# Patient Record
Sex: MALE | Race: WHITE | NOT HISPANIC OR LATINO
[De-identification: names, ages, dates, MRNs, and addresses within clinical notes are randomized per-mention and may not be internally consistent; named-entity substitution may affect disease eponyms.]

---

## 2018-08-30 ENCOUNTER — APPOINTMENT (OUTPATIENT)
Dept: SURGICAL ONCOLOGY | Facility: CLINIC | Age: 83
End: 2018-08-30
Payer: MEDICARE

## 2018-08-30 VITALS
HEART RATE: 70 BPM | SYSTOLIC BLOOD PRESSURE: 107 MMHG | BODY MASS INDEX: 25.18 KG/M2 | WEIGHT: 170 LBS | OXYGEN SATURATION: 98 % | DIASTOLIC BLOOD PRESSURE: 67 MMHG | HEIGHT: 69 IN

## 2018-08-30 DIAGNOSIS — Z86.79 PERSONAL HISTORY OF OTHER DISEASES OF THE CIRCULATORY SYSTEM: ICD-10-CM

## 2018-08-30 DIAGNOSIS — Z87.898 PERSONAL HISTORY OF OTHER SPECIFIED CONDITIONS: ICD-10-CM

## 2018-08-30 DIAGNOSIS — Z80.52 FAMILY HISTORY OF MALIGNANT NEOPLASM OF BLADDER: ICD-10-CM

## 2018-08-30 DIAGNOSIS — Z80.3 FAMILY HISTORY OF MALIGNANT NEOPLASM OF BREAST: ICD-10-CM

## 2018-08-30 DIAGNOSIS — Z86.69 PERSONAL HISTORY OF OTHER DISEASES OF THE NERVOUS SYSTEM AND SENSE ORGANS: ICD-10-CM

## 2018-08-30 DIAGNOSIS — Z86.72 PERSONAL HISTORY OF THROMBOPHLEBITIS: ICD-10-CM

## 2018-08-30 DIAGNOSIS — Z87.19 PERSONAL HISTORY OF OTHER DISEASES OF THE DIGESTIVE SYSTEM: ICD-10-CM

## 2018-08-30 DIAGNOSIS — Z85.828 PERSONAL HISTORY OF OTHER MALIGNANT NEOPLASM OF SKIN: ICD-10-CM

## 2018-08-30 DIAGNOSIS — Z78.9 OTHER SPECIFIED HEALTH STATUS: ICD-10-CM

## 2018-08-30 PROCEDURE — 99205 OFFICE O/P NEW HI 60 MIN: CPT

## 2018-08-31 ENCOUNTER — RESULT REVIEW (OUTPATIENT)
Age: 83
End: 2018-08-31

## 2018-09-05 ENCOUNTER — OUTPATIENT (OUTPATIENT)
Dept: OUTPATIENT SERVICES | Facility: HOSPITAL | Age: 83
LOS: 1 days | End: 2018-09-05
Payer: MEDICARE

## 2018-09-05 VITALS
TEMPERATURE: 98 F | WEIGHT: 164.02 LBS | DIASTOLIC BLOOD PRESSURE: 68 MMHG | HEIGHT: 65 IN | HEART RATE: 56 BPM | SYSTOLIC BLOOD PRESSURE: 108 MMHG | RESPIRATION RATE: 17 BRPM

## 2018-09-05 DIAGNOSIS — Z98.890 OTHER SPECIFIED POSTPROCEDURAL STATES: Chronic | ICD-10-CM

## 2018-09-05 DIAGNOSIS — Z98.61 CORONARY ANGIOPLASTY STATUS: Chronic | ICD-10-CM

## 2018-09-05 DIAGNOSIS — C49.0 MALIGNANT NEOPLASM OF CONNECTIVE AND SOFT TISSUE OF HEAD, FACE AND NECK: ICD-10-CM

## 2018-09-05 DIAGNOSIS — Z98.41 CATARACT EXTRACTION STATUS, RIGHT EYE: Chronic | ICD-10-CM

## 2018-09-05 DIAGNOSIS — Z95.818 PRESENCE OF OTHER CARDIAC IMPLANTS AND GRAFTS: Chronic | ICD-10-CM

## 2018-09-05 DIAGNOSIS — C49.9 MALIGNANT NEOPLASM OF CONNECTIVE AND SOFT TISSUE, UNSPECIFIED: ICD-10-CM

## 2018-09-05 DIAGNOSIS — Z90.49 ACQUIRED ABSENCE OF OTHER SPECIFIED PARTS OF DIGESTIVE TRACT: Chronic | ICD-10-CM

## 2018-09-05 DIAGNOSIS — I25.10 ATHEROSCLEROTIC HEART DISEASE OF NATIVE CORONARY ARTERY WITHOUT ANGINA PECTORIS: ICD-10-CM

## 2018-09-05 DIAGNOSIS — C44.90 UNSPECIFIED MALIGNANT NEOPLASM OF SKIN, UNSPECIFIED: Chronic | ICD-10-CM

## 2018-09-05 DIAGNOSIS — K22.70 BARRETT'S ESOPHAGUS WITHOUT DYSPLASIA: ICD-10-CM

## 2018-09-05 DIAGNOSIS — E78.00 PURE HYPERCHOLESTEROLEMIA, UNSPECIFIED: ICD-10-CM

## 2018-09-05 DIAGNOSIS — Z87.09 PERSONAL HISTORY OF OTHER DISEASES OF THE RESPIRATORY SYSTEM: Chronic | ICD-10-CM

## 2018-09-05 LAB
BUN SERPL-MCNC: 22 MG/DL — SIGNIFICANT CHANGE UP (ref 7–23)
CALCIUM SERPL-MCNC: 9.3 MG/DL — SIGNIFICANT CHANGE UP (ref 8.4–10.5)
CHLORIDE SERPL-SCNC: 104 MMOL/L — SIGNIFICANT CHANGE UP (ref 98–107)
CO2 SERPL-SCNC: 25 MMOL/L — SIGNIFICANT CHANGE UP (ref 22–31)
CREAT SERPL-MCNC: 1.36 MG/DL — HIGH (ref 0.5–1.3)
GLUCOSE SERPL-MCNC: 101 MG/DL — HIGH (ref 70–99)
HCT VFR BLD CALC: 38 % — LOW (ref 39–50)
HGB BLD-MCNC: 12.7 G/DL — LOW (ref 13–17)
MCHC RBC-ENTMCNC: 33.4 % — SIGNIFICANT CHANGE UP (ref 32–36)
MCHC RBC-ENTMCNC: 34 PG — SIGNIFICANT CHANGE UP (ref 27–34)
MCV RBC AUTO: 101.9 FL — HIGH (ref 80–100)
NRBC # FLD: 0 — SIGNIFICANT CHANGE UP
PLATELET # BLD AUTO: 180 K/UL — SIGNIFICANT CHANGE UP (ref 150–400)
PMV BLD: 10.1 FL — SIGNIFICANT CHANGE UP (ref 7–13)
POTASSIUM SERPL-MCNC: 4.3 MMOL/L — SIGNIFICANT CHANGE UP (ref 3.5–5.3)
POTASSIUM SERPL-SCNC: 4.3 MMOL/L — SIGNIFICANT CHANGE UP (ref 3.5–5.3)
RBC # BLD: 3.73 M/UL — LOW (ref 4.2–5.8)
RBC # FLD: 11.8 % — SIGNIFICANT CHANGE UP (ref 10.3–14.5)
SODIUM SERPL-SCNC: 141 MMOL/L — SIGNIFICANT CHANGE UP (ref 135–145)
WBC # BLD: 5.04 K/UL — SIGNIFICANT CHANGE UP (ref 3.8–10.5)
WBC # FLD AUTO: 5.04 K/UL — SIGNIFICANT CHANGE UP (ref 3.8–10.5)

## 2018-09-05 PROCEDURE — 93010 ELECTROCARDIOGRAM REPORT: CPT

## 2018-09-05 RX ORDER — SODIUM CHLORIDE 9 MG/ML
1000 INJECTION, SOLUTION INTRAVENOUS
Qty: 0 | Refills: 0 | Status: DISCONTINUED | OUTPATIENT
Start: 2018-09-12 | End: 2018-09-27

## 2018-09-05 NOTE — H&P PST ADULT - PSH
Closed Fracture of Shoulder Blade (ICD9 811.00)  L 8 yrs ago  Fracture of Nose (ICD9 802.0)  40 yrs ago  H/O cataract extraction, right    Osteomyelitis (ICD9 730.20)  R lower ribs after fracture 1990's requiring ABX and surgery  S/P PTCA (percutaneous transluminal coronary angioplasty)  2009- with stent to LAD  Skin cancer    Status post placement of implantable loop recorder  implanted in 2014  removed in 2017 Closed Fracture of Shoulder Blade (ICD9 811.00)  L 8 yrs ago  Fracture of Nose (ICD9 802.0)  40 yrs ago  H/O cataract extraction, right    H/O pneumothorax  right lung s/p nail punctured right lung (30 years ago)  History of laparoscopic cholecystectomy  2010  Osteomyelitis (ICD9 730.20)  R lower ribs after fracture 1990's requiring ABX and surgery  S/P arthroscopy of shoulder  left  S/P PTCA (percutaneous transluminal coronary angioplasty)  2009- with stent to LAD  Skin cancer    Status post placement of implantable loop recorder  implanted in 2014  removed in 2017

## 2018-09-05 NOTE — H&P PST ADULT - PROBLEM SELECTOR PLAN 1
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  Preoperative instructions reviewed, pt verbalized understanding.  Lab results pending, EKG done.  Pt. instructed to obtain cardiac evaluation prior to surgery, please obtain last stress and Echo results for PST chart

## 2018-09-05 NOTE — H&P PST ADULT - PMH
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)    CAD (Coronary Artery Disease) (ICD9 414.00)  s/p stent to LAD 9/11/09  Cataract    Hypercholesteremia (ICD9 272.0)    Lung nodule  followed by annual CT Scan  Skin cancer  Basal, Squamous - treated surgically  Spinal stenosis    Syncope  (2016) as a result of Clopedigrel and seizure like jerking- stopped after Plavix was stopped 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

## 2018-09-05 NOTE — H&P PST ADULT - PROBLEM/PLAN-3
[facility]  [address]  [facility] [address]  Phone: [phone]           Patient Discharge Instructions   Our goal is to set you up for success. This packet includes information on your condition, medications, and your home care.  It will help you care for yourself to prevent having to return to the hospital.     Please ask your nurse if you have any questions.      There are many details to remember when preparing to leave the hospital. Here is what you will need to do:    1. Take your medicine. If you are prescribed medications, review your Medication List on the following pages. You may have new medications to  at the pharmacy and others that you'll need to stop taking. Review the instructions for how and when to take your medications. Talk with your doctor or nurses if you are unsure of what to do.     2. Go to your follow-up appointments. Specific follow-up information is listed in the following pages. Your may be contacted by a nurse or clinical provider about future appointments. Be sure we have all of the phone numbers to reach you. Please contact your provider's office if you are unable to make an appointment.     3. Watch for warning signs. Your doctor or nurse will give you detailed warning signs to watch for and when to call for assistance. These instructions may also include educational information about your condition. If you experience any of warning signs to your health, call your doctor.           [facility]  Unless otherwise directed by your provider, please   contact [facility], our nurse care line   that is available for 24/7 assistance.     [phone] (toll-free)     Registered nurses in the [facility]   provide: appointment scheduling, clinical advisement, health education, and other advisory services.                  ** Verify the list of medication(s) below is accurate and up to date. Carry this with you in case of  emergency. If your medications have changed, please notify your healthcare provider.             Medication List      START taking these medications        Additional Info                      amoxicillin-clavulanate 500-125mg 500-125 mg Tab   Commonly known as:  AUGMENTIN   Quantity:  14 tablet   Refills:  0   Dose:  1 tablet    Instructions:  Take 1 tablet (500 mg total) by mouth 2 (two) times daily.     Begin Date    AM    Noon    PM    Bedtime       ondansetron 4 MG tablet   Commonly known as:  ZOFRAN   Quantity:  28 tablet   Refills:  0   Dose:  4 mg    Instructions:  Take 1 tablet (4 mg total) by mouth every 6 (six) hours as needed for Nausea.     Begin Date    AM    Noon    PM    Bedtime         CHANGE how you take these medications        Additional Info                      * oxycodone 30 MG Tab   Commonly known as:  ROXICODONE   Refills:  0   Dose:  30 mg   What changed:  Another medication with the same name was added. Make sure you understand how and when to take each.    Last time this was given:  5 mg on 4/18/2017  5:42 AM   Instructions:  Take 30 mg by mouth every 8 (eight) hours.     Begin Date    AM    Noon    PM    Bedtime       * oxycodone 5 MG immediate release tablet   Commonly known as:  ROXICODONE   Quantity:  20 tablet   Refills:  0   Dose:  5 mg   What changed:  You were already taking a medication with the same name, and this prescription was added. Make sure you understand how and when to take each.    Last time this was given:  5 mg on 4/18/2017  5:42 AM   Instructions:  Take 1 tablet (5 mg total) by mouth every 6 (six) hours as needed for Pain.     Begin Date    AM    Noon    PM    Bedtime       * Notice:  This list has 2 medication(s) that are the same as other medications prescribed for you. Read the directions carefully, and ask your doctor or other care provider to review them with you.      CONTINUE taking these medications        Additional Info                      calcium-vitamin  D 250-100 mg-unit per tablet   Refills:  0   Dose:  2 tablet    Instructions:  Take 2 tablets by mouth 2 (two) times daily.     Begin Date    AM    Noon    PM    Bedtime       CHANTIX STARTING MONTH BOX 0.5 mg (11)- 1 mg (42) tablet   Refills:  0   Generic drug:  varenicline    Instructions:  as directed Orally 30 days     Begin Date    AM    Noon    PM    Bedtime       ciprofloxacin HCl 500 MG tablet   Commonly known as:  CIPRO   Quantity:  30 tablet   Refills:  6   Dose:  500 mg    Instructions:  Take 1 tablet (500 mg total) by mouth once daily.     Begin Date    AM    Noon    PM    Bedtime       diazePAM 10 MG Tab   Commonly known as:  VALIUM   Refills:  0   Dose:  10 mg    Last time this was given:  5 mg on 4/17/2017  9:32 PM   Instructions:  Take 10 mg by mouth 2 (two) times daily.     Begin Date    AM    Noon    PM    Bedtime       furosemide 40 MG tablet   Commonly known as:  LASIX   Quantity:  30 tablet   Refills:  11   Dose:  40 mg    Last time this was given:  40 mg on 4/17/2017  2:49 PM   Instructions:  Take 1 tablet (40 mg total) by mouth once daily.     Begin Date    AM    Noon    PM    Bedtime       * lactulose 20 gram/30 mL Soln   Commonly known as:  CHRONULAC   Quantity:  3000 mL   Refills:  11   Dose:  20 g    Instructions:  Take 30 mLs (20 g total) by mouth every 6 (six) hours as needed (titrate to have 2-3 bowle movements per day).     Begin Date    AM    Noon    PM    Bedtime       * lactulose 10 gram/15 mL solution   Commonly known as:  CHRONULAC   Refills:  0      Begin Date    AM    Noon    PM    Bedtime       morphine 30 MG 12 hr tablet   Commonly known as:  MS CONTIN   Refills:  0   Dose:  30 mg    Instructions:  Take 30 mg by mouth every 12 (twelve) hours.     Begin Date    AM    Noon    PM    Bedtime       neomycin-polymyxin-dexamethasone 3.5 mg/g-10,000 unit/g-0.1 % Oint   Commonly known as:  DEXACINE   Refills:  0    Instructions:  every 6 (six) hours.     Begin Date    AM    Noon    PM     Bedtime       nicotine 10 mg Crtg   Commonly known as:  NICOTROL   Quantity:  168 puff   Refills:  0   Dose:  1 puff    Instructions:  Inhale 1 puff into the lungs as needed. (6-16 cartridges daily as needed) inhaled     Begin Date    AM    Noon    PM    Bedtime       pantoprazole 40 MG tablet   Commonly known as:  PROTONIX   Quantity:  30 tablet   Refills:  11   Dose:  40 mg    Last time this was given:  40 mg on 4/17/2017  2:50 PM   Instructions:  Take 1 tablet (40 mg total) by mouth once daily.     Begin Date    AM    Noon    PM    Bedtime       rifAXIMin 550 mg Tab   Commonly known as:  XIFAXAN   Quantity:  60 tablet   Refills:  11   Dose:  550 mg    Last time this was given:  550 mg on 4/17/2017  9:32 PM   Instructions:  Take 1 tablet (550 mg total) by mouth 2 (two) times daily.     Begin Date    AM    Noon    PM    Bedtime       sodium,potassium,mag sulfates 17.5-3.13-1.6 gram Solr   Commonly known as:  SUPREP BOWEL PREP KIT   Quantity:  354 mL   Refills:  0   Dose:  1 kit    Instructions:  Take 1 kit by mouth as directed.     Begin Date    AM    Noon    PM    Bedtime       spironolactone 50 MG tablet   Commonly known as:  ALDACTONE   Quantity:  30 tablet   Refills:  11   Dose:  50 mg    Last time this was given:  50 mg on 4/17/2017  2:50 PM   Instructions:  Take 1 tablet (50 mg total) by mouth once daily.     Begin Date    AM    Noon    PM    Bedtime       VOLTAREN 1 % Gel   Refills:  3   Generic drug:  diclofenac sodium    Instructions:  apply (2G) by topical route 3 times every day to the affected area(s)     Begin Date    AM    Noon    PM    Bedtime       * Notice:  This list has 2 medication(s) that are the same as other medications prescribed for you. Read the directions carefully, and ask your doctor or other care provider to review them with you.         Where to Get Your Medications      You can get these medications from any pharmacy     Bring a paper prescription for each of these medications      amoxicillin-clavulanate 500-125mg 500-125 mg Tab    ondansetron 4 MG tablet    oxycodone 5 MG immediate release tablet                  Please bring to all follow up appointments:    1. A copy of your discharge instructions.  2. All medicines you are currently taking in their original bottles.  3. Identification and insurance card.    Please arrive 15 minutes ahead of scheduled appointment time.    Please call 24 hours in advance if you must reschedule your appointment and/or time.        Your Scheduled Appointments     Apr 25, 2017  1:00 PM CDT   Non-Fasting Lab with LAB, APPOINTMENT [place] [facility] ([facility])    [facility] [facility] [address]   [phone]            Apr 25, 2017  1:05 PM CDT   Urine with SPECIMEN, MAIN CAMPUS [facility] ([facility])    [address] [facility] [address]   [phone]            Apr 27, 2017 11:00 AM CDT   Established Patient Visit with Binta Castaneda MD   [facility] - Pulmonary Services ([facility] )    [address]  [place] [address]   [phone]            Apr 27, 2017  2:20 PM CDT   Established Patient with Sharyn Eaton NP   [facility] - Liver Transplant ([facility] )    [address]  [place] [address]   [phone]            May 05, 2017 11:30 AM CDT   Established Patient Visit with Keely Luna MD   [facility] - Nephrology ([facility] )    [facility]  [place] [id number] [phone]                  Admission Information     Date & Time Provider Department CSN    4/17/2017  7:25 AM Indira Oconnor MD [facility] [id number]      Care Providers     Provider Role Specialty Primary office phone    Indira Oconnor MD Attending Provider Gastroenterology [phone]    [facility] Diagnostic Provider Surgeon  -- Number not on file      Your Vitals Were     BP  "Pulse Temp Resp Height Weight    111/70 (BP Location: Left arm, Patient Position: Lying, BP Method: Automatic) 71 97.9 °F (36.6 °C) (Oral) 18 5' 9" (1.753 m) 70.3 kg (155 lb)    SpO2 BMI             96% 22.89 kg/m2         Recent Lab Values     No lab values to display.      Allergies as of 4/18/2017        Reactions    Adhesive Blisters      Advance Directives     An advance directive is a document which, in the event you are no longer able to make decisions for yourself, tells your healthcare team what kind of treatment you do or do not want to receive, or who you would like to make those decisions for you.  If you do not currently have an advance directive, [facility] encourages you to create one.  For more information call:  [phone]), [phone] ([phone]),  or log on to [email or web address].        Smoking Cessation     If you would like to quit smoking:   You may be eligible for free services if you are a [place] resident and started smoking cigarettes before September 1, 1988.  Call the Smoking Cessation Trust ([facility]) toll free at [phone] or [phone].   Call 9-691-QUIT-NOW if you do not meet the above criteria.   Contact us via email: [email or web address]   View our website for more information: [email or web address]        Language Assistance Services     ATTENTION: Language assistance services are available, free of charge. Please call [phone].      ATENCIÓN: Si habla español, tiene a sears disposición servicios gratuitos de asistencia lingüística. Llame al [phone].     CHÚ Ý: N?u b?n nói Ti?ng Vi?t, có các d?ch v? h? tr? ngôn ng? mi?n phí dành cho b?n. G?i s? [phone].         [facility] complies with applicable Federal civil rights laws and does not discriminate on the basis of race, color, national origin, age, disability, or sex.        " DISPLAY PLAN FREE TEXT

## 2018-09-05 NOTE — H&P PST ADULT - FAMILY HISTORY
Mother  Still living? No  Family history of bladder cancer, Age at diagnosis: Age Unknown     Father  Still living? No  Family history of premature CAD, Age at diagnosis: Age Unknown  Family history of pulmonary embolism, Age at diagnosis: Age Unknown

## 2018-09-05 NOTE — H&P PST ADULT - HISTORY OF PRESENT ILLNESS
83 y/o male with history of right scalp sarcoma presents to PAST today for presurgical evaluation.  He is scheduled for radical resection right scalp sarcoma on 9/12/18. 83 y/o male with history of right scalp sarcoma presents to PAST today for presurgical evaluation.  He has history of multiple skin cancers and had multiple excisions and Mohs Surgeries.  He is scheduled for radical resection right scalp sarcoma on 9/12/18.

## 2018-09-10 PROBLEM — Z80.52 FAMILY HISTORY OF MALIGNANT NEOPLASM OF URINARY BLADDER: Status: ACTIVE | Noted: 2018-08-30

## 2018-09-10 PROBLEM — Z86.79 HISTORY OF OTHER DISEASES OF THE CIRCULATORY SYSTEM, NOT ELSEWHERE CLASSIFIED: Status: RESOLVED | Noted: 2018-08-30 | Resolved: 2018-09-10

## 2018-09-10 PROBLEM — Z86.69 HISTORY OF CATARACT: Status: RESOLVED | Noted: 2018-08-30 | Resolved: 2018-09-10

## 2018-09-10 PROBLEM — Z87.898 HISTORY OF SEIZURES: Status: RESOLVED | Noted: 2018-08-30 | Resolved: 2018-09-10

## 2018-09-10 PROBLEM — Z86.72 HISTORY OF PHLEBITIS: Status: RESOLVED | Noted: 2018-08-30 | Resolved: 2018-09-10

## 2018-09-10 PROBLEM — Z86.69 HISTORY OF EYE PROBLEM: Status: RESOLVED | Noted: 2018-08-30 | Resolved: 2018-09-10

## 2018-09-10 PROBLEM — C49.9 MALIGNANT NEOPLASM OF CONNECTIVE AND SOFT TISSUE, UNSPECIFIED: Chronic | Status: ACTIVE | Noted: 2018-09-05

## 2018-09-10 PROBLEM — Z85.828 HISTORY OF SKIN CANCER: Status: RESOLVED | Noted: 2018-08-30 | Resolved: 2018-09-10

## 2018-09-10 PROBLEM — R91.1 SOLITARY PULMONARY NODULE: Chronic | Status: ACTIVE | Noted: 2018-09-05

## 2018-09-10 PROBLEM — M48.00 SPINAL STENOSIS, SITE UNSPECIFIED: Chronic | Status: ACTIVE | Noted: 2018-09-05

## 2018-09-10 PROBLEM — Z86.79 HISTORY OF VASCULAR DISORDER: Status: RESOLVED | Noted: 2018-08-30 | Resolved: 2018-09-10

## 2018-09-10 PROBLEM — Z78.9 CONSUMES ALCOHOL OCCASIONALLY: Status: ACTIVE | Noted: 2018-08-30

## 2018-09-10 PROBLEM — Z87.19 HISTORY OF HIATAL HERNIA: Status: RESOLVED | Noted: 2018-08-30 | Resolved: 2018-09-10

## 2018-09-10 PROBLEM — Z80.3 FAMILY HISTORY OF MALIGNANT NEOPLASM OF BREAST: Status: ACTIVE | Noted: 2018-08-30

## 2018-09-11 ENCOUNTER — TRANSCRIPTION ENCOUNTER (OUTPATIENT)
Age: 83
End: 2018-09-11

## 2018-09-11 NOTE — ASU PATIENT PROFILE, ADULT - PAIN SCALE PREFERRED, PROFILE
Identified patient 2 identifiers verified.  Patient made aware that there was no Antibiotice prescribed and that she may have to schedule an appointment to be seen
Pt is advising that she was to  Rx today, she will be picking it up tomorrow.  Also, needs to know if there was an antibiotic called in for her     Best contact # [phone]       Message received & copied from [facility]
Pt is expecting a call regarding a Rx for cough syrup to be picked up       Best contact # [phone]       Message received & copied from [facility] Castro
numerical 0-10

## 2018-09-11 NOTE — ASU PATIENT PROFILE, ADULT - PSH
Closed Fracture of Shoulder Blade (ICD9 811.00)  L 8 yrs ago  Fracture of Nose (ICD9 802.0)  40 yrs ago  H/O cataract extraction, right    H/O pneumothorax  right lung s/p nail punctured right lung (30 years ago)  History of laparoscopic cholecystectomy  2010  Osteomyelitis (ICD9 730.20)  R lower ribs after fracture 1990's requiring ABX and surgery  S/P arthroscopy of shoulder  left  S/P PTCA (percutaneous transluminal coronary angioplasty)  2009- with stent to LAD  Skin cancer    Status post placement of implantable loop recorder  implanted in 2014  removed in 2017

## 2018-09-11 NOTE — ASU PATIENT PROFILE, ADULT - PMH
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

## 2018-09-12 ENCOUNTER — RESULT REVIEW (OUTPATIENT)
Age: 83
End: 2018-09-12

## 2018-09-12 ENCOUNTER — OUTPATIENT (OUTPATIENT)
Dept: OUTPATIENT SERVICES | Facility: HOSPITAL | Age: 83
LOS: 1 days | Discharge: HOME | End: 2018-09-12
Payer: MEDICARE

## 2018-09-12 ENCOUNTER — APPOINTMENT (OUTPATIENT)
Dept: SURGICAL ONCOLOGY | Facility: HOSPITAL | Age: 83
End: 2018-09-12

## 2018-09-12 VITALS
SYSTOLIC BLOOD PRESSURE: 138 MMHG | RESPIRATION RATE: 17 BRPM | TEMPERATURE: 98 F | HEIGHT: 65 IN | WEIGHT: 164.02 LBS | OXYGEN SATURATION: 98 % | DIASTOLIC BLOOD PRESSURE: 71 MMHG | HEART RATE: 57 BPM

## 2018-09-12 VITALS
SYSTOLIC BLOOD PRESSURE: 146 MMHG | TEMPERATURE: 98 F | DIASTOLIC BLOOD PRESSURE: 73 MMHG | OXYGEN SATURATION: 96 % | RESPIRATION RATE: 12 BRPM | HEART RATE: 61 BPM

## 2018-09-12 DIAGNOSIS — Z98.890 OTHER SPECIFIED POSTPROCEDURAL STATES: Chronic | ICD-10-CM

## 2018-09-12 DIAGNOSIS — Z95.818 PRESENCE OF OTHER CARDIAC IMPLANTS AND GRAFTS: Chronic | ICD-10-CM

## 2018-09-12 DIAGNOSIS — Z98.61 CORONARY ANGIOPLASTY STATUS: Chronic | ICD-10-CM

## 2018-09-12 DIAGNOSIS — Z90.49 ACQUIRED ABSENCE OF OTHER SPECIFIED PARTS OF DIGESTIVE TRACT: Chronic | ICD-10-CM

## 2018-09-12 DIAGNOSIS — Z98.41 CATARACT EXTRACTION STATUS, RIGHT EYE: Chronic | ICD-10-CM

## 2018-09-12 DIAGNOSIS — C44.90 UNSPECIFIED MALIGNANT NEOPLASM OF SKIN, UNSPECIFIED: Chronic | ICD-10-CM

## 2018-09-12 DIAGNOSIS — Z87.09 PERSONAL HISTORY OF OTHER DISEASES OF THE RESPIRATORY SYSTEM: Chronic | ICD-10-CM

## 2018-09-12 DIAGNOSIS — C49.0 MALIGNANT NEOPLASM OF CONNECTIVE AND SOFT TISSUE OF HEAD, FACE AND NECK: ICD-10-CM

## 2018-09-12 PROCEDURE — 88331 PATH CONSLTJ SURG 1 BLK 1SPC: CPT | Mod: 26

## 2018-09-12 PROCEDURE — 88305 TISSUE EXAM BY PATHOLOGIST: CPT | Mod: 26

## 2018-09-12 PROCEDURE — 21016 RESECT FACE/SCALP TUM 2 CM/>: CPT

## 2018-09-12 PROCEDURE — 21015 RESECT FACE/SCALP TUM < 2 CM: CPT | Mod: 59

## 2018-09-12 PROCEDURE — 88341 IMHCHEM/IMCYTCHM EA ADD ANTB: CPT | Mod: 26

## 2018-09-12 PROCEDURE — 88342 IMHCHEM/IMCYTCHM 1ST ANTB: CPT | Mod: 26

## 2018-09-12 RX ORDER — ONDANSETRON 8 MG/1
4 TABLET, FILM COATED ORAL ONCE
Qty: 0 | Refills: 0 | Status: DISCONTINUED | OUTPATIENT
Start: 2018-09-12 | End: 2018-09-12

## 2018-09-12 RX ORDER — FENTANYL CITRATE 50 UG/ML
25 INJECTION INTRAVENOUS
Qty: 0 | Refills: 0 | Status: DISCONTINUED | OUTPATIENT
Start: 2018-09-12 | End: 2018-09-12

## 2018-09-12 RX ORDER — OXYCODONE HYDROCHLORIDE 5 MG/1
5 TABLET ORAL EVERY 4 HOURS
Qty: 0 | Refills: 0 | Status: DISCONTINUED | OUTPATIENT
Start: 2018-09-12 | End: 2018-09-12

## 2018-09-12 RX ORDER — OXYCODONE HYDROCHLORIDE 5 MG/1
5 TABLET ORAL ONCE
Qty: 0 | Refills: 0 | Status: DISCONTINUED | OUTPATIENT
Start: 2018-09-12 | End: 2018-09-12

## 2018-09-12 RX ORDER — OXYCODONE HYDROCHLORIDE 5 MG/1
1 TABLET ORAL
Qty: 12 | Refills: 0 | OUTPATIENT
Start: 2018-09-12

## 2018-09-12 RX ORDER — CEPHALEXIN 500 MG
1 CAPSULE ORAL
Qty: 21 | Refills: 0 | OUTPATIENT
Start: 2018-09-12 | End: 2018-09-18

## 2018-09-12 NOTE — ASU DISCHARGE PLAN (ADULT/PEDIATRIC). - MEDICATION SUMMARY - MEDICATIONS TO TAKE
I will START or STAY ON the medications listed below when I get home from the hospital:    oxyCODONE 5 mg oral tablet  -- 1 tab(s) by mouth every 4 to 6 hours, As Needed -Moderate to Severe pain MDD:6 tabs  -- Indication: For Postoperative pain control as needed    aspirin 81 mg oral tablet  -- 1 tab(s) by mouth once a day  -- Indication: For Home medication    simvastatin 20 mg oral tablet  -- 1 tab(s) by mouth once a day (at bedtime)  -- Indication: For Home medication    Keflex 500 mg oral capsule  -- 1 cap(s) by mouth every 8 hours   -- Finish all this medication unless otherwise directed by prescriber.    -- Indication: For Postoperative antibiotics for 7 days    Probiotic Formula oral capsule  -- 1 cap(s) by mouth once a day  -- Indication: For Home medication    pantoprazole 40 mg oral delayed release tablet  -- 1 tab(s) by mouth once a day  -- Indication: For Home medication    Folgard Rx 2.2 oral tablet  -- 1 tab(s) by mouth once a day  -- Indication: For Home medication    Vitamin D3 1000 intl units oral tablet  -- 1 tab(s) by mouth once a day  -- Indication: For Home medication

## 2018-09-12 NOTE — ASU DISCHARGE PLAN (ADULT/PEDIATRIC). - NOTIFY
Bleeding that does not stop Swelling that continues/Unable to Urinate/Inability to Tolerate Liquids or Foods/Fever greater than 101/Pain not relieved by Medications/Bleeding that does not stop/Persistent Nausea and Vomiting

## 2018-09-12 NOTE — ASU DISCHARGE PLAN (ADULT/PEDIATRIC). - ACTIVITY LEVEL
weight bearing as tolerated no heavy lifting/weight bearing as tolerated no heavy lifting/no sports/gym/weight bearing as tolerated/no exercise

## 2018-09-12 NOTE — ASU DISCHARGE PLAN (ADULT/PEDIATRIC). - PAIN
take Tylenol as needed for mild pain. Take prescribed oxycodone as needed for moderate to severe pain/prescription called to pharmacy

## 2018-09-12 NOTE — ASU DISCHARGE PLAN (ADULT/PEDIATRIC). - NURSING INSTRUCTIONS
DO NOT take any Tylenol (Acetaminophen) or narcotics containing Tylenol until after  12:30am. You received Tylenol during your operation and it can cause damage to your liver if too much is taken within a 24 hour time period.

## 2018-09-12 NOTE — BRIEF OPERATIVE NOTE - OPERATION/FINDINGS
Right scalp mass removed at the level of the periosteum.  See Plastics note for reconstruction portion Right scalp mass removed at the level of the periosteum (General Surgery)    Closure of defect with pericranial flaps & Split-thickness skin graft from Right Lateral Thigh to Scalp.

## 2018-09-27 ENCOUNTER — APPOINTMENT (OUTPATIENT)
Dept: SURGICAL ONCOLOGY | Facility: CLINIC | Age: 83
End: 2018-09-27
Payer: MEDICARE

## 2018-09-27 VITALS
HEART RATE: 61 BPM | BODY MASS INDEX: 24.88 KG/M2 | RESPIRATION RATE: 15 BRPM | OXYGEN SATURATION: 97 % | WEIGHT: 168 LBS | SYSTOLIC BLOOD PRESSURE: 106 MMHG | DIASTOLIC BLOOD PRESSURE: 67 MMHG | HEIGHT: 69 IN

## 2018-09-27 PROCEDURE — 99024 POSTOP FOLLOW-UP VISIT: CPT

## 2018-10-11 ENCOUNTER — APPOINTMENT (OUTPATIENT)
Dept: SURGICAL ONCOLOGY | Facility: CLINIC | Age: 83
End: 2018-10-11
Payer: MEDICARE

## 2018-10-11 VITALS
DIASTOLIC BLOOD PRESSURE: 78 MMHG | HEART RATE: 71 BPM | HEIGHT: 69 IN | BODY MASS INDEX: 25.18 KG/M2 | OXYGEN SATURATION: 98 % | SYSTOLIC BLOOD PRESSURE: 133 MMHG | WEIGHT: 170 LBS

## 2018-10-11 PROCEDURE — 99024 POSTOP FOLLOW-UP VISIT: CPT

## 2018-11-08 ENCOUNTER — APPOINTMENT (OUTPATIENT)
Dept: SURGICAL ONCOLOGY | Facility: CLINIC | Age: 83
End: 2018-11-08
Payer: MEDICARE

## 2018-11-08 VITALS
DIASTOLIC BLOOD PRESSURE: 61 MMHG | HEIGHT: 69 IN | RESPIRATION RATE: 14 BRPM | HEART RATE: 66 BPM | SYSTOLIC BLOOD PRESSURE: 99 MMHG

## 2018-11-08 PROCEDURE — 99024 POSTOP FOLLOW-UP VISIT: CPT

## 2018-11-27 ENCOUNTER — OUTPATIENT (OUTPATIENT)
Dept: OUTPATIENT SERVICES | Facility: HOSPITAL | Age: 83
LOS: 1 days | End: 2018-11-27

## 2018-11-27 VITALS
WEIGHT: 166.01 LBS | SYSTOLIC BLOOD PRESSURE: 110 MMHG | HEIGHT: 65.5 IN | DIASTOLIC BLOOD PRESSURE: 64 MMHG | TEMPERATURE: 98 F | RESPIRATION RATE: 14 BRPM | HEART RATE: 68 BPM

## 2018-11-27 DIAGNOSIS — Z98.890 OTHER SPECIFIED POSTPROCEDURAL STATES: Chronic | ICD-10-CM

## 2018-11-27 DIAGNOSIS — Z87.09 PERSONAL HISTORY OF OTHER DISEASES OF THE RESPIRATORY SYSTEM: Chronic | ICD-10-CM

## 2018-11-27 DIAGNOSIS — C49.0 MALIGNANT NEOPLASM OF CONNECTIVE AND SOFT TISSUE OF HEAD, FACE AND NECK: ICD-10-CM

## 2018-11-27 DIAGNOSIS — C44.90 UNSPECIFIED MALIGNANT NEOPLASM OF SKIN, UNSPECIFIED: Chronic | ICD-10-CM

## 2018-11-27 DIAGNOSIS — Z90.49 ACQUIRED ABSENCE OF OTHER SPECIFIED PARTS OF DIGESTIVE TRACT: Chronic | ICD-10-CM

## 2018-11-27 DIAGNOSIS — Z95.818 PRESENCE OF OTHER CARDIAC IMPLANTS AND GRAFTS: Chronic | ICD-10-CM

## 2018-11-27 DIAGNOSIS — Z98.61 CORONARY ANGIOPLASTY STATUS: Chronic | ICD-10-CM

## 2018-11-27 DIAGNOSIS — G47.33 OBSTRUCTIVE SLEEP APNEA (ADULT) (PEDIATRIC): ICD-10-CM

## 2018-11-27 DIAGNOSIS — C44.40 UNSPECIFIED MALIGNANT NEOPLASM OF SKIN OF SCALP AND NECK: ICD-10-CM

## 2018-11-27 DIAGNOSIS — Z98.41 CATARACT EXTRACTION STATUS, RIGHT EYE: Chronic | ICD-10-CM

## 2018-11-27 LAB
ALBUMIN SERPL ELPH-MCNC: 4.2 G/DL — SIGNIFICANT CHANGE UP (ref 3.3–5)
ALP SERPL-CCNC: 70 U/L — SIGNIFICANT CHANGE UP (ref 40–120)
ALT FLD-CCNC: 23 U/L — SIGNIFICANT CHANGE UP (ref 4–41)
AST SERPL-CCNC: 25 U/L — SIGNIFICANT CHANGE UP (ref 4–40)
BILIRUB SERPL-MCNC: 0.5 MG/DL — SIGNIFICANT CHANGE UP (ref 0.2–1.2)
BUN SERPL-MCNC: 24 MG/DL — HIGH (ref 7–23)
CALCIUM SERPL-MCNC: 9.2 MG/DL — SIGNIFICANT CHANGE UP (ref 8.4–10.5)
CHLORIDE SERPL-SCNC: 102 MMOL/L — SIGNIFICANT CHANGE UP (ref 98–107)
CO2 SERPL-SCNC: 27 MMOL/L — SIGNIFICANT CHANGE UP (ref 22–31)
CREAT SERPL-MCNC: 1.32 MG/DL — HIGH (ref 0.5–1.3)
GLUCOSE SERPL-MCNC: 88 MG/DL — SIGNIFICANT CHANGE UP (ref 70–99)
HCT VFR BLD CALC: 37.3 % — LOW (ref 39–50)
HGB BLD-MCNC: 12.2 G/DL — LOW (ref 13–17)
MCHC RBC-ENTMCNC: 32.7 % — SIGNIFICANT CHANGE UP (ref 32–36)
MCHC RBC-ENTMCNC: 33.6 PG — SIGNIFICANT CHANGE UP (ref 27–34)
MCV RBC AUTO: 102.8 FL — HIGH (ref 80–100)
NRBC # FLD: 0 — SIGNIFICANT CHANGE UP
PLATELET # BLD AUTO: 175 K/UL — SIGNIFICANT CHANGE UP (ref 150–400)
PMV BLD: 9.8 FL — SIGNIFICANT CHANGE UP (ref 7–13)
POTASSIUM SERPL-MCNC: 4.1 MMOL/L — SIGNIFICANT CHANGE UP (ref 3.5–5.3)
POTASSIUM SERPL-SCNC: 4.1 MMOL/L — SIGNIFICANT CHANGE UP (ref 3.5–5.3)
PROT SERPL-MCNC: 6.5 G/DL — SIGNIFICANT CHANGE UP (ref 6–8.3)
RBC # BLD: 3.63 M/UL — LOW (ref 4.2–5.8)
RBC # FLD: 11.9 % — SIGNIFICANT CHANGE UP (ref 10.3–14.5)
SODIUM SERPL-SCNC: 140 MMOL/L — SIGNIFICANT CHANGE UP (ref 135–145)
WBC # BLD: 4.28 K/UL — SIGNIFICANT CHANGE UP (ref 3.8–10.5)
WBC # FLD AUTO: 4.28 K/UL — SIGNIFICANT CHANGE UP (ref 3.8–10.5)

## 2018-11-27 RX ORDER — ASPIRIN/CALCIUM CARB/MAGNESIUM 324 MG
1 TABLET ORAL
Qty: 0 | Refills: 0 | COMMUNITY

## 2018-11-27 RX ORDER — L.ACIDOPH/B.ANIMALIS/B.LONGUM 15B CELL
1 CAPSULE ORAL
Qty: 0 | Refills: 0 | COMMUNITY

## 2018-11-27 RX ORDER — SODIUM CHLORIDE 9 MG/ML
1000 INJECTION, SOLUTION INTRAVENOUS
Qty: 0 | Refills: 0 | Status: DISCONTINUED | OUTPATIENT
Start: 2018-12-17 | End: 2019-01-01

## 2018-11-27 RX ORDER — PANTOPRAZOLE SODIUM 20 MG/1
1 TABLET, DELAYED RELEASE ORAL
Qty: 0 | Refills: 0 | COMMUNITY

## 2018-11-27 NOTE — H&P PST ADULT - HISTORY OF PRESENT ILLNESS
Pt. is an 82 yo male that has skin cancer in the scalp.  Pt. had surgery 9/2018.  There appears to be cancer remaining in the scalp.

## 2018-11-27 NOTE — H&P PST ADULT - PSH
Closed Fracture of Shoulder Blade (ICD9 811.00)  L 8 yrs ago  Fracture of Nose (ICD9 802.0)  40 yrs ago  H/O cataract extraction, right    H/O pneumothorax  right lung s/p nail punctured right lung (30 years ago)  History of laparoscopic cholecystectomy  2010  History of loop recorder  was removed in 2016/2017  Osteomyelitis (ICD9 730.20)  R lower ribs after fracture 1990's requiring ABX and surgery  S/P arthroscopy of shoulder  left  S/P PTCA (percutaneous transluminal coronary angioplasty)  2009- with stent to LAD  Skin cancer    Status post placement of implantable loop recorder  implanted in 2014  removed in 2017

## 2018-11-27 NOTE — H&P PST ADULT - PROBLEM SELECTOR PLAN 1
Pt. is scheduled for reexcision of scalp skin cancer 12/17/18.  Spoke with Anette benz NP in the Surgeon's office who confirmed a T&S is not warranted for the procedure after discussion with the Surgeon.

## 2018-12-16 ENCOUNTER — TRANSCRIPTION ENCOUNTER (OUTPATIENT)
Age: 83
End: 2018-12-16

## 2018-12-17 ENCOUNTER — OUTPATIENT (OUTPATIENT)
Dept: OUTPATIENT SERVICES | Facility: HOSPITAL | Age: 83
LOS: 1 days | Discharge: HOME | End: 2018-12-17
Payer: MEDICARE

## 2018-12-17 ENCOUNTER — APPOINTMENT (OUTPATIENT)
Dept: SURGICAL ONCOLOGY | Facility: HOSPITAL | Age: 83
End: 2018-12-17

## 2018-12-17 ENCOUNTER — RESULT REVIEW (OUTPATIENT)
Age: 83
End: 2018-12-17

## 2018-12-17 VITALS
RESPIRATION RATE: 16 BRPM | HEART RATE: 63 BPM | DIASTOLIC BLOOD PRESSURE: 65 MMHG | OXYGEN SATURATION: 100 % | SYSTOLIC BLOOD PRESSURE: 128 MMHG

## 2018-12-17 VITALS
SYSTOLIC BLOOD PRESSURE: 130 MMHG | HEIGHT: 65.5 IN | DIASTOLIC BLOOD PRESSURE: 70 MMHG | RESPIRATION RATE: 16 BRPM | WEIGHT: 166.01 LBS | TEMPERATURE: 98 F | HEART RATE: 58 BPM | OXYGEN SATURATION: 99 %

## 2018-12-17 DIAGNOSIS — Z98.890 OTHER SPECIFIED POSTPROCEDURAL STATES: Chronic | ICD-10-CM

## 2018-12-17 DIAGNOSIS — Z98.61 CORONARY ANGIOPLASTY STATUS: Chronic | ICD-10-CM

## 2018-12-17 DIAGNOSIS — Z95.818 PRESENCE OF OTHER CARDIAC IMPLANTS AND GRAFTS: Chronic | ICD-10-CM

## 2018-12-17 DIAGNOSIS — C49.0 MALIGNANT NEOPLASM OF CONNECTIVE AND SOFT TISSUE OF HEAD, FACE AND NECK: ICD-10-CM

## 2018-12-17 DIAGNOSIS — Z90.49 ACQUIRED ABSENCE OF OTHER SPECIFIED PARTS OF DIGESTIVE TRACT: Chronic | ICD-10-CM

## 2018-12-17 DIAGNOSIS — Z87.09 PERSONAL HISTORY OF OTHER DISEASES OF THE RESPIRATORY SYSTEM: Chronic | ICD-10-CM

## 2018-12-17 DIAGNOSIS — Z98.41 CATARACT EXTRACTION STATUS, RIGHT EYE: Chronic | ICD-10-CM

## 2018-12-17 DIAGNOSIS — C44.90 UNSPECIFIED MALIGNANT NEOPLASM OF SKIN, UNSPECIFIED: Chronic | ICD-10-CM

## 2018-12-17 PROCEDURE — 21016 RESECT FACE/SCALP TUM 2 CM/>: CPT

## 2018-12-17 PROCEDURE — 88342 IMHCHEM/IMCYTCHM 1ST ANTB: CPT | Mod: 26

## 2018-12-17 PROCEDURE — 88305 TISSUE EXAM BY PATHOLOGIST: CPT | Mod: 26

## 2018-12-17 PROCEDURE — 88341 IMHCHEM/IMCYTCHM EA ADD ANTB: CPT | Mod: 26

## 2018-12-17 RX ORDER — ONDANSETRON 8 MG/1
4 TABLET, FILM COATED ORAL ONCE
Qty: 0 | Refills: 0 | Status: DISCONTINUED | OUTPATIENT
Start: 2018-12-17 | End: 2018-12-17

## 2018-12-17 RX ORDER — OXYCODONE AND ACETAMINOPHEN 5; 325 MG/1; MG/1
1 TABLET ORAL
Qty: 20 | Refills: 0
Start: 2018-12-17 | End: 2018-12-21

## 2018-12-17 RX ORDER — SODIUM CHLORIDE 9 MG/ML
1000 INJECTION, SOLUTION INTRAVENOUS
Qty: 0 | Refills: 0 | Status: DISCONTINUED | OUTPATIENT
Start: 2018-12-17 | End: 2019-01-01

## 2018-12-17 RX ORDER — FENTANYL CITRATE 50 UG/ML
50 INJECTION INTRAVENOUS
Qty: 0 | Refills: 0 | Status: DISCONTINUED | OUTPATIENT
Start: 2018-12-17 | End: 2018-12-17

## 2018-12-17 RX ADMIN — SODIUM CHLORIDE 30 MILLILITER(S): 9 INJECTION, SOLUTION INTRAVENOUS at 06:42

## 2018-12-17 NOTE — ASU DISCHARGE PLAN (ADULT/PEDIATRIC). - MEDICATION SUMMARY - MEDICATIONS TO TAKE
I will START or STAY ON the medications listed below when I get home from the hospital:    Percocet 5/325 oral tablet  -- 1 tab(s) by mouth every 6 hours as needed for pain MDD:6  -- Caution federal law prohibits the transfer of this drug to any person other  than the person for whom it was prescribed.  May cause drowsiness.  Alcohol may intensify this effect.  Use care when operating dangerous machinery.  This prescription cannot be refilled.  This product contains acetaminophen.  Do not use  with any other product containing acetaminophen to prevent possible liver damage.  Using more of this medication than prescribed may cause serious breathing problems.    -- Indication: For Pain    aspirin 81 mg oral tablet  -- 1 tab(s) by mouth once a day in am  -- Indication: For Home medication    simvastatin 20 mg oral tablet  -- 1 tab(s) by mouth once a day (at bedtime)  -- Indication: For Home medication    pantoprazole 40 mg oral delayed release tablet  -- 1 tab(s) by mouth once a day in am  -- Indication: For Home medication    Folgard Rx 2.2 oral tablet  -- 1 tab(s) by mouth once a day  -- Indication: For Home medication    Vitamin D3 1000 intl units oral tablet  -- 1 tab(s) by mouth once a day  -- Indication: For Home medication I will START or STAY ON the medications listed below when I get home from the hospital:    Percocet 5/325 oral tablet  -- 1 tab(s) by mouth every 6 hours as needed for pain MDD:6  -- Caution federal law prohibits the transfer of this drug to any person other  than the person for whom it was prescribed.  May cause drowsiness.  Alcohol may intensify this effect.  Use care when operating dangerous machinery.  This prescription cannot be refilled.  This product contains acetaminophen.  Do not use  with any other product containing acetaminophen to prevent possible liver damage.  Using more of this medication than prescribed may cause serious breathing problems.    -- Indication: For pain    aspirin 81 mg oral tablet  -- 1 tab(s) by mouth once a day in am  -- Indication: For Home medication    simvastatin 20 mg oral tablet  -- 1 tab(s) by mouth once a day (at bedtime)  -- Indication: For Home medication    pantoprazole 40 mg oral delayed release tablet  -- 1 tab(s) by mouth once a day in am  -- Indication: For Home medication    Folgard Rx 2.2 oral tablet  -- 1 tab(s) by mouth once a day  -- Indication: For Home medication    Vitamin D3 1000 intl units oral tablet  -- 1 tab(s) by mouth once a day  -- Indication: For Home medication

## 2018-12-17 NOTE — ASU DISCHARGE PLAN (ADULT/PEDIATRIC). - NURSING INSTRUCTIONS
call md for follow up appointment. Keep dressing dry and intact. Keep dressing on thigh.  Дмитрий mfor any increase in pain fever or unable to tolerate food or fluids

## 2018-12-17 NOTE — BRIEF OPERATIVE NOTE - PROCEDURE
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

## 2018-12-17 NOTE — BRIEF OPERATIVE NOTE - OPERATION/FINDINGS
Wide local excision from scalp, STSG from R thigh applied with bolster dressing.
3x9cm section of skin excised containing recurrent neoplasm and some of the prior placed skin graft  Plastics to do closure, in separate op note

## 2018-12-17 NOTE — ASU DISCHARGE PLAN (ADULT/PEDIATRIC). - NOTIFY
Fever greater than 101/Bleeding that does not stop/Persistent Nausea and Vomiting Increased Irritability or Sluggishness/Bleeding that does not stop/Fever greater than 101/Inability to Tolerate Liquids or Foods/Pain not relieved by Medications/Persistent Nausea and Vomiting

## 2018-12-17 NOTE — ASU DISCHARGE PLAN (ADULT/PEDIATRIC). - ITEMS TO FOLLOWUP WITH YOUR PHYSICIAN'S
Please keep the bolster head dressing in place and dry. Do not let soaked in the shower. This will be removed in office at your follow up appointment with plastic surgery. The thigh dressing is water protected, this dressing may ooze which is normal. You may reinforce with Tegaderm as needed.    Please follow up with your plastic surgeon, Dr. Myles within 1 week. You may call [phone] to schedule an appointment. Please follow up with Dr. Stevens within 2 weeks of your discharge from the hospital. You may call [phone] to schedule an appointment.

## 2018-12-17 NOTE — BRIEF OPERATIVE NOTE - PROCEDURE
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

## 2018-12-17 NOTE — ASU DISCHARGE PLAN (ADULT/PEDIATRIC). - SPECIAL INSTRUCTIONS
Please take Tylenol/Motrin for pain. You were given a script for narcotic pain control before your surgery, please take for breakthrough pain control.

## 2018-12-24 LAB — SURGICAL PATHOLOGY STUDY: SIGNIFICANT CHANGE UP

## 2019-01-03 ENCOUNTER — APPOINTMENT (OUTPATIENT)
Dept: SURGICAL ONCOLOGY | Facility: CLINIC | Age: 84
End: 2019-01-03
Payer: MEDICARE

## 2019-01-03 VITALS
OXYGEN SATURATION: 99 % | WEIGHT: 170 LBS | HEART RATE: 68 BPM | HEIGHT: 69 IN | DIASTOLIC BLOOD PRESSURE: 72 MMHG | BODY MASS INDEX: 25.18 KG/M2 | SYSTOLIC BLOOD PRESSURE: 118 MMHG

## 2019-01-03 PROCEDURE — 99024 POSTOP FOLLOW-UP VISIT: CPT

## 2019-01-08 NOTE — HISTORY OF PRESENT ILLNESS
[de-identified] : Patient is an 83 y/o male who presented with a raised lesion in the mid-frontal scalp.  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  He is referred for surgical management.\par Patient reports he has had this lesion for several years and does not have complaints of associated pain.  He has no previous history of sarcoma.\par \par 09/27/2018:  Patient is s/p radical resection of frontal scalp spindle cell neoplasm and biopsy of vertex scalp lesion.  Skin graft coverage by Dr. Myles.  Recovering well.  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  Additional deep margin shows spindle cell lesion felt to represent fibrosing granulation tissue.  Vertex scalp lesion demonstrates atypical spindle cell lesion, favoring atypical fibroxanthoma.\par \par 10/11/2018:  Improved.  Skin graft healing well.\par \par 11/08/2018:  Feels well.  Denies pain. Saw Dr. 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 Myles 12/17/2018.  Pathology shows minimal residual disease, margins negative.

## 2019-02-26 ENCOUNTER — RX CHANGE (OUTPATIENT)
Age: 84
End: 2019-02-26

## 2019-03-06 ENCOUNTER — APPOINTMENT (OUTPATIENT)
Dept: CARDIOLOGY | Facility: CLINIC | Age: 84
End: 2019-03-06
Payer: MEDICARE

## 2019-03-06 ENCOUNTER — APPOINTMENT (OUTPATIENT)
Dept: PULMONOLOGY | Facility: CLINIC | Age: 84
End: 2019-03-06
Payer: MEDICARE

## 2019-03-06 ENCOUNTER — NON-APPOINTMENT (OUTPATIENT)
Age: 84
End: 2019-03-06

## 2019-03-06 VITALS
BODY MASS INDEX: 23.99 KG/M2 | HEART RATE: 66 BPM | OXYGEN SATURATION: 97 % | DIASTOLIC BLOOD PRESSURE: 65 MMHG | SYSTOLIC BLOOD PRESSURE: 90 MMHG | HEIGHT: 69 IN | WEIGHT: 162 LBS | TEMPERATURE: 98.2 F

## 2019-03-06 VITALS — HEART RATE: 68 BPM | SYSTOLIC BLOOD PRESSURE: 109 MMHG | OXYGEN SATURATION: 98 % | DIASTOLIC BLOOD PRESSURE: 62 MMHG

## 2019-03-06 DIAGNOSIS — Z82.49 FAMILY HISTORY OF ISCHEMIC HEART DISEASE AND OTHER DISEASES OF THE CIRCULATORY SYSTEM: ICD-10-CM

## 2019-03-06 LAB
ALBUMIN SERPL ELPH-MCNC: 4.5 G/DL
ALP BLD-CCNC: 71 U/L
ALT SERPL-CCNC: 18 U/L
ANION GAP SERPL CALC-SCNC: 12 MMOL/L
AST SERPL-CCNC: 21 U/L
BASOPHILS # BLD AUTO: 0.03 K/UL
BASOPHILS NFR BLD AUTO: 0.6 %
BILIRUB SERPL-MCNC: 0.4 MG/DL
BUN SERPL-MCNC: 33 MG/DL
CALCIUM SERPL-MCNC: 10.1 MG/DL
CHLORIDE SERPL-SCNC: 105 MMOL/L
CHOLEST SERPL-MCNC: 122 MG/DL
CHOLEST/HDLC SERPL: 2.7 RATIO
CO2 SERPL-SCNC: 24 MMOL/L
CREAT SERPL-MCNC: 1.7 MG/DL
EOSINOPHIL # BLD AUTO: 0.04 K/UL
EOSINOPHIL NFR BLD AUTO: 0.8 %
GLUCOSE SERPL-MCNC: 108 MG/DL
HBA1C MFR BLD HPLC: 5.6 %
HCT VFR BLD CALC: 39.8 %
HDLC SERPL-MCNC: 46 MG/DL
HGB BLD-MCNC: 12.5 G/DL
IMM GRANULOCYTES NFR BLD AUTO: 0.2 %
LDLC SERPL CALC-MCNC: 60 MG/DL
LYMPHOCYTES # BLD AUTO: 1.06 K/UL
LYMPHOCYTES NFR BLD AUTO: 20.7 %
MAN DIFF?: NORMAL
MCHC RBC-ENTMCNC: 31.4 GM/DL
MCHC RBC-ENTMCNC: 32.4 PG
MCV RBC AUTO: 103.1 FL
MONOCYTES # BLD AUTO: 0.4 K/UL
MONOCYTES NFR BLD AUTO: 7.8 %
NEUTROPHILS # BLD AUTO: 3.58 K/UL
NEUTROPHILS NFR BLD AUTO: 69.9 %
PLATELET # BLD AUTO: 192 K/UL
POTASSIUM SERPL-SCNC: 5.3 MMOL/L
PROT SERPL-MCNC: 6.8 G/DL
RBC # BLD: 3.86 M/UL
RBC # FLD: 11.8 %
SODIUM SERPL-SCNC: 141 MMOL/L
TRIGL SERPL-MCNC: 80 MG/DL
TSH SERPL-ACNC: 2.01 UIU/ML
WBC # FLD AUTO: 5.12 K/UL

## 2019-03-06 PROCEDURE — 94727 GAS DIL/WSHOT DETER LNG VOL: CPT

## 2019-03-06 PROCEDURE — 93000 ELECTROCARDIOGRAM COMPLETE: CPT

## 2019-03-06 PROCEDURE — 94729 DIFFUSING CAPACITY: CPT

## 2019-03-06 PROCEDURE — 99203 OFFICE O/P NEW LOW 30 MIN: CPT

## 2019-03-06 PROCEDURE — 94060 EVALUATION OF WHEEZING: CPT

## 2019-03-06 PROCEDURE — 99213 OFFICE O/P EST LOW 20 MIN: CPT

## 2019-03-06 PROCEDURE — 99214 OFFICE O/P EST MOD 30 MIN: CPT | Mod: 25

## 2019-03-06 RX ORDER — ASPIRIN ENTERIC COATED TABLETS 81 MG 81 MG/1
81 TABLET, DELAYED RELEASE ORAL DAILY
Refills: 0 | Status: ACTIVE | COMMUNITY

## 2019-03-06 NOTE — PHYSICAL EXAM

## 2019-03-06 NOTE — PHYSICAL EXAM
[General Appearance - Well Developed] : well developed [General Appearance - Well Nourished] : well nourished [Normal Oropharynx] : normal oropharynx [Jugular Venous Distention Increased] : there was no jugular-venous distention [Auscultation Breath Sounds / Voice Sounds] : lungs were clear to auscultation bilaterally [Lungs Percussion] : the lungs were normal to percussion [Abdomen Soft] : soft [Abdomen Tenderness] : non-tender [Nail Clubbing] : no clubbing of the fingernails [Cyanosis, Localized] : no localized cyanosis [Petechial Hemorrhages (___cm)] : no petechial hemorrhages [] : no ischemic changes

## 2019-03-06 NOTE — PROCEDURE
[FreeTextEntry1] : CT noted no change.\par Pulmonary function testing.\par FEV1, FVC, and FEV1/FVC are within normal limits. There was not a significant response to inhaled bronchodilator. TLC and subdivisions are normal. RV/TLC ratio is normal. There is a mild diffusion impairment. Corrects to normal with lung volume correction \par PFT attached relatively stable function.\par

## 2019-03-06 NOTE — ASSESSMENT
[FreeTextEntry1] : Patient clinically and radiographically stable. Recommend repeat CAT scan in one year at which time he will followup

## 2019-03-06 NOTE — HISTORY OF PRESENT ILLNESS
[FreeTextEntry1] : Lesion on frontal scalp s/p radical resection in Jan 2019 [de-identified] : 83 year old male patient with hx of lung nodules, skin cancer, lumbosacral stenosis presents today for follow up lesion on scalp. Pt is concerned that the wound is not healing well. There are purulent discharge. Pt denied pain or swelling. pt with hx of cad

## 2019-03-06 NOTE — HISTORY OF PRESENT ILLNESS
[FreeTextEntry1] : Patient presently feeling well. Describes mild chronic cough which has not significantly changed and he relates predominantly to a postnasal drip\par \par No wheezing and baseline JOHN.

## 2019-03-09 LAB
ANION GAP SERPL CALC-SCNC: 11 MMOL/L
BUN SERPL-MCNC: 37 MG/DL
CALCIUM SERPL-MCNC: 9.6 MG/DL
CHLORIDE SERPL-SCNC: 105 MMOL/L
CO2 SERPL-SCNC: 23 MMOL/L
CREAT SERPL-MCNC: 1.64 MG/DL
GLUCOSE SERPL-MCNC: 85 MG/DL
POTASSIUM SERPL-SCNC: 4.6 MMOL/L
SODIUM SERPL-SCNC: 139 MMOL/L

## 2019-03-12 LAB — POCT - HEMOGLOBIN (HGB), QUANTITATIVE, TRANSCUTANEOUS: 12.8

## 2019-03-25 ENCOUNTER — LABORATORY RESULT (OUTPATIENT)
Age: 84
End: 2019-03-25

## 2019-03-26 ENCOUNTER — APPOINTMENT (OUTPATIENT)
Dept: DERMATOLOGY | Facility: CLINIC | Age: 84
End: 2019-03-26
Payer: MEDICARE

## 2019-03-26 VITALS — HEIGHT: 69 IN | BODY MASS INDEX: 23.99 KG/M2 | WEIGHT: 162 LBS

## 2019-03-26 PROCEDURE — 17000 DESTRUCT PREMALG LESION: CPT | Mod: 59

## 2019-03-26 PROCEDURE — 99204 OFFICE O/P NEW MOD 45 MIN: CPT | Mod: 25

## 2019-03-26 PROCEDURE — 17003 DESTRUCT PREMALG LES 2-14: CPT

## 2019-03-26 PROCEDURE — 11102 TANGNTL BX SKIN SINGLE LES: CPT

## 2019-04-02 ENCOUNTER — APPOINTMENT (OUTPATIENT)
Dept: SURGICAL ONCOLOGY | Facility: CLINIC | Age: 84
End: 2019-04-02
Payer: MEDICARE

## 2019-04-02 VITALS
SYSTOLIC BLOOD PRESSURE: 114 MMHG | OXYGEN SATURATION: 98 % | HEIGHT: 69 IN | DIASTOLIC BLOOD PRESSURE: 68 MMHG | HEART RATE: 72 BPM | WEIGHT: 170 LBS | BODY MASS INDEX: 25.18 KG/M2

## 2019-04-02 PROCEDURE — 99213 OFFICE O/P EST LOW 20 MIN: CPT

## 2019-04-02 NOTE — HISTORY OF PRESENT ILLNESS
[de-identified] : Patient is an 81 y/o male who presented with a raised lesion in the mid-frontal scalp.  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  He is referred for surgical management.\par Patient reports he has had this lesion for several years and does not have complaints of associated pain.  He has no previous history of sarcoma.\par \par 09/27/2018:  Patient is s/p radical resection of frontal scalp spindle cell neoplasm and biopsy of vertex scalp lesion.  Skin graft coverage by Dr. Myles.  Recovering well.  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  Additional deep margin shows spindle cell lesion felt to represent fibrosing granulation tissue.  Vertex scalp lesion demonstrates atypical spindle cell lesion, favoring atypical fibroxanthoma.\par \par 10/11/2018:  Improved.  Skin graft healing well.\par \par 11/08/2018:  Feels well.  Denies pain. Saw Dr. 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 Myles 12/17/2018.  Pathology shows minimal residual disease, margins negative.\par \par 04/02/2019:  Patient presents for 3 months follow up.  Feels well.  He states he is transferring dermatology care to Dr. Fitzpatrick of [facility].  Had benign right mid back biopsy performed last week by dermatology.

## 2019-04-02 NOTE — ASSESSMENT
[FreeTextEntry1] : Doing well.\par \par Plan:  Continue skin surveillance with dermatology.  Follow up exam in 6 months.

## 2019-04-02 NOTE — PHYSICAL EXAM
[FreeTextEntry1] : Scalp skin graft well healed.  No obvious tumor recurrence under graft.  Small area of residual scar at left edge of skin graft at 3 o'clock position (6 o'clock anterior). [Normal] : supple, no neck mass and thyroid not enlarged [Normal Neck Lymph Nodes] : normal neck lymph nodes  [Normal Supraclavicular Lymph Nodes] : normal supraclavicular lymph nodes [Normal Groin Lymph Nodes] : normal groin lymph nodes [Normal Axillary Lymph Nodes] : normal axillary lymph nodes [Normal] : oriented to person, place and time, with appropriate affect

## 2019-04-02 NOTE — REASON FOR VISIT
[Follow-Up Visit] : a follow-up visit for [FreeTextEntry2] : spindle cell and squamous cell neoplasm of scalp

## 2019-06-03 ENCOUNTER — LABORATORY RESULT (OUTPATIENT)
Age: 84
End: 2019-06-03

## 2019-06-04 ENCOUNTER — APPOINTMENT (OUTPATIENT)
Dept: DERMATOLOGY | Facility: CLINIC | Age: 84
End: 2019-06-04
Payer: MEDICARE

## 2019-06-04 PROCEDURE — 17003 DESTRUCT PREMALG LES 2-14: CPT

## 2019-06-04 PROCEDURE — 11102 TANGNTL BX SKIN SINGLE LES: CPT

## 2019-06-04 PROCEDURE — 99214 OFFICE O/P EST MOD 30 MIN: CPT | Mod: 25

## 2019-06-04 PROCEDURE — 17000 DESTRUCT PREMALG LESION: CPT | Mod: 59

## 2019-07-11 ENCOUNTER — RX RENEWAL (OUTPATIENT)
Age: 84
End: 2019-07-11

## 2019-07-30 ENCOUNTER — LABORATORY RESULT (OUTPATIENT)
Age: 84
End: 2019-07-30

## 2019-07-30 ENCOUNTER — NON-APPOINTMENT (OUTPATIENT)
Age: 84
End: 2019-07-30

## 2019-07-30 ENCOUNTER — APPOINTMENT (OUTPATIENT)
Dept: CARDIOLOGY | Facility: CLINIC | Age: 84
End: 2019-07-30
Payer: MEDICARE

## 2019-07-30 VITALS
HEART RATE: 69 BPM | WEIGHT: 163 LBS | HEIGHT: 69 IN | DIASTOLIC BLOOD PRESSURE: 60 MMHG | OXYGEN SATURATION: 98 % | TEMPERATURE: 98.1 F | SYSTOLIC BLOOD PRESSURE: 90 MMHG | BODY MASS INDEX: 24.14 KG/M2

## 2019-07-30 VITALS — SYSTOLIC BLOOD PRESSURE: 110 MMHG | DIASTOLIC BLOOD PRESSURE: 64 MMHG

## 2019-07-30 DIAGNOSIS — Z00.00 ENCOUNTER FOR GENERAL ADULT MEDICAL EXAMINATION W/OUT ABNORMAL FINDINGS: ICD-10-CM

## 2019-07-30 PROCEDURE — 93000 ELECTROCARDIOGRAM COMPLETE: CPT

## 2019-07-30 PROCEDURE — 99214 OFFICE O/P EST MOD 30 MIN: CPT

## 2019-07-30 NOTE — HISTORY OF PRESENT ILLNESS
[de-identified] : This is an 83 year old gentlemen with a PMH of CAD, HLD, Lung Nodule, Spinal Cell carcinoma and a Pancreatic Lesion. Patient states that he recently had a CT of the Abdomen and Pelvis which showed that the pancreatic lesion was unchanged from 2018. The CT also showed a new compression fracture at L3, which is most likely contributing to the back pain.Patient states that the back pain has been effecting his quality of life and would like a new referral to an orthopedist.  Patient denies dyspnea, palpitations, chest pain, nausea, vomiting, dizziness and lightheadedness.\par

## 2019-07-30 NOTE — PHYSICAL EXAM
[No Acute Distress] : no acute distress [Well Nourished] : well nourished [Well Developed] : well developed [Well-Appearing] : well-appearing [Normal Sclera/Conjunctiva] : normal sclera/conjunctiva [PERRL] : pupils equal round and reactive to light [Normal Outer Ear/Nose] : the outer ears and nose were normal in appearance [EOMI] : extraocular movements intact [No JVD] : no jugular venous distention [Normal Oropharynx] : the oropharynx was normal [No Lymphadenopathy] : no lymphadenopathy [Thyroid Normal, No Nodules] : the thyroid was normal and there were no nodules present [Supple] : supple [No Respiratory Distress] : no respiratory distress  [No Accessory Muscle Use] : no accessory muscle use [Clear to Auscultation] : lungs were clear to auscultation bilaterally [Normal Rate] : normal rate  [Regular Rhythm] : with a regular rhythm [No Murmur] : no murmur heard [Normal S1, S2] : normal S1 and S2 [No Carotid Bruits] : no carotid bruits [No Varicosities] : no varicosities [No Abdominal Bruit] : a ~M bruit was not heard ~T in the abdomen [Pedal Pulses Present] : the pedal pulses are present [No Edema] : there was no peripheral edema [No Palpable Aorta] : no palpable aorta [No Extremity Clubbing/Cyanosis] : no extremity clubbing/cyanosis [Soft] : abdomen soft [Non-distended] : non-distended [Non Tender] : non-tender [No Masses] : no abdominal mass palpated [No HSM] : no HSM [Normal Bowel Sounds] : normal bowel sounds [Normal Posterior Cervical Nodes] : no posterior cervical lymphadenopathy [Normal Anterior Cervical Nodes] : no anterior cervical lymphadenopathy [No CVA Tenderness] : no CVA  tenderness [No Spinal Tenderness] : no spinal tenderness [Grossly Normal Strength/Tone] : grossly normal strength/tone [No Joint Swelling] : no joint swelling [No Rash] : no rash [Coordination Grossly Intact] : coordination grossly intact [No Focal Deficits] : no focal deficits [Normal Gait] : normal gait [Deep Tendon Reflexes (DTR)] : deep tendon reflexes were 2+ and symmetric [Normal Affect] : the affect was normal [Normal Insight/Judgement] : insight and judgment were intact [de-identified] : excoriation scalp

## 2019-08-02 ENCOUNTER — APPOINTMENT (OUTPATIENT)
Dept: ORTHOPEDIC SURGERY | Facility: CLINIC | Age: 84
End: 2019-08-02
Payer: MEDICARE

## 2019-08-02 VITALS
WEIGHT: 160 LBS | SYSTOLIC BLOOD PRESSURE: 91 MMHG | BODY MASS INDEX: 24.25 KG/M2 | HEART RATE: 72 BPM | DIASTOLIC BLOOD PRESSURE: 52 MMHG | HEIGHT: 68 IN

## 2019-08-02 PROCEDURE — 72100 X-RAY EXAM L-S SPINE 2/3 VWS: CPT

## 2019-08-02 PROCEDURE — 99204 OFFICE O/P NEW MOD 45 MIN: CPT

## 2019-08-02 NOTE — ADDENDUM
[FreeTextEntry1] : This note was authored by Monique Byrnes working as a medical scribe for Dr. Ulises Enriquez. The note was reviewed, edited, and revised by Dr. Ulises Enriquez whom is in agreement with the exam findings, imaging findings, and treatment plan. 08/02/2019.

## 2019-08-02 NOTE — PHYSICAL EXAM
[UE/LE] : Sensory: Intact in bilateral upper & lower extremities [ALL] : dorsalis pedis, posterior tibial, femoral, popliteal, and radial 2+ and symmetric bilaterally [Normal] : Gait: normal [Poor Appearance] : well-appearing [Acute Distress] : not in acute distress [de-identified] : 5 out of 5 motor strength, sensation is intact and symmetrical full range of motion flexion extension and rotation, no palpatory tenderness full range of motion of hips knees shoulders and elbows (all four extremities), no atrophy, negative straight leg raise, no pathological reflexes, no swelling, normal ambulation, no apparent distress skin is intact, no palpable lymph nodes, no upper or lower extremity instability, alert and oriented x3 and normal mood. Normal finger-to nose test. \par Percussive tenderness over L3.  [de-identified] : AP/lat lumbar 08/02/2019:L3 and L1 fracture-reviewed with patient.

## 2019-08-02 NOTE — DISCUSSION/SUMMARY
[de-identified] : L1 and L3 fracture L3 ? new.\par We discussed all options. \par Brace and if no better Dr. Hinojosa for discussion kyphoplasty.\par All options discussed including rest, medicine, home exercise, acupuncture, Chiropractic care, Physical Therapy, Pain management, and last resort surgery. \par All questions were answered, all alternatives discussed and the patient is in complete agreement with that plan. Follow-up appointment as instructed. Any issues and the patient will call or come in sooner.

## 2019-08-02 NOTE — HISTORY OF PRESENT ILLNESS
[Stable] : stable [de-identified] : C/o low back pain - pt points to the area above the buttocks/small of the back x 4 years. \par Pain with walking and standing extended periods of time. \par Can walk up to one block. \par Recently worsened. \par Went on vacation in April and tripped while going up the steps - landed onto the outstretched hands, but did not fall directly onto the back. \par Patient referred himself for a CT scan on 07/22/2019. \par CT showed new compression fracture at the inferior endplate of the L3 vertebral body. \par Hx of prior compression fractures of L1 and L5. \par Treated with PT, chiropractic care, acupuncture and medication. \par No NSAIDs.\par No fever chills sweats nausea vomiting no bowel or bladder dysfunction, no recent weight loss or gain no night pain. This history is in addition to the intake form that I personally reviewed.

## 2019-08-04 LAB
25(OH)D3 SERPL-MCNC: 46.8 NG/ML
ALBUMIN SERPL ELPH-MCNC: 4.3 G/DL
ALP BLD-CCNC: 70 U/L
ALT SERPL-CCNC: 14 U/L
ANION GAP SERPL CALC-SCNC: 12 MMOL/L
AST SERPL-CCNC: 18 U/L
BASOPHILS # BLD AUTO: 0.03 K/UL
BASOPHILS NFR BLD AUTO: 0.7 %
BILIRUB DIRECT SERPL-MCNC: 0.1 MG/DL
BILIRUB INDIRECT SERPL-MCNC: 0.2 MG/DL
BILIRUB SERPL-MCNC: 0.3 MG/DL
BUN SERPL-MCNC: 20 MG/DL
CALCIUM SERPL-MCNC: 9.4 MG/DL
CHLORIDE SERPL-SCNC: 107 MMOL/L
CHOLEST SERPL-MCNC: 118 MG/DL
CHOLEST/HDLC SERPL: 2.6 RATIO
CK SERPL-CCNC: 69 U/L
CO2 SERPL-SCNC: 24 MMOL/L
CREAT SERPL-MCNC: 1.41 MG/DL
EOSINOPHIL # BLD AUTO: 0.1 K/UL
EOSINOPHIL NFR BLD AUTO: 2.3 %
ESTIMATED AVERAGE GLUCOSE: 120 MG/DL
GLUCOSE SERPL-MCNC: 88 MG/DL
HBA1C MFR BLD HPLC: 5.8 %
HCT VFR BLD CALC: 37.3 %
HDLC SERPL-MCNC: 45 MG/DL
HGB BLD-MCNC: 12 G/DL
IMM GRANULOCYTES NFR BLD AUTO: 0.2 %
LDLC SERPL CALC-MCNC: 56 MG/DL
LYMPHOCYTES # BLD AUTO: 1.19 K/UL
LYMPHOCYTES NFR BLD AUTO: 27.4 %
MAGNESIUM SERPL-MCNC: 1.6 MG/DL
MAN DIFF?: NORMAL
MCHC RBC-ENTMCNC: 32.2 GM/DL
MCHC RBC-ENTMCNC: 33.1 PG
MCV RBC AUTO: 102.8 FL
MONOCYTES # BLD AUTO: 0.39 K/UL
MONOCYTES NFR BLD AUTO: 9 %
NEUTROPHILS # BLD AUTO: 2.62 K/UL
NEUTROPHILS NFR BLD AUTO: 60.4 %
PHOSPHATE SERPL-MCNC: 2.1 MG/DL
PLATELET # BLD AUTO: 182 K/UL
POTASSIUM SERPL-SCNC: 4.8 MMOL/L
PROT SERPL-MCNC: 6.1 G/DL
PSA FREE SERPL-MCNC: 0.54 NG/ML
RBC # BLD: 3.63 M/UL
RBC # FLD: 12.5 %
SODIUM SERPL-SCNC: 143 MMOL/L
T3RU NFR SERPL: 1 TBI
T4 FREE SERPL-MCNC: 1.1 NG/DL
T4 SERPL-MCNC: 4.8 UG/DL
TRIGL SERPL-MCNC: 84 MG/DL
TSH SERPL-ACNC: 2.6 UIU/ML
URATE SERPL-MCNC: 6.5 MG/DL
WBC # FLD AUTO: 4.34 K/UL

## 2019-08-05 RX ORDER — ADHESIVE TAPE 3"X 2.3 YD
50 MCG TAPE, NON-MEDICATED TOPICAL
Qty: 90 | Refills: 2 | Status: ACTIVE | COMMUNITY
Start: 2019-08-05 | End: 1900-01-01

## 2019-08-05 RX ORDER — IBUPROFEN 200 MG
600 CAPSULE ORAL DAILY
Qty: 60 | Refills: 0 | Status: ACTIVE | COMMUNITY
Start: 2019-08-05 | End: 1900-01-01

## 2019-08-17 ENCOUNTER — MEDICATION RENEWAL (OUTPATIENT)
Age: 84
End: 2019-08-17

## 2019-08-27 ENCOUNTER — APPOINTMENT (OUTPATIENT)
Dept: DERMATOLOGY | Facility: CLINIC | Age: 84
End: 2019-08-27
Payer: MEDICARE

## 2019-08-27 ENCOUNTER — LABORATORY RESULT (OUTPATIENT)
Age: 84
End: 2019-08-27

## 2019-08-27 DIAGNOSIS — Z86.03 PERSONAL HISTORY OF NEOPLASM OF UNCERTAIN BEHAVIOR: ICD-10-CM

## 2019-08-27 DIAGNOSIS — Z12.83 ENCOUNTER FOR SCREENING FOR MALIGNANT NEOPLASM OF SKIN: ICD-10-CM

## 2019-08-27 DIAGNOSIS — D48.5 NEOPLASM OF UNCERTAIN BEHAVIOR OF SKIN: ICD-10-CM

## 2019-08-27 PROCEDURE — 11103 TANGNTL BX SKIN EA SEP/ADDL: CPT

## 2019-08-27 PROCEDURE — 11102 TANGNTL BX SKIN SINGLE LES: CPT

## 2019-08-27 PROCEDURE — 17003 DESTRUCT PREMALG LES 2-14: CPT

## 2019-08-27 PROCEDURE — 17000 DESTRUCT PREMALG LESION: CPT | Mod: 59

## 2019-08-27 PROCEDURE — 99214 OFFICE O/P EST MOD 30 MIN: CPT | Mod: 25

## 2019-09-27 ENCOUNTER — LABORATORY RESULT (OUTPATIENT)
Age: 84
End: 2019-09-27

## 2019-09-27 ENCOUNTER — APPOINTMENT (OUTPATIENT)
Dept: DERMATOLOGY | Facility: CLINIC | Age: 84
End: 2019-09-27
Payer: MEDICARE

## 2019-09-27 PROCEDURE — 11602 EXC TR-EXT MAL+MARG 1.1-2 CM: CPT

## 2019-09-27 PROCEDURE — 11102 TANGNTL BX SKIN SINGLE LES: CPT | Mod: 59

## 2019-09-27 PROCEDURE — 12032 INTMD RPR S/A/T/EXT 2.6-7.5: CPT | Mod: 59

## 2019-10-01 ENCOUNTER — APPOINTMENT (OUTPATIENT)
Dept: SURGICAL ONCOLOGY | Facility: CLINIC | Age: 84
End: 2019-10-01
Payer: MEDICARE

## 2019-10-01 VITALS
HEART RATE: 65 BPM | SYSTOLIC BLOOD PRESSURE: 95 MMHG | HEIGHT: 68 IN | WEIGHT: 160 LBS | DIASTOLIC BLOOD PRESSURE: 60 MMHG | OXYGEN SATURATION: 98 % | BODY MASS INDEX: 24.25 KG/M2 | RESPIRATION RATE: 14 BRPM

## 2019-10-01 PROCEDURE — 99213 OFFICE O/P EST LOW 20 MIN: CPT

## 2019-10-01 NOTE — ASSESSMENT
[FreeTextEntry1] : Await pathology to determine need for additional surgery.\par If negative no surgical excision needed, may follow up in 3 months.

## 2019-10-01 NOTE — PHYSICAL EXAM
[FreeTextEntry1] : Scalp skin graft well healed. Biopsy sites along mid posterior aspect of skin graft. [Normal] : supple, no neck mass and thyroid not enlarged [Normal Neck Lymph Nodes] : normal neck lymph nodes  [Normal Supraclavicular Lymph Nodes] : normal supraclavicular lymph nodes [Normal Groin Lymph Nodes] : normal groin lymph nodes [Normal Axillary Lymph Nodes] : normal axillary lymph nodes [Normal] : oriented to person, place and time, with appropriate affect

## 2019-10-01 NOTE — HISTORY OF PRESENT ILLNESS
[de-identified] : Patient is an 81 y/o male who presented with a raised lesion in the mid-frontal scalp.  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  He is referred for surgical management.\par Patient reports he has had this lesion for several years and does not have complaints of associated pain.  He has no previous history of sarcoma.\par \par 09/27/2018:  Patient is s/p radical resection of frontal scalp spindle cell neoplasm and biopsy of vertex scalp lesion.  Skin graft coverage by Dr. Myles.  Recovering well.  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  Additional deep margin shows spindle cell lesion felt to represent fibrosing granulation tissue.  Vertex scalp lesion demonstrates atypical spindle cell lesion, favoring atypical fibroxanthoma.\par \par 10/11/2018:  Improved.  Skin graft healing well.\par \par 11/08/2018:  Feels well.  Denies pain. Saw Dr. 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 Myles 12/17/2018.  Pathology shows minimal residual disease, margins negative.\par \par 04/02/2019:  Patient presents for 3 months follow up.  Feels well.  He states he is transferring dermatology care to Dr. Fitzpatrick of [facility].  Had benign right mid back biopsy performed last week by dermatology.\par \par 10/01/2019: Patient recently developed firm nodule at the posterior edge of his scalp skin graft.  Biopsy performed 08/27/2019 demonstrated atypical epithelioid cells for which an underlying neoplasm could not be ruled out.  He is s/p deeper biopsy by dermatology last week for which results are pending.  He offers no new complaints.

## 2019-10-04 ENCOUNTER — APPOINTMENT (OUTPATIENT)
Dept: DERMATOLOGY | Facility: CLINIC | Age: 84
End: 2019-10-04
Payer: MEDICARE

## 2019-10-04 DIAGNOSIS — Z48.02 ENCOUNTER FOR REMOVAL OF SUTURES: ICD-10-CM

## 2019-10-04 PROCEDURE — 99024 POSTOP FOLLOW-UP VISIT: CPT

## 2019-10-08 ENCOUNTER — MEDICATION RENEWAL (OUTPATIENT)
Age: 84
End: 2019-10-08

## 2019-10-08 RX ORDER — CYANOCOBALAMIN/FOLIC AC/VIT B6 1-2.2-25MG
2.2-25-1 TABLET ORAL
Refills: 0 | Status: DISCONTINUED | COMMUNITY
End: 2019-10-08

## 2019-11-09 ENCOUNTER — RX RENEWAL (OUTPATIENT)
Age: 84
End: 2019-11-09

## 2019-11-25 ENCOUNTER — APPOINTMENT (OUTPATIENT)
Dept: CARDIOLOGY | Facility: CLINIC | Age: 84
End: 2019-11-25
Payer: MEDICARE

## 2019-11-25 ENCOUNTER — NON-APPOINTMENT (OUTPATIENT)
Age: 84
End: 2019-11-25

## 2019-11-25 VITALS
OXYGEN SATURATION: 99 % | HEART RATE: 64 BPM | HEIGHT: 68 IN | TEMPERATURE: 98.5 F | BODY MASS INDEX: 24.25 KG/M2 | WEIGHT: 160 LBS | SYSTOLIC BLOOD PRESSURE: 100 MMHG | DIASTOLIC BLOOD PRESSURE: 60 MMHG

## 2019-11-25 DIAGNOSIS — G47.62 SLEEP RELATED LEG CRAMPS: ICD-10-CM

## 2019-11-25 PROCEDURE — G0008: CPT

## 2019-11-25 PROCEDURE — 99214 OFFICE O/P EST MOD 30 MIN: CPT

## 2019-11-25 PROCEDURE — 90662 IIV NO PRSV INCREASED AG IM: CPT

## 2019-11-25 PROCEDURE — 93000 ELECTROCARDIOGRAM COMPLETE: CPT

## 2019-11-25 NOTE — PHYSICAL EXAM
[General Appearance - Well Developed] : well developed [Normal Appearance] : normal appearance [Well Groomed] : well groomed [General Appearance - In No Acute Distress] : no acute distress [No Deformities] : no deformities [General Appearance - Well Nourished] : well nourished [Normal Conjunctiva] : the conjunctiva exhibited no abnormalities [Eyelids - No Xanthelasma] : the eyelids demonstrated no xanthelasmas [Normal Oral Mucosa] : normal oral mucosa [No Oral Pallor] : no oral pallor [No Oral Cyanosis] : no oral cyanosis [Normal Jugular Venous A Waves Present] : normal jugular venous A waves present [Normal Jugular Venous V Waves Present] : normal jugular venous V waves present [No Jugular Venous Mcgee A Waves] : no jugular venous mcgee A waves [Respiration, Rhythm And Depth] : normal respiratory rhythm and effort [Exaggerated Use Of Accessory Muscles For Inspiration] : no accessory muscle use [Auscultation Breath Sounds / Voice Sounds] : lungs were clear to auscultation bilaterally [Heart Rate And Rhythm] : heart rate and rhythm were normal [Heart Sounds] : normal S1 and S2 [Abdomen Soft] : soft [Murmurs] : no murmurs present [Abdomen Tenderness] : non-tender [Abdomen Mass (___ Cm)] : no abdominal mass palpated [Gait - Sufficient For Exercise Testing] : the gait was sufficient for exercise testing [Abnormal Walk] : normal gait [Nail Clubbing] : no clubbing of the fingernails [Petechial Hemorrhages (___cm)] : no petechial hemorrhages [Cyanosis, Localized] : no localized cyanosis [] : no rash [Skin Color & Pigmentation] : normal skin color and pigmentation [No Venous Stasis] : no venous stasis [Skin Lesions] : no skin lesions [No Skin Ulcers] : no skin ulcer [No Xanthoma] : no  xanthoma was observed [Mood] : the mood was normal [No Anxiety] : not feeling anxious [Affect] : the affect was normal [Oriented To Time, Place, And Person] : oriented to person, place, and time [FreeTextEntry1] : mild decreased pulses bilaterally

## 2019-11-25 NOTE — HISTORY OF PRESENT ILLNESS
[FreeTextEntry1] : Terence is an 85yo male who presents for routine follow-up. He has hx of [facility] of CAD, HLD, Lung Nodule, Spinal Cell carcinoma and a Pancreatic Lesion. Patient reports that he is following with Dr. Hull of hematology and states he was diagnosed with Osteopenia He is taking Calcium daily. He also reports BL leg cramps that wakes him up at night. He states this has been ongoing for the past 6 months and occurs about 1-2 x a week. He does not have them when he falls asleep but develops them in the middle of the night. Patient also requesting the flu shot. Patient states he saw Ortho for his back and was given a brace which is helping with his back pain.

## 2019-11-27 ENCOUNTER — APPOINTMENT (OUTPATIENT)
Dept: CARDIOLOGY | Facility: CLINIC | Age: 84
End: 2019-11-27
Payer: MEDICARE

## 2019-11-27 PROCEDURE — 93306 TTE W/DOPPLER COMPLETE: CPT

## 2019-11-27 PROCEDURE — 93978 VASCULAR STUDY: CPT

## 2019-11-27 PROCEDURE — 93925 LOWER EXTREMITY STUDY: CPT

## 2019-12-08 LAB
25(OH)D3 SERPL-MCNC: 60.1 NG/ML
ALBUMIN SERPL ELPH-MCNC: 4.5 G/DL
ALP BLD-CCNC: 71 U/L
ALT SERPL-CCNC: 18 U/L
ANION GAP SERPL CALC-SCNC: 13 MMOL/L
AST SERPL-CCNC: 23 U/L
BASOPHILS # BLD AUTO: 0.05 K/UL
BASOPHILS NFR BLD AUTO: 1 %
BILIRUB DIRECT SERPL-MCNC: 0.2 MG/DL
BILIRUB INDIRECT SERPL-MCNC: 0.3 MG/DL
BILIRUB SERPL-MCNC: 0.4 MG/DL
BUN SERPL-MCNC: 26 MG/DL
CALCIUM SERPL-MCNC: 9.4 MG/DL
CHLORIDE SERPL-SCNC: 103 MMOL/L
CHOLEST SERPL-MCNC: 135 MG/DL
CHOLEST/HDLC SERPL: 2.2 RATIO
CK SERPL-CCNC: 88 U/L
CO2 SERPL-SCNC: 23 MMOL/L
CREAT SERPL-MCNC: 1.46 MG/DL
EOSINOPHIL # BLD AUTO: 0.17 K/UL
EOSINOPHIL NFR BLD AUTO: 3.4 %
ESTIMATED AVERAGE GLUCOSE: 114 MG/DL
GLUCOSE SERPL-MCNC: 95 MG/DL
HBA1C MFR BLD HPLC: 5.6 %
HCT VFR BLD CALC: 37.6 %
HDLC SERPL-MCNC: 62 MG/DL
HGB BLD-MCNC: 12.4 G/DL
IMM GRANULOCYTES NFR BLD AUTO: 0.4 %
LDLC SERPL CALC-MCNC: 63 MG/DL
LDLC SERPL DIRECT ASSAY-MCNC: 72 MG/DL
LYMPHOCYTES # BLD AUTO: 1.64 K/UL
LYMPHOCYTES NFR BLD AUTO: 32.6 %
MAGNESIUM SERPL-MCNC: 1.8 MG/DL
MAN DIFF?: NORMAL
MCHC RBC-ENTMCNC: 33 GM/DL
MCHC RBC-ENTMCNC: 33.2 PG
MCV RBC AUTO: 100.8 FL
MONOCYTES # BLD AUTO: 0.41 K/UL
MONOCYTES NFR BLD AUTO: 8.2 %
NEUTROPHILS # BLD AUTO: 2.74 K/UL
NEUTROPHILS NFR BLD AUTO: 54.4 %
PLATELET # BLD AUTO: 183 K/UL
POTASSIUM SERPL-SCNC: 4.5 MMOL/L
PROT SERPL-MCNC: 6.5 G/DL
RBC # BLD: 3.73 M/UL
RBC # FLD: 11.8 %
SODIUM SERPL-SCNC: 139 MMOL/L
T4 FREE SERPL-MCNC: 1.1 NG/DL
TRIGL SERPL-MCNC: 52 MG/DL
TSH SERPL-ACNC: 1.87 UIU/ML
VIT B1 SERPL-MCNC: 106 NMOL/L
VIT B12 SERPL-MCNC: 1289 PG/ML
VIT B6 SERPL-MCNC: 71.9 UG/L
WBC # FLD AUTO: 5.03 K/UL

## 2019-12-18 ENCOUNTER — APPOINTMENT (OUTPATIENT)
Dept: DERMATOLOGY | Facility: CLINIC | Age: 84
End: 2019-12-18
Payer: MEDICARE

## 2019-12-18 ENCOUNTER — LABORATORY RESULT (OUTPATIENT)
Age: 84
End: 2019-12-18

## 2019-12-18 PROCEDURE — 17003 DESTRUCT PREMALG LES 2-14: CPT | Mod: 59

## 2019-12-18 PROCEDURE — 11102 TANGNTL BX SKIN SINGLE LES: CPT | Mod: 59

## 2019-12-18 PROCEDURE — 99214 OFFICE O/P EST MOD 30 MIN: CPT | Mod: 25

## 2019-12-18 PROCEDURE — 17110 DESTRUCTION B9 LES UP TO 14: CPT | Mod: 59

## 2019-12-18 PROCEDURE — 17000 DESTRUCT PREMALG LESION: CPT | Mod: 59

## 2020-01-02 ENCOUNTER — APPOINTMENT (OUTPATIENT)
Dept: CARDIOLOGY | Facility: CLINIC | Age: 85
End: 2020-01-02
Payer: MEDICARE

## 2020-01-02 VITALS
DIASTOLIC BLOOD PRESSURE: 62 MMHG | OXYGEN SATURATION: 98 % | HEIGHT: 68 IN | WEIGHT: 161 LBS | BODY MASS INDEX: 24.4 KG/M2 | SYSTOLIC BLOOD PRESSURE: 100 MMHG | TEMPERATURE: 98.2 F | HEART RATE: 67 BPM

## 2020-01-02 DIAGNOSIS — N28.9 DISORDER OF KIDNEY AND URETER, UNSPECIFIED: ICD-10-CM

## 2020-01-02 DIAGNOSIS — R07.81 PLEURODYNIA: ICD-10-CM

## 2020-01-02 LAB
ANION GAP SERPL CALC-SCNC: 11 MMOL/L
ANION GAP SERPL CALC-SCNC: 15 MMOL/L
BASOPHILS # BLD AUTO: 0.03 K/UL
BASOPHILS NFR BLD AUTO: 0.5 %
BUN SERPL-MCNC: 34 MG/DL
BUN SERPL-MCNC: 35 MG/DL
CALCIUM SERPL-MCNC: 9.7 MG/DL
CALCIUM SERPL-MCNC: 9.9 MG/DL
CHLORIDE SERPL-SCNC: 103 MMOL/L
CHLORIDE SERPL-SCNC: 103 MMOL/L
CO2 SERPL-SCNC: 23 MMOL/L
CO2 SERPL-SCNC: 26 MMOL/L
CREAT SERPL-MCNC: 1.76 MG/DL
CREAT SERPL-MCNC: 1.78 MG/DL
EOSINOPHIL # BLD AUTO: 0.08 K/UL
EOSINOPHIL NFR BLD AUTO: 1.4 %
GLUCOSE SERPL-MCNC: 97 MG/DL
GLUCOSE SERPL-MCNC: 97 MG/DL
HCT VFR BLD CALC: 38.9 %
HGB BLD-MCNC: 12.4 G/DL
IMM GRANULOCYTES NFR BLD AUTO: 0.2 %
LYMPHOCYTES # BLD AUTO: 1.34 K/UL
LYMPHOCYTES NFR BLD AUTO: 24 %
MAN DIFF?: NORMAL
MCHC RBC-ENTMCNC: 31.9 GM/DL
MCHC RBC-ENTMCNC: 32.9 PG
MCV RBC AUTO: 103.2 FL
MONOCYTES # BLD AUTO: 0.32 K/UL
MONOCYTES NFR BLD AUTO: 5.7 %
NEUTROPHILS # BLD AUTO: 3.8 K/UL
NEUTROPHILS NFR BLD AUTO: 68.2 %
PLATELET # BLD AUTO: 226 K/UL
POTASSIUM SERPL-SCNC: 4.7 MMOL/L
POTASSIUM SERPL-SCNC: 4.9 MMOL/L
RBC # BLD: 3.77 M/UL
RBC # FLD: 11.8 %
SODIUM SERPL-SCNC: 140 MMOL/L
SODIUM SERPL-SCNC: 141 MMOL/L
WBC # FLD AUTO: 5.58 K/UL

## 2020-01-02 PROCEDURE — 99214 OFFICE O/P EST MOD 30 MIN: CPT

## 2020-01-02 NOTE — PHYSICAL EXAM
[General Appearance - Well Developed] : well developed [Normal Appearance] : normal appearance [General Appearance - Well Nourished] : well nourished [No Deformities] : no deformities [Well Groomed] : well groomed [Normal Conjunctiva] : the conjunctiva exhibited no abnormalities [General Appearance - In No Acute Distress] : no acute distress [Eyelids - No Xanthelasma] : the eyelids demonstrated no xanthelasmas [No Oral Pallor] : no oral pallor [Normal Oral Mucosa] : normal oral mucosa [No Oral Cyanosis] : no oral cyanosis [Normal Jugular Venous A Waves Present] : normal jugular venous A waves present [No Jugular Venous Mcgee A Waves] : no jugular venous mcgee A waves [Normal Jugular Venous V Waves Present] : normal jugular venous V waves present [Exaggerated Use Of Accessory Muscles For Inspiration] : no accessory muscle use [Respiration, Rhythm And Depth] : normal respiratory rhythm and effort [Auscultation Breath Sounds / Voice Sounds] : lungs were clear to auscultation bilaterally [Heart Rate And Rhythm] : heart rate and rhythm were normal [Heart Sounds] : normal S1 and S2 [Murmurs] : no murmurs present [Abdomen Soft] : soft [Abdomen Tenderness] : non-tender [Abdomen Mass (___ Cm)] : no abdominal mass palpated [Abnormal Walk] : normal gait [Gait - Sufficient For Exercise Testing] : the gait was sufficient for exercise testing [Nail Clubbing] : no clubbing of the fingernails [Cyanosis, Localized] : no localized cyanosis [Petechial Hemorrhages (___cm)] : no petechial hemorrhages [Skin Color & Pigmentation] : normal skin color and pigmentation [] : no rash [No Venous Stasis] : no venous stasis [Skin Lesions] : no skin lesions [No Skin Ulcers] : no skin ulcer [No Xanthoma] : no  xanthoma was observed [Oriented To Time, Place, And Person] : oriented to person, place, and time [Affect] : the affect was normal [Mood] : the mood was normal [No Anxiety] : not feeling anxious [FreeTextEntry1] : excoriation scalp

## 2020-01-02 NOTE — HISTORY OF PRESENT ILLNESS
[FreeTextEntry1] : Terence is an 83yo male who presents for routine follow-up. Patient reports he fell two weeks ago after tripping over an object. Patient reports he developed a large bruise on his right side and believes he may have broken a rib but did not go to the emergency room. Patient reports he is having right rib pain and has not taken any OTC pain meds. Patient also reporting he is following Dr. Root and last PSA was 6.6. Patient also reports he is following with nephrology Dr. Castillo for CKD. Patient also reporting he had recent skin biopsy done and is awaiting result, patient denies chest pain, palpitations or shortness of breath.\par patient also reporting [id number]

## 2020-01-05 ENCOUNTER — RX RENEWAL (OUTPATIENT)
Age: 85
End: 2020-01-05

## 2020-01-07 ENCOUNTER — APPOINTMENT (OUTPATIENT)
Dept: SURGICAL ONCOLOGY | Facility: CLINIC | Age: 85
End: 2020-01-07
Payer: MEDICARE

## 2020-01-07 ENCOUNTER — CHART COPY (OUTPATIENT)
Age: 85
End: 2020-01-07

## 2020-01-07 VITALS
WEIGHT: 160 LBS | RESPIRATION RATE: 14 BRPM | HEIGHT: 68 IN | BODY MASS INDEX: 24.25 KG/M2 | HEART RATE: 69 BPM | SYSTOLIC BLOOD PRESSURE: 95 MMHG | DIASTOLIC BLOOD PRESSURE: 60 MMHG

## 2020-01-07 PROCEDURE — 99213 OFFICE O/P EST LOW 20 MIN: CPT

## 2020-01-07 NOTE — PHYSICAL EXAM
[FreeTextEntry1] : Scalp skin graft well healed. Vertex scalp with 1 cm open wound. [Normal] : supple, no neck mass and thyroid not enlarged [Normal Neck Lymph Nodes] : normal neck lymph nodes  [Normal Supraclavicular Lymph Nodes] : normal supraclavicular lymph nodes [Normal Axillary Lymph Nodes] : normal axillary lymph nodes [Normal Groin Lymph Nodes] : normal groin lymph nodes [Normal] : grossly intact

## 2020-01-07 NOTE — ASSESSMENT
[FreeTextEntry1] : Continue local wound care and dermatology follow up.\par Follow up exam in 3 months.

## 2020-01-07 NOTE — HISTORY OF PRESENT ILLNESS
[de-identified] : Patient is an 81 y/o male who presented with a raised lesion in the mid-frontal scalp.  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  He is referred for surgical management.\par Patient reports he has had this lesion for several years and does not have complaints of associated pain.  He has no previous history of sarcoma.\par \par 09/27/2018:  Patient is s/p radical resection of frontal scalp spindle cell neoplasm and biopsy of vertex scalp lesion.  Skin graft coverage by Dr. Myles.  Recovering well.  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  Additional deep margin shows spindle cell lesion felt to represent fibrosing granulation tissue.  Vertex scalp lesion demonstrates atypical spindle cell lesion, favoring atypical fibroxanthoma.\par \par 10/11/2018:  Improved.  Skin graft healing well.\par \par 11/08/2018:  Feels well.  Denies pain. Saw Dr. 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 Myles 12/17/2018.  Pathology shows minimal residual disease, margins negative.\par \par 04/02/2019:  Patient presents for 3 months follow up.  Feels well.  He states he is transferring dermatology care to Dr. Fitzpatrick of [facility].  Had benign right mid back biopsy performed last week by dermatology.\par \par 10/01/2019: Patient recently developed firm nodule at the posterior edge of his scalp skin graft.  Biopsy performed 08/27/2019 demonstrated atypical epithelioid cells for which an underlying neoplasm could not be ruled out.  He is s/p deeper biopsy by dermatology last week for which results are pending.  He offers no new complaints.\par \par 01/07/2020: Overall doing well, but developed an area of ulceration in vertex scalp.  Recent biopsy 12/18/2019 did not demonstrate evidence of recurrent malignancy.  He denies pain or drainage.

## 2020-01-08 ENCOUNTER — OTHER (OUTPATIENT)
Age: 85
End: 2020-01-08

## 2020-03-12 ENCOUNTER — APPOINTMENT (OUTPATIENT)
Dept: DERMATOLOGY | Facility: CLINIC | Age: 85
End: 2020-03-12
Payer: MEDICARE

## 2020-03-12 PROCEDURE — 99213 OFFICE O/P EST LOW 20 MIN: CPT

## 2020-03-17 ENCOUNTER — APPOINTMENT (OUTPATIENT)
Dept: SURGICAL ONCOLOGY | Facility: CLINIC | Age: 85
End: 2020-03-17

## 2020-04-10 ENCOUNTER — APPOINTMENT (OUTPATIENT)
Dept: PULMONOLOGY | Facility: CLINIC | Age: 85
End: 2020-04-10

## 2020-06-02 ENCOUNTER — APPOINTMENT (OUTPATIENT)
Dept: PULMONOLOGY | Facility: CLINIC | Age: 85
End: 2020-06-02
Payer: MEDICARE

## 2020-06-02 VITALS
SYSTOLIC BLOOD PRESSURE: 94 MMHG | HEIGHT: 68 IN | TEMPERATURE: 97.6 F | OXYGEN SATURATION: 96 % | DIASTOLIC BLOOD PRESSURE: 58 MMHG | WEIGHT: 16 LBS | HEART RATE: 94 BPM | BODY MASS INDEX: 2.43 KG/M2

## 2020-06-02 DIAGNOSIS — Z87.891 PERSONAL HISTORY OF NICOTINE DEPENDENCE: ICD-10-CM

## 2020-06-02 PROCEDURE — 99214 OFFICE O/P EST MOD 30 MIN: CPT

## 2020-06-02 NOTE — ASSESSMENT
[FreeTextEntry1] : Trial of twice daily pantoprazole.\par Up in 4 to 6 weeks for reevaluation and lung function testing.  Decision at that time regarding value of bronchodilator therapy for COPD and cough.\par No indication for follow-up CAT scan of the chest unless there is a change in his clinical status.

## 2020-06-02 NOTE — HISTORY OF PRESENT ILLNESS
[TextBox_4] : Some intermittent hoarseness believes related to GERD\par On pantoprazole in AM\par \par Had CT\par \par Pers. cough dry. \par No wheezing or SOB.\par \par Trying pantoprazole BID.\par \par Had fall in Dec. with rib fractures. Healed\par \par No other change medical history

## 2020-06-02 NOTE — DISCUSSION/SUMMARY
[FreeTextEntry1] : Mild COPD\par Stable CT\par Cough to be related to GERD.  Possible component related to mild chronic obstructive pulmonary disease.  This likely related to postnasal drip or other etiologies.\par Status post rib fractures.  Clinically healed.

## 2020-06-02 NOTE — PHYSICAL EXAM
[No Acute Distress] : no acute distress [Normal Oropharynx] : normal oropharynx [Normal Appearance] : normal appearance [No Neck Mass] : no neck mass [No JVD] : no jvd [Normal S1, S2] : normal s1, s2 [No Murmurs] : no murmurs [Clear to Auscultation Bilaterally] : clear to auscultation bilaterally [Normal to Percussion] : normal to percussion [Benign] : benign [Not Tender] : not tender [No HSM] : no hsm [No Clubbing] : no clubbing [No Cyanosis] : no cyanosis [No Edema] : no edema

## 2020-06-18 ENCOUNTER — APPOINTMENT (OUTPATIENT)
Dept: DERMATOLOGY | Facility: CLINIC | Age: 85
End: 2020-06-18

## 2020-06-22 ENCOUNTER — NON-APPOINTMENT (OUTPATIENT)
Age: 85
End: 2020-06-22

## 2020-06-22 ENCOUNTER — APPOINTMENT (OUTPATIENT)
Dept: CARDIOLOGY | Facility: CLINIC | Age: 85
End: 2020-06-22
Payer: MEDICARE

## 2020-06-22 VITALS
WEIGHT: 160 LBS | HEIGHT: 68 IN | SYSTOLIC BLOOD PRESSURE: 80 MMHG | HEART RATE: 60 BPM | OXYGEN SATURATION: 96 % | BODY MASS INDEX: 24.25 KG/M2 | DIASTOLIC BLOOD PRESSURE: 60 MMHG | TEMPERATURE: 98.1 F

## 2020-06-22 DIAGNOSIS — R53.83 OTHER FATIGUE: ICD-10-CM

## 2020-06-22 DIAGNOSIS — Z71.89 OTHER SPECIFIED COUNSELING: ICD-10-CM

## 2020-06-22 DIAGNOSIS — Z12.11 ENCOUNTER FOR SCREENING FOR MALIGNANT NEOPLASM OF COLON: ICD-10-CM

## 2020-06-22 DIAGNOSIS — K86.9 DISEASE OF PANCREAS, UNSPECIFIED: ICD-10-CM

## 2020-06-22 DIAGNOSIS — Z86.79 PERSONAL HISTORY OF OTHER DISEASES OF THE CIRCULATORY SYSTEM: ICD-10-CM

## 2020-06-22 DIAGNOSIS — M85.80 OTHER SPECIFIED DISORDERS OF BONE DENSITY AND STRUCTURE, UNSPECIFIED SITE: ICD-10-CM

## 2020-06-22 PROCEDURE — 93000 ELECTROCARDIOGRAM COMPLETE: CPT

## 2020-06-22 PROCEDURE — 99214 OFFICE O/P EST MOD 30 MIN: CPT

## 2020-06-22 NOTE — PHYSICAL EXAM
[General Appearance - Well Developed] : well developed [Normal Appearance] : normal appearance [Well Groomed] : well groomed [General Appearance - Well Nourished] : well nourished [No Deformities] : no deformities [General Appearance - In No Acute Distress] : no acute distress [Normal Conjunctiva] : the conjunctiva exhibited no abnormalities [Eyelids - No Xanthelasma] : the eyelids demonstrated no xanthelasmas [Normal Oral Mucosa] : normal oral mucosa [No Oral Pallor] : no oral pallor [No Oral Cyanosis] : no oral cyanosis [Normal Jugular Venous V Waves Present] : normal jugular venous V waves present [Normal Jugular Venous A Waves Present] : normal jugular venous A waves present [No Jugular Venous Mcgee A Waves] : no jugular venous mcgee A waves [Exaggerated Use Of Accessory Muscles For Inspiration] : no accessory muscle use [Respiration, Rhythm And Depth] : normal respiratory rhythm and effort [Auscultation Breath Sounds / Voice Sounds] : lungs were clear to auscultation bilaterally [Heart Rate And Rhythm] : heart rate and rhythm were normal [Heart Sounds] : normal S1 and S2 [Murmurs] : no murmurs present [Abdomen Soft] : soft [Abdomen Tenderness] : non-tender [Abdomen Mass (___ Cm)] : no abdominal mass palpated [Abnormal Walk] : normal gait [Gait - Sufficient For Exercise Testing] : the gait was sufficient for exercise testing [Nail Clubbing] : no clubbing of the fingernails [Cyanosis, Localized] : no localized cyanosis [Petechial Hemorrhages (___cm)] : no petechial hemorrhages [Skin Color & Pigmentation] : normal skin color and pigmentation [] : no rash [No Venous Stasis] : no venous stasis [Skin Lesions] : no skin lesions [No Skin Ulcers] : no skin ulcer [No Xanthoma] : no  xanthoma was observed [FreeTextEntry1] : excoriation scalp  [Oriented To Time, Place, And Person] : oriented to person, place, and time [Affect] : the affect was normal [Mood] : the mood was normal [No Anxiety] : not feeling anxious

## 2020-06-28 ENCOUNTER — RESULT CHARGE (OUTPATIENT)
Age: 85
End: 2020-06-28

## 2020-06-29 DIAGNOSIS — Z20.828 CONTACT WITH AND (SUSPECTED) EXPOSURE TO OTHER VIRAL COMMUNICABLE DISEASES: ICD-10-CM

## 2020-07-05 ENCOUNTER — APPOINTMENT (OUTPATIENT)
Dept: DISASTER EMERGENCY | Facility: CLINIC | Age: 85
End: 2020-07-05

## 2020-07-06 LAB — SARS-COV-2 N GENE NPH QL NAA+PROBE: NOT DETECTED

## 2020-07-07 ENCOUNTER — APPOINTMENT (OUTPATIENT)
Dept: PULMONOLOGY | Facility: CLINIC | Age: 85
End: 2020-07-07
Payer: MEDICARE

## 2020-07-07 ENCOUNTER — RX RENEWAL (OUTPATIENT)
Age: 85
End: 2020-07-07

## 2020-07-07 VITALS
BODY MASS INDEX: 24.25 KG/M2 | SYSTOLIC BLOOD PRESSURE: 84 MMHG | RESPIRATION RATE: 14 BRPM | OXYGEN SATURATION: 99 % | WEIGHT: 160 LBS | HEIGHT: 68 IN | DIASTOLIC BLOOD PRESSURE: 55 MMHG | HEART RATE: 66 BPM

## 2020-07-07 VITALS
HEIGHT: 68 IN | WEIGHT: 160 LBS | OXYGEN SATURATION: 99 % | DIASTOLIC BLOOD PRESSURE: 55 MMHG | SYSTOLIC BLOOD PRESSURE: 84 MMHG | BODY MASS INDEX: 24.25 KG/M2 | HEART RATE: 66 BPM | RESPIRATION RATE: 14 BRPM

## 2020-07-07 PROCEDURE — 94060 EVALUATION OF WHEEZING: CPT

## 2020-07-07 PROCEDURE — 94727 GAS DIL/WSHOT DETER LNG VOL: CPT

## 2020-07-07 PROCEDURE — ZZZZZ: CPT

## 2020-07-07 PROCEDURE — 94729 DIFFUSING CAPACITY: CPT

## 2020-07-07 PROCEDURE — 99213 OFFICE O/P EST LOW 20 MIN: CPT | Mod: 25

## 2020-07-08 NOTE — HISTORY OF PRESENT ILLNESS
[TextBox_4] : Still some cough and interm. hoarseness\par Feels comes from throat.\par Occ clear mucous.\par \par Has not seen ENT\par \par Had endoscopy told GERD\par \par On Pantoprazole OD

## 2020-07-08 NOTE — DISCUSSION/SUMMARY
[FreeTextEntry1] : Mild COPD\par Stable CT\par Cough most likely related to GERD.    Possible component related to postnasal drip.\par Possible component related to mild chronic obstructive pulmonary disease.

## 2020-07-08 NOTE — PROCEDURE
[FreeTextEntry1] : CT 3/9/20 stable\par \par 07/07/2020\par Pulmonary function testing\par Normal Flow Rates There is no significant bronchodilator response. Normal Lung Volumes. There is a mild diffusion impairment.

## 2020-07-21 ENCOUNTER — LABORATORY RESULT (OUTPATIENT)
Age: 85
End: 2020-07-21

## 2020-07-22 ENCOUNTER — APPOINTMENT (OUTPATIENT)
Dept: DERMATOLOGY | Facility: CLINIC | Age: 85
End: 2020-07-22
Payer: MEDICARE

## 2020-07-22 VITALS — TEMPERATURE: 97.3 F

## 2020-07-22 PROCEDURE — 11102 TANGNTL BX SKIN SINGLE LES: CPT | Mod: 59,GC

## 2020-07-22 PROCEDURE — 17003 DESTRUCT PREMALG LES 2-14: CPT | Mod: 59,GC

## 2020-07-22 PROCEDURE — 17000 DESTRUCT PREMALG LESION: CPT | Mod: 59,GC

## 2020-07-22 PROCEDURE — 99214 OFFICE O/P EST MOD 30 MIN: CPT | Mod: 25,GC

## 2020-07-23 ENCOUNTER — APPOINTMENT (OUTPATIENT)
Dept: SURGICAL ONCOLOGY | Facility: CLINIC | Age: 85
End: 2020-07-23
Payer: MEDICARE

## 2020-07-23 VITALS
WEIGHT: 165 LBS | DIASTOLIC BLOOD PRESSURE: 66 MMHG | BODY MASS INDEX: 25.01 KG/M2 | HEART RATE: 65 BPM | RESPIRATION RATE: 15 BRPM | HEIGHT: 68 IN | OXYGEN SATURATION: 98 % | SYSTOLIC BLOOD PRESSURE: 100 MMHG

## 2020-07-23 PROCEDURE — 99213 OFFICE O/P EST LOW 20 MIN: CPT

## 2020-07-23 NOTE — HISTORY OF PRESENT ILLNESS
[de-identified] : Patient is an 83 y/o male who presented with a raised lesion in the mid-frontal scalp.  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  He is referred for surgical management.\par Patient reports he has had this lesion for several years and does not have complaints of associated pain.  He has no previous history of sarcoma.\par \par 09/27/2018:  Patient is s/p radical resection of frontal scalp spindle cell neoplasm and biopsy of vertex scalp lesion.  Skin graft coverage by Dr. Myles.  Recovering well.  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  Additional deep margin shows spindle cell lesion felt to represent fibrosing granulation tissue.  Vertex scalp lesion demonstrates atypical spindle cell lesion, favoring atypical fibroxanthoma.\par \par 10/11/2018:  Improved.  Skin graft healing well.\par \par 11/08/2018:  Feels well.  Denies pain. Saw Dr. 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 Myles 12/17/2018.  Pathology shows minimal residual disease, margins negative.\par \par 04/02/2019:  Patient presents for 3 months follow up.  Feels well.  He states he is transferring dermatology care to Dr. Fitzpatrick of [facility].  Had benign right mid back biopsy performed last week by dermatology.\par \par 10/01/2019: Patient recently developed firm nodule at the posterior edge of his scalp skin graft.  Biopsy performed 08/27/2019 demonstrated atypical epithelioid cells for which an underlying neoplasm could not be ruled out.  He is s/p deeper biopsy by dermatology last week for which results are pending.  He offers no new complaints.\par \par 01/07/2020: Overall doing well, but developed an area of ulceration in vertex scalp.  Recent biopsy 12/18/2019 did not demonstrate evidence of recurrent malignancy.  He denies pain or drainage.\par \par 07/23/2020: Patient was seen by dermatology yesterday and underwent biopsy of a brown pigmented scalp lesion posterior to skin graft.  He continues to have a nonhealing ulcer at the vertex scalp at site of prior biopsy.  He denies pain or drainage.

## 2020-07-23 NOTE — PHYSICAL EXAM
[FreeTextEntry1] : Scalp skin graft well healed. Vertex scalp with 1 cm open wound. [Normal] : supple, no neck mass and thyroid not enlarged [Normal Supraclavicular Lymph Nodes] : normal supraclavicular lymph nodes [Normal Neck Lymph Nodes] : normal neck lymph nodes  [Normal Groin Lymph Nodes] : normal groin lymph nodes [Normal Axillary Lymph Nodes] : normal axillary lymph nodes [Normal] : oriented to person, place and time, with appropriate affect

## 2020-07-23 NOTE — ASSESSMENT
[FreeTextEntry1] : Will follow up biopsy pathology.\par Patient will follow up with plastic surgery to address nonhealing vertex scalp ulcer pending biopsy results.\par Tentative follow up in 6 months, sooner if needed.

## 2020-07-26 LAB
ALBUMIN SERPL ELPH-MCNC: 4.7 G/DL
ALBUMIN SERPL ELPH-MCNC: 4.7 G/DL
ALP BLD-CCNC: 68 U/L
ALP BLD-CCNC: 69 U/L
ALT SERPL-CCNC: 15 U/L
ALT SERPL-CCNC: 16 U/L
ANION GAP SERPL CALC-SCNC: 12 MMOL/L
APPEARANCE: CLEAR
AST SERPL-CCNC: 22 U/L
AST SERPL-CCNC: 22 U/L
BACTERIA: NEGATIVE
BASOPHILS # BLD AUTO: 0.04 K/UL
BASOPHILS NFR BLD AUTO: 0.8 %
BILIRUB DIRECT SERPL-MCNC: 0.2 MG/DL
BILIRUB INDIRECT SERPL-MCNC: 0.4 MG/DL
BILIRUB SERPL-MCNC: 0.6 MG/DL
BILIRUB SERPL-MCNC: 0.6 MG/DL
BILIRUBIN URINE: NEGATIVE
BLOOD URINE: NEGATIVE
BUN SERPL-MCNC: 29 MG/DL
CALCIUM SERPL-MCNC: 9.9 MG/DL
CHLORIDE SERPL-SCNC: 104 MMOL/L
CHOLEST SERPL-MCNC: 136 MG/DL
CHOLEST/HDLC SERPL: 2.4 RATIO
CK SERPL-CCNC: 78 U/L
CO2 SERPL-SCNC: 26 MMOL/L
COLOR: YELLOW
CREAT SERPL-MCNC: 1.9 MG/DL
CREAT SPEC-SCNC: 277 MG/DL
EOSINOPHIL # BLD AUTO: 0.15 K/UL
EOSINOPHIL NFR BLD AUTO: 3 %
GLUCOSE QUALITATIVE U: NEGATIVE
GLUCOSE SERPL-MCNC: 102 MG/DL
HCT VFR BLD CALC: 37.4 %
HDLC SERPL-MCNC: 57 MG/DL
HGB BLD-MCNC: 12 G/DL
HYALINE CASTS: 1 /LPF
IMM GRANULOCYTES NFR BLD AUTO: 0.4 %
KETONES URINE: NEGATIVE
LDLC SERPL CALC-MCNC: 64 MG/DL
LEUKOCYTE ESTERASE URINE: NEGATIVE
LYMPHOCYTES # BLD AUTO: 1.46 K/UL
LYMPHOCYTES NFR BLD AUTO: 29.4 %
MAN DIFF?: NORMAL
MCHC RBC-ENTMCNC: 32.1 GM/DL
MCHC RBC-ENTMCNC: 33.4 PG
MCV RBC AUTO: 104.2 FL
MICROALBUMIN 24H UR DL<=1MG/L-MCNC: <1.2 MG/DL
MICROALBUMIN/CREAT 24H UR-RTO: NORMAL MG/G
MICROSCOPIC-UA: NORMAL
MONOCYTES # BLD AUTO: 0.42 K/UL
MONOCYTES NFR BLD AUTO: 8.5 %
NEUTROPHILS # BLD AUTO: 2.87 K/UL
NEUTROPHILS NFR BLD AUTO: 57.9 %
NITRITE URINE: NEGATIVE
PH URINE: 6
PLATELET # BLD AUTO: 196 K/UL
POTASSIUM SERPL-SCNC: 5.2 MMOL/L
PROT SERPL-MCNC: 6.6 G/DL
PROT SERPL-MCNC: 6.8 G/DL
PROTEIN URINE: NORMAL
RBC # BLD: 3.59 M/UL
RBC # FLD: 12 %
RED BLOOD CELLS URINE: 1 /HPF
SARS-COV-2 IGG SERPL IA-ACNC: <3.8 AU/ML
SARS-COV-2 IGG SERPL QL IA: NEGATIVE
SODIUM SERPL-SCNC: 141 MMOL/L
SPECIFIC GRAVITY URINE: 1.03
SQUAMOUS EPITHELIAL CELLS: 0 /HPF
T3FREE SERPL-MCNC: 2.52 PG/ML
T4 FREE SERPL-MCNC: 1.1 NG/DL
TRIGL SERPL-MCNC: 74 MG/DL
TSH SERPL-ACNC: 2.46 UIU/ML
UROBILINOGEN URINE: NORMAL
WBC # FLD AUTO: 4.96 K/UL
WHITE BLOOD CELLS URINE: 1 /HPF

## 2020-07-31 ENCOUNTER — TRANSCRIPTION ENCOUNTER (OUTPATIENT)
Age: 85
End: 2020-07-31

## 2020-08-06 ENCOUNTER — APPOINTMENT (OUTPATIENT)
Dept: SURGICAL ONCOLOGY | Facility: CLINIC | Age: 85
End: 2020-08-06
Payer: MEDICARE

## 2020-08-06 VITALS
BODY MASS INDEX: 23.49 KG/M2 | WEIGHT: 155 LBS | DIASTOLIC BLOOD PRESSURE: 50 MMHG | HEIGHT: 68 IN | RESPIRATION RATE: 15 BRPM | SYSTOLIC BLOOD PRESSURE: 74 MMHG | OXYGEN SATURATION: 98 % | HEART RATE: 66 BPM

## 2020-08-06 PROCEDURE — 99213 OFFICE O/P EST LOW 20 MIN: CPT

## 2020-08-07 NOTE — PHYSICAL EXAM
[FreeTextEntry1] : Scalp skin graft well healed. Vertex scalp with 1 cm open wound.  Adjacent  biopsy site. [Normal] : supple, no neck mass and thyroid not enlarged [Normal Neck Lymph Nodes] : normal neck lymph nodes  [Normal Groin Lymph Nodes] : normal groin lymph nodes [Normal Supraclavicular Lymph Nodes] : normal supraclavicular lymph nodes [Normal Axillary Lymph Nodes] : normal axillary lymph nodes [Normal] : grossly intact

## 2020-08-07 NOTE — ASSESSMENT
[FreeTextEntry1] : Will plan excision of scalp melanoma with area of chronic nonhealing scalp wound.\par Plastic reconstruction.\par Discussed with patient in detail.  R/B/A explained.

## 2020-08-07 NOTE — HISTORY OF PRESENT ILLNESS
[de-identified] : Patient is an 83 y/o male who presented with a raised lesion in the mid-frontal scalp.  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  He is referred for surgical management.\par Patient reports he has had this lesion for several years and does not have complaints of associated pain.  He has no previous history of sarcoma.\par \par 09/27/2018:  Patient is s/p radical resection of frontal scalp spindle cell neoplasm and biopsy of vertex scalp lesion.  Skin graft coverage by Dr. Myles.  Recovering well.  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  Additional deep margin shows spindle cell lesion felt to represent fibrosing granulation tissue.  Vertex scalp lesion demonstrates atypical spindle cell lesion, favoring atypical fibroxanthoma.\par \par 10/11/2018:  Improved.  Skin graft healing well.\par \par 11/08/2018:  Feels well.  Denies pain. Saw Dr. 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 Myles 12/17/2018.  Pathology shows minimal residual disease, margins negative.\par \par 04/02/2019:  Patient presents for 3 months follow up.  Feels well.  He states he is transferring dermatology care to Dr. Fitzpatrick of [facility].  Had benign right mid back biopsy performed last week by dermatology.\par \par 10/01/2019: Patient recently developed firm nodule at the posterior edge of his scalp skin graft.  Biopsy performed 08/27/2019 demonstrated atypical epithelioid cells for which an underlying neoplasm could not be ruled out.  He is s/p deeper biopsy by dermatology last week for which results are pending.  He offers no new complaints.\par \par 01/07/2020: Overall doing well, but developed an area of ulceration in vertex scalp.  Recent biopsy 12/18/2019 did not demonstrate evidence of recurrent malignancy.  He denies pain or drainage.\par \par 07/23/2020: Patient was seen by dermatology yesterday and underwent biopsy of a brown pigmented scalp lesion posterior to skin graft.  He continues to have a nonhealing ulcer at the vertex scalp at site of prior biopsy.  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  he offers no new complaints.

## 2020-08-19 NOTE — ASU PATIENT PROFILE, ADULT - VISION (WITH CORRECTIVE LENSES IF THE PATIENT USUALLY WEARS THEM):
no
Partially impaired: cannot see medication labels or newsprint, but can see obstacles in path, and the surrounding layout; can count fingers at arm's length/eyeglasses

## 2020-09-01 ENCOUNTER — NON-APPOINTMENT (OUTPATIENT)
Age: 85
End: 2020-09-01

## 2020-09-01 ENCOUNTER — APPOINTMENT (OUTPATIENT)
Dept: CARDIOLOGY | Facility: CLINIC | Age: 85
End: 2020-09-01
Payer: MEDICARE

## 2020-09-01 VITALS
SYSTOLIC BLOOD PRESSURE: 100 MMHG | HEIGHT: 68 IN | OXYGEN SATURATION: 98 % | HEART RATE: 60 BPM | WEIGHT: 156 LBS | TEMPERATURE: 97.8 F | BODY MASS INDEX: 23.64 KG/M2 | DIASTOLIC BLOOD PRESSURE: 60 MMHG

## 2020-09-01 PROCEDURE — 93000 ELECTROCARDIOGRAM COMPLETE: CPT

## 2020-09-01 PROCEDURE — 99213 OFFICE O/P EST LOW 20 MIN: CPT

## 2020-09-01 NOTE — HISTORY OF PRESENT ILLNESS
[Preoperative Visit] : for a medical evaluation prior to surgery [Scheduled Procedure ___] : a [unfilled] [Date of Surgery ___] : on [unfilled] [Surgeon Name ___] : surgeon: [unfilled] [FreeTextEntry1] : Patient is a 84 year old male with a past medical history of CAD s/p stent LAD 2009, PAD, CKD, HLD, B/L Lung nodule, COPD, Pancreatic nodule, Enlarged prostate, Basal cell carcinoma, SCC of scalp and neck, Spindle cell carcinoma, Caballero esophagus, Osteopenia, Spinal stenosis, Vitamin D Deficiency, history of Falls and recent diagnosis of melanoma on scalp who presents for pre-op clearance. Patient is scheduled for melanoma excision on 9/11/20 at [facility]. Patient reports no other issues or concerns for today. Patient denies chest pain, shortness of breath, palpitations, dizziness, vision changes, n/v, abdominal pain, changes in bowel/bladder habits,  or appetite.

## 2020-09-01 NOTE — PHYSICAL EXAM
[General Appearance - Well Developed] : well developed [Normal Appearance] : normal appearance [Well Groomed] : well groomed [General Appearance - Well Nourished] : well nourished [No Deformities] : no deformities [General Appearance - In No Acute Distress] : no acute distress [Normal Conjunctiva] : the conjunctiva exhibited no abnormalities [Eyelids - No Xanthelasma] : the eyelids demonstrated no xanthelasmas [Normal Oral Mucosa] : normal oral mucosa [No Oral Pallor] : no oral pallor [No Oral Cyanosis] : no oral cyanosis [FreeTextEntry1] : lesion noted scalp and scar noted scalp  [Normal Jugular Venous A Waves Present] : normal jugular venous A waves present [Normal Jugular Venous V Waves Present] : normal jugular venous V waves present [No Jugular Venous Mcgee A Waves] : no jugular venous mcgee A waves [Respiration, Rhythm And Depth] : normal respiratory rhythm and effort [Exaggerated Use Of Accessory Muscles For Inspiration] : no accessory muscle use [Auscultation Breath Sounds / Voice Sounds] : lungs were clear to auscultation bilaterally [Heart Rate And Rhythm] : heart rate and rhythm were normal [Heart Sounds] : normal S1 and S2 [Murmurs] : no murmurs present [Abdomen Soft] : soft [Abdomen Tenderness] : non-tender [Abdomen Mass (___ Cm)] : no abdominal mass palpated [Abnormal Walk] : normal gait [Gait - Sufficient For Exercise Testing] : the gait was sufficient for exercise testing [Nail Clubbing] : no clubbing of the fingernails [Cyanosis, Localized] : no localized cyanosis [Petechial Hemorrhages (___cm)] : no petechial hemorrhages [Skin Color & Pigmentation] : normal skin color and pigmentation [] : no rash [No Venous Stasis] : no venous stasis [Skin Lesions] : no skin lesions [No Skin Ulcers] : no skin ulcer [No Xanthoma] : no  xanthoma was observed [Oriented To Time, Place, And Person] : oriented to person, place, and time [Affect] : the affect was normal [Mood] : the mood was normal [No Anxiety] : not feeling anxious

## 2020-09-04 ENCOUNTER — OUTPATIENT (OUTPATIENT)
Dept: OUTPATIENT SERVICES | Facility: HOSPITAL | Age: 85
LOS: 1 days | End: 2020-09-04

## 2020-09-04 VITALS
HEIGHT: 63 IN | TEMPERATURE: 97 F | HEART RATE: 70 BPM | SYSTOLIC BLOOD PRESSURE: 102 MMHG | DIASTOLIC BLOOD PRESSURE: 64 MMHG | WEIGHT: 153 LBS | RESPIRATION RATE: 16 BRPM | OXYGEN SATURATION: 98 %

## 2020-09-04 DIAGNOSIS — Z90.49 ACQUIRED ABSENCE OF OTHER SPECIFIED PARTS OF DIGESTIVE TRACT: Chronic | ICD-10-CM

## 2020-09-04 DIAGNOSIS — Z98.890 OTHER SPECIFIED POSTPROCEDURAL STATES: Chronic | ICD-10-CM

## 2020-09-04 DIAGNOSIS — D03.4 MELANOMA IN SITU OF SCALP AND NECK: ICD-10-CM

## 2020-09-04 DIAGNOSIS — Z87.09 PERSONAL HISTORY OF OTHER DISEASES OF THE RESPIRATORY SYSTEM: ICD-10-CM

## 2020-09-04 DIAGNOSIS — Z95.818 PRESENCE OF OTHER CARDIAC IMPLANTS AND GRAFTS: Chronic | ICD-10-CM

## 2020-09-04 DIAGNOSIS — Z87.09 PERSONAL HISTORY OF OTHER DISEASES OF THE RESPIRATORY SYSTEM: Chronic | ICD-10-CM

## 2020-09-04 DIAGNOSIS — Z98.61 CORONARY ANGIOPLASTY STATUS: Chronic | ICD-10-CM

## 2020-09-04 DIAGNOSIS — Z98.41 CATARACT EXTRACTION STATUS, RIGHT EYE: Chronic | ICD-10-CM

## 2020-09-04 DIAGNOSIS — I25.10 ATHEROSCLEROTIC HEART DISEASE OF NATIVE CORONARY ARTERY WITHOUT ANGINA PECTORIS: ICD-10-CM

## 2020-09-04 DIAGNOSIS — C44.90 UNSPECIFIED MALIGNANT NEOPLASM OF SKIN, UNSPECIFIED: Chronic | ICD-10-CM

## 2020-09-04 LAB
BLD GP AB SCN SERPL QL: NEGATIVE — SIGNIFICANT CHANGE UP
RH IG SCN BLD-IMP: POSITIVE — SIGNIFICANT CHANGE UP

## 2020-09-04 RX ORDER — PANTOPRAZOLE SODIUM 20 MG/1
1 TABLET, DELAYED RELEASE ORAL
Qty: 0 | Refills: 0 | DISCHARGE

## 2020-09-04 RX ORDER — CHOLECALCIFEROL (VITAMIN D3) 125 MCG
1 CAPSULE ORAL
Qty: 0 | Refills: 0 | DISCHARGE

## 2020-09-04 RX ORDER — SODIUM CHLORIDE 9 MG/ML
1000 INJECTION INTRAMUSCULAR; INTRAVENOUS; SUBCUTANEOUS
Refills: 0 | Status: DISCONTINUED | OUTPATIENT
Start: 2020-09-11 | End: 2020-09-25

## 2020-09-04 RX ORDER — ASPIRIN/CALCIUM CARB/MAGNESIUM 324 MG
1 TABLET ORAL
Qty: 0 | Refills: 0 | DISCHARGE

## 2020-09-04 RX ORDER — SIMVASTATIN 20 MG/1
1 TABLET, FILM COATED ORAL
Qty: 0 | Refills: 0 | DISCHARGE

## 2020-09-04 NOTE — H&P PST ADULT - NSICDXPASTSURGICALHX_GEN_ALL_CORE_FT
PAST SURGICAL HISTORY:  Closed Fracture of Shoulder Blade (ICD9 811.00) L 8 yrs ago    Fracture of Nose (ICD9 802.0) 40 yrs ago    H/O cataract extraction, right     H/O pneumothorax right lung s/p nail punctured right lung (30 years ago)    History of laparoscopic cholecystectomy 2010    History of loop recorder was removed in 2016/2017    Osteomyelitis (ICD9 730.20) R lower ribs after fracture 1990's requiring ABX and surgery    S/P arthroscopy of shoulder left    S/P PTCA (percutaneous transluminal coronary angioplasty) 2009- with stent to LAD    S/P skin biopsy of scalp    S/P skin cancer resection spindle cell carcinoma 9/2018 1/2019 re-excision for residula disease    Skin cancer     Status post placement of implantable loop recorder implanted in 2014  removed in 2017

## 2020-09-04 NOTE — H&P PST ADULT - NEGATIVE NEUROLOGICAL SYMPTOMS
no weakness/no paresthesias/no generalized seizures/no vertigo/no headache/no focal seizures/no syncope

## 2020-09-04 NOTE — H&P PST ADULT - HISTORY OF PRESENT ILLNESS
83 y/o male with CAD s/p stent in LAD, bilateral Lung Nodules, HLD, CKD, spindle cell neoplasm s/p resection in 2018 and COPD presents to PST preop for excision of scalp melanoma with reconstruction on 9/11/20. preop dx of melanoma in situ of scalp and neck. pt presented to his dermatologist reporting scalp lesion. biopsy revealed melanoma. now for surgery 85 y/o male with CAD s/p stent in LAD, bilateral Lung Nodules, HLD, CKD, spindle cell neoplasm s/p resection in 2018 and COPD presents to PST preop for excision of scalp melanoma with reconstruction on 9/11/20. preop dx of melanoma in situ of scalp and neck. pt presented to his dermatologist reporting scalp lesion. biopsy revealed melanoma. now for surgery.

## 2020-09-04 NOTE — H&P PST ADULT - NS MD HP INPLANTS MED DEV
1 coronary stent, right lung shrapnel (nail)/Vascular stents/Clips 1 coronary stent, right lung shrapnel (nail)/Lens implant/Vascular stents/Clips

## 2020-09-04 NOTE — H&P PST ADULT - NSICDXPASTMEDICALHX_GEN_ALL_CORE_FT
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)     CAD (Coronary Artery Disease) (ICD9 414.00) s/p stent to LAD 9/11/09    Cataract     Chronic kidney disease (CKD)     History of COPD     Hypercholesteremia (ICD9 272.0)     Lung nodule followed by annual CT Scan    Lung nodule bilateral    Melanoma in situ of scalp     PAD (peripheral artery disease)     Sarcoma of scalp    Skin cancer Basal, Squamous - treated surgically    Spinal stenosis     Spinal stenosis     Spindle cell carcinoma 2018    Syncope (2016) as a result of Clopedigrel and seizure like jerking- stopped after Plavix was stopped

## 2020-09-04 NOTE — H&P PST ADULT - NSICDXPROBLEM_GEN_ALL_CORE_FT
PROBLEM DIAGNOSES  Problem: Melanoma in situ of scalp  Assessment and Plan: preop for excision of scalp melanoma with reconstruction on 9/11/20  preop instructions given, pt verbalized understanding   pt can take prescribed pantoprazole AM of surgery for GI prophylaxis   pt scheduled for COVID testing on 9/8/20  DAVID precautions. OR booking notified     Problem: CAD (coronary artery disease)  Assessment and Plan: cardiac clearance on chart with ECHO and EKG  pt to remain on low dose aspirin in preop period     Problem: History of COPD  Assessment and Plan: last pulmonary visit note on chart with recent PFT's

## 2020-09-04 NOTE — H&P PST ADULT - MUSCULOSKELETAL
details… No joint pain, swelling or deformity; no limitation of movement detailed exam no joint swelling/normal strength

## 2020-09-04 NOTE — H&P PST ADULT - RS GEN PE MLT RESP DETAILS PC
clear to auscultation bilaterally respirations non-labored/clear to auscultation bilaterally/breath sounds equal/no wheezes

## 2020-09-06 DIAGNOSIS — Z01.818 ENCOUNTER FOR OTHER PREPROCEDURAL EXAMINATION: ICD-10-CM

## 2020-09-07 ENCOUNTER — RX RENEWAL (OUTPATIENT)
Age: 85
End: 2020-09-07

## 2020-09-08 ENCOUNTER — APPOINTMENT (OUTPATIENT)
Dept: DISASTER EMERGENCY | Facility: CLINIC | Age: 85
End: 2020-09-08

## 2020-09-09 LAB — SARS-COV-2 N GENE NPH QL NAA+PROBE: NOT DETECTED

## 2020-09-10 ENCOUNTER — TRANSCRIPTION ENCOUNTER (OUTPATIENT)
Age: 85
End: 2020-09-10

## 2020-09-10 NOTE — ASU PATIENT PROFILE, ADULT - PSH
Closed Fracture of Shoulder Blade (ICD9 811.00)  L 8 yrs ago  Fracture of Nose (ICD9 802.0)  40 yrs ago  H/O cataract extraction, right    H/O pneumothorax  right lung s/p nail punctured right lung (30 years ago)  History of laparoscopic cholecystectomy  2010  History of loop recorder  was removed in 2016/2017  Osteomyelitis (ICD9 730.20)  R lower ribs after fracture 1990's requiring ABX and surgery  S/P arthroscopy of shoulder  left  S/P PTCA (percutaneous transluminal coronary angioplasty)  2009- with stent to LAD  S/P skin biopsy  of scalp  S/P skin cancer resection  spindle cell carcinoma 9/2018 1/2019 re-excision for residula disease  Skin cancer    Status post placement of implantable loop recorder  implanted in 2014  removed in 2017

## 2020-09-10 NOTE — ASU PATIENT PROFILE, ADULT - PMH
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

## 2020-09-11 ENCOUNTER — APPOINTMENT (OUTPATIENT)
Dept: SURGICAL ONCOLOGY | Facility: HOSPITAL | Age: 85
End: 2020-09-11

## 2020-09-11 ENCOUNTER — RESULT REVIEW (OUTPATIENT)
Age: 85
End: 2020-09-11

## 2020-09-11 ENCOUNTER — OUTPATIENT (OUTPATIENT)
Dept: OUTPATIENT SERVICES | Facility: HOSPITAL | Age: 85
LOS: 1 days | Discharge: HOME | End: 2020-09-11
Payer: MEDICARE

## 2020-09-11 VITALS
HEART RATE: 71 BPM | RESPIRATION RATE: 14 BRPM | OXYGEN SATURATION: 100 % | TEMPERATURE: 98 F | DIASTOLIC BLOOD PRESSURE: 50 MMHG | SYSTOLIC BLOOD PRESSURE: 129 MMHG

## 2020-09-11 VITALS
DIASTOLIC BLOOD PRESSURE: 62 MMHG | SYSTOLIC BLOOD PRESSURE: 110 MMHG | WEIGHT: 153 LBS | OXYGEN SATURATION: 97 % | TEMPERATURE: 98 F | HEART RATE: 56 BPM | HEIGHT: 63 IN | RESPIRATION RATE: 16 BRPM

## 2020-09-11 DIAGNOSIS — Z98.890 OTHER SPECIFIED POSTPROCEDURAL STATES: Chronic | ICD-10-CM

## 2020-09-11 DIAGNOSIS — D03.4 MELANOMA IN SITU OF SCALP AND NECK: ICD-10-CM

## 2020-09-11 DIAGNOSIS — Z98.61 CORONARY ANGIOPLASTY STATUS: Chronic | ICD-10-CM

## 2020-09-11 DIAGNOSIS — C44.90 UNSPECIFIED MALIGNANT NEOPLASM OF SKIN, UNSPECIFIED: Chronic | ICD-10-CM

## 2020-09-11 DIAGNOSIS — Z98.41 CATARACT EXTRACTION STATUS, RIGHT EYE: Chronic | ICD-10-CM

## 2020-09-11 DIAGNOSIS — Z87.09 PERSONAL HISTORY OF OTHER DISEASES OF THE RESPIRATORY SYSTEM: Chronic | ICD-10-CM

## 2020-09-11 DIAGNOSIS — Z90.49 ACQUIRED ABSENCE OF OTHER SPECIFIED PARTS OF DIGESTIVE TRACT: Chronic | ICD-10-CM

## 2020-09-11 DIAGNOSIS — Z95.818 PRESENCE OF OTHER CARDIAC IMPLANTS AND GRAFTS: Chronic | ICD-10-CM

## 2020-09-11 PROCEDURE — 11626 EXC S/N/H/F/G MAL+MRG >4 CM: CPT

## 2020-09-11 RX ORDER — SODIUM CHLORIDE 9 MG/ML
1000 INJECTION, SOLUTION INTRAVENOUS
Refills: 0 | Status: DISCONTINUED | OUTPATIENT
Start: 2020-09-11 | End: 2020-09-25

## 2020-09-11 RX ORDER — ASPIRIN/CALCIUM CARB/MAGNESIUM 324 MG
1 TABLET ORAL
Qty: 0 | Refills: 0 | DISCHARGE

## 2020-09-11 RX ORDER — HYDROMORPHONE HYDROCHLORIDE 2 MG/ML
0.5 INJECTION INTRAMUSCULAR; INTRAVENOUS; SUBCUTANEOUS
Refills: 0 | Status: DISCONTINUED | OUTPATIENT
Start: 2020-09-11 | End: 2020-09-11

## 2020-09-11 RX ORDER — HYDROMORPHONE HYDROCHLORIDE 2 MG/ML
0.25 INJECTION INTRAMUSCULAR; INTRAVENOUS; SUBCUTANEOUS
Refills: 0 | Status: DISCONTINUED | OUTPATIENT
Start: 2020-09-11 | End: 2020-09-11

## 2020-09-11 RX ORDER — ONDANSETRON 8 MG/1
4 TABLET, FILM COATED ORAL ONCE
Refills: 0 | Status: DISCONTINUED | OUTPATIENT
Start: 2020-09-11 | End: 2020-09-25

## 2020-09-11 NOTE — BRIEF OPERATIVE NOTE - NSICDXBRIEFPROCEDURE_GEN_ALL_CORE_FT
PROCEDURES:  Application, skin substitute, initial 25 sq cm 11-Sep-2020 09:10:07  Navi Guan  Surgical preparation of recipient site by excision of open wound of scalp 11-Sep-2020 09:07:17  Navi Guan
PROCEDURES:  Excision of malignant lesion of scalp, 3.6 to 4.0cm 11-Sep-2020 08:56:18  Cornell Gómez

## 2020-09-11 NOTE — ASU DISCHARGE PLAN (ADULT/PEDIATRIC) - FOLLOW UP APPOINTMENTS
911 or go to the nearest Emergency Room [facility] (Adult):/may also call Recovery Room (PACU) 24/7 @ [phone]

## 2020-09-11 NOTE — ASU DISCHARGE PLAN (ADULT/PEDIATRIC) - CARE PROVIDER_API CALL
Octavia Brown  [address] [address]  Phone: [phone]  Fax: [phone]  Follow Up Time: 1 week

## 2020-09-11 NOTE — BRIEF OPERATIVE NOTE - OPERATION/FINDINGS
Scalp lesion removed by Dr. Stevens. Wound bed prepared with pineapple bur on scalp. 2x2 meshed integra bilayer matrix sutured in place. Cotton bolster over top, tied down with nylon sutures. Separate biopsy site closed primarily.
Left parietal scalp excisional biopsy,   Wide-Excision of Scalp Melanoma in situ  followed with closure by Plastic Surgery

## 2020-09-11 NOTE — ASU DISCHARGE PLAN (ADULT/PEDIATRIC) - ASU DC SPECIAL INSTRUCTIONSFT
Do not get the dressing wet for 10 days, ok to shower from the neck down after 2 days  Ok to restart ASA after 2 days  No strenuous activity or heavy lifting for 1 week  Sleep with HOB Elevated at 45 degrees please

## 2020-09-11 NOTE — ASU DISCHARGE PLAN (ADULT/PEDIATRIC) - CALL YOUR DOCTOR IF YOU HAVE ANY OF THE FOLLOWING:
Bleeding that does not stop/Pain not relieved by Medications/Swelling that gets worse/Fever greater than (need to indicate Fahrenheit or Celsius)/Wound/Surgical Site with redness, or foul smelling discharge or pus Nausea and vomiting that does not stop/Unable to urinate/Bleeding that does not stop/Pain not relieved by Medications/Swelling that gets worse/Fever greater than (need to indicate Fahrenheit or Celsius)/Wound/Surgical Site with redness, or foul smelling discharge or pus

## 2020-09-18 LAB — SURGICAL PATHOLOGY STUDY: SIGNIFICANT CHANGE UP

## 2020-09-22 LAB
ALBUMIN SERPL ELPH-MCNC: 4.4 G/DL
ALP BLD-CCNC: 58 U/L
ALT SERPL-CCNC: 16 U/L
ANION GAP SERPL CALC-SCNC: 11 MMOL/L
APPEARANCE: CLEAR
APTT BLD: 30.4 SEC
AST SERPL-CCNC: 20 U/L
BACTERIA: NEGATIVE
BASOPHILS # BLD AUTO: 0.04 K/UL
BASOPHILS NFR BLD AUTO: 0.8 %
BILIRUB SERPL-MCNC: 0.4 MG/DL
BILIRUBIN URINE: NEGATIVE
BLOOD URINE: NEGATIVE
BUN SERPL-MCNC: 28 MG/DL
CALCIUM SERPL-MCNC: 9.4 MG/DL
CHLORIDE SERPL-SCNC: 104 MMOL/L
CO2 SERPL-SCNC: 24 MMOL/L
COLOR: YELLOW
CREAT SERPL-MCNC: 1.54 MG/DL
EOSINOPHIL # BLD AUTO: 0.08 K/UL
EOSINOPHIL NFR BLD AUTO: 1.6 %
GLUCOSE QUALITATIVE U: NEGATIVE
GLUCOSE SERPL-MCNC: 96 MG/DL
HCT VFR BLD CALC: 35.6 %
HGB BLD-MCNC: 11.6 G/DL
HYALINE CASTS: 1 /LPF
IMM GRANULOCYTES NFR BLD AUTO: 0.2 %
INR PPP: 1.01 RATIO
KETONES URINE: NEGATIVE
LEUKOCYTE ESTERASE URINE: NEGATIVE
LYMPHOCYTES # BLD AUTO: 1.16 K/UL
LYMPHOCYTES NFR BLD AUTO: 23.7 %
MAN DIFF?: NORMAL
MCHC RBC-ENTMCNC: 32.6 GM/DL
MCHC RBC-ENTMCNC: 33.6 PG
MCV RBC AUTO: 103.2 FL
MICROSCOPIC-UA: NORMAL
MONOCYTES # BLD AUTO: 0.44 K/UL
MONOCYTES NFR BLD AUTO: 9 %
NEUTROPHILS # BLD AUTO: 3.17 K/UL
NEUTROPHILS NFR BLD AUTO: 64.7 %
NITRITE URINE: NEGATIVE
PH URINE: 6
PLATELET # BLD AUTO: 183 K/UL
POTASSIUM SERPL-SCNC: 4.4 MMOL/L
PROT SERPL-MCNC: 6.7 G/DL
PROTEIN URINE: NORMAL
PT BLD: 11.9 SEC
RBC # BLD: 3.45 M/UL
RBC # FLD: 11.8 %
RED BLOOD CELLS URINE: 2 /HPF
SODIUM SERPL-SCNC: 138 MMOL/L
SPECIFIC GRAVITY URINE: 1.02
SQUAMOUS EPITHELIAL CELLS: 0 /HPF
UROBILINOGEN URINE: NORMAL
WBC # FLD AUTO: 4.9 K/UL
WHITE BLOOD CELLS URINE: 0 /HPF

## 2020-09-30 PROBLEM — D03.4 MELANOMA IN SITU OF SCALP AND NECK: Chronic | Status: ACTIVE | Noted: 2020-09-04

## 2020-09-30 PROBLEM — N18.9 CHRONIC KIDNEY DISEASE, UNSPECIFIED: Chronic | Status: ACTIVE | Noted: 2020-09-04

## 2020-09-30 PROBLEM — M48.00 SPINAL STENOSIS, SITE UNSPECIFIED: Chronic | Status: ACTIVE | Noted: 2020-09-04

## 2020-09-30 PROBLEM — R91.1 SOLITARY PULMONARY NODULE: Chronic | Status: ACTIVE | Noted: 2020-09-04

## 2020-09-30 PROBLEM — Z87.09 PERSONAL HISTORY OF OTHER DISEASES OF THE RESPIRATORY SYSTEM: Chronic | Status: ACTIVE | Noted: 2020-09-04

## 2020-09-30 PROBLEM — D64.9 ANEMIA, UNSPECIFIED: Chronic | Status: ACTIVE | Noted: 2020-09-04

## 2020-09-30 PROBLEM — I73.9 PERIPHERAL VASCULAR DISEASE, UNSPECIFIED: Chronic | Status: ACTIVE | Noted: 2020-09-04

## 2020-09-30 PROBLEM — C80.1 MALIGNANT (PRIMARY) NEOPLASM, UNSPECIFIED: Chronic | Status: ACTIVE | Noted: 2020-09-04

## 2020-10-08 ENCOUNTER — APPOINTMENT (OUTPATIENT)
Dept: SURGICAL ONCOLOGY | Facility: CLINIC | Age: 85
End: 2020-10-08
Payer: MEDICARE

## 2020-10-08 VITALS
RESPIRATION RATE: 15 BRPM | HEART RATE: 73 BPM | HEIGHT: 68 IN | SYSTOLIC BLOOD PRESSURE: 97 MMHG | WEIGHT: 159 LBS | OXYGEN SATURATION: 100 % | DIASTOLIC BLOOD PRESSURE: 59 MMHG | BODY MASS INDEX: 24.1 KG/M2

## 2020-10-08 PROCEDURE — 99213 OFFICE O/P EST LOW 20 MIN: CPT

## 2020-10-09 NOTE — PHYSICAL EXAM
[FreeTextEntry1] : Scalp: open wound with good granulation.  I compared his preoperative photo taken by dermatology and current post-operative scalp.  The area where the melanoma in-situ was discovered is no longer present.  Left parietal scalp excision site healing well.

## 2020-10-09 NOTE — ASSESSMENT
[FreeTextEntry1] : Recovering well.\par Patient to proceed with skin graft with plastic surgery.\par Will coordinate re-excision of left parietal scalp lesion at that time.

## 2020-10-09 NOTE — HISTORY OF PRESENT ILLNESS
[de-identified] : Patient is an 83 y/o male who presented with a raised lesion in the mid-frontal scalp.  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  He is referred for surgical management.\par Patient reports he has had this lesion for several years and does not have complaints of associated pain.  He has no previous history of sarcoma.\par \par 09/27/2018:  Patient is s/p radical resection of frontal scalp spindle cell neoplasm and biopsy of vertex scalp lesion.  Skin graft coverage by Dr. Myles.  Recovering well.  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  Additional deep margin shows spindle cell lesion felt to represent fibrosing granulation tissue.  Vertex scalp lesion demonstrates atypical spindle cell lesion, favoring atypical fibroxanthoma.\par \par 10/11/2018:  Improved.  Skin graft healing well.\par \par 11/08/2018:  Feels well.  Denies pain. Saw Dr. 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 Myles 12/17/2018.  Pathology shows minimal residual disease, margins negative.\par \par 04/02/2019:  Patient presents for 3 months follow up.  Feels well.  He states he is transferring dermatology care to Dr. Fitzaptrick of [facility].  Had benign right mid back biopsy performed last week by dermatology.\par \par 10/01/2019: Patient recently developed firm nodule at the posterior edge of his scalp skin graft.  Biopsy performed 08/27/2019 demonstrated atypical epithelioid cells for which an underlying neoplasm could not be ruled out.  He is s/p deeper biopsy by dermatology last week for which results are pending.  He offers no new complaints.\par \par 01/07/2020: Overall doing well, but developed an area of ulceration in vertex scalp.  Recent biopsy 12/18/2019 did not demonstrate evidence of recurrent malignancy.  He denies pain or drainage.\par \par 07/23/2020: Patient was seen by dermatology yesterday and underwent biopsy of a brown pigmented scalp lesion posterior to skin graft.  He continues to have a nonhealing ulcer at the vertex scalp at site of prior biopsy.  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  he offers no new complaints.\par \par 10/08/2020: Patient underwent scalp excision of melanoma in-situ and nonhealing area 09/11/2020.  A significant portion of his pre-existing skin graft and adjacent scalp.  An additional left parietal scalp lesion was excised as well.\par Pathology: scalp biopsy - small foci of squamous cell carcinoma in situ, epidermal cyst, no melanoma identified.  Parietal scalp lesion - squamous cell carcinoma in situ - extending to one margin.\par Scalp lesion was covered with Integra and is granulating well.  He is pending formal skin graft coverage.

## 2020-10-14 ENCOUNTER — RX RENEWAL (OUTPATIENT)
Age: 85
End: 2020-10-14

## 2020-10-21 ENCOUNTER — APPOINTMENT (OUTPATIENT)
Dept: CARDIOLOGY | Facility: CLINIC | Age: 85
End: 2020-10-21
Payer: MEDICARE

## 2020-10-21 VITALS
BODY MASS INDEX: 24.1 KG/M2 | SYSTOLIC BLOOD PRESSURE: 122 MMHG | WEIGHT: 159 LBS | HEART RATE: 59 BPM | OXYGEN SATURATION: 97 % | HEIGHT: 68 IN | DIASTOLIC BLOOD PRESSURE: 60 MMHG | TEMPERATURE: 98.2 F

## 2020-10-21 DIAGNOSIS — Z71.89 OTHER SPECIFIED COUNSELING: ICD-10-CM

## 2020-10-21 PROCEDURE — 90662 IIV NO PRSV INCREASED AG IM: CPT

## 2020-10-21 PROCEDURE — G0008: CPT

## 2020-10-21 PROCEDURE — 99213 OFFICE O/P EST LOW 20 MIN: CPT

## 2020-10-21 NOTE — PHYSICAL EXAM
[General Appearance - Well Developed] : well developed [Normal Appearance] : normal appearance [Well Groomed] : well groomed [General Appearance - Well Nourished] : well nourished [No Deformities] : no deformities [General Appearance - In No Acute Distress] : no acute distress [Normal Conjunctiva] : the conjunctiva exhibited no abnormalities [Eyelids - No Xanthelasma] : the eyelids demonstrated no xanthelasmas [Normal Oral Mucosa] : normal oral mucosa [No Oral Pallor] : no oral pallor [No Oral Cyanosis] : no oral cyanosis [Normal Jugular Venous A Waves Present] : normal jugular venous A waves present [Normal Jugular Venous V Waves Present] : normal jugular venous V waves present [No Jugular Venous Mcgee A Waves] : no jugular venous mcgee A waves [Respiration, Rhythm And Depth] : normal respiratory rhythm and effort [Exaggerated Use Of Accessory Muscles For Inspiration] : no accessory muscle use [Auscultation Breath Sounds / Voice Sounds] : lungs were clear to auscultation bilaterally [Heart Rate And Rhythm] : heart rate and rhythm were normal [Heart Sounds] : normal S1 and S2 [Murmurs] : no murmurs present [Abdomen Soft] : soft [Abdomen Tenderness] : non-tender [Abdomen Mass (___ Cm)] : no abdominal mass palpated [Abnormal Walk] : normal gait [Gait - Sufficient For Exercise Testing] : the gait was sufficient for exercise testing [Nail Clubbing] : no clubbing of the fingernails [Cyanosis, Localized] : no localized cyanosis [Petechial Hemorrhages (___cm)] : no petechial hemorrhages [] : no ischemic changes [FreeTextEntry1] : scar noted scalp area  [Oriented To Time, Place, And Person] : oriented to person, place, and time [Affect] : the affect was normal [Mood] : the mood was normal [No Anxiety] : not feeling anxious

## 2020-10-21 NOTE — HISTORY OF PRESENT ILLNESS
[FreeTextEntry1] : pt presents for f/u pt s/p scalp surgery pending skin graft .pt otherwise well .pt with back pain  stble and has adressed chronic pt denies any chest  pain dizziness ,lightheadedness ,nausea vomiting diaphoresis\par

## 2020-11-04 ENCOUNTER — APPOINTMENT (OUTPATIENT)
Dept: CARDIOLOGY | Facility: CLINIC | Age: 85
End: 2020-11-04
Payer: MEDICARE

## 2020-11-04 ENCOUNTER — MED ADMIN CHARGE (OUTPATIENT)
Age: 85
End: 2020-11-04

## 2020-11-04 PROCEDURE — 90732 PPSV23 VACC 2 YRS+ SUBQ/IM: CPT

## 2020-11-04 PROCEDURE — G0009: CPT

## 2020-11-22 ENCOUNTER — RX RENEWAL (OUTPATIENT)
Age: 85
End: 2020-11-22

## 2021-01-09 ENCOUNTER — RX RENEWAL (OUTPATIENT)
Age: 86
End: 2021-01-09

## 2021-01-19 ENCOUNTER — APPOINTMENT (OUTPATIENT)
Dept: SURGICAL ONCOLOGY | Facility: CLINIC | Age: 86
End: 2021-01-19
Payer: MEDICARE

## 2021-01-19 VITALS
HEART RATE: 63 BPM | RESPIRATION RATE: 14 BRPM | HEIGHT: 67 IN | SYSTOLIC BLOOD PRESSURE: 89 MMHG | DIASTOLIC BLOOD PRESSURE: 55 MMHG | BODY MASS INDEX: 24.33 KG/M2 | WEIGHT: 155 LBS | OXYGEN SATURATION: 97 % | TEMPERATURE: 97.9 F

## 2021-01-19 PROCEDURE — 99213 OFFICE O/P EST LOW 20 MIN: CPT

## 2021-01-19 NOTE — HISTORY OF PRESENT ILLNESS
[de-identified] : Patient is an 81 y/o male who presented with a raised lesion in the mid-frontal scalp.  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  He is referred for surgical management.\par Patient reports he has had this lesion for several years and does not have complaints of associated pain.  He has no previous history of sarcoma.\par \par 09/27/2018:  Patient is s/p radical resection of frontal scalp spindle cell neoplasm and biopsy of vertex scalp lesion.  Skin graft coverage by Dr. Myles.  Recovering well.  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  Additional deep margin shows spindle cell lesion felt to represent fibrosing granulation tissue.  Vertex scalp lesion demonstrates atypical spindle cell lesion, favoring atypical fibroxanthoma.\par \par 10/11/2018:  Improved.  Skin graft healing well.\par \par 11/08/2018:  Feels well.  Denies pain. Saw Dr. 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 Myles 12/17/2018.  Pathology shows minimal residual disease, margins negative.\par \par 04/02/2019:  Patient presents for 3 months follow up.  Feels well.  He states he is transferring dermatology care to Dr. Fitzpatrick of [facility].  Had benign right mid back biopsy performed last week by dermatology.\par \par 10/01/2019: Patient recently developed firm nodule at the posterior edge of his scalp skin graft.  Biopsy performed 08/27/2019 demonstrated atypical epithelioid cells for which an underlying neoplasm could not be ruled out.  He is s/p deeper biopsy by dermatology last week for which results are pending.  He offers no new complaints.\par \par 01/07/2020: Overall doing well, but developed an area of ulceration in vertex scalp.  Recent biopsy 12/18/2019 did not demonstrate evidence of recurrent malignancy.  He denies pain or drainage.\par \par 07/23/2020: Patient was seen by dermatology yesterday and underwent biopsy of a brown pigmented scalp lesion posterior to skin graft.  He continues to have a nonhealing ulcer at the vertex scalp at site of prior biopsy.  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  he offers no new complaints.\par \par 10/08/2020: Patient underwent scalp excision of melanoma in-situ and nonhealing area 09/11/2020.  A significant portion of his pre-existing skin graft and adjacent scalp.  An additional left parietal scalp lesion was excised as well.\par Pathology: scalp biopsy - small foci of squamous cell carcinoma in situ, epidermal cyst, no melanoma identified.  Parietal scalp lesion - squamous cell carcinoma in situ - extending to one margin.\par Scalp lesion was covered with Integra and is granulating well.  He is pending formal skin graft coverage.\par \par 01/19/2021: Patient is doing well.  He has recovered well from scalp skin graft.  He denies pain.  He has not have dermatology follow up since surgery 09/2020.

## 2021-01-19 NOTE — PHYSICAL EXAM
[FreeTextEntry1] : Scalp: well healed skin graft.  No obvious evidence of recurrent/new disease.  Photograph taken with patient's consent. [Normal] : supple, no neck mass and thyroid not enlarged [Normal Neck Lymph Nodes] : normal neck lymph nodes  [Normal Supraclavicular Lymph Nodes] : normal supraclavicular lymph nodes [Normal Groin Lymph Nodes] : normal groin lymph nodes [Normal Axillary Lymph Nodes] : normal axillary lymph nodes [Normal] : oriented to person, place and time, with appropriate affect

## 2021-01-26 ENCOUNTER — APPOINTMENT (OUTPATIENT)
Dept: SURGICAL ONCOLOGY | Facility: CLINIC | Age: 86
End: 2021-01-26

## 2021-02-16 ENCOUNTER — NON-APPOINTMENT (OUTPATIENT)
Age: 86
End: 2021-02-16

## 2021-02-16 ENCOUNTER — APPOINTMENT (OUTPATIENT)
Dept: CARDIOLOGY | Facility: CLINIC | Age: 86
End: 2021-02-16
Payer: MEDICARE

## 2021-02-16 VITALS
BODY MASS INDEX: 24.33 KG/M2 | HEIGHT: 67 IN | TEMPERATURE: 98.6 F | DIASTOLIC BLOOD PRESSURE: 60 MMHG | OXYGEN SATURATION: 98 % | SYSTOLIC BLOOD PRESSURE: 85 MMHG | HEART RATE: 65 BPM | WEIGHT: 155 LBS

## 2021-02-16 DIAGNOSIS — M25.551 PAIN IN RIGHT HIP: ICD-10-CM

## 2021-02-16 PROCEDURE — 93000 ELECTROCARDIOGRAM COMPLETE: CPT

## 2021-02-16 PROCEDURE — 99214 OFFICE O/P EST MOD 30 MIN: CPT

## 2021-02-16 NOTE — REVIEW OF SYSTEMS
Billing Type: Third-Party Bill [Earache] : earache [Hip Pain] : hip pain [Lower Back Pain] : lower back pain [Negative] : Heme/Lymph [Discharge From The Ears] : no discharge from the ears [Loss Of Hearing] : no hearing loss [Mouth Sores] : no mouth sores [Sore Throat] : no sore throat [Sinus Pressure] : no sinus pressure [see HPI] : see HPI

## 2021-02-16 NOTE — HISTORY OF PRESENT ILLNESS
[FreeTextEntry1] : This is an 85 year old gentlemen with a PMH of Melanoma, SQ cell carcinoma, HLD, vitamin D Deficiency, Ochoa's Esophagus, Anemia, and CKD presents today for follow up. Patient states that for the past year he has been experiencing hoarseness of his voice, over the last 6 months, patient with an intermittent sharp right ear pain that extends to his jaw. Patient states that his left  Hip has been sore recently, and last week, he was unable to walk. Patient states that he is still experiencing lower back pain, and has not been to Dr. Gates. Patient denies dyspnea, palpitations, chest pain, nausea, vomiting, dizziness and lightheadedness.\par

## 2021-02-16 NOTE — REASON FOR VISIT
[Follow-Up - Clinic] : a clinic follow-up of [FreeTextEntry1] : F/U Medical Issues  Statement Selected

## 2021-02-17 ENCOUNTER — APPOINTMENT (OUTPATIENT)
Dept: CARDIOLOGY | Facility: CLINIC | Age: 86
End: 2021-02-17
Payer: MEDICARE

## 2021-02-17 PROCEDURE — 93306 TTE W/DOPPLER COMPLETE: CPT

## 2021-02-18 ENCOUNTER — APPOINTMENT (OUTPATIENT)
Dept: ORTHOPEDIC SURGERY | Facility: CLINIC | Age: 86
End: 2021-02-18

## 2021-02-21 LAB
25(OH)D3 SERPL-MCNC: 48.9 NG/ML
25(OH)D3 SERPL-MCNC: 51.2 NG/ML
ALBUMIN SERPL ELPH-MCNC: 4.3 G/DL
ALBUMIN SERPL ELPH-MCNC: 4.5 G/DL
ALP BLD-CCNC: 66 U/L
ALP BLD-CCNC: 72 U/L
ALT SERPL-CCNC: 16 U/L
ALT SERPL-CCNC: 19 U/L
ANION GAP SERPL CALC-SCNC: 13 MMOL/L
AST SERPL-CCNC: 23 U/L
AST SERPL-CCNC: 25 U/L
BASOPHILS # BLD AUTO: 0.04 K/UL
BASOPHILS NFR BLD AUTO: 0.8 %
BILIRUB DIRECT SERPL-MCNC: 0.2 MG/DL
BILIRUB INDIRECT SERPL-MCNC: 0.3 MG/DL
BILIRUB SERPL-MCNC: 0.4 MG/DL
BILIRUB SERPL-MCNC: 0.5 MG/DL
BUN SERPL-MCNC: 42 MG/DL
CALCIUM SERPL-MCNC: 9.8 MG/DL
CHLORIDE SERPL-SCNC: 103 MMOL/L
CHOLEST SERPL-MCNC: 121 MG/DL
CHOLEST SERPL-MCNC: 124 MG/DL
CK SERPL-CCNC: 117 U/L
CK SERPL-CCNC: 96 U/L
CO2 SERPL-SCNC: 22 MMOL/L
CREAT SERPL-MCNC: 1.9 MG/DL
EOSINOPHIL # BLD AUTO: 0.07 K/UL
EOSINOPHIL NFR BLD AUTO: 1.3 %
ESTIMATED AVERAGE GLUCOSE: 117 MG/DL
ESTIMATED AVERAGE GLUCOSE: 117 MG/DL
FERRITIN SERPL-MCNC: 304 NG/ML
FOLATE SERPL-MCNC: >20 NG/ML
GLUCOSE SERPL-MCNC: 92 MG/DL
HBA1C MFR BLD HPLC: 5.7 %
HBA1C MFR BLD HPLC: 5.7 %
HCT VFR BLD CALC: 38.1 %
HDLC SERPL-MCNC: 50 MG/DL
HDLC SERPL-MCNC: 51 MG/DL
HGB BLD-MCNC: 12.1 G/DL
IMM GRANULOCYTES NFR BLD AUTO: 0.2 %
IRON SERPL-MCNC: 90 UG/DL
LDLC SERPL CALC-MCNC: 58 MG/DL
LDLC SERPL CALC-MCNC: 60 MG/DL
LDLC SERPL DIRECT ASSAY-MCNC: 62 MG/DL
LYMPHOCYTES # BLD AUTO: 1.44 K/UL
LYMPHOCYTES NFR BLD AUTO: 27.5 %
MAN DIFF?: NORMAL
MCHC RBC-ENTMCNC: 31.8 GM/DL
MCHC RBC-ENTMCNC: 32.3 PG
MCV RBC AUTO: 101.6 FL
MONOCYTES # BLD AUTO: 0.39 K/UL
MONOCYTES NFR BLD AUTO: 7.4 %
NEUTROPHILS # BLD AUTO: 3.29 K/UL
NEUTROPHILS NFR BLD AUTO: 62.8 %
NONHDLC SERPL-MCNC: 71 MG/DL
NONHDLC SERPL-MCNC: 73 MG/DL
PLATELET # BLD AUTO: 204 K/UL
POTASSIUM SERPL-SCNC: 5.4 MMOL/L
PROT SERPL-MCNC: 6.8 G/DL
PROT SERPL-MCNC: 7.2 G/DL
RBC # BLD: 3.75 M/UL
RBC # FLD: 11.8 %
SODIUM SERPL-SCNC: 138 MMOL/L
TRIGL SERPL-MCNC: 64 MG/DL
TRIGL SERPL-MCNC: 65 MG/DL
TSH SERPL-ACNC: 1.22 UIU/ML
VIT B12 SERPL-MCNC: 1488 PG/ML
WBC # FLD AUTO: 5.24 K/UL

## 2021-02-23 ENCOUNTER — NON-APPOINTMENT (OUTPATIENT)
Age: 86
End: 2021-02-23

## 2021-02-23 DIAGNOSIS — E87.5 HYPERKALEMIA: ICD-10-CM

## 2021-02-25 ENCOUNTER — LABORATORY RESULT (OUTPATIENT)
Age: 86
End: 2021-02-25

## 2021-02-25 ENCOUNTER — APPOINTMENT (OUTPATIENT)
Dept: DERMATOLOGY | Facility: CLINIC | Age: 86
End: 2021-02-25
Payer: MEDICARE

## 2021-02-25 DIAGNOSIS — Z86.03 PERSONAL HISTORY OF NEOPLASM OF UNCERTAIN BEHAVIOR: ICD-10-CM

## 2021-02-25 PROCEDURE — 11102 TANGNTL BX SKIN SINGLE LES: CPT | Mod: 59

## 2021-02-25 PROCEDURE — 17003 DESTRUCT PREMALG LES 2-14: CPT | Mod: 59

## 2021-02-25 PROCEDURE — 11103 TANGNTL BX SKIN EA SEP/ADDL: CPT | Mod: 59

## 2021-02-25 PROCEDURE — 99213 OFFICE O/P EST LOW 20 MIN: CPT | Mod: 25

## 2021-02-25 PROCEDURE — 17000 DESTRUCT PREMALG LESION: CPT | Mod: 59

## 2021-03-03 ENCOUNTER — NON-APPOINTMENT (OUTPATIENT)
Age: 86
End: 2021-03-03

## 2021-04-02 ENCOUNTER — APPOINTMENT (OUTPATIENT)
Dept: ORTHOPEDIC SURGERY | Facility: CLINIC | Age: 86
End: 2021-04-02
Payer: MEDICARE

## 2021-04-02 VITALS — HEIGHT: 68 IN | BODY MASS INDEX: 23.49 KG/M2 | WEIGHT: 155 LBS

## 2021-04-02 VITALS — HEIGHT: 68 IN | WEIGHT: 155 LBS | BODY MASS INDEX: 23.49 KG/M2

## 2021-04-02 DIAGNOSIS — M70.62 TROCHANTERIC BURSITIS, LEFT HIP: ICD-10-CM

## 2021-04-02 PROCEDURE — 99203 OFFICE O/P NEW LOW 30 MIN: CPT

## 2021-04-02 PROCEDURE — 72100 X-RAY EXAM L-S SPINE 2/3 VWS: CPT

## 2021-04-02 NOTE — DISCUSSION/SUMMARY
[de-identified] : Discussed findings of today's exam and possible causes of patient's pain.  Educated patient on their most probable diagnosis of chronic multiple years of low back pain due to severe multilevel osteoarthritis, and lumbosacral stenosis.  Reviewed possible courses of treatment, and we collaboratively decided best course of treatment at this time will include conservative management.  Patient will be started on a course of physical therapy to restore normal range of motion and strength as tolerated.  I also recommend physiatry consultation to discuss risk/benefits of epidural/facet steroid injections.  Patient has no acute injury, no decrease sensation or motor weakness on clinical exam, no immediate need for surgical consultation at this time.  Follow up as needed.  Patient appreciates and agrees with current plan.\par \par This note was generated using dragon medical dictation software.  A reasonable effort has been made for proofreading its contents, but typos may still remain.  If there are any questions or points of clarification needed please notify my office.\par

## 2021-04-02 NOTE — PHYSICAL EXAM
[de-identified] : Constitutional: Well-nourished, well-developed, No acute distress\par Respiratory:  Good respiratory effort, no SOB\par Lymphatic: No regional lymphadenopathy, no lymphedema\par Psychiatric: Pleasant and normal affect, alert and oriented x3\par Skin: Clean dry and intact B/L UE/LE\par Musculoskeletal: normal except where as noted in regional exam\par \par Lumbar Spine Exam\par \par APPEARANCE: no marked deformities or malalignment, + loss of lordotic curvature of the lumbosacral spine. + poor posture\par POSITIVE TENDERNESS: + IL/SI/ST ligs, + TTP of b/l SI joints, + b/l lower lumbar tenderness and spasm noted in erector spinae and quadratus lumborum\par NONTENDER: no bony midline tenderness.\par ROM: Mildly limited in all directions, mildly painful at end range of flexion and extension\par RESISTIVE TESTING: painless 5/5 resisted flex/ext, sidebending b/l, and rotation\par SPECIAL TESTS: neg SLR b/l, neg LESLI b/l, neg Trendelenburg b/l \par PULSES: 2+ DP/PT pulses\par NEURO:  L1 - S2 intact to sensation and motor, DTRs 2+/4 patella and achilles\par  [de-identified] : The following radiographs were ordered and read by me during this patient's visit. I reviewed each radiograph in detail with the patient and discussed the findings as highlighted below. \par \par 2 views of the lumbar spine were obtained today that show degenerative changes and evidence of severe multilevel osteoarthritis.  No acute fracture, or dislocation seen at this time, however patient has evidence of known prior L1, L3, and L5 compression fractures, these appear stable at this time. There is no malalignment. No other obvious osseous abnormality. Otherwise unremarkable.

## 2021-04-02 NOTE — HISTORY OF PRESENT ILLNESS
[de-identified] : Mr. SHEA  is a 85 year male  presenting to the office complaining of low back pain. He  presents to the office ambulating with a cane for support. Patient reports pain for intermittently for years with worsening pain over the past few months. Denies injury or trauma to the area. Patient has a known PMHx of DJD, spinal stenosis compression fractures of L1, L3 and L5, and osteopenia. His last bone mineral density was in 2019.  The patient describes the pain as a dull aching, and occasionally sharp pain localized to the low back that is intermittent in nature.  His symptoms are exacerbated with walking and standing. Pain is alleviated with rest. Patient denies radicular symptoms. Patients left lateral aspect hip pain as well.  Patient denies new weakness, numbness or paresthesia.  Patient denies bowel/bladder dysfunction, fevers, chills, weight loss, night pain, or night sweats. Patient is taking Tylenol for pain relief with mild to moderate relief in symptoms. Patient has completed multiple courses of Physcial therapy, and acupuncture noting mild relief in symptoms. Patient has been performing a HEP for the past year due to Covid-19 concerns. He notes the pain has persisted despite his conservative management. \par Patient denies any other complaints at this time. \par \par The patient's past medical history, past surgical history, medications and allergies were reviewed by me today and documented accordingly. In addition, the patient's family and social history, which were noncontributory to this visit, were reviewed also. Intake form was reviewed. The patient has no family history of arthritis.

## 2021-04-02 NOTE — CONSULT LETTER
[Dear  ___] : Dear  [unfilled], [Consult Letter:] : I had the pleasure of evaluating your patient, [unfilled]. [Please see my note below.] : Please see my note below. [Sincerely,] : Sincerely, [FreeTextEntry2] : PCP [FreeTextEntry3] : Dean Parker DO, ATC\par Primary Care Sports Medicine\par [facility] [place]

## 2021-04-08 ENCOUNTER — APPOINTMENT (OUTPATIENT)
Dept: PHYSICAL MEDICINE AND REHAB | Facility: CLINIC | Age: 86
End: 2021-04-08
Payer: MEDICARE

## 2021-04-08 VITALS
TEMPERATURE: 97.9 F | SYSTOLIC BLOOD PRESSURE: 103 MMHG | OXYGEN SATURATION: 100 % | DIASTOLIC BLOOD PRESSURE: 65 MMHG | HEART RATE: 64 BPM

## 2021-04-08 PROCEDURE — 99214 OFFICE O/P EST MOD 30 MIN: CPT

## 2021-04-09 ENCOUNTER — TRANSCRIPTION ENCOUNTER (OUTPATIENT)
Age: 86
End: 2021-04-09

## 2021-04-09 NOTE — REVIEW OF SYSTEMS
[Joint Pain] : joint pain [Joint Stiffness] : joint stiffness [Muscle Pain] : muscle pain [Difficulty Walking] : difficulty walking [Fever] : no fever [Chills] : no chills [Fatigue] : no fatigue [Recent Change In Weight] : ~T no recent weight change [Chest Pain] : no chest pain [Lower Ext Edema] : no lower extremity edema [Shortness Of Breath] : no shortness of breath [Wheezing] : no wheezing [Cough] : no cough [Incontinence] : no incontinence [Muscle Weakness] : no muscle weakness [Negative] : Genitourinary [FreeTextEntry7] : no bowel incontinence [FreeTextEntry9] : as per HPI

## 2021-04-09 NOTE — PHYSICAL EXAM
[FreeTextEntry1] : Gen: NAD\par HEENT: neck supple\par CV: no cyanosis\par Pulm: breathing well on room air\par Abd: soft\par Low back: range of motion limited by pain, tenderness to palpation lower lumbar paraspinals, pos straight leg raise, neg FABERE, neg FAIR, neg Facet loading b/l\par Hip: TTP left hip, not right hip. Left ITB nontender.\par Msk: \par 5/5 hip flexion B/L, 5/5 knee extension B/L, 5/5 knee flexion B/L, 5/5 dorsiflexion B/L, 5/5 EHL B/L, 5/5 plantar flexion B/L\par 5/5 shoulder abduction B/L, 5/5 elbow flexion B/L, 5/5 elbow extension B/L, 5/5 wrist extension B/L, 5/5 hand  B/L\par Neuro: sensation intact to light touch in bilateral upper and lower extremities, reflexes 2+ brachioradialis, biceps, triceps bilaterally, reflexes 2+ patella, medial hamstring, achilles bilaterally, negative babinski, negative irizarry\par

## 2021-04-09 NOTE — ASSESSMENT
[FreeTextEntry1] :  85 year male with PMH CAD s/p stent, CKD, melanoma, basal cell CA, DJD, spinal stenosis compression fractures of L1, L3 and L5, and osteopenia presents today w/ c/o low back pain likely secondary to lumbar spinal stenosis and lumbar radiculopathy\par \par Patient endorses low back pain despite greater than six weeks of physician directed conservative care including PT/HEP, relative rest, PO NSAIDs, PO muscle relaxants, and PO corticosteroids.\par \par - Orthopedic and Cardiology notes reviewed\par - MR L Spine w/o contrast ordered\par - Patient with a hx of CKD, will avoid PO and inj NSAIDs\par - RTC following MRI to review results.  Patient likely would be a good candidate for lumbar ERWIN.  Will design lumbar ERWIN based on a combination of clinical hx, PE findings, and MRI results.\par \par Justin Jaime MD\par Spine and Sports Medicine\par \par [employer]\par At [facility]\par \par

## 2021-04-09 NOTE — HISTORY OF PRESENT ILLNESS
[FreeTextEntry1] : 84 yo M with PMH CAD s/p stent, CKD, melanoma, basal cell CA, DJD, spinal stenosis compression fractures of L1, L3 and L5, and osteopenia who presents with low back pain.\par \par Onset:  several years but has worsened over the last 2-3 months.  No inciting events, trauma, or falls.\par Location: upper, middle, and lower lumbar spine\par Characteristics: dull with periods of sharp pain\par Aggravating factors: prolonged standing, walking\par Alleviating factors: rest\par Radiation: left lower extremity to the left thigh\par Treatments: tylenol, rest, physical therapy, HEP, acupuncture with minimal improvement in his pain.  Given CKD, patient has abstained from PO NSAIDs.\par Severity: 8/10\par \par Diagnostic studies:\par No recent advanced imaging of the lumbar spine\par No previous EMG/NCS\par \par Patient denies new weakness, numbness or paresthesia.  Patient denies bowel/bladder dysfunction, fevers, chills, weight loss, night pain, or night sweats.\par

## 2021-04-13 ENCOUNTER — APPOINTMENT (OUTPATIENT)
Dept: SURGICAL ONCOLOGY | Facility: CLINIC | Age: 86
End: 2021-04-13
Payer: MEDICARE

## 2021-04-13 VITALS
BODY MASS INDEX: 23.49 KG/M2 | SYSTOLIC BLOOD PRESSURE: 84 MMHG | OXYGEN SATURATION: 98 % | DIASTOLIC BLOOD PRESSURE: 47 MMHG | RESPIRATION RATE: 14 BRPM | TEMPERATURE: 97.9 F | HEIGHT: 68 IN | HEART RATE: 66 BPM | WEIGHT: 155 LBS

## 2021-04-13 PROCEDURE — 99213 OFFICE O/P EST LOW 20 MIN: CPT

## 2021-04-14 NOTE — PHYSICAL EXAM
[FreeTextEntry1] : Scalp: well healed skin graft.  No obvious evidence of recurrent/new disease.  New ulcerated region at vertex scalp and second ulcer more anterior aspect, not present on 01/2021 visit. Photograph taken with patient's consent. [Normal] : supple, no neck mass and thyroid not enlarged [Normal Neck Lymph Nodes] : normal neck lymph nodes  [Normal Supraclavicular Lymph Nodes] : normal supraclavicular lymph nodes [Normal Groin Lymph Nodes] : normal groin lymph nodes [Normal Axillary Lymph Nodes] : normal axillary lymph nodes [Normal] : oriented to person, place and time, with appropriate affect

## 2021-04-14 NOTE — HISTORY OF PRESENT ILLNESS
[de-identified] : Patient is an 81 y/o male who presented with a raised lesion in the mid-frontal scalp.  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  He is referred for surgical management.\par Patient reports he has had this lesion for several years and does not have complaints of associated pain.  He has no previous history of sarcoma.\par \par 09/27/2018:  Patient is s/p radical resection of frontal scalp spindle cell neoplasm and biopsy of vertex scalp lesion.  Skin graft coverage by Dr. Myles.  Recovering well.  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  Additional deep margin shows spindle cell lesion felt to represent fibrosing granulation tissue.  Vertex scalp lesion demonstrates atypical spindle cell lesion, favoring atypical fibroxanthoma.\par \par 10/11/2018:  Improved.  Skin graft healing well.\par \par 11/08/2018:  Feels well.  Denies pain. Saw Dr. 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 Myles 12/17/2018.  Pathology shows minimal residual disease, margins negative.\par \par 04/02/2019:  Patient presents for 3 months follow up.  Feels well.  He states he is transferring dermatology care to Dr. Fitzpatrick of [facility].  Had benign right mid back biopsy performed last week by dermatology.\par \par 10/01/2019: Patient recently developed firm nodule at the posterior edge of his scalp skin graft.  Biopsy performed 08/27/2019 demonstrated atypical epithelioid cells for which an underlying neoplasm could not be ruled out.  He is s/p deeper biopsy by dermatology last week for which results are pending.  He offers no new complaints.\par \par 01/07/2020: Overall doing well, but developed an area of ulceration in vertex scalp.  Recent biopsy 12/18/2019 did not demonstrate evidence of recurrent malignancy.  He denies pain or drainage.\par \par 07/23/2020: Patient was seen by dermatology yesterday and underwent biopsy of a brown pigmented scalp lesion posterior to skin graft.  He continues to have a nonhealing ulcer at the vertex scalp at site of prior biopsy.  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  he offers no new complaints.\par \par 10/08/2020: Patient underwent scalp excision of melanoma in-situ and nonhealing area 09/11/2020.  A significant portion of his pre-existing skin graft and adjacent scalp.  An additional left parietal scalp lesion was excised as well.\par Pathology: scalp biopsy - small foci of squamous cell carcinoma in situ, epidermal cyst, no melanoma identified.  Parietal scalp lesion - squamous cell carcinoma in situ - extending to one margin.\par Scalp lesion was covered with Integra and is granulating well.  He is pending formal skin graft coverage.\par \par 01/19/2021: Patient is doing well.  He has recovered well from scalp skin graft.  He denies pain.  He has not have dermatology follow up since surgery 09/2020.\par \par 04/13/2021: Patient developed non-healing wound at vertex scalp since his last visit.  Biopsy of the area by dermatology 02/2021 showed actinic keratosis without evidence of malignancy.  He denies pain in the area.

## 2021-04-18 ENCOUNTER — RX RENEWAL (OUTPATIENT)
Age: 86
End: 2021-04-18

## 2021-04-20 ENCOUNTER — APPOINTMENT (OUTPATIENT)
Dept: MRI IMAGING | Facility: CLINIC | Age: 86
End: 2021-04-20
Payer: MEDICARE

## 2021-04-20 ENCOUNTER — OUTPATIENT (OUTPATIENT)
Dept: OUTPATIENT SERVICES | Facility: HOSPITAL | Age: 86
LOS: 1 days | End: 2021-04-20
Payer: MEDICARE

## 2021-04-20 DIAGNOSIS — Z87.09 PERSONAL HISTORY OF OTHER DISEASES OF THE RESPIRATORY SYSTEM: Chronic | ICD-10-CM

## 2021-04-20 DIAGNOSIS — Z98.61 CORONARY ANGIOPLASTY STATUS: Chronic | ICD-10-CM

## 2021-04-20 DIAGNOSIS — Z90.49 ACQUIRED ABSENCE OF OTHER SPECIFIED PARTS OF DIGESTIVE TRACT: Chronic | ICD-10-CM

## 2021-04-20 DIAGNOSIS — Z98.41 CATARACT EXTRACTION STATUS, RIGHT EYE: Chronic | ICD-10-CM

## 2021-04-20 DIAGNOSIS — Z98.890 OTHER SPECIFIED POSTPROCEDURAL STATES: Chronic | ICD-10-CM

## 2021-04-20 DIAGNOSIS — M48.07 SPINAL STENOSIS, LUMBOSACRAL REGION: ICD-10-CM

## 2021-04-20 DIAGNOSIS — Z95.818 PRESENCE OF OTHER CARDIAC IMPLANTS AND GRAFTS: Chronic | ICD-10-CM

## 2021-04-20 DIAGNOSIS — C44.90 UNSPECIFIED MALIGNANT NEOPLASM OF SKIN, UNSPECIFIED: Chronic | ICD-10-CM

## 2021-04-20 PROCEDURE — G1004: CPT

## 2021-04-20 PROCEDURE — 72148 MRI LUMBAR SPINE W/O DYE: CPT | Mod: 26,ME

## 2021-04-20 PROCEDURE — 72148 MRI LUMBAR SPINE W/O DYE: CPT

## 2021-04-26 ENCOUNTER — APPOINTMENT (OUTPATIENT)
Dept: PHYSICAL MEDICINE AND REHAB | Facility: CLINIC | Age: 86
End: 2021-04-26
Payer: MEDICARE

## 2021-04-26 PROCEDURE — 99214 OFFICE O/P EST MOD 30 MIN: CPT

## 2021-04-26 NOTE — PHYSICAL EXAM
[FreeTextEntry1] : Gen: NAD\par HEENT: neck supple\par CV: no cyanosis\par Pulm: breathing well on room air\par Abd: soft\par Low back: range of motion limited by pain, tenderness to palpation lower lumbar paraspinals, pos straight leg raise LLE, neg FABERE, neg FAIR, neg Facet loading b/l\par Hip: TTP left hip, not right hip. Left ITB nontender.\par Msk: \par 5/5 hip flexion B/L, 5/5 knee extension B/L, 5/5 knee flexion B/L, 5/5 dorsiflexion B/L, 5/5 EHL B/L, 5/5 plantar flexion B/L\par 5/5 shoulder abduction B/L, 5/5 elbow flexion B/L, 5/5 elbow extension B/L, 5/5 wrist extension B/L, 5/5 hand  B/L\par Neuro: sensation intact to light touch in bilateral upper and lower extremities, reflexes 2+ brachioradialis, biceps, triceps bilaterally, reflexes 2+ patella, medial hamstring, achilles bilaterally, negative babinski, negative irizarry\par

## 2021-04-26 NOTE — HISTORY OF PRESENT ILLNESS
[FreeTextEntry1] : 4/26/21\par 86 yo M who presents for follow up with low back pain.  Patient reports that low back pain is about the same as previously.  Pain is worse with prolonged standing.  MRI lumbar spine w/o contrast showing multilevel disc degeneration worse at L3-L4 with noted severe central spinal stenosis.  Patient denies new weakness, numbness or paresthesia.  Denies bowel/bladder dysfunction, fevers, chills, weight loss, night pain, or night sweats.\par \par 4/9/21\par 86 yo M with PMH CAD s/p stent, CKD, melanoma, basal cell CA, DJD, spinal stenosis compression fractures of L1, L3 and L5, and osteopenia who presents with low back pain.\par \par Onset:  several years but has worsened over the last 2-3 months.  No inciting events, trauma, or falls.\par Location: upper, middle, and lower lumbar spine\par Characteristics: dull with periods of sharp pain\par Aggravating factors: prolonged standing, walking\par Alleviating factors: rest\par Radiation: left lower extremity to the left thigh\par Treatments: tylenol, rest, physical therapy, HEP, acupuncture with minimal improvement in his pain.  Given CKD, patient has abstained from PO NSAIDs.\par Severity: 8/10\par \par Diagnostic studies:\par No recent advanced imaging of the lumbar spine\par No previous EMG/NCS\par \par Patient denies new weakness, numbness or paresthesia.  Patient denies bowel/bladder dysfunction, fevers, chills, weight loss, night pain, or night sweats.\par

## 2021-04-26 NOTE — ASSESSMENT
[FreeTextEntry1] :  85 year male with PMH CAD s/p stent, CKD, melanoma, basal cell CA, DJD, spinal stenosis compression fractures of L1, L3 and L5, and osteopenia presents today w/ c/o low back pain likely secondary to lumbar spinal stenosis and lumbar radiculopathy\par \par Several treatment options discussed including lumbar ERWIN.  Risks and benefits of lumbar ERWIN reviewed with patient.  Patient elects for the following treatment plan.\par \par - Orthopedic and Cardiology notes reviewed\par - MR L Spine w/o contrast reviewed\par - Start gabapentin 100 mg PO qHS x 2 weeks.  Risks and benefits of this medication discussed.  If side effect profile is tolerable, then increase gabapentin 200 mg PO qHS until he follows up with me.  Creatinine clearance calculated on this visit as 28.  Will uptitrate as tolerated.\par - Patient with a hx of CKD, will avoid PO and inj NSAIDs\par - RTC 4-6 weeks, if pain persists or worsen despite compliance with above, then will consider scheduling lumbar ERWIN if patient is amenable.\par \par Justin Jaime MD\par Spine and Sports Medicine\par \par [employer] [facility] [employer] [employer]\par At [facility]\par \par

## 2021-04-26 NOTE — REVIEW OF SYSTEMS
[Joint Pain] : joint pain [Joint Stiffness] : joint stiffness [Muscle Pain] : muscle pain [Difficulty Walking] : difficulty walking [Negative] : Heme/Lymph [Fever] : no fever [Chills] : no chills [Fatigue] : no fatigue [Recent Change In Weight] : ~T no recent weight change [Chest Pain] : no chest pain [Lower Ext Edema] : no lower extremity edema [Shortness Of Breath] : no shortness of breath [Wheezing] : no wheezing [Cough] : no cough [Incontinence] : no incontinence [Muscle Weakness] : no muscle weakness [FreeTextEntry7] : no bowel incontinence [FreeTextEntry9] : as per HPI

## 2021-04-27 ENCOUNTER — TRANSCRIPTION ENCOUNTER (OUTPATIENT)
Age: 86
End: 2021-04-27

## 2021-05-13 ENCOUNTER — NON-APPOINTMENT (OUTPATIENT)
Age: 86
End: 2021-05-13

## 2021-05-19 ENCOUNTER — NON-APPOINTMENT (OUTPATIENT)
Age: 86
End: 2021-05-19

## 2021-05-19 ENCOUNTER — APPOINTMENT (OUTPATIENT)
Dept: CARDIOLOGY | Facility: CLINIC | Age: 86
End: 2021-05-19
Payer: MEDICARE

## 2021-05-19 VITALS
HEART RATE: 63 BPM | SYSTOLIC BLOOD PRESSURE: 98 MMHG | BODY MASS INDEX: 23.79 KG/M2 | HEIGHT: 68 IN | DIASTOLIC BLOOD PRESSURE: 60 MMHG | WEIGHT: 157 LBS | OXYGEN SATURATION: 98 %

## 2021-05-19 VITALS — SYSTOLIC BLOOD PRESSURE: 98 MMHG | DIASTOLIC BLOOD PRESSURE: 62 MMHG

## 2021-05-19 DIAGNOSIS — H92.01 OTALGIA, RIGHT EAR: ICD-10-CM

## 2021-05-19 DIAGNOSIS — N40.0 BENIGN PROSTATIC HYPERPLASIA WITHOUT LOWER URINARY TRACT SYMPMS: ICD-10-CM

## 2021-05-19 DIAGNOSIS — M54.9 DORSALGIA, UNSPECIFIED: ICD-10-CM

## 2021-05-19 DIAGNOSIS — Z12.83 ENCOUNTER FOR SCREENING FOR MALIGNANT NEOPLASM OF SKIN: ICD-10-CM

## 2021-05-19 PROCEDURE — 99214 OFFICE O/P EST MOD 30 MIN: CPT

## 2021-05-19 PROCEDURE — 93000 ELECTROCARDIOGRAM COMPLETE: CPT

## 2021-05-19 NOTE — HISTORY OF PRESENT ILLNESS
[FreeTextEntry1] : This is an 85 year old gentlemen with a  PMH of Melanoma, SQ cell carcinoma, HLD, vitamin D Deficiency, Ochoa's Esophagus, Anemia, and CKD presents today for follow up. Patient states that he has been following closely with Dr. Marti re: Ochoa's Esophagus and was told that everything is stable. Patient still with increased hoarseness, was seen by Dr. Nova and had endoscopy. Patient still with back pain, attending physical therapy and states that it is not providing much relief. Patient addressing with ortho (Dr. Tate) and states that he does not think he is going to have spinal injections. Patient with no further episodes of ear pain, s/p normal imaging and visit to ENT. Patient seen by Dr. Pryor for CKD. Patient denies dyspnea, palpitations, chest pain, nausea, vomiting, dizziness and lightheadedness.\par

## 2021-05-19 NOTE — PHYSICAL EXAM
[Well Developed] : well developed [Well Nourished] : well nourished [No Acute Distress] : no acute distress [Normal Conjunctiva] : normal conjunctiva [Normal Venous Pressure] : normal venous pressure [No Carotid Bruit] : no carotid bruit [Normal S1, S2] : normal S1, S2 [No Murmur] : no murmur [No Rub] : no rub [No Gallop] : no gallop [Clear Lung Fields] : clear lung fields [Good Air Entry] : good air entry [No Respiratory Distress] : no respiratory distress  [Soft] : abdomen soft [Non Tender] : non-tender [No Masses/organomegaly] : no masses/organomegaly [Normal Bowel Sounds] : normal bowel sounds [No Edema] : no edema [No Cyanosis] : no cyanosis [No Clubbing] : no clubbing [No Varicosities] : no varicosities [No Rash] : no rash [No Skin Lesions] : no skin lesions [Moves all extremities] : moves all extremities [No Focal Deficits] : no focal deficits [Normal Speech] : normal speech [Alert and Oriented] : alert and oriented [Normal memory] : normal memory [de-identified] : Uses cane

## 2021-05-23 ENCOUNTER — RX RENEWAL (OUTPATIENT)
Age: 86
End: 2021-05-23

## 2021-05-26 ENCOUNTER — APPOINTMENT (OUTPATIENT)
Dept: DERMATOLOGY | Facility: CLINIC | Age: 86
End: 2021-05-26
Payer: MEDICARE

## 2021-05-26 LAB
ALBUMIN SERPL ELPH-MCNC: 4.3 G/DL
ALP BLD-CCNC: 59 U/L
ALT SERPL-CCNC: 17 U/L
ANION GAP SERPL CALC-SCNC: 9 MMOL/L
AST SERPL-CCNC: 21 U/L
BASOPHILS # BLD AUTO: 0.03 K/UL
BASOPHILS NFR BLD AUTO: 0.6 %
BILIRUB SERPL-MCNC: 0.3 MG/DL
BUN SERPL-MCNC: 34 MG/DL
CALCIUM SERPL-MCNC: 9.4 MG/DL
CHLORIDE SERPL-SCNC: 105 MMOL/L
CO2 SERPL-SCNC: 22 MMOL/L
CREAT SERPL-MCNC: 1.74 MG/DL
EOSINOPHIL # BLD AUTO: 0.13 K/UL
EOSINOPHIL NFR BLD AUTO: 2.4 %
GLUCOSE SERPL-MCNC: 89 MG/DL
HCT VFR BLD CALC: 34.6 %
HGB BLD-MCNC: 11 G/DL
IMM GRANULOCYTES NFR BLD AUTO: 0.2 %
LYMPHOCYTES # BLD AUTO: 1.28 K/UL
LYMPHOCYTES NFR BLD AUTO: 24 %
MAN DIFF?: NORMAL
MCHC RBC-ENTMCNC: 31.8 GM/DL
MCHC RBC-ENTMCNC: 32.4 PG
MCV RBC AUTO: 101.8 FL
MONOCYTES # BLD AUTO: 0.41 K/UL
MONOCYTES NFR BLD AUTO: 7.7 %
NEUTROPHILS # BLD AUTO: 3.48 K/UL
NEUTROPHILS NFR BLD AUTO: 65.1 %
PLATELET # BLD AUTO: 194 K/UL
POTASSIUM SERPL-SCNC: 5 MMOL/L
PROT SERPL-MCNC: 6.8 G/DL
RBC # BLD: 3.4 M/UL
RBC # FLD: 12.3 %
SODIUM SERPL-SCNC: 137 MMOL/L
WBC # FLD AUTO: 5.34 K/UL

## 2021-05-26 PROCEDURE — 17000 DESTRUCT PREMALG LESION: CPT

## 2021-05-26 PROCEDURE — 99214 OFFICE O/P EST MOD 30 MIN: CPT | Mod: 25

## 2021-05-26 PROCEDURE — 17003 DESTRUCT PREMALG LES 2-14: CPT

## 2021-05-26 NOTE — PHYSICAL EXAM
[FreeTextEntry3] : AAOx3, pleasant, NAD, no visual lymphadenopathy\par hair, scalp, face, nose, eyelids, ears, lips, oropharynx, neck, chest, abdomen, back, right arm, left arm, nails, and hands examined with all normal findings,\par pertinent findings include:\par \par 2 crusted plaques at posterior end of graft; 1 extending down to bone; similar to pictures from Feb 2021\par \par

## 2021-05-26 NOTE — ASSESSMENT
[FreeTextEntry1] : 1.  skin check-\par -benign findings as above\par \par 2. ulcers; bx proven HAK\par similar to Feb 2021; discussed lesion down to bone may not heal fully\par Actinic keratoses as above\par -curretted down; Treated with cryotherapy x 2, side effects discussed including erythema and scarring, blister formation expected by patient, total freeze time 4 seconds. Wound care reviewed withpatient. Risk of hypo/hyperpigmentation reviewed with patient, and possible need for re-treatment and / or future biopsy also reviewed with patient\par - total # treated- 2\par \par 3. Hx AFX\par Vertex scalp s/p surgical resection with skin graft\par Concern for recurrence in 8/2019, with several bx showing no residual spindled tumor\par Biopsied again in 12/2019 given persistent ulceration - path showing epidermal ulceration w/ residual AK, no spindled tumor\par - cont close f/u with Derm and Surg Onc \par \par 4. h/o MIS\par s/p excision 10/8/20 by surg/onc\par NER\par continue FBSEs\par

## 2021-06-09 ENCOUNTER — APPOINTMENT (OUTPATIENT)
Dept: PHYSICAL MEDICINE AND REHAB | Facility: CLINIC | Age: 86
End: 2021-06-09
Payer: MEDICARE

## 2021-06-09 VITALS
HEIGHT: 68 IN | HEART RATE: 57 BPM | BODY MASS INDEX: 23.49 KG/M2 | OXYGEN SATURATION: 99 % | TEMPERATURE: 97.6 F | SYSTOLIC BLOOD PRESSURE: 92 MMHG | DIASTOLIC BLOOD PRESSURE: 52 MMHG | WEIGHT: 155 LBS

## 2021-06-09 PROCEDURE — 20553 NJX 1/MLT TRIGGER POINTS 3/>: CPT

## 2021-06-09 PROCEDURE — 99214 OFFICE O/P EST MOD 30 MIN: CPT | Mod: 25

## 2021-06-12 NOTE — PROCEDURE
[de-identified] : Procedure: lower lumbar paraspinal muscle trigger point injection.\par Side: Bilateral\par Patient positioning: prone\par Target identification: the body region was palpated as needed in order to localize the area of maximal tenderness.  The overlying skin was marked as necessary.\par Skin sterilization: The overlying skin was widely prepped with a chlorhexidine scrub, which was then allowed to dry for 30 seconds.\par Procedure: A 25 gauge 1.5 inch needle was then inserted through the sterilized skin and directed to the paraspinal muscles at L1-L5 area in which patient has pain and muscle twitching as small amounts of lidocaine mixed with dexamethasone were deposited.  Aspiration was negative for blood prior to injection.\par Injectate: 5 cc 0.5% lidocaine + 0.5 mL dexamethasone (10 mg/ml)\par Post Procedure: Band-Aids were then applied and the patient was advised to leave this on until the next day.  The patient tolerated the procedure well and reported significant pain relief following the procedure.\par

## 2021-06-12 NOTE — PHYSICAL EXAM
[FreeTextEntry1] : Gen: NAD\par HEENT: neck supple\par CV: no cyanosis\par Pulm: breathing well on room air\par Abd: soft\par Low back: range of motion limited by pain, +muscle spasms bilateral lumbar paraspinal muscles, tenderness to palpation lower lumbar paraspinals, pos straight leg raise LLE, neg FABERE, neg FAIR, neg Facet loading b/l\par Hip: TTP left hip, not right hip. Left ITB nontender.\par Msk: \par 5/5 hip flexion B/L, 5/5 knee extension B/L, 5/5 knee flexion B/L, 5/5 dorsiflexion B/L, 5/5 EHL B/L, 5/5 plantar flexion B/L\par 5/5 shoulder abduction B/L, 5/5 elbow flexion B/L, 5/5 elbow extension B/L, 5/5 wrist extension B/L, 5/5 hand  B/L\par Neuro: sensation intact to light touch in bilateral upper and lower extremities, reflexes 2+ brachioradialis, biceps, triceps bilaterally, reflexes 2+ patella, medial hamstring, achilles bilaterally, negative babinski, negative irizarry\par

## 2021-06-12 NOTE — HISTORY OF PRESENT ILLNESS
[FreeTextEntry1] : 6/9/21\par 84 yo M who presents for follow up with low back pain.  Patient reports that he has had some improvement in his low back pain with adherence to PT/HEP.  Patient reports that he is signed up for a few more weeks of therapy (with his last session at the end of June).  Patient continues to have worse pain on the left side of his low back with radiation into the left lower extremity.  However, patient also endorses some right sided low back pain.  Patient denies new weakness, numbness or paresthesia.  Denies bowel/bladder dysfunction, fevers, chills, weight loss, night pain, or night sweats.\par \par 4/26/21\par 84 yo M who presents for follow up with low back pain.  Patient reports that low back pain is about the same as previously.  Pain is worse with prolonged standing.  MRI lumbar spine w/o contrast showing multilevel disc degeneration worse at L3-L4 with noted severe central spinal stenosis.  Patient denies new weakness, numbness or paresthesia.  Denies bowel/bladder dysfunction, fevers, chills, weight loss, night pain, or night sweats.\par \par 4/9/21\par 84 yo M with PMH CAD s/p stent, CKD, melanoma, basal cell CA, DJD, spinal stenosis compression fractures of L1, L3 and L5, and osteopenia who presents with low back pain.\par \par Onset:  several years but has worsened over the last 2-3 months.  No inciting events, trauma, or falls.\par Location: upper, middle, and lower lumbar spine\par Characteristics: dull with periods of sharp pain\par Aggravating factors: prolonged standing, walking\par Alleviating factors: rest\par Radiation: left lower extremity to the left thigh\par Treatments: tylenol, rest, physical therapy, HEP, acupuncture with minimal improvement in his pain.  Given CKD, patient has abstained from PO NSAIDs.\par Severity: 8/10\par \par Diagnostic studies:\par No recent advanced imaging of the lumbar spine\par No previous EMG/NCS\par \par Patient denies new weakness, numbness or paresthesia.  Patient denies bowel/bladder dysfunction, fevers, chills, weight loss, night pain, or night sweats.\par

## 2021-07-07 ENCOUNTER — APPOINTMENT (OUTPATIENT)
Dept: PHYSICAL MEDICINE AND REHAB | Facility: CLINIC | Age: 86
End: 2021-07-07
Payer: MEDICARE

## 2021-07-07 VITALS — TEMPERATURE: 97.3 F

## 2021-07-07 DIAGNOSIS — M54.16 RADICULOPATHY, LUMBAR REGION: ICD-10-CM

## 2021-07-07 DIAGNOSIS — M54.5 LOW BACK PAIN: ICD-10-CM

## 2021-07-07 DIAGNOSIS — M48.07 SPINAL STENOSIS, LUMBOSACRAL REGION: ICD-10-CM

## 2021-07-07 PROCEDURE — 99213 OFFICE O/P EST LOW 20 MIN: CPT

## 2021-07-09 PROBLEM — M54.16 LUMBAR RADICULOPATHY: Status: ACTIVE | Noted: 2021-04-09

## 2021-07-09 NOTE — PHYSICAL EXAM
[FreeTextEntry1] : Gen: NAD\par HEENT: neck supple\par CV: no cyanosis\par Pulm: breathing well on room air\par Abd: soft\par Low back: range of motion limited by pain, mild muscle spasms bilateral lumbar paraspinal muscles, mild tenderness to palpation lower lumbar paraspinals, pos straight leg raise LLE, neg FABERE, neg FAIR, neg Facet loading b/l\par Hip: TTP left hip, not right hip. Left ITB nontender.\par Msk: \par 5/5 hip flexion B/L, 5/5 knee extension B/L, 5/5 knee flexion B/L, 5/5 dorsiflexion B/L, 5/5 EHL B/L, 5/5 plantar flexion B/L\par 5/5 shoulder abduction B/L, 5/5 elbow flexion B/L, 5/5 elbow extension B/L, 5/5 wrist extension B/L, 5/5 hand  B/L\par Neuro: sensation intact to light touch in bilateral upper and lower extremities, reflexes 2+ brachioradialis, biceps, triceps bilaterally, reflexes 2+ patella, medial hamstring, achilles bilaterally, negative babinski, negative irizarry\par

## 2021-07-09 NOTE — HISTORY OF PRESENT ILLNESS
[FreeTextEntry1] : 7/7/21\par 86 yo M who presents for follow up with low back pain.  Patient reports that low back pain has significantly improved following trigger point injections and PT/HEP.  Patient reports that he still has intermittent low back pain in the morning upon waking but that this pain improves after about an hour.  Patient reports currently he has no low back pain.  Patient denies new weakness, numbness or paresthesia.  Denies bowel/bladder dysfunction, fevers, chills, weight loss, night pain, or night sweats.\par \par 6/9/21\par 86 yo M who presents for follow up with low back pain.  Patient reports that he has had some improvement in his low back pain with adherence to PT/HEP.  Patient reports that he is signed up for a few more weeks of therapy (with his last session at the end of June).  Patient continues to have worse pain on the left side of his low back with radiation into the left lower extremity.  However, patient also endorses some right sided low back pain.  Patient denies new weakness, numbness or paresthesia.  Denies bowel/bladder dysfunction, fevers, chills, weight loss, night pain, or night sweats.\par \par 4/26/21\par 86 yo M who presents for follow up with low back pain.  Patient reports that low back pain is about the same as previously.  Pain is worse with prolonged standing.  MRI lumbar spine w/o contrast showing multilevel disc degeneration worse at L3-L4 with noted severe central spinal stenosis.  Patient denies new weakness, numbness or paresthesia.  Denies bowel/bladder dysfunction, fevers, chills, weight loss, night pain, or night sweats.\par \par 4/9/21\par 86 yo M with PMH CAD s/p stent, CKD, melanoma, basal cell CA, DJD, spinal stenosis compression fractures of L1, L3 and L5, and osteopenia who presents with low back pain.\par \par Onset:  several years but has worsened over the last 2-3 months.  No inciting events, trauma, or falls.\par Location: upper, middle, and lower lumbar spine\par Characteristics: dull with periods of sharp pain\par Aggravating factors: prolonged standing, walking\par Alleviating factors: rest\par Radiation: left lower extremity to the left thigh\par Treatments: tylenol, rest, physical therapy, HEP, acupuncture with minimal improvement in his pain.  Given CKD, patient has abstained from PO NSAIDs.\par Severity: 8/10\par \par Diagnostic studies:\par No recent advanced imaging of the lumbar spine\par No previous EMG/NCS\par \par Patient denies new weakness, numbness or paresthesia.  Patient denies bowel/bladder dysfunction, fevers, chills, weight loss, night pain, or night sweats.\par

## 2021-07-09 NOTE — ASSESSMENT
[FreeTextEntry1] :  85 year male with PMH CAD s/p stent, CKD, melanoma, basal cell CA, DJD, spinal stenosis compression fractures of L1, L3 and L5, and osteopenia presents today w/ c/o low back pain likely secondary to lumbar spinal stenosis and lumbar radiculopathy\par \par Patient reports significant improvement in his pain with relative rest and trigger point injections.  Several treatment options discussed with Mr. Pacheco including lumbar ERWIN, PT/HEP, and PO medication.  While patient continues to have intermittent and mild pain, he is not interested in pursuing further rounds of treatment at this time.\par Red flag signs and symptoms were reviewed and patient is instructed to seek immediate medical attention should these arise.\par \par - Orthopedic and Cardiology notes reviewed\par - MR L Spine w/o contrast reviewed\par - Patient with a hx of CKD, will avoid PO and inj NSAIDs\par - RTC prn pain\par \par Justin Jaime MD\par Spine and Sports Medicine\par \par [employer]\par At [facility]\par \par

## 2021-07-13 ENCOUNTER — APPOINTMENT (OUTPATIENT)
Dept: SURGICAL ONCOLOGY | Facility: CLINIC | Age: 86
End: 2021-07-13
Payer: MEDICARE

## 2021-07-13 VITALS
HEART RATE: 77 BPM | SYSTOLIC BLOOD PRESSURE: 73 MMHG | OXYGEN SATURATION: 98 % | DIASTOLIC BLOOD PRESSURE: 41 MMHG | RESPIRATION RATE: 14 BRPM | WEIGHT: 155 LBS | BODY MASS INDEX: 23.49 KG/M2 | HEIGHT: 68 IN

## 2021-07-13 DIAGNOSIS — C44.42 SQUAMOUS CELL CARCINOMA OF SKIN OF SCALP AND NECK: ICD-10-CM

## 2021-07-13 PROCEDURE — 99213 OFFICE O/P EST LOW 20 MIN: CPT

## 2021-07-13 NOTE — HISTORY OF PRESENT ILLNESS
[de-identified] : Patient is an 81 y/o male who presented with a raised lesion in the mid-frontal scalp.  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  He is referred for surgical management.\par Patient reports he has had this lesion for several years and does not have complaints of associated pain.  He has no previous history of sarcoma.\par \par 09/27/2018:  Patient is s/p radical resection of frontal scalp spindle cell neoplasm and biopsy of vertex scalp lesion.  Skin graft coverage by Dr. Myles.  Recovering well.  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  Additional deep margin shows spindle cell lesion felt to represent fibrosing granulation tissue.  Vertex scalp lesion demonstrates atypical spindle cell lesion, favoring atypical fibroxanthoma.\par \par 10/11/2018:  Improved.  Skin graft healing well.\par \par 11/08/2018:  Feels well.  Denies pain. Saw Dr. 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 Myles 12/17/2018.  Pathology shows minimal residual disease, margins negative.\par \par 04/02/2019:  Patient presents for 3 months follow up.  Feels well.  He states he is transferring dermatology care to Dr. Fitzpatrick of [facility].  Had benign right mid back biopsy performed last week by dermatology.\par \par 10/01/2019: Patient recently developed firm nodule at the posterior edge of his scalp skin graft.  Biopsy performed 08/27/2019 demonstrated atypical epithelioid cells for which an underlying neoplasm could not be ruled out.  He is s/p deeper biopsy by dermatology last week for which results are pending.  He offers no new complaints.\par \par 01/07/2020: Overall doing well, but developed an area of ulceration in vertex scalp.  Recent biopsy 12/18/2019 did not demonstrate evidence of recurrent malignancy.  He denies pain or drainage.\par \par 07/23/2020: Patient was seen by dermatology yesterday and underwent biopsy of a brown pigmented scalp lesion posterior to skin graft.  He continues to have a nonhealing ulcer at the vertex scalp at site of prior biopsy.  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  he offers no new complaints.\par \par 10/08/2020: Patient underwent scalp excision of melanoma in-situ and nonhealing area 09/11/2020.  A significant portion of his pre-existing skin graft and adjacent scalp.  An additional left parietal scalp lesion was excised as well.\par Pathology: scalp biopsy - small foci of squamous cell carcinoma in situ, epidermal cyst, no melanoma identified.  Parietal scalp lesion - squamous cell carcinoma in situ - extending to one margin.\par Scalp lesion was covered with Integra and is granulating well.  He is pending formal skin graft coverage.\par \par 01/19/2021: Patient is doing well.  He has recovered well from scalp skin graft.  He denies pain.  He has not have dermatology follow up since surgery 09/2020.\par \par 04/13/2021: Patient developed non-healing wound at vertex scalp since his last visit.  Biopsy of the area by dermatology 02/2021 showed actinic keratosis without evidence of malignancy.  He denies pain in the area.\par \par 07/13/2021: Patient remains stable without new malignant skin biopsies.  He denies pain in scalp excision sites.

## 2021-07-13 NOTE — PHYSICAL EXAM
[FreeTextEntry1] : Scalp: well healed skin graft.  No obvious evidence of recurrent/new disease. Ulcerated region at vertex scalp appears smaller and second ulcer more anterior aspect is stable/improved.  Photograph taken with patient's consent.

## 2021-07-19 ENCOUNTER — NON-APPOINTMENT (OUTPATIENT)
Age: 86
End: 2021-07-19

## 2021-07-27 ENCOUNTER — NON-APPOINTMENT (OUTPATIENT)
Age: 86
End: 2021-07-27

## 2021-07-30 ENCOUNTER — INPATIENT (INPATIENT)
Facility: HOSPITAL | Age: 86
LOS: 9 days | Discharge: HOME HEALTH | DRG: 551 | End: 2021-08-09
Attending: STUDENT IN AN ORGANIZED HEALTH CARE EDUCATION/TRAINING PROGRAM | Admitting: HOSPITALIST
Payer: MEDICARE

## 2021-07-30 ENCOUNTER — NON-APPOINTMENT (OUTPATIENT)
Age: 86
End: 2021-07-30

## 2021-07-30 VITALS
TEMPERATURE: 98 F | SYSTOLIC BLOOD PRESSURE: 113 MMHG | WEIGHT: 145.06 LBS | OXYGEN SATURATION: 96 % | DIASTOLIC BLOOD PRESSURE: 74 MMHG | RESPIRATION RATE: 18 BRPM | HEIGHT: 63 IN | HEART RATE: 74 BPM

## 2021-07-30 DIAGNOSIS — Z98.41 CATARACT EXTRACTION STATUS, RIGHT EYE: Chronic | ICD-10-CM

## 2021-07-30 DIAGNOSIS — Z98.61 CORONARY ANGIOPLASTY STATUS: Chronic | ICD-10-CM

## 2021-07-30 DIAGNOSIS — Z98.890 OTHER SPECIFIED POSTPROCEDURAL STATES: Chronic | ICD-10-CM

## 2021-07-30 DIAGNOSIS — C44.90 UNSPECIFIED MALIGNANT NEOPLASM OF SKIN, UNSPECIFIED: Chronic | ICD-10-CM

## 2021-07-30 DIAGNOSIS — Z95.818 PRESENCE OF OTHER CARDIAC IMPLANTS AND GRAFTS: Chronic | ICD-10-CM

## 2021-07-30 DIAGNOSIS — Z90.49 ACQUIRED ABSENCE OF OTHER SPECIFIED PARTS OF DIGESTIVE TRACT: Chronic | ICD-10-CM

## 2021-07-30 DIAGNOSIS — Z87.09 PERSONAL HISTORY OF OTHER DISEASES OF THE RESPIRATORY SYSTEM: Chronic | ICD-10-CM

## 2021-07-30 DIAGNOSIS — S32.029A UNSPECIFIED FRACTURE OF SECOND LUMBAR VERTEBRA, INITIAL ENCOUNTER FOR CLOSED FRACTURE: ICD-10-CM

## 2021-07-30 LAB
ALBUMIN SERPL ELPH-MCNC: 4.2 G/DL — SIGNIFICANT CHANGE UP (ref 3.3–5)
ALP SERPL-CCNC: 56 U/L — SIGNIFICANT CHANGE UP (ref 40–120)
ALT FLD-CCNC: 39 U/L — SIGNIFICANT CHANGE UP (ref 10–45)
ANION GAP SERPL CALC-SCNC: 10 MMOL/L — SIGNIFICANT CHANGE UP (ref 5–17)
APPEARANCE UR: CLEAR — SIGNIFICANT CHANGE UP
APTT BLD: 22.8 SEC — LOW (ref 27.5–35.5)
AST SERPL-CCNC: 26 U/L — SIGNIFICANT CHANGE UP (ref 10–40)
BACTERIA # UR AUTO: NEGATIVE — SIGNIFICANT CHANGE UP
BASE EXCESS BLDV CALC-SCNC: 1.3 MMOL/L — SIGNIFICANT CHANGE UP (ref -2–2)
BASOPHILS # BLD AUTO: 0.01 K/UL — SIGNIFICANT CHANGE UP (ref 0–0.2)
BASOPHILS NFR BLD AUTO: 0.1 % — SIGNIFICANT CHANGE UP (ref 0–2)
BILIRUB SERPL-MCNC: 0.7 MG/DL — SIGNIFICANT CHANGE UP (ref 0.2–1.2)
BILIRUB UR-MCNC: NEGATIVE — SIGNIFICANT CHANGE UP
BUN SERPL-MCNC: 42 MG/DL — HIGH (ref 7–23)
CA-I SERPL-SCNC: 1.17 MMOL/L — SIGNIFICANT CHANGE UP (ref 1.12–1.3)
CALCIUM SERPL-MCNC: 9.3 MG/DL — SIGNIFICANT CHANGE UP (ref 8.4–10.5)
CHLORIDE BLDV-SCNC: 103 MMOL/L — SIGNIFICANT CHANGE UP (ref 96–108)
CHLORIDE SERPL-SCNC: 99 MMOL/L — SIGNIFICANT CHANGE UP (ref 96–108)
CK SERPL-CCNC: 56 U/L — SIGNIFICANT CHANGE UP (ref 30–200)
CO2 BLDV-SCNC: 28 MMOL/L — SIGNIFICANT CHANGE UP (ref 22–30)
CO2 SERPL-SCNC: 25 MMOL/L — SIGNIFICANT CHANGE UP (ref 22–31)
COLOR SPEC: SIGNIFICANT CHANGE UP
CREAT SERPL-MCNC: 1.6 MG/DL — HIGH (ref 0.5–1.3)
DIFF PNL FLD: NEGATIVE — SIGNIFICANT CHANGE UP
EOSINOPHIL # BLD AUTO: 0 K/UL — SIGNIFICANT CHANGE UP (ref 0–0.5)
EOSINOPHIL NFR BLD AUTO: 0 % — SIGNIFICANT CHANGE UP (ref 0–6)
EPI CELLS # UR: 0 /HPF — SIGNIFICANT CHANGE UP
GAS PNL BLDV: 133 MMOL/L — LOW (ref 135–145)
GAS PNL BLDV: SIGNIFICANT CHANGE UP
GLUCOSE BLDV-MCNC: 128 MG/DL — HIGH (ref 70–99)
GLUCOSE SERPL-MCNC: 129 MG/DL — HIGH (ref 70–99)
GLUCOSE UR QL: NEGATIVE — SIGNIFICANT CHANGE UP
HCO3 BLDV-SCNC: 26 MMOL/L — SIGNIFICANT CHANGE UP (ref 21–29)
HCT VFR BLD CALC: 37 % — LOW (ref 39–50)
HCT VFR BLDA CALC: 39 % — SIGNIFICANT CHANGE UP (ref 39–50)
HGB BLD CALC-MCNC: 12.7 G/DL — LOW (ref 13–17)
HGB BLD-MCNC: 12.1 G/DL — LOW (ref 13–17)
IMM GRANULOCYTES NFR BLD AUTO: 1 % — SIGNIFICANT CHANGE UP (ref 0–1.5)
INR BLD: 1.03 RATIO — SIGNIFICANT CHANGE UP (ref 0.88–1.16)
KETONES UR-MCNC: NEGATIVE — SIGNIFICANT CHANGE UP
LACTATE BLDV-MCNC: 1.1 MMOL/L — SIGNIFICANT CHANGE UP (ref 0.7–2)
LEUKOCYTE ESTERASE UR-ACNC: NEGATIVE — SIGNIFICANT CHANGE UP
LIDOCAIN IGE QN: 53 U/L — SIGNIFICANT CHANGE UP (ref 7–60)
LYMPHOCYTES # BLD AUTO: 0.27 K/UL — LOW (ref 1–3.3)
LYMPHOCYTES # BLD AUTO: 2.4 % — LOW (ref 13–44)
MAGNESIUM SERPL-MCNC: 2.6 MG/DL — SIGNIFICANT CHANGE UP (ref 1.6–2.6)
MCHC RBC-ENTMCNC: 32.4 PG — SIGNIFICANT CHANGE UP (ref 27–34)
MCHC RBC-ENTMCNC: 32.7 GM/DL — SIGNIFICANT CHANGE UP (ref 32–36)
MCV RBC AUTO: 99.2 FL — SIGNIFICANT CHANGE UP (ref 80–100)
MONOCYTES # BLD AUTO: 0.4 K/UL — SIGNIFICANT CHANGE UP (ref 0–0.9)
MONOCYTES NFR BLD AUTO: 3.5 % — SIGNIFICANT CHANGE UP (ref 2–14)
NEUTROPHILS # BLD AUTO: 10.55 K/UL — HIGH (ref 1.8–7.4)
NEUTROPHILS NFR BLD AUTO: 93 % — HIGH (ref 43–77)
NITRITE UR-MCNC: NEGATIVE — SIGNIFICANT CHANGE UP
NRBC # BLD: 0 /100 WBCS — SIGNIFICANT CHANGE UP (ref 0–0)
PCO2 BLDV: 45 MMHG — SIGNIFICANT CHANGE UP (ref 35–50)
PH BLDV: 7.38 — SIGNIFICANT CHANGE UP (ref 7.35–7.45)
PH UR: 6.5 — SIGNIFICANT CHANGE UP (ref 5–8)
PHOSPHATE SERPL-MCNC: 3.7 MG/DL — SIGNIFICANT CHANGE UP (ref 2.5–4.5)
PLATELET # BLD AUTO: 254 K/UL — SIGNIFICANT CHANGE UP (ref 150–400)
PO2 BLDV: 27 MMHG — SIGNIFICANT CHANGE UP (ref 25–45)
POTASSIUM BLDV-SCNC: 4.8 MMOL/L — SIGNIFICANT CHANGE UP (ref 3.5–5.3)
POTASSIUM SERPL-MCNC: 5 MMOL/L — SIGNIFICANT CHANGE UP (ref 3.5–5.3)
POTASSIUM SERPL-SCNC: 5 MMOL/L — SIGNIFICANT CHANGE UP (ref 3.5–5.3)
PROT SERPL-MCNC: 7 G/DL — SIGNIFICANT CHANGE UP (ref 6–8.3)
PROT UR-MCNC: NEGATIVE — SIGNIFICANT CHANGE UP
PROTHROM AB SERPL-ACNC: 12.3 SEC — SIGNIFICANT CHANGE UP (ref 10.6–13.6)
RBC # BLD: 3.73 M/UL — LOW (ref 4.2–5.8)
RBC # FLD: 11.8 % — SIGNIFICANT CHANGE UP (ref 10.3–14.5)
RBC CASTS # UR COMP ASSIST: 0 /HPF — SIGNIFICANT CHANGE UP (ref 0–4)
SAO2 % BLDV: 42 % — LOW (ref 67–88)
SARS-COV-2 RNA SPEC QL NAA+PROBE: SIGNIFICANT CHANGE UP
SODIUM SERPL-SCNC: 134 MMOL/L — LOW (ref 135–145)
SP GR SPEC: 1.01 — SIGNIFICANT CHANGE UP (ref 1.01–1.02)
UROBILINOGEN FLD QL: NEGATIVE — SIGNIFICANT CHANGE UP
WBC # BLD: 11.34 K/UL — HIGH (ref 3.8–10.5)
WBC # FLD AUTO: 11.34 K/UL — HIGH (ref 3.8–10.5)
WBC UR QL: 0 /HPF — SIGNIFICANT CHANGE UP (ref 0–5)

## 2021-07-30 PROCEDURE — 74177 CT ABD & PELVIS W/CONTRAST: CPT | Mod: 26,MA

## 2021-07-30 PROCEDURE — 93010 ELECTROCARDIOGRAM REPORT: CPT

## 2021-07-30 PROCEDURE — 72125 CT NECK SPINE W/O DYE: CPT | Mod: 26,MH

## 2021-07-30 PROCEDURE — 70450 CT HEAD/BRAIN W/O DYE: CPT | Mod: 26,MH

## 2021-07-30 PROCEDURE — 99285 EMERGENCY DEPT VISIT HI MDM: CPT

## 2021-07-30 PROCEDURE — 72129 CT CHEST SPINE W/DYE: CPT | Mod: 26,MA

## 2021-07-30 PROCEDURE — 71260 CT THORAX DX C+: CPT | Mod: 26,MA

## 2021-07-30 PROCEDURE — 72132 CT LUMBAR SPINE W/DYE: CPT | Mod: 26,MA

## 2021-07-30 PROCEDURE — 71045 X-RAY EXAM CHEST 1 VIEW: CPT | Mod: 26

## 2021-07-30 RX ORDER — SODIUM CHLORIDE 9 MG/ML
1000 INJECTION, SOLUTION INTRAVENOUS ONCE
Refills: 0 | Status: COMPLETED | OUTPATIENT
Start: 2021-07-30 | End: 2021-07-30

## 2021-07-30 RX ORDER — CEFTRIAXONE 500 MG/1
1000 INJECTION, POWDER, FOR SOLUTION INTRAMUSCULAR; INTRAVENOUS ONCE
Refills: 0 | Status: COMPLETED | OUTPATIENT
Start: 2021-07-30 | End: 2021-07-30

## 2021-07-30 RX ORDER — AZITHROMYCIN 500 MG/1
500 TABLET, FILM COATED ORAL ONCE
Refills: 0 | Status: COMPLETED | OUTPATIENT
Start: 2021-07-30 | End: 2021-07-30

## 2021-07-30 RX ADMIN — CEFTRIAXONE 100 MILLIGRAM(S): 500 INJECTION, POWDER, FOR SOLUTION INTRAMUSCULAR; INTRAVENOUS at 20:38

## 2021-07-30 RX ADMIN — SODIUM CHLORIDE 1000 MILLILITER(S): 9 INJECTION, SOLUTION INTRAVENOUS at 16:09

## 2021-07-30 RX ADMIN — AZITHROMYCIN 250 MILLIGRAM(S): 500 TABLET, FILM COATED ORAL at 22:44

## 2021-07-30 NOTE — ED PROVIDER NOTE - CLINICAL SUMMARY MEDICAL DECISION MAKING FREE TEXT BOX
86 yo M w PMH of spinal stenosis, on ASA presenting with worsened lower back pain after a fall last night. VS WNL. PE showed dry mucous membranes and glossitis. Pt likely had fall 2/2 to orthostasis. R/o spinal injury, r/o brain bleed    Plan:  CBC, CMP, coags, VGB, bcx, lipase, mg/phos, UA  Head/cervical spine CT w/o contrast,   Chest/abd/pelvis CT with contrast  Thoracic/Lumbar spine with contrast  Chest xray    LR bolus 1L

## 2021-07-30 NOTE — ED ADULT NURSE NOTE - NSIMPLEMENTINTERV_GEN_ALL_ED
Implemented All Fall Risk Interventions:  [place] to call system. Call bell, personal items and telephone within reach. Instruct patient to call for assistance. Room bathroom lighting operational. Non-slip footwear when patient is off stretcher. Physically safe environment: no spills, clutter or unnecessary equipment. Stretcher in lowest position, wheels locked, appropriate side rails in place. Provide visual cue, wrist band, yellow gown, etc. Monitor gait and stability. Monitor for mental status changes and reorient to person, place, and time. Review medications for side effects contributing to fall risk. Reinforce activity limits and safety measures with patient and family.

## 2021-07-30 NOTE — ED PROVIDER NOTE - OBJECTIVE STATEMENT
84 yo M w PMH of spinal stenosis, on ASA, recently d/c for COVID hospitalizlakhwinder 1 wk ago presenting with worsened lower back pain after a fall last night. Pt was found on the floor this AM by son. Pt says he remembers fall, endorses dizziness prior to fall. Endorses some abdominal pain. Denies hitting head, head pain, neck pain, urinary incontinence, tongue biting. Shortly after fall, pt complained of worsened back pain compared to baseline, which he rates at 6/10. Has not taken anything for the pain.

## 2021-07-30 NOTE — ED PROVIDER NOTE - ATTENDING CONTRIBUTION TO CARE
85y M hx of spinal stenosis, CAD w stents, HTN, HLD on ASA, recently hospitalized at [facility] from July 19-23rd for COVID infection. Pt was DC back home where he lives alone in 2 story home, uses cane. Son lives locally and was helping him at home since hospital DC. Last night pt got up and fell backward at top of stairs. No head strike, no LOC. States felt dizzy prior to fall. Was not using cane. Son found him hours later. Now c/o back pain, generalized weakness, dec appetite. Will check imaging to r/o acute traumatic injury. Pt on ASA, needs head CT. Based on age needs C spine imaging as well. No FND, unlikley acute SCI. Will check labs, lytes. admit for deconditioning, unsafe DC, PT eval, possible rehab placement. 85y M hx of spinal stenosis, CAD w stents, HTN, HLD on ASA, recently hospitalized at [facility] from July 19-23rd for COVID infection. Pt was DC back home where he lives alone in 2 story home, uses cane. Son lives locally and was helping him at home since hospital DC. Last night pt got up and fell backward at top of stairs. No head strike, no LOC. States felt dizzy prior to fall. Was not using cane. Son found him hours later. Now c/o back pain, generalized weakness, dec appetite. Will check imaging to r/o acute traumatic injury. Pt on ASA, needs head CT. Based on age needs C spine imaging as well. No FND, unlikely acute SCI. Will check labs, lytes. admit for deconditioning, unsafe DC, PT eval, possible rehab placement.

## 2021-07-30 NOTE — ED ADULT NURSE NOTE - OBJECTIVE STATEMENT
85y Male PMHx anemia, lung nodule, spinal stenosis, COPD, PAD, CKD, Sarcoma, and Ochoa's esophagus BIBEMS for back pain d/t fall this morning. Pt reports he had a seizure at 2am and fell, pts son found him on the floor. Pts son states he did not lose consciousness but woke up groggy. Pt is not on seizure medication but has a hx of seizures, PCP is aware. Pt has a baseline of pain d/t spinal stenosis but made worse d/t the fall now 8/10. EMS gave 8 Morphine and 1L of NS in transit. On exam pt A&Ox3, PERRL 2mm, neg facial droop, neg arm drift, equal b/l strength and sensation in all extremities, no vision changes. Denies dizziness, lightheaded, weakness, numbness, tingling, no loss or bladder or bowel. IV placed, labs drawn, CM NSR, seen and evaluated by MD.

## 2021-07-31 DIAGNOSIS — S32.020A WEDGE COMPRESSION FRACTURE OF SECOND LUMBAR VERTEBRA, INITIAL ENCOUNTER FOR CLOSED FRACTURE: ICD-10-CM

## 2021-07-31 DIAGNOSIS — U07.1 COVID-19: ICD-10-CM

## 2021-07-31 DIAGNOSIS — I25.10 ATHEROSCLEROTIC HEART DISEASE OF NATIVE CORONARY ARTERY WITHOUT ANGINA PECTORIS: ICD-10-CM

## 2021-07-31 DIAGNOSIS — K22.719 BARRETT'S ESOPHAGUS WITH DYSPLASIA, UNSPECIFIED: ICD-10-CM

## 2021-07-31 LAB
ALBUMIN SERPL ELPH-MCNC: 3.5 G/DL — SIGNIFICANT CHANGE UP (ref 3.3–5)
ALP SERPL-CCNC: 48 U/L — SIGNIFICANT CHANGE UP (ref 40–120)
ALT FLD-CCNC: 29 U/L — SIGNIFICANT CHANGE UP (ref 10–45)
ANION GAP SERPL CALC-SCNC: 10 MMOL/L — SIGNIFICANT CHANGE UP (ref 5–17)
AST SERPL-CCNC: 18 U/L — SIGNIFICANT CHANGE UP (ref 10–40)
BASOPHILS # BLD AUTO: 0 K/UL — SIGNIFICANT CHANGE UP (ref 0–0.2)
BASOPHILS NFR BLD AUTO: 0 % — SIGNIFICANT CHANGE UP (ref 0–2)
BILIRUB SERPL-MCNC: 0.6 MG/DL — SIGNIFICANT CHANGE UP (ref 0.2–1.2)
BUN SERPL-MCNC: 31 MG/DL — HIGH (ref 7–23)
CALCIUM SERPL-MCNC: 9 MG/DL — SIGNIFICANT CHANGE UP (ref 8.4–10.5)
CHLORIDE SERPL-SCNC: 101 MMOL/L — SIGNIFICANT CHANGE UP (ref 96–108)
CO2 SERPL-SCNC: 24 MMOL/L — SIGNIFICANT CHANGE UP (ref 22–31)
CREAT SERPL-MCNC: 1.28 MG/DL — SIGNIFICANT CHANGE UP (ref 0.5–1.3)
CULTURE RESULTS: SIGNIFICANT CHANGE UP
EOSINOPHIL # BLD AUTO: 0.02 K/UL — SIGNIFICANT CHANGE UP (ref 0–0.5)
EOSINOPHIL NFR BLD AUTO: 0.2 % — SIGNIFICANT CHANGE UP (ref 0–6)
GLUCOSE SERPL-MCNC: 107 MG/DL — HIGH (ref 70–99)
HCT VFR BLD CALC: 34.1 % — LOW (ref 39–50)
HGB BLD-MCNC: 11.2 G/DL — LOW (ref 13–17)
IMM GRANULOCYTES NFR BLD AUTO: 0.7 % — SIGNIFICANT CHANGE UP (ref 0–1.5)
LYMPHOCYTES # BLD AUTO: 0.86 K/UL — LOW (ref 1–3.3)
LYMPHOCYTES # BLD AUTO: 9.7 % — LOW (ref 13–44)
MCHC RBC-ENTMCNC: 32.1 PG — SIGNIFICANT CHANGE UP (ref 27–34)
MCHC RBC-ENTMCNC: 32.8 GM/DL — SIGNIFICANT CHANGE UP (ref 32–36)
MCV RBC AUTO: 97.7 FL — SIGNIFICANT CHANGE UP (ref 80–100)
MONOCYTES # BLD AUTO: 0.58 K/UL — SIGNIFICANT CHANGE UP (ref 0–0.9)
MONOCYTES NFR BLD AUTO: 6.5 % — SIGNIFICANT CHANGE UP (ref 2–14)
NEUTROPHILS # BLD AUTO: 7.38 K/UL — SIGNIFICANT CHANGE UP (ref 1.8–7.4)
NEUTROPHILS NFR BLD AUTO: 82.9 % — HIGH (ref 43–77)
NRBC # BLD: 0 /100 WBCS — SIGNIFICANT CHANGE UP (ref 0–0)
PLATELET # BLD AUTO: 213 K/UL — SIGNIFICANT CHANGE UP (ref 150–400)
POTASSIUM SERPL-MCNC: 4.6 MMOL/L — SIGNIFICANT CHANGE UP (ref 3.5–5.3)
POTASSIUM SERPL-SCNC: 4.6 MMOL/L — SIGNIFICANT CHANGE UP (ref 3.5–5.3)
PROT SERPL-MCNC: 6 G/DL — SIGNIFICANT CHANGE UP (ref 6–8.3)
RBC # BLD: 3.49 M/UL — LOW (ref 4.2–5.8)
RBC # FLD: 11.8 % — SIGNIFICANT CHANGE UP (ref 10.3–14.5)
SARS-COV-2 RNA SPEC QL NAA+PROBE: DETECTED
SODIUM SERPL-SCNC: 135 MMOL/L — SIGNIFICANT CHANGE UP (ref 135–145)
SPECIMEN SOURCE: SIGNIFICANT CHANGE UP
WBC # BLD: 8.9 K/UL — SIGNIFICANT CHANGE UP (ref 3.8–10.5)
WBC # FLD AUTO: 8.9 K/UL — SIGNIFICANT CHANGE UP (ref 3.8–10.5)

## 2021-07-31 PROCEDURE — 12345: CPT | Mod: NC

## 2021-07-31 PROCEDURE — 99223 1ST HOSP IP/OBS HIGH 75: CPT

## 2021-07-31 RX ORDER — TRAMADOL HYDROCHLORIDE 50 MG/1
25 TABLET ORAL ONCE
Refills: 0 | Status: DISCONTINUED | OUTPATIENT
Start: 2021-07-31 | End: 2021-07-31

## 2021-07-31 RX ORDER — HEPARIN SODIUM 5000 [USP'U]/ML
5000 INJECTION INTRAVENOUS; SUBCUTANEOUS EVERY 8 HOURS
Refills: 0 | Status: DISCONTINUED | OUTPATIENT
Start: 2021-07-31 | End: 2021-08-01

## 2021-07-31 RX ORDER — ACETAMINOPHEN 500 MG
1000 TABLET ORAL ONCE
Refills: 0 | Status: COMPLETED | OUTPATIENT
Start: 2021-07-31 | End: 2021-07-31

## 2021-07-31 RX ORDER — SIMVASTATIN 20 MG/1
20 TABLET, FILM COATED ORAL AT BEDTIME
Refills: 0 | Status: DISCONTINUED | OUTPATIENT
Start: 2021-07-31 | End: 2021-08-09

## 2021-07-31 RX ORDER — SODIUM CHLORIDE 9 MG/ML
500 INJECTION INTRAMUSCULAR; INTRAVENOUS; SUBCUTANEOUS ONCE
Refills: 0 | Status: COMPLETED | OUTPATIENT
Start: 2021-07-31 | End: 2021-07-31

## 2021-07-31 RX ORDER — ASPIRIN/CALCIUM CARB/MAGNESIUM 324 MG
81 TABLET ORAL DAILY
Refills: 0 | Status: DISCONTINUED | OUTPATIENT
Start: 2021-07-31 | End: 2021-08-09

## 2021-07-31 RX ORDER — ACETAMINOPHEN 500 MG
650 TABLET ORAL EVERY 6 HOURS
Refills: 0 | Status: DISCONTINUED | OUTPATIENT
Start: 2021-07-31 | End: 2021-08-09

## 2021-07-31 RX ORDER — PANTOPRAZOLE SODIUM 20 MG/1
40 TABLET, DELAYED RELEASE ORAL
Refills: 0 | Status: DISCONTINUED | OUTPATIENT
Start: 2021-07-31 | End: 2021-08-09

## 2021-07-31 RX ADMIN — HEPARIN SODIUM 5000 UNIT(S): 5000 INJECTION INTRAVENOUS; SUBCUTANEOUS at 21:55

## 2021-07-31 RX ADMIN — Medication 400 MILLIGRAM(S): at 21:54

## 2021-07-31 RX ADMIN — TRAMADOL HYDROCHLORIDE 25 MILLIGRAM(S): 50 TABLET ORAL at 17:00

## 2021-07-31 RX ADMIN — TRAMADOL HYDROCHLORIDE 25 MILLIGRAM(S): 50 TABLET ORAL at 16:07

## 2021-07-31 RX ADMIN — SODIUM CHLORIDE 100 MILLILITER(S): 9 INJECTION INTRAMUSCULAR; INTRAVENOUS; SUBCUTANEOUS at 18:45

## 2021-07-31 RX ADMIN — SIMVASTATIN 20 MILLIGRAM(S): 20 TABLET, FILM COATED ORAL at 21:55

## 2021-07-31 RX ADMIN — HEPARIN SODIUM 5000 UNIT(S): 5000 INJECTION INTRAVENOUS; SUBCUTANEOUS at 06:09

## 2021-07-31 RX ADMIN — HEPARIN SODIUM 5000 UNIT(S): 5000 INJECTION INTRAVENOUS; SUBCUTANEOUS at 14:31

## 2021-07-31 RX ADMIN — Medication 81 MILLIGRAM(S): at 14:31

## 2021-07-31 RX ADMIN — PANTOPRAZOLE SODIUM 40 MILLIGRAM(S): 20 TABLET, DELAYED RELEASE ORAL at 06:08

## 2021-07-31 NOTE — H&P ADULT - NSHPPHYSICALEXAM_GEN_ALL_CORE
Vital Signs Last 24 Hrs  T(C): 36.3 (31 Jul 2021 01:20), Max: 36.6 (30 Jul 2021 15:30)  T(F): 97.4 (31 Jul 2021 01:20), Max: 97.9 (30 Jul 2021 15:30)  HR: 62 (31 Jul 2021 01:20) (62 - 74)  BP: 145/79 (31 Jul 2021 01:20) (113/71 - 145/79)  BP(mean): --  RR: 18 (31 Jul 2021 01:20) (17 - 18)  SpO2: 97% (31 Jul 2021 01:20) (96% - 97%)

## 2021-07-31 NOTE — CONSULT NOTE ADULT - SUBJECTIVE AND OBJECTIVE BOX
p ([id number])     HPI:  86 yo M w PMH of spinal stenosis, on ASA, [facility] with worsened lower back pain after a fall last night. Pt was found on the floor this AM by son. Pt says he remembers fall, endorses dizziness prior to fall. Endorses some abdominal pain. Denies hitting head, head pain, neck pain, urinary incontinence, tongue biting. Shortly after fall, pt complained of worsened back pain compared to baseline, which he rates at 6/10. Has not taken anything for the pain  CT shows T3 age indeterminant compression fx and L2 compression fx, L5/s1 retrolisthesis. CTH/Cspine neg. Exam: Awake, JEFFERSON 5/5, no numbness/tingling/bowel/bladder sxs.    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)    Hypercholesteremia (ICD9 272.0)    CAD (Coronary Artery Disease) (ICD9 414.00)    Syncope    Cataract    Skin cancer    Lung nodule    Spinal stenosis    Sarcoma    Melanoma in situ of scalp    Chronic kidney disease (CKD)    History of COPD    PAD (peripheral artery disease)    Spindle cell carcinoma    Spinal stenosis    Anemia    Lung nodule      PAST SURGICAL HISTORY   Fracture of Nose (ICD9 802.0)    Closed Fracture of Shoulder Blade (ICD9 811.00)    Osteomyelitis (ICD9 730.20)    Contraindication to percutaneous coronary intervention (PCI)    S/P PTCA (percutaneous transluminal coronary angioplasty)    Status post placement of implantable loop recorder    H/O cataract extraction, right    Skin cancer    S/P arthroscopy of shoulder    H/O pneumothorax    History of laparoscopic cholecystectomy    History of loop recorder    S/P skin cancer resection    S/P skin biopsy          MEDICATIONS:  Antibiotics:    Neuro:    Other:      SOCIAL HISTORY:   Occupation:   Marital Status:     FAMILY HISTORY:  Family history of premature CAD (Father)    Family history of pulmonary embolism (Father)    Family history of bladder cancer (Mother)        ROS: Negative except per HPI    LABS:  PT/INR - ( 30 Jul 2021 16:03 )   PT: 12.3 sec;   INR: 1.03 ratio         PTT - ( 30 Jul 2021 16:03 )  PTT:22.8 sec                        12.1   11.34 )-----------( 254      ( 30 Jul 2021 16:03 )             37.0     07-30    134<L>  |  99  |  42<H>  ----------------------------<  129<H>  5.0   |  25  |  1.60<H>    Ca    9.3      30 Jul 2021 16:03  Phos  3.7     07-30  Mg     2.6     07-30    TPro  7.0  /  Alb  4.2  /  TBili  0.7  /  DBili  x   /  AST  26  /  ALT  39  /  AlkPhos  56  07-30

## 2021-07-31 NOTE — CONSULT NOTE ADULT - ASSESSMENT
Terence Mendoza    85M s/p fall p/w LBP. CT shows T3 age indeterminant compression fx and L2 compression fx, L5/s1 retrolisthesis. CTH/Cspine neg. Exam: Awake, JEFFERSON 5/5, no numbness/tingling/bowel/bladder sxs.  -TLSO for comfort  -PT/OT  -Pain control  -Admitted medicine

## 2021-07-31 NOTE — PROGRESS NOTE ADULT - SUBJECTIVE AND OBJECTIVE BOX
Patient is a 85y old  Male who presents with a chief complaint of fall (2021 02:08)      SUBJECTIVE / OVERNIGHT EVENTS:    Tele reviewed:       ADDITIONAL REVIEW OF SYSTEMS: Negative except for above    MEDICATIONS  (STANDING):  aspirin  chewable 81 milliGRAM(s) Oral daily  heparin   Injectable 5000 Unit(s) SubCutaneous every 8 hours  pantoprazole    Tablet 40 milliGRAM(s) Oral before breakfast  simvastatin 20 milliGRAM(s) Oral at bedtime    MEDICATIONS  (PRN):  acetaminophen   Tablet .. 650 milliGRAM(s) Oral every 6 hours PRN Mild Pain (1 - 3)      CAPILLARY BLOOD GLUCOSE        I&O's Summary    2021 07:01  -  2021 07:00  --------------------------------------------------------  IN: 0 mL / OUT: 380 mL / NET: -380 mL    2021 07:01  -  2021 12:34  --------------------------------------------------------  IN: 0 mL / OUT: 300 mL / NET: -300 mL        PHYSICAL EXAM:  Vital Signs Last 24 Hrs  T(C): 36.1 (2021 05:39), Max: 36.6 (2021 15:30)  T(F): 97 (2021 05:39), Max: 97.9 (2021 15:30)  HR: 63 (2021 05:39) (62 - 74)  BP: 138/80 (2021 05:39) (113/71 - 145/79)  BP(mean): --  RR: 18 (2021 05:39) (17 - 18)  SpO2: 94% (2021 05:39) (94% - 97%)    PHYSICAL EXAM:  GENERAL: NAD, well-developed  HEAD:  Atraumatic, Normocephalic  EYES:  conjunctiva and sclera clear  NECK: Supple, No JVD  CHEST/LUNG: Clear to auscultation bilaterally; No wheeze  HEART: Regular rate and rhythm; No murmurs, rubs, or gallops  ABDOMEN: Soft, Nontender, Nondistended; Bowel sounds present  EXTREMITIES:  2+ Peripheral Pulses, No clubbing, cyanosis, or edema  PSYCH:     NEUROLOGY: non-focal  SKIN: No rashes or lesions      LABS:                        11.2   8.90  )-----------( 213      ( 2021 10:51 )             34.1     07-31    135  |  101  |  31<H>  ----------------------------<  107<H>  4.6   |  24  |  1.28    Ca    9.0      2021 10:51  Phos  3.7     07-30  Mg     2.6     0730    TPro  6.0  /  Alb  3.5  /  TBili  0.6  /  DBili  x   /  AST  18  /  ALT  29  /  AlkPhos  48  07-31    PT/INR - ( 2021 16:03 )   PT: 12.3 sec;   INR: 1.03 ratio         PTT - ( 2021 16:03 )  PTT:22.8 sec  CARDIAC MARKERS ( 2021 16:03 )  x     / x     / 56 U/L / x     / x          Urinalysis Basic - ( 2021 19:03 )    Color: Light Yellow / Appearance: Clear / S.014 / pH: x  Gluc: x / Ketone: Negative  / Bili: Negative / Urobili: Negative   Blood: x / Protein: Negative / Nitrite: Negative   Leuk Esterase: Negative / RBC: 0 /hpf / WBC 0 /HPF   Sq Epi: x / Non Sq Epi: 0 /hpf / Bacteria: Negative          RADIOLOGY & ADDITIONAL TESTS:    Imaging Personally Reviewed:    Electrocardiogram Personally Reviewed:    COORDINATION OF CARE:  Care Discussed with Consultants/Other Providers [Y/N]:  Prior or Outpatient Records Reviewed [Y/N]:     Patient is a 85y old  Male who presents with a chief complaint of fall (2021 02:08)      SUBJECTIVE / OVERNIGHT EVENTS:  No overnight events       ADDITIONAL REVIEW OF SYSTEMS: Negative except for above    MEDICATIONS  (STANDING):  aspirin  chewable 81 milliGRAM(s) Oral daily  heparin   Injectable 5000 Unit(s) SubCutaneous every 8 hours  pantoprazole    Tablet 40 milliGRAM(s) Oral before breakfast  simvastatin 20 milliGRAM(s) Oral at bedtime    MEDICATIONS  (PRN):  acetaminophen   Tablet .. 650 milliGRAM(s) Oral every 6 hours PRN Mild Pain (1 - 3)      CAPILLARY BLOOD GLUCOSE        I&O's Summary    2021 07:01  -  2021 07:00  --------------------------------------------------------  IN: 0 mL / OUT: 380 mL / NET: -380 mL    2021 07:01  -  2021 12:34  --------------------------------------------------------  IN: 0 mL / OUT: 300 mL / NET: -300 mL        PHYSICAL EXAM:  Vital Signs Last 24 Hrs  T(C): 36.1 (2021 05:39), Max: 36.6 (2021 15:30)  T(F): 97 (2021 05:39), Max: 97.9 (2021 15:30)  HR: 63 (2021 05:39) (62 - 74)  BP: 138/80 (2021 05:39) (113/71 - 145/79)  BP(mean): --  RR: 18 (2021 05:39) (17 - 18)  SpO2: 94% (2021 05:39) (94% - 97%)    PHYSICAL EXAM:  GENERAL: NAD, well-developed  HEAD:  Atraumatic, Normocephalic  EYES:  conjunctiva and sclera clear  NECK: Supple, No JVD  CHEST/LUNG: Clear to auscultation bilaterally; No wheeze  HEART: Regular rate and rhythm; No murmurs, rubs, or gallops  ABDOMEN: Soft, Nontender, Nondistended; Bowel sounds present  EXTREMITIES:  2+ Peripheral Pulses, No clubbing, cyanosis, or edema  NEUROLOGY: non-focal  SKIN: No rashes or lesions      LABS:                        11.2   8.90  )-----------( 213      ( 2021 10:51 )             34.1     07-31    135  |  101  |  31<H>  ----------------------------<  107<H>  4.6   |  24  |  1.28    Ca    9.0      2021 10:51  Phos  3.7     07-30  Mg     2.6     07    TPro  6.0  /  Alb  3.5  /  TBili  0.6  /  DBili  x   /  AST  18  /  ALT  29  /  AlkPhos  48  07-31    PT/INR - ( 2021 16:03 )   PT: 12.3 sec;   INR: 1.03 ratio         PTT - ( 2021 16:03 )  PTT:22.8 sec  CARDIAC MARKERS ( 2021 16:03 )  x     / x     / 56 U/L / x     / x          Urinalysis Basic - ( 2021 19:03 )    Color: Light Yellow / Appearance: Clear / S.014 / pH: x  Gluc: x / Ketone: Negative  / Bili: Negative / Urobili: Negative   Blood: x / Protein: Negative / Nitrite: Negative   Leuk Esterase: Negative / RBC: 0 /hpf / WBC 0 /HPF   Sq Epi: x / Non Sq Epi: 0 /hpf / Bacteria: Negative          RADIOLOGY & ADDITIONAL TESTS:    Imaging Personally Reviewed:    Electrocardiogram Personally Reviewed:    COORDINATION OF CARE:  Care Discussed with Consultants/Other Providers [Y/N]:  Prior or Outpatient Records Reviewed [Y/N]:

## 2021-07-31 NOTE — H&P ADULT - PROBLEM SELECTOR PLAN 2
-recently discharged ~1 week prior to presentation for COVID pneumonia  -COVID negative today but does have bilateral yet small ground glass opacities on imaging  -currently afebrile, very minimal leukocytosis, saturating well on room air  -obtain sputum cultures, urinary Antigens  -low suspicion of bacterial pneumonia in absence of cough, sputum production -recently discharged ~1 week prior to presentation for COVID pneumonia  -COVID negative today but does have bilateral yet small ground glass opacities on imaging  -currently afebrile, very minimal leukocytosis, saturating well on room air, unlikely to benefits from steroids or remdesivir based on current vitals  -obtain sputum cultures, urinary Antigens  -low suspicion of bacterial pneumonia in absence of cough, sputum production

## 2021-07-31 NOTE — PHYSICAL THERAPY INITIAL EVALUATION ADULT - GENERAL OBSERVATIONS, REHAB EVAL
pt received semi-supine in bed with c/o back pain however agreeable to PT session, deferred pre-medication by ORTIZ Landry

## 2021-07-31 NOTE — H&P ADULT - PROBLEM SELECTOR PLAN 1
-acute L2 fracture seen on imaging in setting of recent fall and history of spinal stenosis  -consult neurosurgery, appreciate recommendations  -TLSO for comfort  -pain control Tylenol PRN  -physical therapy evaluation

## 2021-07-31 NOTE — H&P ADULT - HISTORY OF PRESENT ILLNESS
86yo M w/ PMHx of spinal stenosis, CAD (s/p PCI), HLD, CKD, bilateral lung nodules, COPD, spindle cell neoplasm (s/p resection), presents after a fall. The patient reports that he was at the top of the stairs and suddenly felt very weak, with hands and feet shaking and trembling and then he fell on his back. He denies head strike, denies LOC and states he remembers the entire episode. After he fell, he lay on the ground for about an hour entire his son came and found him and called 911. He says that he has fallen a few times in the last few months but unsure exactly how many. Prior to the fall, he denied any dizziness, diaphoresis, palpitations, syncope. He denies any bowel or bladder incontinence, saddle anesthesia, fever/chills. After the fall his only new complaint was lower back pain, which was described as a sharp pain of moderate intensity, relieved with rest and aggravated by movement and turning his back. Currently he has no pain. In the ED, he was hemodynamically stable, afebrile, saturating well on room air. Labs were significant for mild leukocytosis, anemia at baseline, patient had pan-scan for traumatic workup which was concerning for new L2 fracture and RUL/RLL opacities. Of note, patient was recently discharged ~1 week ago for COVID pneumonia. Neurosurgery consulted in the ED, no acute surgical interventions recommended. Patient was given 1L LR, azithromycin/ceftriaxone x1 and admitted to general medicine for further management.

## 2021-07-31 NOTE — PHYSICAL THERAPY INITIAL EVALUATION ADULT - PERTINENT HX OF CURRENT PROBLEM, REHAB EVAL
86yo M w/ PMHx of spinal stenosis, CAD (s/p PCI), HLD, CKD, bilateral lung nodules, COPD, spindle cell neoplasm (s/p resection), presents after a fall. He says that he has fallen a few times in the last few months but unsure exactly how many. After the fall his only new complaint was lower back pain, which was described as a sharp pain of moderate intensity, relieved with rest and aggravated by movement and turning his back.

## 2021-07-31 NOTE — H&P ADULT - NSHPADDITIONALINFOADULT_GEN_ALL_CORE
dvt ppx: heparin SQ dvt ppx: heparin SQ    patient reports that his most recent med list is with his son but cannot recall his mediations by name, attempted to call son, no answer, please confirm medication list in AM

## 2021-07-31 NOTE — H&P ADULT - NSHPLABSRESULTS_GEN_ALL_CORE
Personally reviewed labs, EKG and images    Labs: very mild leukocytosis, Cr 1.6 (baseline 1.3), normal urinalysis    EKG: normal sinus rhythm     Images: CT Chest - R upper lobe ground glass opacity, R lower lobe ground glass opacity, very small left lower lobe ground glass opacity, no visible pulmonary embolism in large pulmonary vessels

## 2021-07-31 NOTE — H&P ADULT - CONSTITUTIONAL DETAILS
MD aware, awaiting urgent US.  Amylase/lipase added to AM labs. Hot packs to abdomen for pain.  No tylenol d/t increasing LFTs. Will continue to monitor. well-groomed/no distress

## 2021-07-31 NOTE — H&P ADULT - NSICDXFAMILYHX_GEN_ALL_CORE_FT
FAMILY HISTORY:  Father  Still living? No  Family history of premature CAD, Age at diagnosis: Age Unknown  Family history of pulmonary embolism, Age at diagnosis: Age Unknown    Mother  Still living? No  Family history of bladder cancer, Age at diagnosis: Age Unknown

## 2021-07-31 NOTE — PHYSICAL THERAPY INITIAL EVALUATION ADULT - PRECAUTIONS/LIMITATIONS, REHAB EVAL
Currently he has no pain. In the ED, he was hemodynamically stable, afebrile, saturating well on room air. Labs were significant for mild leukocytosis, anemia at baseline, pt had pan-scan for traumatic workup which was concerning for new L2 fracture and RUL/RLL opacities. Of note, pt was recently discharged ~1 week ago for COVID pneumonia. Neurosurgery consulted in the ED, no acute surgical interventions recommended. Patient was given 1L LR, azithromycin/ceftriaxone x1 and admitted to general medicine for further management.  CTChest: No evidence of acute intrathoracic or intra-abdominal/pelvic traumatic injury.Acute appearing fracture lucency through the L2 vertebral body with mild compression of the vertebral body. Indeterminate age compression fractures of T3, L1, and L3. Wedge-shaped groundglass opacities in the right upper lobe, right lower lobe, and left lower lobe for which primary consideration is atypical/viral pneumonia such as Covid 19 given recent hospitalization for Covid 19. CTT/LSpine: Thoracic spine:Age-indeterminate compression deformity of the superior T3 vertebral body, without significant bony retropulsion.Patchy peripheral groundglass opacities in the right lung, suspicious for atypical pneumonia such as Covid-19.  Redemonstration of a 2.2 cm bent, linear metallic object in the right paraspinal region at the T8 level suspicious for a foreign body, similar to prior chest radiograph on 5/11/2015. Lumbar spine:New loss of height of the superior L2 vertebral body with linear lucencies, suggesting acute fracture. No significant bony retropulsion. BRAIN:No acute displaced calvarial fracture. Age-appropriate involutional and ischemic gliotic changes. No hemorrhage. No acute hemorrhage or mass effect. CERVICAL SPINE: No acute displaced fracture or traumatic subluxation. Degenerative changes. CXR: Right upper lung opacification, suggest CT for further evaluation.Lucency in right seventh rib. Currently he has no pain. In the ED, he was hemodynamically stable, afebrile, saturating well on room air. Labs were significant for mild leukocytosis, anemia at baseline, pt had pan-scan for traumatic workup which was concerning for new L2 fracture and RUL/RLL opacities. Of note, pt was recently discharged ~1 week ago for COVID pneumonia. Neurosurgery consulted in the ED, no acute surgical interventions recommended. Patient was given 1L LR, azithromycin/ceftriaxone x1 and admitted to general medicine for further management.  CTChest: No evidence of acute intrathoracic or intra-abdominal/pelvic traumatic injury.Acute appearing fracture lucency through the L2 vertebral body with mild compression of the vertebral body. Indeterminate age compression fractures of T3, L1, and L3. Wedge-shaped groundglass opacities in the right upper lobe, right lower lobe, and left lower lobe for which primary consideration is atypical/viral pneumonia such as Covid 19 given recent hospitalization for Covid 19. CTT/LSpine: Thoracic spine:Age-indeterminate compression deformity of the superior T3 vertebral body, without significant bony retropulsion.Patchy peripheral groundglass opacities in the right lung, suspicious for atypical pneumonia such as Covid-19.  Redemonstration of a 2.2 cm bent, linear metallic object in the right paraspinal region at the T8 level suspicious for a foreign body, similar to prior chest radiograph on 5/11/2015. Lumbar spine:New loss of height of the superior L2 vertebral body with linear lucencies, suggesting acute fracture. No significant bony retropulsion. BRAIN:No acute displaced calvarial fracture. Age-appropriate involutional and ischemic gliotic changes. No hemorrhage. No acute hemorrhage or mass effect. CERVICAL SPINE: No acute displaced fracture or traumatic subluxation. Degenerative changes. CXR: Right upper lung opacification, suggest CT for further evaluation.Lucency in right seventh rib./fall precautions/spinal precautions

## 2021-07-31 NOTE — H&P ADULT - ASSESSMENT
84yo M w/ PMHx of spinal stenosis, CAD (s/p PCI), HLD, CKD, bilateral lung nodules, COPD, spindle cell neoplasm (s/p resection), presents after a likely mechanical fall, found to have acute L2 fracture and small bilateral pulmonary opacities

## 2021-08-01 DIAGNOSIS — N17.9 ACUTE KIDNEY FAILURE, UNSPECIFIED: ICD-10-CM

## 2021-08-01 DIAGNOSIS — I95.89 OTHER HYPOTENSION: ICD-10-CM

## 2021-08-01 LAB
ANION GAP SERPL CALC-SCNC: 10 MMOL/L — SIGNIFICANT CHANGE UP (ref 5–17)
ANION GAP SERPL CALC-SCNC: 9 MMOL/L — SIGNIFICANT CHANGE UP (ref 5–17)
BUN SERPL-MCNC: 34 MG/DL — HIGH (ref 7–23)
BUN SERPL-MCNC: 40 MG/DL — HIGH (ref 7–23)
CALCIUM SERPL-MCNC: 8.9 MG/DL — SIGNIFICANT CHANGE UP (ref 8.4–10.5)
CALCIUM SERPL-MCNC: 9 MG/DL — SIGNIFICANT CHANGE UP (ref 8.4–10.5)
CHLORIDE SERPL-SCNC: 101 MMOL/L — SIGNIFICANT CHANGE UP (ref 96–108)
CHLORIDE SERPL-SCNC: 99 MMOL/L — SIGNIFICANT CHANGE UP (ref 96–108)
CO2 SERPL-SCNC: 22 MMOL/L — SIGNIFICANT CHANGE UP (ref 22–31)
CO2 SERPL-SCNC: 25 MMOL/L — SIGNIFICANT CHANGE UP (ref 22–31)
COVID-19 SPIKE DOMAIN AB INTERP: POSITIVE
COVID-19 SPIKE DOMAIN ANTIBODY RESULT: >250 U/ML — HIGH
CREAT SERPL-MCNC: 1.37 MG/DL — HIGH (ref 0.5–1.3)
CREAT SERPL-MCNC: 1.75 MG/DL — HIGH (ref 0.5–1.3)
CRP SERPL-MCNC: 22 MG/L — HIGH (ref 0–4)
GLUCOSE SERPL-MCNC: 114 MG/DL — HIGH (ref 70–99)
GLUCOSE SERPL-MCNC: 90 MG/DL — SIGNIFICANT CHANGE UP (ref 70–99)
HCT VFR BLD CALC: 33.8 % — LOW (ref 39–50)
HCT VFR BLD CALC: 34.2 % — LOW (ref 39–50)
HGB BLD-MCNC: 10.9 G/DL — LOW (ref 13–17)
HGB BLD-MCNC: 11.2 G/DL — LOW (ref 13–17)
MCHC RBC-ENTMCNC: 31.9 GM/DL — LOW (ref 32–36)
MCHC RBC-ENTMCNC: 32.1 PG — SIGNIFICANT CHANGE UP (ref 27–34)
MCHC RBC-ENTMCNC: 32.1 PG — SIGNIFICANT CHANGE UP (ref 27–34)
MCHC RBC-ENTMCNC: 33.1 GM/DL — SIGNIFICANT CHANGE UP (ref 32–36)
MCV RBC AUTO: 100.6 FL — HIGH (ref 80–100)
MCV RBC AUTO: 96.8 FL — SIGNIFICANT CHANGE UP (ref 80–100)
NRBC # BLD: 0 /100 WBCS — SIGNIFICANT CHANGE UP (ref 0–0)
NRBC # BLD: 0 /100 WBCS — SIGNIFICANT CHANGE UP (ref 0–0)
PLATELET # BLD AUTO: 171 K/UL — SIGNIFICANT CHANGE UP (ref 150–400)
PLATELET # BLD AUTO: 189 K/UL — SIGNIFICANT CHANGE UP (ref 150–400)
POTASSIUM SERPL-MCNC: 4.5 MMOL/L — SIGNIFICANT CHANGE UP (ref 3.5–5.3)
POTASSIUM SERPL-MCNC: 4.9 MMOL/L — SIGNIFICANT CHANGE UP (ref 3.5–5.3)
POTASSIUM SERPL-SCNC: 4.5 MMOL/L — SIGNIFICANT CHANGE UP (ref 3.5–5.3)
POTASSIUM SERPL-SCNC: 4.9 MMOL/L — SIGNIFICANT CHANGE UP (ref 3.5–5.3)
PROCALCITONIN SERPL-MCNC: 0.07 NG/ML — SIGNIFICANT CHANGE UP (ref 0.02–0.1)
PROCALCITONIN SERPL-MCNC: 0.07 NG/ML — SIGNIFICANT CHANGE UP (ref 0.02–0.1)
RBC # BLD: 3.4 M/UL — LOW (ref 4.2–5.8)
RBC # BLD: 3.49 M/UL — LOW (ref 4.2–5.8)
RBC # FLD: 11.7 % — SIGNIFICANT CHANGE UP (ref 10.3–14.5)
RBC # FLD: 11.9 % — SIGNIFICANT CHANGE UP (ref 10.3–14.5)
SARS-COV-2 IGG+IGM SERPL QL IA: >250 U/ML — HIGH
SARS-COV-2 IGG+IGM SERPL QL IA: POSITIVE
SODIUM SERPL-SCNC: 131 MMOL/L — LOW (ref 135–145)
SODIUM SERPL-SCNC: 135 MMOL/L — SIGNIFICANT CHANGE UP (ref 135–145)
WBC # BLD: 7.81 K/UL — SIGNIFICANT CHANGE UP (ref 3.8–10.5)
WBC # BLD: 7.85 K/UL — SIGNIFICANT CHANGE UP (ref 3.8–10.5)
WBC # FLD AUTO: 7.81 K/UL — SIGNIFICANT CHANGE UP (ref 3.8–10.5)
WBC # FLD AUTO: 7.85 K/UL — SIGNIFICANT CHANGE UP (ref 3.8–10.5)

## 2021-08-01 PROCEDURE — 99233 SBSQ HOSP IP/OBS HIGH 50: CPT

## 2021-08-01 RX ORDER — SODIUM CHLORIDE 9 MG/ML
1000 INJECTION INTRAMUSCULAR; INTRAVENOUS; SUBCUTANEOUS
Refills: 0 | Status: DISCONTINUED | OUTPATIENT
Start: 2021-08-01 | End: 2021-08-01

## 2021-08-01 RX ORDER — SODIUM CHLORIDE 9 MG/ML
1000 INJECTION INTRAMUSCULAR; INTRAVENOUS; SUBCUTANEOUS
Refills: 0 | Status: DISCONTINUED | OUTPATIENT
Start: 2021-08-01 | End: 2021-08-02

## 2021-08-01 RX ADMIN — Medication 81 MILLIGRAM(S): at 11:06

## 2021-08-01 RX ADMIN — SODIUM CHLORIDE 70 MILLILITER(S): 9 INJECTION INTRAMUSCULAR; INTRAVENOUS; SUBCUTANEOUS at 10:20

## 2021-08-01 RX ADMIN — HEPARIN SODIUM 5000 UNIT(S): 5000 INJECTION INTRAVENOUS; SUBCUTANEOUS at 05:12

## 2021-08-01 RX ADMIN — SIMVASTATIN 20 MILLIGRAM(S): 20 TABLET, FILM COATED ORAL at 22:12

## 2021-08-01 RX ADMIN — PANTOPRAZOLE SODIUM 40 MILLIGRAM(S): 20 TABLET, DELAYED RELEASE ORAL at 05:12

## 2021-08-01 RX ADMIN — HEPARIN SODIUM 5000 UNIT(S): 5000 INJECTION INTRAVENOUS; SUBCUTANEOUS at 11:06

## 2021-08-01 NOTE — PROGRESS NOTE ADULT - PROBLEM SELECTOR PLAN 6
SHANITA most likely from hypovolemia   F/up urine Studies   IVF restarted today   Will repeat BMP  No urine retention

## 2021-08-01 NOTE — CONSULT NOTE ADULT - ASSESSMENT
84yo M w/ PMHx of spinal stenosis, CAD (s/p PCI), HLD, CKD, bilateral lung nodules, COPD, spindle cell neoplasm (s/p resection), presents after a fall. The patient reports that he was at the top of the stairs and suddenly felt very weak, with hands and feet shaking and trembling and then he fell on his back. He denies head strike, denies LOC and states he remembers the entire episode. After he fell, he lay on the ground for about an hour entire his son came and found him and called 911. Prior to the fall, he denied any dizziness, diaphoresis, palpitations, syncope. He denies any bowel or bladder incontinence, saddle anesthesia, fever/chills. After the fall his only new complaint was lower back pain, which was described as a sharp pain of moderate intensity, relieved with rest and aggravated by movement and turning his back.  Neurosurgery consulted in the ED, no acute surgical interventions recommended. Patient was given 1L LR, azithromycin/ceftriaxone x1 and admitted to general medicine for further management.      Consult was called for a fall with fainting episode earlier today. Pt was reported to be shaking after which he had an absent stare and was confused.    Impression: body shaking with absent stare and subsequent confusion may be 2/2 possible seizure 2/2 unknown etiology     Recommendations:   [] 24 hour video EEG  [] Hold off of AEDs as it seems to be first time seizures, may consider starting 500 mg keppra BID.  []Seizure precautions  []Fall precautions  []MRI brain w/ epilepsy protocol  []toxic metabolic work up as per primary team  []CBC, CMP, urine toxicology, UA, urine cx, blood cx, alcohol level    Case to be discussed with neurologist Dr. Martinez. 86yo M w/ PMHx of spinal stenosis, CAD (s/p PCI), HLD, CKD, bilateral lung nodules, COPD, spindle cell neoplasm (s/p resection), hx of seizures 15 years ago (not on AED) presents after a fall. The patient reports that he was at the top of the stairs and suddenly felt very weak, with hands and feet shaking and trembling and then he fell on his back. He denies head strike, denies LOC and states he remembers the entire episode. After he fell, he lay on the ground for about an hour entire his son came and found him and called 911. Prior to the fall, he denied any dizziness, diaphoresis, palpitations, syncope. He denies any bowel or bladder incontinence, saddle anesthesia, fever/chills. Pt was reported to be shaking after which he had an absent stare and was confused. After the fall his only new complaint was lower back pain, which was described as a sharp pain of moderate intensity, relieved with rest and aggravated by movement and turning his back.  Neurosurgery consulted in the ED, no acute surgical interventions recommended.     Consult was called for possible seizure.     Impression: body shaking with absent stare and subsequent confusion may be 2/2 possible seizure 2/2 unknown etiology     Recommendations:   [] 24 hour video EEG  [] May start 500 mg keppra BID as it does not seem like first time seizures.  []Pt was advised to avoid lifting heavy machinery, swimming, climbing heights, bathing, driving etc  []Seizure precautions  []Fall precautions  []MRI brain w/ epilepsy protocol  []toxic metabolic work up as per primary team  []CBC, CMP, urine toxicology, UA, urine cx, blood cx, alcohol level  []Neurology to follow up with further recommendations    Case to be discussed with neurologist Dr. Martinez.

## 2021-08-01 NOTE — CHART NOTE - NSCHARTNOTEFT_GEN_A_CORE
Mr Pacheco was measured for and fit with an LSO today. The brace should be worn while in an upright position (standing and sitting). It is not necessary while in bed or in a semi reclined position (45 degrees or more). It should be snug, but does not need to be overly tight. Instructions were left with the patient. Use and care were explained. Contact info given.      JORGE Joshua  [facility]  [phone].

## 2021-08-01 NOTE — CONSULT NOTE ADULT - SUBJECTIVE AND OBJECTIVE BOX
HPI:  86yo M w/ PMHx of spinal stenosis, CAD (s/p PCI), HLD, CKD, bilateral lung nodules, COPD, spindle cell neoplasm (s/p resection), presents after a fall. The patient reports that he was at the top of the stairs and suddenly felt very weak, with hands and feet shaking and trembling and then he fell on his back. He denies head strike, denies LOC and states he remembers the entire episode. After he fell, he lay on the ground for about an hour entire his son came and found him and called 911. Prior to the fall, he denied any dizziness, diaphoresis, palpitations, syncope. He denies any bowel or bladder incontinence, saddle anesthesia, fever/chills. After the fall his only new complaint was lower back pain, which was described as a sharp pain of moderate intensity, relieved with rest and aggravated by movement and turning his back.  Neurosurgery consulted in the ED, no acute surgical interventions recommended. Patient was given 1L LR, azithromycin/ceftriaxone x1 and admitted to general medicine for further management.      Consult was called for a fall with fainting episode earlier today. Pt was reported to be shaking after which he had an absent stare and was confused.        ROS: A 10-system ROS was performed and is negative except for those items noted above and/or in the HPI.    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)    Hypercholesteremia (ICD9 272.0)    CAD (Coronary Artery Disease) (ICD9 414.00)  s/p stent to LAD 9/11/09    Syncope  (2016) as a result of Clopedigrel and seizure like jerking- stopped after Plavix was stopped    Cataract    Skin cancer  Basal, Squamous - treated surgically    Lung nodule  followed by annual CT Scan    Spinal stenosis    Sarcoma  of scalp    Melanoma in situ of scalp    Chronic kidney disease (CKD)    History of COPD    PAD (peripheral artery disease)    Spindle cell carcinoma  2018    Spinal stenosis    Anemia    Lung nodule  bilateral    Fracture of Nose (ICD9 802.0)  40 yrs ago    Closed Fracture of Shoulder Blade (ICD9 811.00)  L 8 yrs ago    Osteomyelitis (ICD9 730.20)  R lower ribs after fracture 1990&#x27;s requiring ABX and surgery    S/P PTCA (percutaneous transluminal coronary angioplasty)  2009- with stent to LAD    Status post placement of implantable loop recorder  implanted in 2014  removed in 2017    H/O cataract extraction, right    Skin cancer    S/P arthroscopy of shoulder  left    H/O pneumothorax  right lung s/p nail punctured right lung (30 years ago)    History of laparoscopic cholecystectomy  2010    History of loop recorder  was removed in 2016/2017    S/P skin cancer resection  spindle cell carcinoma 9/2018 1/2019 re-excision for residula disease    S/P skin biopsy  of scalp      FAMILY HISTORY:  Family history of premature CAD (Father)    Family history of pulmonary embolism (Father)    Family history of bladder cancer (Mother)        SOCIAL HISTORY: SOCIAL HISTORY:     Marital Status: (  )   (  ) Single  (  )   (  )      Occupation:      Lives: (  ) alone  (  ) with children   (  ) with spouse  (  ) with parents  (  ) other     Illicit Drug Use: (  ) never used  (  ) other _____     Tobacco Use:  (  ) never smoked  (  ) former smoker  (  ) current smoker  (  ) pack year  (  ) last cigarette date     Alcohol Use:      Sexual History:        MEDICATIONS  Home Medications:  Calcium: 600 milligram(s) orally once a day, am (11 Sep 2020 06:14)  Folgard oral tablet: 2.2 tab(s) orally once a day, am (11 Sep 2020 06:14)  magnesium chloride: 400 milligram(s) orally once a day, am (11 Sep 2020 06:14)  pantoprazole 40 mg oral delayed release tablet: 1 tab(s) orally once a day, am (11 Sep 2020 06:14)  simvastatin 20 mg oral tablet: 1 tab(s) orally once a day (at bedtime) (11 Sep 2020 06:14)      MEDICATIONS  (STANDING):  aspirin  chewable 81 milliGRAM(s) Oral daily  pantoprazole    Tablet 40 milliGRAM(s) Oral before breakfast  simvastatin 20 milliGRAM(s) Oral at bedtime  sodium chloride 0.9%. 1000 milliLiter(s) (70 mL/Hr) IV Continuous <Continuous>    MEDICATIONS  (PRN):  acetaminophen   Tablet .. 650 milliGRAM(s) Oral every 6 hours PRN Mild Pain (1 - 3)      ALLERGIES/INTOLERANCES:  Allergies  No Known Allergies    Intolerances      OBJECTIVE:  VITALS   Vital Signs Last 24 Hrs  T(C): 36.3 (01 Aug 2021 21:24), Max: 36.6 (01 Aug 2021 05:15)  T(F): 97.4 (01 Aug 2021 21:24), Max: 97.8 (01 Aug 2021 05:15)  HR: 71 (01 Aug 2021 21:24) (62 - 83)  BP: 119/73 (01 Aug 2021 21:24) (79/81 - 136/77)  BP(mean): --  RR: 20 (01 Aug 2021 21:24) (18 - 20)  SpO2: 95% (01 Aug 2021 21:24) (95% - 96%)    PHYSICAL EXAM:  Neurological Exam:  Mental Status: Orientated to self, date and place.  Attention intact.  No dysarthria, aphasia or neglect.  Knowledge intact.  Registration intact.  Short and long term memory grossly intact.      Cranial Nerves: PERRL, EOMI, VFF, no nystagmus or diplopia.  CN V1-3 intact to light touch and pinprick.  No facial asymmetry.  Hearing intact.  Tongue midline.  Sternocleidomastoid and Trapezius intact bilaterally.    Motor:   Tone: normal            Strength:     Upper extremity                      Delt       Bicep    Tricep                                               R         5/5        5/5        5/5       5/5                                            L          5/5        5/5        5/5       5/5  Lower extremity                       HF          KE          KF        DF         PF                                            R        5/5        5/5        5/5       5/5       5/5                                            L         5/5        5/5       5/5       5/5        5/5  Pronator drift: none                 Dysmetria: None to finger-nose-finger or heel-shin-heel  No truncal ataxia.    Tremor: No resting, postural or action tremor.  No myoclonus.    Sensation: intact to light touch, pinprick, vibration and proprioception    Deep Tendon Reflexes: 2+ bilateral biceps, triceps, brachioradialis, knee and ankle  Toes flexor bilaterally    Gait: normal and stable.      LABORATORY:  CBC                       11.2   7.81  )-----------( 171      ( 01 Aug 2021 16:33 )             33.8     Chem 08-01    131<L>  |  99  |  34<H>  ----------------------------<  114<H>  4.5   |  22  |  1.37<H>    Ca    9.0      01 Aug 2021 16:33    TPro  6.0  /  Alb  3.5  /  TBili  0.6  /  DBili  x   /  AST  18  /  ALT  29  /  AlkPhos  48  07-31    LFTs LIVER FUNCTIONS - ( 31 Jul 2021 10:51 )  Alb: 3.5 g/dL / Pro: 6.0 g/dL / ALK PHOS: 48 U/L / ALT: 29 U/L / AST: 18 U/L / GGT: x           Coagulopathy   Lipid Panel   A1c   Cardiac enzymes     U/A   CSF  Immunological  Other    STUDIES & IMAGING:  Studies (EKG, EEG, EMG, etc):     Radiology (XR, CT, MR, U/S, TTE/KATLYN):  BRAIN:    No acute displaced calvarial fracture. Age-appropriate involutional and ischemic gliotic changes. No hemorrhage.  No acute hemorrhage or mass effect.    CERVICAL SPINE:    No acute displaced fracture or traumatic subluxation. Degenerative changes.   HPI:  84yo M w/ PMHx of spinal stenosis, CAD (s/p PCI), HLD, CKD, bilateral lung nodules, COPD, spindle cell neoplasm (s/p resection),hx of seizures 15 years ago (not on AED) presents after a fall. The patient reports that he was at the top of the stairs and suddenly felt very weak, with hands and feet shaking and trembling and then he fell on his back. He denies head strike, denies LOC and states he remembers the entire episode. After he fell, he lay on the ground for about an hour entire his son came and found him and called 911. Prior to the fall, he denied any dizziness, diaphoresis, palpitations, syncope. He denies any bowel or bladder incontinence, saddle anesthesia, fever/chills. After the fall his only new complaint was lower back pain, which was described as a sharp pain of moderate intensity, relieved with rest and aggravated by movement and turning his back.  Neurosurgery consulted in the ED, no acute surgical interventions recommended. Patient was given 1L LR, azithromycin/ceftriaxone x1 and admitted to general medicine for further management.            ROS: A 10-system ROS was performed and is negative except for those items noted above and/or in the HPI.    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)    Hypercholesteremia (ICD9 272.0)    CAD (Coronary Artery Disease) (ICD9 414.00)  s/p stent to LAD 9/11/09    Syncope  (2016) as a result of Clopedigrel and seizure like jerking- stopped after Plavix was stopped    Cataract    Skin cancer  Basal, Squamous - treated surgically    Lung nodule  followed by annual CT Scan    Spinal stenosis    Sarcoma  of scalp    Melanoma in situ of scalp    Chronic kidney disease (CKD)    History of COPD    PAD (peripheral artery disease)    Spindle cell carcinoma  2018    Spinal stenosis    Anemia    Lung nodule  bilateral    Fracture of Nose (ICD9 802.0)  40 yrs ago    Closed Fracture of Shoulder Blade (ICD9 811.00)  L 8 yrs ago    Osteomyelitis (ICD9 730.20)  R lower ribs after fracture 1990&#x27;s requiring ABX and surgery    S/P PTCA (percutaneous transluminal coronary angioplasty)  2009- with stent to LAD    Status post placement of implantable loop recorder  implanted in 2014  removed in 2017    H/O cataract extraction, right    Skin cancer    S/P arthroscopy of shoulder  left    H/O pneumothorax  right lung s/p nail punctured right lung (30 years ago)    History of laparoscopic cholecystectomy  2010    History of loop recorder  was removed in 2016/2017    S/P skin cancer resection  spindle cell carcinoma 9/2018 1/2019 re-excision for residula disease    S/P skin biopsy  of scalp      FAMILY HISTORY:  Family history of premature CAD (Father)    Family history of pulmonary embolism (Father)    Family history of bladder cancer (Mother)        SOCIAL HISTORY: SOCIAL HISTORY:     Marital Status: (  )   (  ) Single  (  )   (  )      Occupation:      Lives: (  ) alone  (  ) with children   (  ) with spouse  (  ) with parents  (  ) other     Illicit Drug Use: (  ) never used  (  ) other _____     Tobacco Use:  (  ) never smoked  (  ) former smoker  (  ) current smoker  (  ) pack year  (  ) last cigarette date     Alcohol Use:      Sexual History:        MEDICATIONS  Home Medications:  Calcium: 600 milligram(s) orally once a day, am (11 Sep 2020 06:14)  Folgard oral tablet: 2.2 tab(s) orally once a day, am (11 Sep 2020 06:14)  magnesium chloride: 400 milligram(s) orally once a day, am (11 Sep 2020 06:14)  pantoprazole 40 mg oral delayed release tablet: 1 tab(s) orally once a day, am (11 Sep 2020 06:14)  simvastatin 20 mg oral tablet: 1 tab(s) orally once a day (at bedtime) (11 Sep 2020 06:14)      MEDICATIONS  (STANDING):  aspirin  chewable 81 milliGRAM(s) Oral daily  pantoprazole    Tablet 40 milliGRAM(s) Oral before breakfast  simvastatin 20 milliGRAM(s) Oral at bedtime  sodium chloride 0.9%. 1000 milliLiter(s) (70 mL/Hr) IV Continuous <Continuous>    MEDICATIONS  (PRN):  acetaminophen   Tablet .. 650 milliGRAM(s) Oral every 6 hours PRN Mild Pain (1 - 3)      ALLERGIES/INTOLERANCES:  Allergies  No Known Allergies    Intolerances      OBJECTIVE:  VITALS   Vital Signs Last 24 Hrs  T(C): 36.3 (01 Aug 2021 21:24), Max: 36.6 (01 Aug 2021 05:15)  T(F): 97.4 (01 Aug 2021 21:24), Max: 97.8 (01 Aug 2021 05:15)  HR: 71 (01 Aug 2021 21:24) (62 - 83)  BP: 119/73 (01 Aug 2021 21:24) (79/81 - 136/77)  BP(mean): --  RR: 20 (01 Aug 2021 21:24) (18 - 20)  SpO2: 95% (01 Aug 2021 21:24) (95% - 96%)    PHYSICAL EXAM:  Neurological Exam:  Mental Status: Resting comfortably in bed. Eyes open. Orientated to self, date and place.  Attention intact.  No dysarthria, aphasia or neglect.  Knowledge intact.  Registration intact.     Cranial Nerves: PERRL, EOMI, VFF, no nystagmus or diplopia.  CN V1-3 intact to light touch.  No facial asymmetry.  Hearing intact.  Tongue midline.  Sternocleidomastoid and Trapezius intact bilaterally.    No laceration on tongue.     Motor:   Tone: normal            Strength:     Upper extremity                              Delt       Bicep    Tricep                                               R         5/5        5/5        5/5       5/5                                            L          5/5        5/5        5/5       5/5  Lower extremity                              HF          KE        KF        DF         PF                                            R        5/5        5/5        5/5       5/5       5/5                                            L         5/5        5/5       5/5       5/5        5/5  Pronator drift: none                 Dysmetria: None to finger-nose-finger or heel-shin-heel  No truncal ataxia.    Tremor: No resting tremor.  No myoclonus.    Sensation: intact to light touch,     Deep Tendon Reflexes: 2+ bilateral biceps, triceps, brachioradialis, knee and ankle  Toes flexor bilaterally.      LABORATORY:  CBC                       11.2   7.81  )-----------( 171      ( 01 Aug 2021 16:33 )             33.8     Chem 08-01    131<L>  |  99  |  34<H>  ----------------------------<  114<H>  4.5   |  22  |  1.37<H>    Ca    9.0      01 Aug 2021 16:33    TPro  6.0  /  Alb  3.5  /  TBili  0.6  /  DBili  x   /  AST  18  /  ALT  29  /  AlkPhos  48  07-31    LFTs LIVER FUNCTIONS - ( 31 Jul 2021 10:51 )  Alb: 3.5 g/dL / Pro: 6.0 g/dL / ALK PHOS: 48 U/L / ALT: 29 U/L / AST: 18 U/L / GGT: x           Coagulopathy   Lipid Panel   A1c   Cardiac enzymes     U/A   CSF  Immunological  Other    STUDIES & IMAGING:  Studies (EKG, EEG, EMG, etc):     Radiology (XR, CT, MR, U/S, TTE/KATLYN):  BRAIN:    No acute displaced calvarial fracture. Age-appropriate involutional and ischemic gliotic changes. No hemorrhage.  No acute hemorrhage or mass effect.    CERVICAL SPINE:    No acute displaced fracture or traumatic subluxation. Degenerative changes.

## 2021-08-01 NOTE — PROGRESS NOTE ADULT - SUBJECTIVE AND OBJECTIVE BOX
Patient is a 85y old  Male who presents with a chief complaint of fall (2021 12:33)      SUBJECTIVE / OVERNIGHT EVENTS:    BP remains low , no issues presented by patient       ADDITIONAL REVIEW OF SYSTEMS: Negative except for above    MEDICATIONS  (STANDING):  aspirin  chewable 81 milliGRAM(s) Oral daily  heparin   Injectable 5000 Unit(s) SubCutaneous every 8 hours  pantoprazole    Tablet 40 milliGRAM(s) Oral before breakfast  simvastatin 20 milliGRAM(s) Oral at bedtime  sodium chloride 0.9%. 1000 milliLiter(s) (70 mL/Hr) IV Continuous <Continuous>    MEDICATIONS  (PRN):  acetaminophen   Tablet .. 650 milliGRAM(s) Oral every 6 hours PRN Mild Pain (1 - 3)      CAPILLARY BLOOD GLUCOSE        I&O's Summary    2021 07:01  -  01 Aug 2021 07:00  --------------------------------------------------------  IN: 0 mL / OUT: 900 mL / NET: -900 mL        PHYSICAL EXAM:  Vital Signs Last 24 Hrs  T(C): 36.6 (01 Aug 2021 05:15), Max: 36.9 (2021 19:53)  T(F): 97.8 (01 Aug 2021 05:15), Max: 98.4 (2021 19:53)  HR: 62 (01 Aug 2021 05:15) (62 - 77)  BP: 136/77 (01 Aug 2021 05:15) (90/60 - 136/77)  BP(mean): --  RR: 18 (01 Aug 2021 05:15) (18 - 18)  SpO2: 96% (01 Aug 2021 05:15) (93% - 96%)    PHYSICAL EXAM:  GENERAL: NAD, well-developed  HEAD:  Atraumatic, Normocephalic  EYES:  conjunctiva and sclera clear  NECK: Supple, No JVD  CHEST/LUNG: Clear to auscultation bilaterally; No wheeze  HEART: Regular rate and rhythm; No murmurs, rubs, or gallops  ABDOMEN: Soft, Nontender, Nondistended; Bowel sounds present  EXTREMITIES:  2+ Peripheral Pulses, No clubbing, cyanosis, or edema  PSYCH: AAOx3  NEUROLOGY: non-focal  SKIN: No rashes or lesions      LABS:                        10.9   7.85  )-----------( 189      ( 01 Aug 2021 07:15 )             34.2     08    135  |  101  |  40<H>  ----------------------------<  90  4.9   |  25  |  1.75<H>    Ca    8.9      01 Aug 2021 07:14  Phos  3.7     07  Mg     2.6         TPro  6.0  /  Alb  3.5  /  TBili  0.6  /  DBili  x   /  AST  18  /  ALT  29  /  AlkPhos  48      PT/INR - ( 2021 16:03 )   PT: 12.3 sec;   INR: 1.03 ratio         PTT - ( 2021 16:03 )  PTT:22.8 sec  CARDIAC MARKERS ( 2021 16:03 )  x     / x     / 56 U/L / x     / x          Urinalysis Basic - ( 2021 19:03 )    Color: Light Yellow / Appearance: Clear / S.014 / pH: x  Gluc: x / Ketone: Negative  / Bili: Negative / Urobili: Negative   Blood: x / Protein: Negative / Nitrite: Negative   Leuk Esterase: Negative / RBC: 0 /hpf / WBC 0 /HPF   Sq Epi: x / Non Sq Epi: 0 /hpf / Bacteria: Negative        Culture - Blood (collected 2021 22:59)  Source: .Blood Blood-Peripheral  Preliminary Report (2021 23:02):    No growth to date.    Culture - Blood (collected 2021 22:59)  Source: .Blood Blood-Venous  Preliminary Report (2021 23:02):    No growth to date.    Culture - Urine (collected 2021 22:11)  Source: Clean Catch Clean Catch (Midstream)  Final Report (2021 20:06):    <10,000 CFU/mL Normal Urogenital Beth        RADIOLOGY & ADDITIONAL TESTS:    Imaging Personally Reviewed:    Electrocardiogram Personally Reviewed:    COORDINATION OF CARE:  Care Discussed with Consultants/Other Providers [Y/N]:  Prior or Outpatient Records Reviewed [Y/N]:     Patient is a 85y old  Male who presents with a chief complaint of fall (2021 12:33)      SUBJECTIVE / OVERNIGHT EVENTS:    BP remains low , no issues presented by patient .  Patient wants to go home.  Pt is noted to have hypotension s/p IVF       ADDITIONAL REVIEW OF SYSTEMS: Negative except for above    MEDICATIONS  (STANDING):  aspirin  chewable 81 milliGRAM(s) Oral daily  heparin   Injectable 5000 Unit(s) SubCutaneous every 8 hours  pantoprazole    Tablet 40 milliGRAM(s) Oral before breakfast  simvastatin 20 milliGRAM(s) Oral at bedtime  sodium chloride 0.9%. 1000 milliLiter(s) (70 mL/Hr) IV Continuous <Continuous>    MEDICATIONS  (PRN):  acetaminophen   Tablet .. 650 milliGRAM(s) Oral every 6 hours PRN Mild Pain (1 - 3)      CAPILLARY BLOOD GLUCOSE        I&O's Summary    2021 07:01  -  01 Aug 2021 07:00  --------------------------------------------------------  IN: 0 mL / OUT: 900 mL / NET: -900 mL        PHYSICAL EXAM:  Vital Signs Last 24 Hrs  T(C): 36.6 (01 Aug 2021 05:15), Max: 36.9 (2021 19:53)  T(F): 97.8 (01 Aug 2021 05:15), Max: 98.4 (2021 19:53)  HR: 62 (01 Aug 2021 05:15) (62 - 77)  BP: 136/77 (01 Aug 2021 05:15) (90/60 - 136/77)  BP(mean): --  RR: 18 (01 Aug 2021 05:15) (18 - 18)  SpO2: 96% (01 Aug 2021 05:15) (93% - 96%)    PHYSICAL EXAM:  GENERAL: NAD, well-developed  HEAD:  Atraumatic, Normocephalic  EYES:  conjunctiva and sclera clear  NECK: Supple, No JVD  CHEST/LUNG: Clear to auscultation bilaterally; No wheeze  HEART: Regular rate and rhythm; No murmurs, rubs, or gallops  ABDOMEN: Soft, No TENDERNESS, Nondistended; Bowel sounds present  EXTREMITIES:  2+ Peripheral Pulses, No clubbing, cyanosis, or edema  NEUROLOGY: non-focal  SKIN: No rashes or lesions      LABS:                        10.9   7.85  )-----------( 189      ( 01 Aug 2021 07:15 )             34.2     08    135  |  101  |  40<H>  ----------------------------<  90  4.9   |  25  |  1.75<H>    Ca    8.9      01 Aug 2021 07:14  Phos  3.7     07  Mg     2.6         TPro  6.0  /  Alb  3.5  /  TBili  0.6  /  DBili  x   /  AST  18  /  ALT  29  /  AlkPhos  48  0731    PT/INR - ( 2021 16:03 )   PT: 12.3 sec;   INR: 1.03 ratio         PTT - ( 2021 16:03 )  PTT:22.8 sec  CARDIAC MARKERS ( 2021 16:03 )  x     / x     / 56 U/L / x     / x          Urinalysis Basic - ( 2021 19:03 )    Color: Light Yellow / Appearance: Clear / S.014 / pH: x  Gluc: x / Ketone: Negative  / Bili: Negative / Urobili: Negative   Blood: x / Protein: Negative / Nitrite: Negative   Leuk Esterase: Negative / RBC: 0 /hpf / WBC 0 /HPF   Sq Epi: x / Non Sq Epi: 0 /hpf / Bacteria: Negative        Culture - Blood (collected 2021 22:59)  Source: .Blood Blood-Peripheral  Preliminary Report (2021 23:02):    No growth to date.    Culture - Blood (collected 2021 22:59)  Source: .Blood Blood-Venous  Preliminary Report (2021 23:02):    No growth to date.    Culture - Urine (collected 2021 22:11)  Source: Clean Catch Clean Catch (Midstream)  Final Report (2021 20:06):    <10,000 CFU/mL Normal Urogenital Beth        RADIOLOGY & ADDITIONAL TESTS:    Imaging Personally Reviewed:    Electrocardiogram Personally Reviewed:    COORDINATION OF CARE:  Care Discussed with Consultants/Other Providers [Y/N]:  Prior or Outpatient Records Reviewed [Y/N]:

## 2021-08-01 NOTE — CONSULT NOTE ADULT - ATTENDING COMMENTS
84yo M w/ PMHx of spinal stenosis, CAD (s/p PCI), HLD, CKD, bilateral lung nodules, COPD, spindle cell neoplasm (s/p resection), hx of seizures 15 years ago (not on AED) presents after a fall. The patient reports that he was at the top of the stairs and suddenly felt very weak, with hands and feet shaking and trembling and then he fell on his back. He denies head strike, denies LOC and states he remembers the entire episode. After he fell, he lay on the ground for about an hour entire his son came and found him and called 911.Pt was reported to be shaking after which he had an absent stare and was confused. After the fall his only new complaint was lower back pain, which was described as a sharp pain of moderate intensity, relieved with rest and aggravated by movement and turning his back.  Neurosurgery consulted in the ED, T frx on TSLO. Exam grossly non-focal slowed movements, mild LE atrophy  Consult was called for possible seizure.     Impression: shaking post-fall r/o seizure    Recommendations:   []started on Keppra 500 BID, maintain on for now given hx of seizures and fracture with active Coivd, will reassess as an outpt  []routine EEG  [] MRi will be difficult to obtain while admitted for Covid. doubt acute process can arrange as an outpt

## 2021-08-01 NOTE — PROVIDER CONTACT NOTE (CHANGE IN STATUS NOTIFICATION) - SITUATION
asymptomatic orthostatic hypotension
Pt orthostatics taken on both arms - standing BP is 79/81 HR 83

## 2021-08-02 DIAGNOSIS — Z29.9 ENCOUNTER FOR PROPHYLACTIC MEASURES, UNSPECIFIED: ICD-10-CM

## 2021-08-02 LAB
CORTIS AM PEAK SERPL-MCNC: 14.9 UG/DL — SIGNIFICANT CHANGE UP (ref 6–18.4)
T4 FREE+ TSH PNL SERPL: 1.84 UIU/ML — SIGNIFICANT CHANGE UP (ref 0.27–4.2)

## 2021-08-02 PROCEDURE — 99233 SBSQ HOSP IP/OBS HIGH 50: CPT

## 2021-08-02 RX ORDER — SODIUM CHLORIDE 9 MG/ML
1000 INJECTION INTRAMUSCULAR; INTRAVENOUS; SUBCUTANEOUS
Refills: 0 | Status: DISCONTINUED | OUTPATIENT
Start: 2021-08-02 | End: 2021-08-03

## 2021-08-02 RX ORDER — HEPARIN SODIUM 5000 [USP'U]/ML
5000 INJECTION INTRAVENOUS; SUBCUTANEOUS EVERY 8 HOURS
Refills: 0 | Status: DISCONTINUED | OUTPATIENT
Start: 2021-08-02 | End: 2021-08-09

## 2021-08-02 RX ORDER — LEVETIRACETAM 250 MG/1
500 TABLET, FILM COATED ORAL EVERY 12 HOURS
Refills: 0 | Status: DISCONTINUED | OUTPATIENT
Start: 2021-08-02 | End: 2021-08-09

## 2021-08-02 RX ADMIN — SODIUM CHLORIDE 75 MILLILITER(S): 9 INJECTION INTRAMUSCULAR; INTRAVENOUS; SUBCUTANEOUS at 16:43

## 2021-08-02 RX ADMIN — SODIUM CHLORIDE 70 MILLILITER(S): 9 INJECTION INTRAMUSCULAR; INTRAVENOUS; SUBCUTANEOUS at 00:48

## 2021-08-02 RX ADMIN — PANTOPRAZOLE SODIUM 40 MILLIGRAM(S): 20 TABLET, DELAYED RELEASE ORAL at 05:38

## 2021-08-02 RX ADMIN — SIMVASTATIN 20 MILLIGRAM(S): 20 TABLET, FILM COATED ORAL at 21:30

## 2021-08-02 RX ADMIN — HEPARIN SODIUM 5000 UNIT(S): 5000 INJECTION INTRAVENOUS; SUBCUTANEOUS at 21:31

## 2021-08-02 RX ADMIN — Medication 81 MILLIGRAM(S): at 13:37

## 2021-08-02 RX ADMIN — LEVETIRACETAM 500 MILLIGRAM(S): 250 TABLET, FILM COATED ORAL at 21:30

## 2021-08-02 RX ADMIN — LEVETIRACETAM 500 MILLIGRAM(S): 250 TABLET, FILM COATED ORAL at 08:37

## 2021-08-02 NOTE — OCCUPATIONAL THERAPY INITIAL EVALUATION ADULT - DIAGNOSIS, OT EVAL
Pt p/w impaired balance, strength, AROM, endurance, coordination, motor control, safety awareness impacting ind with ADLs and fxnl mobility.

## 2021-08-02 NOTE — PROGRESS NOTE ADULT - PROBLEM SELECTOR PLAN 3
recent hosp admit (admitted 7/19/21) at [facility] for  COVID pneumonia and was discharged on Dexamethasone which he had completed as per son.   -COVID negative then Positive, have bilateral yet small ground glass opacities on imaging, currently afebrile, saturating well on room air , so will cont to monitor. positive COVID swab likely due to viral shedding  -low suspicion of bacterial pneumonia in absence of cough, sputum production recent hosp admit (admitted 7/19/21) at [facility] for  COVID pneumonia and was discharged on Dexamethasone which he had completed as per son.   -COVID negative then Positive, have bilateral yet small ground glass opacities on imaging, currently afebrile, saturating well on room air ,so will cont to monitor. positive COVID swab likely due to viral shedding  -low suspicion of bacterial pneumonia in absence of cough, sputum production

## 2021-08-02 NOTE — OCCUPATIONAL THERAPY INITIAL EVALUATION ADULT - PLANNED THERAPY INTERVENTIONS, OT EVAL
ADL retraining/balance training/bed mobility training/motor coordination training/strengthening/transfer training

## 2021-08-02 NOTE — OCCUPATIONAL THERAPY INITIAL EVALUATION ADULT - PRECAUTIONS/LIMITATIONS, REHAB EVAL
Currently he has no pain. In the ED, he was hemodynamically stable, afebrile, saturating well on room air. Labs were significant for mild leukocytosis, anemia at baseline, pt had pan-scan for traumatic workup which was concerning for new L2 fracture and RUL/RLL opacities. Of note, pt was recently discharged ~1 week ago for COVID pneumonia. Neurosurgery consulted in the ED, no acute surgical interventions recommended. Patient was given 1L LR, azithromycin/ceftriaxone x1 and admitted to general medicine for further management.  CTChest: No evidence of acute intrathoracic or intra-abdominal/pelvic traumatic injury.Acute appearing fracture lucency through the L2 vertebral body with mild compression of the vertebral body. Indeterminate age compression fractures of T3, L1, and L3. Wedge-shaped groundglass opacities in the right upper lobe, right lower lobe, and left lower lobe for which primary consideration is atypical/viral pneumonia such as Covid 19 given recent hospitalization for Covid 19. CTT/LSpine: Thoracic spine:Age-indeterminate compression deformity of the superior T3 vertebral body, without significant bony retropulsion.Patchy peripheral groundglass opacities in the right lung, suspicious for atypical pneumonia such as Covid-19.  Redemonstration of a 2.2 cm bent, linear metallic object in the right paraspinal region at the T8 level suspicious for a foreign body, similar to prior chest radiograph on 5/11/2015. Lumbar spine:New loss of height of the superior L2 vertebral body with linear lucencies, suggesting acute fracture. No significant bony retropulsion. BRAIN:No acute displaced calvarial fracture. Age-appropriate involutional and ischemic gliotic changes. No hemorrhage. No acute hemorrhage or mass effect. CERVICAL SPINE: No acute displaced fracture or traumatic subluxation. Degenerative changes. CXR: Right upper lung opacification, suggest CT for further evaluation.Lucency in right seventh rib/fall precautions Currently he has no pain. In the ED, he was hemodynamically stable, afebrile, saturating well on room air. Labs were significant for mild leukocytosis, anemia at baseline, pt had pan-scan for traumatic workup which was concerning for new L2 fracture and RUL/RLL opacities. Of note, pt was recently discharged ~1 week ago for COVID pneumonia. Neurosurgery consulted in the ED, no acute surgical interventions recommended. Patient was given 1L LR, azithromycin/ceftriaxone x1 and admitted to general medicine for further management.  CTChest: No evidence of acute intrathoracic or intra-abdominal/pelvic traumatic injury.Acute appearing fracture lucency through the L2 vertebral body with mild compression of the vertebral body. Indeterminate age compression fractures of T3, L1, and L3. Wedge-shaped groundglass opacities in the right upper lobe, right lower lobe, and left lower lobe for which primary consideration is atypical/viral pneumonia such as Covid 19 given recent hospitalization for Covid 19. CTT/LSpine: Thoracic spine:Age-indeterminate compression deformity of the superior T3 vertebral body, without significant bony retropulsion.Patchy peripheral groundglass opacities in the right lung, suspicious for atypical pneumonia such as Covid-19.  Redemonstration of a 2.2 cm bent, linear metallic object in the right paraspinal region at the T8 level suspicious for a foreign body, similar to prior chest radiograph on 5/11/2015. Lumbar spine:New loss of height of the superior L2 vertebral body with linear lucencies, suggesting acute fracture. No significant bony retropulsion. BRAIN:No acute displaced calvarial fracture. Age-appropriate involutional and ischemic gliotic changes. No hemorrhage. No acute hemorrhage or mass effect. CERVICAL SPINE: No acute displaced fracture or traumatic subluxation. Degenerative changes. CXR: Right upper lung opacification, suggest CT for further evaluation.Lucency in right seventh rib/fall precautions/seizure precautions

## 2021-08-02 NOTE — PROGRESS NOTE ADULT - NSPROGADDITIONALINFOA_GEN_ALL_CORE
.  Bri Kat MD  Division of Hospital Medicine  [facility]   Pager: [phone]    Plan discussed with patient and medicine NP Yanet. .  Bri Kat MD  Division of Hospital Medicine  [facility]   Pager: [phone]    Plan discussed with patient and medicine NP Yanet.  Called misael Lev for updates but no answer. left voicemail. .  Bri Kat MD  Division of Hospital Medicine  [facility]   Pager: [phone]    Plan discussed with patient, PCP Dr. Joy, and medicine NP Yanet.  Called misael Ohara for updates but no answer. left voicemail.

## 2021-08-02 NOTE — PROGRESS NOTE ADULT - PROBLEM SELECTOR PLAN 6
SHANITA most likely from hypovolemia. improved with IV hydration  - f/u urine studies, though results will now be skewed as has gotten IVF  - c/w NS @ 75cc/hr  - no evidence of urinary retention  - monitor Cr on BMP

## 2021-08-03 LAB
ANION GAP SERPL CALC-SCNC: 10 MMOL/L — SIGNIFICANT CHANGE UP (ref 5–17)
BUN SERPL-MCNC: 21 MG/DL — SIGNIFICANT CHANGE UP (ref 7–23)
CALCIUM SERPL-MCNC: 8.9 MG/DL — SIGNIFICANT CHANGE UP (ref 8.4–10.5)
CHLORIDE SERPL-SCNC: 104 MMOL/L — SIGNIFICANT CHANGE UP (ref 96–108)
CO2 SERPL-SCNC: 22 MMOL/L — SIGNIFICANT CHANGE UP (ref 22–31)
CREAT ?TM UR-MCNC: 82 MG/DL — SIGNIFICANT CHANGE UP
CREAT SERPL-MCNC: 1.18 MG/DL — SIGNIFICANT CHANGE UP (ref 0.5–1.3)
GLUCOSE SERPL-MCNC: 99 MG/DL — SIGNIFICANT CHANGE UP (ref 70–99)
HCT VFR BLD CALC: 37 % — LOW (ref 39–50)
HGB BLD-MCNC: 12 G/DL — LOW (ref 13–17)
MAGNESIUM SERPL-MCNC: 2.1 MG/DL — SIGNIFICANT CHANGE UP (ref 1.6–2.6)
MCHC RBC-ENTMCNC: 32.4 GM/DL — SIGNIFICANT CHANGE UP (ref 32–36)
MCHC RBC-ENTMCNC: 32.5 PG — SIGNIFICANT CHANGE UP (ref 27–34)
MCV RBC AUTO: 100.3 FL — HIGH (ref 80–100)
NRBC # BLD: 0 /100 WBCS — SIGNIFICANT CHANGE UP (ref 0–0)
OSMOLALITY SERPL: 284 MOSMOL/KG — SIGNIFICANT CHANGE UP (ref 280–301)
OSMOLALITY UR: 609 MOS/KG — SIGNIFICANT CHANGE UP (ref 300–900)
PHOSPHATE SERPL-MCNC: 2.4 MG/DL — LOW (ref 2.5–4.5)
PLATELET # BLD AUTO: 142 K/UL — LOW (ref 150–400)
POTASSIUM SERPL-MCNC: 4.6 MMOL/L — SIGNIFICANT CHANGE UP (ref 3.5–5.3)
POTASSIUM SERPL-SCNC: 4.6 MMOL/L — SIGNIFICANT CHANGE UP (ref 3.5–5.3)
RBC # BLD: 3.69 M/UL — LOW (ref 4.2–5.8)
RBC # FLD: 11.8 % — SIGNIFICANT CHANGE UP (ref 10.3–14.5)
SARS-COV-2 RNA SPEC QL NAA+PROBE: DETECTED
SODIUM SERPL-SCNC: 136 MMOL/L — SIGNIFICANT CHANGE UP (ref 135–145)
SODIUM UR-SCNC: 151 MMOL/L — SIGNIFICANT CHANGE UP
WBC # BLD: 7.05 K/UL — SIGNIFICANT CHANGE UP (ref 3.8–10.5)
WBC # FLD AUTO: 7.05 K/UL — SIGNIFICANT CHANGE UP (ref 3.8–10.5)

## 2021-08-03 PROCEDURE — 95816 EEG AWAKE AND DROWSY: CPT | Mod: 26

## 2021-08-03 PROCEDURE — 99232 SBSQ HOSP IP/OBS MODERATE 35: CPT

## 2021-08-03 RX ORDER — POLYETHYLENE GLYCOL 3350 17 G/17G
17 POWDER, FOR SOLUTION ORAL
Refills: 0 | Status: DISCONTINUED | OUTPATIENT
Start: 2021-08-03 | End: 2021-08-07

## 2021-08-03 RX ORDER — SENNA PLUS 8.6 MG/1
2 TABLET ORAL AT BEDTIME
Refills: 0 | Status: DISCONTINUED | OUTPATIENT
Start: 2021-08-03 | End: 2021-08-09

## 2021-08-03 RX ORDER — LIDOCAINE 4 G/100G
1 CREAM TOPICAL ONCE
Refills: 0 | Status: COMPLETED | OUTPATIENT
Start: 2021-08-03 | End: 2021-08-03

## 2021-08-03 RX ORDER — ACETAMINOPHEN 500 MG
1000 TABLET ORAL ONCE
Refills: 0 | Status: COMPLETED | OUTPATIENT
Start: 2021-08-03 | End: 2021-08-03

## 2021-08-03 RX ADMIN — SENNA PLUS 2 TABLET(S): 8.6 TABLET ORAL at 21:09

## 2021-08-03 RX ADMIN — Medication 650 MILLIGRAM(S): at 12:30

## 2021-08-03 RX ADMIN — HEPARIN SODIUM 5000 UNIT(S): 5000 INJECTION INTRAVENOUS; SUBCUTANEOUS at 05:22

## 2021-08-03 RX ADMIN — Medication 1000 MILLIGRAM(S): at 14:30

## 2021-08-03 RX ADMIN — Medication 400 MILLIGRAM(S): at 14:04

## 2021-08-03 RX ADMIN — HEPARIN SODIUM 5000 UNIT(S): 5000 INJECTION INTRAVENOUS; SUBCUTANEOUS at 13:47

## 2021-08-03 RX ADMIN — Medication 81 MILLIGRAM(S): at 12:01

## 2021-08-03 RX ADMIN — Medication 650 MILLIGRAM(S): at 12:00

## 2021-08-03 RX ADMIN — SIMVASTATIN 20 MILLIGRAM(S): 20 TABLET, FILM COATED ORAL at 21:09

## 2021-08-03 RX ADMIN — LIDOCAINE 1 PATCH: 4 CREAM TOPICAL at 18:52

## 2021-08-03 RX ADMIN — HEPARIN SODIUM 5000 UNIT(S): 5000 INJECTION INTRAVENOUS; SUBCUTANEOUS at 21:08

## 2021-08-03 RX ADMIN — LIDOCAINE 1 PATCH: 4 CREAM TOPICAL at 19:00

## 2021-08-03 RX ADMIN — POLYETHYLENE GLYCOL 3350 17 GRAM(S): 17 POWDER, FOR SOLUTION ORAL at 17:40

## 2021-08-03 RX ADMIN — LEVETIRACETAM 500 MILLIGRAM(S): 250 TABLET, FILM COATED ORAL at 05:21

## 2021-08-03 RX ADMIN — PANTOPRAZOLE SODIUM 40 MILLIGRAM(S): 20 TABLET, DELAYED RELEASE ORAL at 05:21

## 2021-08-03 RX ADMIN — LEVETIRACETAM 500 MILLIGRAM(S): 250 TABLET, FILM COATED ORAL at 17:40

## 2021-08-03 NOTE — EEG REPORT - NS EEG TEXT BOX
[facility]   COMPREHENSIVE EPILEPSY CENTER   REPORT OF ROUTINE VIDEO EEG     [facility]: [address] [place] [address] , [address], [place] [address], Ph#: [phone]  [facility]: [address], [place] [address], Ph#: [phone]  Office: [address] [place] [address], [facility] [address], [place] [address] Ph#: [phone]    Patient Name: BEATRIS SHEA  Age and : 85y ([birth year])  MRN #: [id number]  Location: [facility] 3TOW [id number] D1  Referring Physician: Bri Kat    Study Date: 21    _____________________________________________________________  TECHNICAL INFORMATION    Placement and Labeling of Electrodes:  The EEG was performed utilizing 20 channels referential EEG connections (coronal over temporal over parasagittal montage) using all standard 10-20 electrode placements with EKG.  Recording was at a sampling rate of 256 samples per second per channel.  Time synchronized digital video recording was done simultaneously with EEG recording.  A low light infrared camera was used for low light recording.  Jay and seizure detection algorithms were utilized.    _____________________________________________________________  HISTORY    Patient is a 85y old  Male who presents with a chief complaint of fall (03 Aug 2021 14:36)      PERTINENT MEDICATION:  MEDICATIONS  (STANDING):  aspirin  chewable 81 milliGRAM(s) Oral daily  heparin   Injectable 5000 Unit(s) SubCutaneous every 8 hours  levETIRAcetam 500 milliGRAM(s) Oral every 12 hours  pantoprazole    Tablet 40 milliGRAM(s) Oral before breakfast  senna 2 Tablet(s) Oral at bedtime  simvastatin 20 milliGRAM(s) Oral at bedtime    _____________________________________________________________  STUDY INTERPRETATION    Findings: The background was continuous, spontaneously variable and reactive. During wakefulness, the posterior dominant rhythm consisted of symmetric, poorly-modulated 7 Hz activity, with amplitude to 30 uV, that attenuated to eye opening.  Low amplitude frontal beta was noted in wakefulness.      Background Slowing:  -Posterior dominant background slowing  -Intermittent diffuse theta and polymorphic delta slowing.    Focal Slowing:   None was present.    Sleep Background:  Drowsiness was characterized by fragmentation, attenuation, and slowing of the background activity.    Sleep was characterized by the presence of vertex waves, symmetric spindles, and K-complexes.  Stage II sleep transients were not recorded.  Drowsiness and stage II sleep transients were not recorded.    Other Non-Epileptiform Findings:  None were present.    Interictal Epileptiform Activity:   -Occasional sharp waves in the left occipital region, maximum O1/P7/O2    Events:  Clinical events: None recorded.  Seizures: None recorded.    Activation Procedures:   Hyperventilation was not performed.    Photic stimulation was not performed.     Artifacts:  Intermittent myogenic and movement artifacts were noted.    ECG:  The heart rate on single channel ECG was predominantly between 60-80 BPM.    _____________________________________________________________  EEG SUMMARY/CLASSIFICATION  Abnormal EEG in the awake, drowsy and asleep states.  -Occasional sharp waves in the left occipital region, maximum O1/P7/O2  -Posterior dominant background slowing  -Intermittent diffuse theta and polymorphic delta slowing.  _____________________________________________________________  EEG IMPRESSION/CLINICAL CORRELATE  Abnormal EEG study.  1. Increased risk for seizures with focal onset in the left occipital region.  2. Mild to moderate nonspecific diffuse or multifocal cerebral dysfunction.   3. No seizures were recorded.    Kalpesh Dean MD  Neurology Attending Physician

## 2021-08-03 NOTE — PROGRESS NOTE ADULT - PROBLEM SELECTOR PLAN 3
recent hosp admit (admitted 7/19/21) at [facility] for  COVID pneumonia and was discharged on Dexamethasone which he had completed as per son.   - COVID negative then Positive, have bilateral yet small ground glass opacities on imaging, currently afebrile, saturating well on room air, so will cont to monitor. positive COVID swab likely due to viral shedding  - low suspicion of bacterial pneumonia in absence of cough, sputum production  - COVID PCR remains positive today 8/3, discussed with infection control. recommending maintaining isolation precautions until discharge

## 2021-08-03 NOTE — PROGRESS NOTE ADULT - SUBJECTIVE AND OBJECTIVE BOX
[facility] Division of Hospital Medicine  Bri Kta MD  Pager (M-F, 8A-5P): [phone]  Other Times:  [phone]      Patient is a 85y old  Male who presents with a chief complaint of fall (02 Aug 2021 16:19)      SUBJECTIVE / OVERNIGHT EVENTS: no acute events overnight. no lightheadedness nor dizziness. denied any back pain at time of my exam but informed by ACP later reporting back pain given IV tylenol and lidocaine patch. +constipation with last BM 1 week ago.   ADDITIONAL REVIEW OF SYSTEMS:    MEDICATIONS  (STANDING):  aspirin  chewable 81 milliGRAM(s) Oral daily  heparin   Injectable 5000 Unit(s) SubCutaneous every 8 hours  levETIRAcetam 500 milliGRAM(s) Oral every 12 hours  lidocaine   4% Patch 1 Patch Transdermal once  pantoprazole    Tablet 40 milliGRAM(s) Oral before breakfast  senna 2 Tablet(s) Oral at bedtime  simvastatin 20 milliGRAM(s) Oral at bedtime    MEDICATIONS  (PRN):  acetaminophen   Tablet .. 650 milliGRAM(s) Oral every 6 hours PRN Mild Pain (1 - 3)  polyethylene glycol 3350 17 Gram(s) Oral two times a day PRN Constipation      CAPILLARY BLOOD GLUCOSE        I&O's Summary    02 Aug 2021 07:01  -  03 Aug 2021 07:00  --------------------------------------------------------  IN: 0 mL / OUT: 300 mL / NET: -300 mL        PHYSICAL EXAM:  Vital Signs Last 24 Hrs  T(C): 36.7 (03 Aug 2021 11:53), Max: 36.7 (02 Aug 2021 22:28)  T(F): 98.1 (03 Aug 2021 11:53), Max: 98.1 (02 Aug 2021 22:28)  HR: 71 (03 Aug 2021 11:53) (63 - 71)  BP: 110/65 (03 Aug 2021 11:53) (110/65 - 136/77)  BP(mean): --  RR: 18 (03 Aug 2021 11:53) (18 - 18)  SpO2: 97% (03 Aug 2021 11:53) (97% - 98%)    CONSTITUTIONAL: NAD, well-developed, well-groomed  EYES: PERRLA; conjunctiva and sclera clear  ENMT: Moist oral mucosa, no pharyngeal injection or exudates; normal dentition  NECK: Supple, no palpable masses; no thyromegaly  RESPIRATORY: Normal respiratory effort; lungs are clear to auscultation bilaterally  CARDIOVASCULAR: Regular rate and rhythm, normal S1 and S2, no murmur/rub/gallop; No lower extremity edema; Peripheral pulses are 2+ bilaterally  ABDOMEN: Soft, Nondistended,  Nontender to palpation, normoactive bowel sounds  MUSCULOSKELETAL:  No clubbing or cyanosis of digits; no joint swelling or tenderness to palpation  PSYCH: A+O to person, place, and time; affect appropriate  NEUROLOGY: CN 2-12 are intact and symmetric; no gross sensory deficits, moving all extremities  SKIN: No rashes; no palpable lesions    LABS:                        12.0   7.05  )-----------( 142      ( 03 Aug 2021 07:27 )             37.0     08-03    136  |  104  |  21  ----------------------------<  99  4.6   |  22  |  1.18    Ca    8.9      03 Aug 2021 07:27  Phos  2.4     08-03  Mg     2.1     08-03          RADIOLOGY & ADDITIONAL TESTS:  Results Reviewed:  no leukocytosis, H/H stable, Cr improved to 1.18 s/p IVF  Imaging Personally Reviewed:  Electrocardiogram Personally Reviewed:    COORDINATION OF CARE:  Care Discussed with Consultants/Other Providers [Y]: medicine NP PK  Prior or Outpatient Records Reviewed [Y/N]:

## 2021-08-03 NOTE — PROGRESS NOTE ADULT - PROBLEM SELECTOR PLAN 6
SHANITA most likely from hypovolemia. improved with IV hydration  - urine studies unfortunately obtained after patient had received fluids  - but overall clinical picture supports pre-renal etiology due to hypovolemic  - discontinued IVF today, encouraged PO intake of fluids   though results will now be skewed as has gotten IVF  - no evidence of urinary retention  - monitor Cr on BMP periodically

## 2021-08-03 NOTE — PROGRESS NOTE ADULT - NSPROGADDITIONALINFOA_GEN_ALL_CORE
.  Bri Kat MD  Division of Hospital Medicine  [facility]   Pager: [phone]    Plan discussed with patient, son Lev, and medicine NP PK.  Will update PCP Dr. Joy later today.

## 2021-08-04 LAB
CULTURE RESULTS: SIGNIFICANT CHANGE UP
CULTURE RESULTS: SIGNIFICANT CHANGE UP
SPECIMEN SOURCE: SIGNIFICANT CHANGE UP
SPECIMEN SOURCE: SIGNIFICANT CHANGE UP

## 2021-08-04 PROCEDURE — 99232 SBSQ HOSP IP/OBS MODERATE 35: CPT

## 2021-08-04 RX ORDER — OXYCODONE AND ACETAMINOPHEN 5; 325 MG/1; MG/1
1 TABLET ORAL ONCE
Refills: 0 | Status: DISCONTINUED | OUTPATIENT
Start: 2021-08-04 | End: 2021-08-04

## 2021-08-04 RX ORDER — LIDOCAINE 4 G/100G
1 CREAM TOPICAL EVERY 24 HOURS
Refills: 0 | Status: DISCONTINUED | OUTPATIENT
Start: 2021-08-04 | End: 2021-08-09

## 2021-08-04 RX ADMIN — LIDOCAINE 1 PATCH: 4 CREAM TOPICAL at 14:30

## 2021-08-04 RX ADMIN — LIDOCAINE 1 PATCH: 4 CREAM TOPICAL at 06:15

## 2021-08-04 RX ADMIN — Medication 650 MILLIGRAM(S): at 06:00

## 2021-08-04 RX ADMIN — SIMVASTATIN 20 MILLIGRAM(S): 20 TABLET, FILM COATED ORAL at 21:14

## 2021-08-04 RX ADMIN — OXYCODONE AND ACETAMINOPHEN 1 TABLET(S): 5; 325 TABLET ORAL at 14:35

## 2021-08-04 RX ADMIN — HEPARIN SODIUM 5000 UNIT(S): 5000 INJECTION INTRAVENOUS; SUBCUTANEOUS at 13:07

## 2021-08-04 RX ADMIN — Medication 650 MILLIGRAM(S): at 05:28

## 2021-08-04 RX ADMIN — PANTOPRAZOLE SODIUM 40 MILLIGRAM(S): 20 TABLET, DELAYED RELEASE ORAL at 05:11

## 2021-08-04 RX ADMIN — HEPARIN SODIUM 5000 UNIT(S): 5000 INJECTION INTRAVENOUS; SUBCUTANEOUS at 05:13

## 2021-08-04 RX ADMIN — OXYCODONE AND ACETAMINOPHEN 1 TABLET(S): 5; 325 TABLET ORAL at 06:03

## 2021-08-04 RX ADMIN — OXYCODONE AND ACETAMINOPHEN 1 TABLET(S): 5; 325 TABLET ORAL at 07:00

## 2021-08-04 RX ADMIN — OXYCODONE AND ACETAMINOPHEN 1 TABLET(S): 5; 325 TABLET ORAL at 13:05

## 2021-08-04 RX ADMIN — LEVETIRACETAM 500 MILLIGRAM(S): 250 TABLET, FILM COATED ORAL at 05:11

## 2021-08-04 RX ADMIN — Medication 81 MILLIGRAM(S): at 12:14

## 2021-08-04 RX ADMIN — SENNA PLUS 2 TABLET(S): 8.6 TABLET ORAL at 21:14

## 2021-08-04 RX ADMIN — LEVETIRACETAM 500 MILLIGRAM(S): 250 TABLET, FILM COATED ORAL at 17:23

## 2021-08-04 RX ADMIN — HEPARIN SODIUM 5000 UNIT(S): 5000 INJECTION INTRAVENOUS; SUBCUTANEOUS at 21:15

## 2021-08-04 NOTE — PROGRESS NOTE ADULT - NSPROGADDITIONALINFOA_GEN_ALL_CORE
.  Bri Kat MD  Division of Hospital Medicine  [facility]   Pager: [phone]    Plan discussed with patient, son Lev, and medicine NP Dez.  Will update PCP Dr. Joy later today.

## 2021-08-04 NOTE — PROGRESS NOTE ADULT - PROBLEM SELECTOR PLAN 3
recent hosp admit (admitted 7/19/21) at [facility] for  COVID pneumonia and was discharged on Dexamethasone which he had completed as per son.   - COVID negative then Positive, have bilateral yet small ground glass opacities on imaging, currently afebrile, saturating well on room air, so will cont to monitor. positive COVID swab likely due to viral shedding  - low suspicion of bacterial pneumonia in absence of cough, sputum production  - COVID PCR remains positive today 8/3, discussed with infection control. recommending maintaining isolation precautions until discharge

## 2021-08-04 NOTE — PROVIDER CONTACT NOTE (OTHER) - ACTION/TREATMENT ORDERED:
Continue to monitor. Provider will assess , and review the pt's chart of the next course of actions.

## 2021-08-04 NOTE — CHART NOTE - NSCHARTNOTEFT_GEN_A_CORE
EEG Reviewed and Report as below:    EEG SUMMARY/CLASSIFICATION  Abnormal EEG in the awake, drowsy and asleep states.  -Occasional sharp waves in the left occipital region, maximum O1/P7/O2  -Posterior dominant background slowing  -Intermittent diffuse theta and polymorphic delta slowing.  _____________________________________________________________  EEG IMPRESSION/CLINICAL CORRELATE  Abnormal EEG study.  1. Increased risk for seizures with focal onset in the left occipital region.  2. Mild to moderate nonspecific diffuse or multifocal cerebral dysfunction.   3. No seizures were recorded.      At this time recommendin) Continue Keppra 500 q12h  2) MRI Brain w/ and w/o contrast    Will continue to follow

## 2021-08-05 LAB
ANION GAP SERPL CALC-SCNC: 11 MMOL/L — SIGNIFICANT CHANGE UP (ref 5–17)
BUN SERPL-MCNC: 26 MG/DL — HIGH (ref 7–23)
CALCIUM SERPL-MCNC: 9.3 MG/DL — SIGNIFICANT CHANGE UP (ref 8.4–10.5)
CHLORIDE SERPL-SCNC: 101 MMOL/L — SIGNIFICANT CHANGE UP (ref 96–108)
CO2 SERPL-SCNC: 20 MMOL/L — LOW (ref 22–31)
CREAT SERPL-MCNC: 1.35 MG/DL — HIGH (ref 0.5–1.3)
GLUCOSE SERPL-MCNC: 96 MG/DL — SIGNIFICANT CHANGE UP (ref 70–99)
HCT VFR BLD CALC: 33.1 % — LOW (ref 39–50)
HGB BLD-MCNC: 10.9 G/DL — LOW (ref 13–17)
MAGNESIUM SERPL-MCNC: 2.1 MG/DL — SIGNIFICANT CHANGE UP (ref 1.6–2.6)
MCHC RBC-ENTMCNC: 32.4 PG — SIGNIFICANT CHANGE UP (ref 27–34)
MCHC RBC-ENTMCNC: 32.9 GM/DL — SIGNIFICANT CHANGE UP (ref 32–36)
MCV RBC AUTO: 98.5 FL — SIGNIFICANT CHANGE UP (ref 80–100)
NRBC # BLD: 0 /100 WBCS — SIGNIFICANT CHANGE UP (ref 0–0)
PHOSPHATE SERPL-MCNC: 3 MG/DL — SIGNIFICANT CHANGE UP (ref 2.5–4.5)
PLATELET # BLD AUTO: 144 K/UL — LOW (ref 150–400)
POTASSIUM SERPL-MCNC: 4.6 MMOL/L — SIGNIFICANT CHANGE UP (ref 3.5–5.3)
POTASSIUM SERPL-SCNC: 4.6 MMOL/L — SIGNIFICANT CHANGE UP (ref 3.5–5.3)
RBC # BLD: 3.36 M/UL — LOW (ref 4.2–5.8)
RBC # FLD: 11.8 % — SIGNIFICANT CHANGE UP (ref 10.3–14.5)
SARS-COV-2 RNA SPEC QL NAA+PROBE: DETECTED
SODIUM SERPL-SCNC: 132 MMOL/L — LOW (ref 135–145)
WBC # BLD: 5.89 K/UL — SIGNIFICANT CHANGE UP (ref 3.8–10.5)
WBC # FLD AUTO: 5.89 K/UL — SIGNIFICANT CHANGE UP (ref 3.8–10.5)

## 2021-08-05 PROCEDURE — 99232 SBSQ HOSP IP/OBS MODERATE 35: CPT

## 2021-08-05 RX ORDER — SODIUM CHLORIDE 9 MG/ML
1000 INJECTION, SOLUTION INTRAVENOUS
Refills: 0 | Status: DISCONTINUED | OUTPATIENT
Start: 2021-08-05 | End: 2021-08-07

## 2021-08-05 RX ORDER — LANOLIN ALCOHOL/MO/W.PET/CERES
3 CREAM (GRAM) TOPICAL ONCE
Refills: 0 | Status: COMPLETED | OUTPATIENT
Start: 2021-08-05 | End: 2021-08-05

## 2021-08-05 RX ADMIN — LEVETIRACETAM 500 MILLIGRAM(S): 250 TABLET, FILM COATED ORAL at 05:39

## 2021-08-05 RX ADMIN — SENNA PLUS 2 TABLET(S): 8.6 TABLET ORAL at 21:38

## 2021-08-05 RX ADMIN — HEPARIN SODIUM 5000 UNIT(S): 5000 INJECTION INTRAVENOUS; SUBCUTANEOUS at 13:26

## 2021-08-05 RX ADMIN — LIDOCAINE 1 PATCH: 4 CREAM TOPICAL at 06:06

## 2021-08-05 RX ADMIN — HEPARIN SODIUM 5000 UNIT(S): 5000 INJECTION INTRAVENOUS; SUBCUTANEOUS at 05:39

## 2021-08-05 RX ADMIN — SIMVASTATIN 20 MILLIGRAM(S): 20 TABLET, FILM COATED ORAL at 21:37

## 2021-08-05 RX ADMIN — Medication 3 MILLIGRAM(S): at 21:38

## 2021-08-05 RX ADMIN — SODIUM CHLORIDE 100 MILLILITER(S): 9 INJECTION, SOLUTION INTRAVENOUS at 08:36

## 2021-08-05 RX ADMIN — PANTOPRAZOLE SODIUM 40 MILLIGRAM(S): 20 TABLET, DELAYED RELEASE ORAL at 05:39

## 2021-08-05 RX ADMIN — HEPARIN SODIUM 5000 UNIT(S): 5000 INJECTION INTRAVENOUS; SUBCUTANEOUS at 21:38

## 2021-08-05 RX ADMIN — LIDOCAINE 1 PATCH: 4 CREAM TOPICAL at 02:14

## 2021-08-05 RX ADMIN — LIDOCAINE 1 PATCH: 4 CREAM TOPICAL at 19:34

## 2021-08-05 RX ADMIN — LEVETIRACETAM 500 MILLIGRAM(S): 250 TABLET, FILM COATED ORAL at 17:06

## 2021-08-05 RX ADMIN — Medication 81 MILLIGRAM(S): at 11:45

## 2021-08-05 RX ADMIN — Medication 650 MILLIGRAM(S): at 12:05

## 2021-08-05 RX ADMIN — LIDOCAINE 1 PATCH: 4 CREAM TOPICAL at 13:26

## 2021-08-05 RX ADMIN — Medication 650 MILLIGRAM(S): at 13:00

## 2021-08-05 RX ADMIN — Medication 650 MILLIGRAM(S): at 06:13

## 2021-08-05 NOTE — DIETITIAN INITIAL EVALUATION ADULT. - PROBLEM SELECTOR PLAN 2
-recently discharged ~1 week prior to presentation for COVID pneumonia  -COVID negative today but does have bilateral yet small ground glass opacities on imaging  -currently afebrile, very minimal leukocytosis, saturating well on room air, unlikely to benefits from steroids or remdesivir based on current vitals  -obtain sputum cultures, urinary Antigens  -low suspicion of bacterial pneumonia in absence of cough, sputum production

## 2021-08-05 NOTE — DIETITIAN INITIAL EVALUATION ADULT. - ORAL INTAKE PTA/DIET HISTORY
as per son who was with pt for his meals prior to admission   oatmeal, banana, berries, toast for breakfast, sandwich, fruit, xuan biscuit for lunch, fish spaghetti, meatballs for dinner. ensure 1 x daily

## 2021-08-05 NOTE — DIETITIAN INITIAL EVALUATION ADULT. - OTHER INFO
spoke with son due to pt current medical condition  Denies nausea/vomit :  Denies difficulty chewing /swallow :  Denies diarrhea/constipation:  Last BM : 8/5-fecal incontinence  NKFA  Ht: 67"  Ht taken from son  Dosing ht: 172.7cm  Dosing wt: 65.9kg  Ht used for BMI 67"  Wt used for BMI dosing  Education Provided : son was educated on the importance of supplements to increase calorie and protein intake in light of RD's nutritional findings.   pressure injury: none

## 2021-08-05 NOTE — DIETITIAN INITIAL EVALUATION ADULT. - PERTINENT MEDS FT
MEDICATIONS  (STANDING):  aspirin  chewable 81 milliGRAM(s) Oral daily  heparin   Injectable 5000 Unit(s) SubCutaneous every 8 hours  lactated ringers. 1000 milliLiter(s) (100 mL/Hr) IV Continuous <Continuous>  levETIRAcetam 500 milliGRAM(s) Oral every 12 hours  lidocaine   4% Patch 1 Patch Transdermal every 24 hours  pantoprazole    Tablet 40 milliGRAM(s) Oral before breakfast  senna 2 Tablet(s) Oral at bedtime  simvastatin 20 milliGRAM(s) Oral at bedtime    MEDICATIONS  (PRN):  acetaminophen   Tablet .. 650 milliGRAM(s) Oral every 6 hours PRN Mild Pain (1 - 3)  polyethylene glycol 3350 17 Gram(s) Oral two times a day PRN Constipation

## 2021-08-05 NOTE — PROGRESS NOTE ADULT - PROBLEM SELECTOR PLAN 6
SHANITA most likely from hypovolemia. improved with IV hydration  - urine studies unfortunately obtained after patient had received fluids  - but overall clinical picture supports pre-renal etiology due to hypovolemic  - continue to encourage PO intake of fluids   though results will now be skewed as has gotten IVF  - no evidence of urinary retention  - monitor Cr on BMP periodically and give IVF as needed

## 2021-08-05 NOTE — PROGRESS NOTE ADULT - PROBLEM SELECTOR PLAN 3
recent hosp admit (admitted 7/19/21) at [facility] for  COVID pneumonia and was discharged on Dexamethasone which he had completed as per son.   - COVID negative then Positive, have bilateral yet small ground glass opacities on imaging, currently afebrile, saturating well on room air, so will cont to monitor. positive COVID swab likely due to viral shedding  - low suspicion of bacterial pneumonia in absence of cough, sputum production  - COVID PCR remain positive on 8/5, discussed with infection control. recommending maintaining isolation precautions until discharge

## 2021-08-05 NOTE — PROGRESS NOTE ADULT - SUBJECTIVE AND OBJECTIVE BOX
[facility] Division of Hospital Medicine  Bri Kat MD  Pager (M-F, 8A-5P): [phone]  Other Times:  [phone]      Patient is a 85y old  Male who presents with a chief complaint of fall (05 Aug 2021 11:35)      SUBJECTIVE / OVERNIGHT EVENTS: no acute events overnight. feels well without complaints. some left sided back pain but overall doing okay. no fever, dyspnea. occasional dry cough.  ADDITIONAL REVIEW OF SYSTEMS:    MEDICATIONS  (STANDING):  aspirin  chewable 81 milliGRAM(s) Oral daily  heparin   Injectable 5000 Unit(s) SubCutaneous every 8 hours  lactated ringers. 1000 milliLiter(s) (100 mL/Hr) IV Continuous <Continuous>  levETIRAcetam 500 milliGRAM(s) Oral every 12 hours  lidocaine   4% Patch 1 Patch Transdermal every 24 hours  pantoprazole    Tablet 40 milliGRAM(s) Oral before breakfast  senna 2 Tablet(s) Oral at bedtime  simvastatin 20 milliGRAM(s) Oral at bedtime    MEDICATIONS  (PRN):  acetaminophen   Tablet .. 650 milliGRAM(s) Oral every 6 hours PRN Mild Pain (1 - 3)  polyethylene glycol 3350 17 Gram(s) Oral two times a day PRN Constipation      CAPILLARY BLOOD GLUCOSE        I&O's Summary    04 Aug 2021 07:01  -  05 Aug 2021 07:00  --------------------------------------------------------  IN: 240 mL / OUT: 400 mL / NET: -160 mL    05 Aug 2021 07:01  -  05 Aug 2021 18:44  --------------------------------------------------------  IN: 600 mL / OUT: 675 mL / NET: -75 mL        PHYSICAL EXAM:  Vital Signs Last 24 Hrs  T(C): 36.7 (05 Aug 2021 12:45), Max: 36.7 (04 Aug 2021 21:30)  T(F): 98 (05 Aug 2021 12:45), Max: 98.1 (04 Aug 2021 21:30)  HR: 80 (05 Aug 2021 12:45) (67 - 80)  BP: 105/69 (05 Aug 2021 12:45) (105/69 - 122/72)  BP(mean): --  RR: 18 (05 Aug 2021 12:45) (18 - 18)  SpO2: 97% (05 Aug 2021 12:45) (95% - 97%)    CONSTITUTIONAL: NAD, well-developed, well-groomed  EYES: PERRLA; conjunctiva and sclera clear  ENMT: Moist oral mucosa, no pharyngeal injection or exudates; normal dentition  NECK: Supple, no palpable masses; no thyromegaly  RESPIRATORY: Normal respiratory effort; lungs are clear to auscultation bilaterally  CARDIOVASCULAR: Regular rate and rhythm, normal S1 and S2, no murmur/rub/gallop; No lower extremity edema; Peripheral pulses are 2+ bilaterally  ABDOMEN: Soft, Nondistended,  Nontender to palpation, normoactive bowel sounds  MUSCULOSKELETAL:  No clubbing or cyanosis of digits; no joint swelling or tenderness to palpation  PSYCH: A+O to person, place, and time; affect appropriate  NEUROLOGY: CN 2-12 are intact and symmetric; no gross sensory deficits, moving all extremities  SKIN: No rashes; no palpable lesions    LABS:                        10.9   5.89  )-----------( 144      ( 05 Aug 2021 06:57 )             33.1     08-05    132<L>  |  101  |  26<H>  ----------------------------<  96  4.6   |  20<L>  |  1.35<H>    Ca    9.3      05 Aug 2021 06:15  Phos  3.0     08-05  Mg     2.1     08-05                  RADIOLOGY & ADDITIONAL TESTS:  Results Reviewed:   Imaging Personally Reviewed:  Electrocardiogram Personally Reviewed:    COORDINATION OF CARE:  Care Discussed with Consultants/Other Providers [Y/N]:  Prior or Outpatient Records Reviewed [Y/N]:

## 2021-08-05 NOTE — DIETITIAN INITIAL EVALUATION ADULT. - ADD RECOMMEND
keep diet liberalized at this time to encourage intake keep diet liberalized at this time to encourage intake, RD discontinued mighty shaked due to elevated creatinine

## 2021-08-05 NOTE — PROGRESS NOTE ADULT - NSPROGADDITIONALINFOA_GEN_ALL_CORE
.  Bri Kat MD  Division of Hospital Medicine  [facility]   Pager: [phone]    Plan discussed with patient and medicine NP Dez.

## 2021-08-05 NOTE — DIETITIAN INITIAL EVALUATION ADULT. - PERTINENT LABORATORY DATA
08-05 @ 06:15: Na 132<L>, BUN 26<H>, Cr 1.35<H>, BG 96, K+ 4.6, Phos 3.0, Mg 2.1, Alk Phos --, ALT/SGPT --, AST/SGOT --, HbA1c --

## 2021-08-06 LAB
ANION GAP SERPL CALC-SCNC: 9 MMOL/L — SIGNIFICANT CHANGE UP (ref 5–17)
BUN SERPL-MCNC: 22 MG/DL — SIGNIFICANT CHANGE UP (ref 7–23)
CALCIUM SERPL-MCNC: 9.4 MG/DL — SIGNIFICANT CHANGE UP (ref 8.4–10.5)
CHLORIDE SERPL-SCNC: 102 MMOL/L — SIGNIFICANT CHANGE UP (ref 96–108)
CO2 SERPL-SCNC: 22 MMOL/L — SIGNIFICANT CHANGE UP (ref 22–31)
CREAT SERPL-MCNC: 1.31 MG/DL — HIGH (ref 0.5–1.3)
GLUCOSE SERPL-MCNC: 95 MG/DL — SIGNIFICANT CHANGE UP (ref 70–99)
MAGNESIUM SERPL-MCNC: 2.1 MG/DL — SIGNIFICANT CHANGE UP (ref 1.6–2.6)
PHOSPHATE SERPL-MCNC: 3.1 MG/DL — SIGNIFICANT CHANGE UP (ref 2.5–4.5)
POTASSIUM SERPL-MCNC: 4.3 MMOL/L — SIGNIFICANT CHANGE UP (ref 3.5–5.3)
POTASSIUM SERPL-SCNC: 4.3 MMOL/L — SIGNIFICANT CHANGE UP (ref 3.5–5.3)
SODIUM SERPL-SCNC: 133 MMOL/L — LOW (ref 135–145)

## 2021-08-06 PROCEDURE — 70553 MRI BRAIN STEM W/O & W/DYE: CPT | Mod: 26

## 2021-08-06 PROCEDURE — 99232 SBSQ HOSP IP/OBS MODERATE 35: CPT

## 2021-08-06 RX ORDER — LANOLIN ALCOHOL/MO/W.PET/CERES
3 CREAM (GRAM) TOPICAL AT BEDTIME
Refills: 0 | Status: DISCONTINUED | OUTPATIENT
Start: 2021-08-06 | End: 2021-08-09

## 2021-08-06 RX ORDER — OXYCODONE AND ACETAMINOPHEN 5; 325 MG/1; MG/1
1 TABLET ORAL ONCE
Refills: 0 | Status: DISCONTINUED | OUTPATIENT
Start: 2021-08-06 | End: 2021-08-06

## 2021-08-06 RX ORDER — OXYCODONE AND ACETAMINOPHEN 5; 325 MG/1; MG/1
1 TABLET ORAL
Refills: 0 | Status: DISCONTINUED | OUTPATIENT
Start: 2021-08-06 | End: 2021-08-06

## 2021-08-06 RX ADMIN — Medication 3 MILLIGRAM(S): at 18:45

## 2021-08-06 RX ADMIN — Medication 650 MILLIGRAM(S): at 05:10

## 2021-08-06 RX ADMIN — HEPARIN SODIUM 5000 UNIT(S): 5000 INJECTION INTRAVENOUS; SUBCUTANEOUS at 21:28

## 2021-08-06 RX ADMIN — HEPARIN SODIUM 5000 UNIT(S): 5000 INJECTION INTRAVENOUS; SUBCUTANEOUS at 12:33

## 2021-08-06 RX ADMIN — LIDOCAINE 1 PATCH: 4 CREAM TOPICAL at 22:40

## 2021-08-06 RX ADMIN — Medication 81 MILLIGRAM(S): at 12:33

## 2021-08-06 RX ADMIN — LIDOCAINE 1 PATCH: 4 CREAM TOPICAL at 20:00

## 2021-08-06 RX ADMIN — OXYCODONE AND ACETAMINOPHEN 1 TABLET(S): 5; 325 TABLET ORAL at 21:28

## 2021-08-06 RX ADMIN — LEVETIRACETAM 500 MILLIGRAM(S): 250 TABLET, FILM COATED ORAL at 05:10

## 2021-08-06 RX ADMIN — HEPARIN SODIUM 5000 UNIT(S): 5000 INJECTION INTRAVENOUS; SUBCUTANEOUS at 05:13

## 2021-08-06 RX ADMIN — SIMVASTATIN 20 MILLIGRAM(S): 20 TABLET, FILM COATED ORAL at 21:28

## 2021-08-06 RX ADMIN — LIDOCAINE 1 PATCH: 4 CREAM TOPICAL at 13:29

## 2021-08-06 RX ADMIN — LIDOCAINE 1 PATCH: 4 CREAM TOPICAL at 02:00

## 2021-08-06 RX ADMIN — SENNA PLUS 2 TABLET(S): 8.6 TABLET ORAL at 21:28

## 2021-08-06 RX ADMIN — PANTOPRAZOLE SODIUM 40 MILLIGRAM(S): 20 TABLET, DELAYED RELEASE ORAL at 05:10

## 2021-08-06 RX ADMIN — LEVETIRACETAM 500 MILLIGRAM(S): 250 TABLET, FILM COATED ORAL at 18:45

## 2021-08-06 NOTE — PROGRESS NOTE ADULT - PROBLEM SELECTOR PLAN 3
recent hosp admit (admitted 7/19/21) at [facility] for  COVID pneumonia and was discharged on Dexamethasone which he had completed as per son.   - COVID negative then Positive, have bilateral yet small ground glass opacities on imaging, currently afebrile, saturating well on room air, so will cont to monitor. positive COVID swab likely due to viral shedding  - low suspicion of bacterial pneumonia in absence of cough, sputum production  - COVID PCR remain positive on 8/5, discussed with infection control. recommending maintaining isolation precautions until discharge

## 2021-08-06 NOTE — PROGRESS NOTE ADULT - NSPROGADDITIONALINFOA_GEN_ALL_CORE
.  Bri Kat MD  Division of Hospital Medicine  [facility]   Pager: [phone]    Plan discussed with patient, misael Ohara via phone, LEIGH Arita, and medicine NP Dez. .  Bri Kat MD  Division of Hospital Medicine  [facility]   Pager: [phone]    Plan discussed with patient, CM Lyla, and medicine NP Dez.  left voicemail for son Lev today, as no answer.

## 2021-08-06 NOTE — PROGRESS NOTE ADULT - SUBJECTIVE AND OBJECTIVE BOX
[facility] Division of Hospital Medicine  Bri Kat MD  Pager (M-F, 8A-9N): [phone]  Other Times:  [phone]      Patient is a 85y old  Male who presents with a chief complaint of fall (05 Aug 2021 18:44)      SUBJECTIVE / OVERNIGHT EVENTS: no acute events overnight. observed end of PT session, did well, no shaking/tremor when I was present but per physical therapist, was having upper body tremor with standing. feels well today. no fever, chills, chest pain, nor sob. no back pain today at time of my exam.  ADDITIONAL REVIEW OF SYSTEMS:    MEDICATIONS  (STANDING):  aspirin  chewable 81 milliGRAM(s) Oral daily  heparin   Injectable 5000 Unit(s) SubCutaneous every 8 hours  lactated ringers. 1000 milliLiter(s) (100 mL/Hr) IV Continuous <Continuous>  levETIRAcetam 500 milliGRAM(s) Oral every 12 hours  lidocaine   4% Patch 1 Patch Transdermal every 24 hours  oxycodone    5 mG/acetaminophen 325 mG 1 Tablet(s) Oral <User Schedule>  pantoprazole    Tablet 40 milliGRAM(s) Oral before breakfast  senna 2 Tablet(s) Oral at bedtime  simvastatin 20 milliGRAM(s) Oral at bedtime    MEDICATIONS  (PRN):  acetaminophen   Tablet .. 650 milliGRAM(s) Oral every 6 hours PRN Mild Pain (1 - 3)  polyethylene glycol 3350 17 Gram(s) Oral two times a day PRN Constipation      CAPILLARY BLOOD GLUCOSE        I&O's Summary    05 Aug 2021 07:01  -  06 Aug 2021 07:00  --------------------------------------------------------  IN: 600 mL / OUT: 1525 mL / NET: -925 mL    06 Aug 2021 07:01  -  06 Aug 2021 17:49  --------------------------------------------------------  IN: 240 mL / OUT: 0 mL / NET: 240 mL        PHYSICAL EXAM:  Vital Signs Last 24 Hrs  T(C): 36.4 (06 Aug 2021 15:06), Max: 36.6 (06 Aug 2021 05:00)  T(F): 97.6 (06 Aug 2021 15:06), Max: 97.8 (06 Aug 2021 05:00)  HR: 68 (06 Aug 2021 15:06) (60 - 71)  BP: 108/71 (06 Aug 2021 15:06) (107/68 - 116/71)  BP(mean): --  RR: 18 (06 Aug 2021 15:06) (18 - 18)  SpO2: 98% (06 Aug 2021 15:06) (95% - 98%)    CONSTITUTIONAL: NAD, well-developed, well-groomed  EYES: PERRLA; conjunctiva and sclera clear  ENMT: Moist oral mucosa, no pharyngeal injection or exudates; normal dentition  NECK: Supple, no palpable masses; no thyromegaly  RESPIRATORY: Normal respiratory effort; lungs are clear to auscultation bilaterally  CARDIOVASCULAR: Regular rate and rhythm, normal S1 and S2, no murmur/rub/gallop; No lower extremity edema; Peripheral pulses are 2+ bilaterally  ABDOMEN: Soft, Nondistended,  Nontender to palpation, normoactive bowel sounds  MUSCULOSKELETAL:  No clubbing or cyanosis of digits; no joint swelling or tenderness to palpation  PSYCH: A+O to person, place, and time; affect appropriate  NEUROLOGY: no gross sensory deficits, moving all extremities, occasional tremor in upper extremities bu tmild  SKIN: No rashes; no palpable lesions    LABS:                        10.9   5.89  )-----------( 144      ( 05 Aug 2021 06:57 )             33.1     08-06    133<L>  |  102  |  22  ----------------------------<  95  4.3   |  22  |  1.31<H>    Ca    9.4      06 Aug 2021 06:22  Phos  3.1     08-06  Mg     2.1     08-06      RADIOLOGY & ADDITIONAL TESTS:  Results Reviewed: Cr improved  Imaging Personally Reviewed:  Electrocardiogram Personally Reviewed:    COORDINATION OF CARE:  Care Discussed with Consultants/Other Providers [Y]: medicine EZEKIEL Garces  Prior or Outpatient Records Reviewed [Y/N]:

## 2021-08-07 DIAGNOSIS — R56.9 UNSPECIFIED CONVULSIONS: ICD-10-CM

## 2021-08-07 PROCEDURE — 99232 SBSQ HOSP IP/OBS MODERATE 35: CPT

## 2021-08-07 RX ORDER — OXYCODONE HYDROCHLORIDE 5 MG/1
5 TABLET ORAL EVERY 6 HOURS
Refills: 0 | Status: DISCONTINUED | OUTPATIENT
Start: 2021-08-07 | End: 2021-08-09

## 2021-08-07 RX ORDER — POLYETHYLENE GLYCOL 3350 17 G/17G
17 POWDER, FOR SOLUTION ORAL
Refills: 0 | Status: DISCONTINUED | OUTPATIENT
Start: 2021-08-07 | End: 2021-08-09

## 2021-08-07 RX ADMIN — Medication 3 MILLIGRAM(S): at 22:01

## 2021-08-07 RX ADMIN — OXYCODONE HYDROCHLORIDE 5 MILLIGRAM(S): 5 TABLET ORAL at 20:54

## 2021-08-07 RX ADMIN — LIDOCAINE 1 PATCH: 4 CREAM TOPICAL at 13:17

## 2021-08-07 RX ADMIN — Medication 650 MILLIGRAM(S): at 05:34

## 2021-08-07 RX ADMIN — SIMVASTATIN 20 MILLIGRAM(S): 20 TABLET, FILM COATED ORAL at 22:01

## 2021-08-07 RX ADMIN — LEVETIRACETAM 500 MILLIGRAM(S): 250 TABLET, FILM COATED ORAL at 18:05

## 2021-08-07 RX ADMIN — Medication 650 MILLIGRAM(S): at 22:23

## 2021-08-07 RX ADMIN — PANTOPRAZOLE SODIUM 40 MILLIGRAM(S): 20 TABLET, DELAYED RELEASE ORAL at 05:34

## 2021-08-07 RX ADMIN — Medication 650 MILLIGRAM(S): at 22:53

## 2021-08-07 RX ADMIN — SENNA PLUS 2 TABLET(S): 8.6 TABLET ORAL at 22:01

## 2021-08-07 RX ADMIN — LIDOCAINE 1 PATCH: 4 CREAM TOPICAL at 19:24

## 2021-08-07 RX ADMIN — LEVETIRACETAM 500 MILLIGRAM(S): 250 TABLET, FILM COATED ORAL at 05:33

## 2021-08-07 RX ADMIN — HEPARIN SODIUM 5000 UNIT(S): 5000 INJECTION INTRAVENOUS; SUBCUTANEOUS at 13:24

## 2021-08-07 RX ADMIN — HEPARIN SODIUM 5000 UNIT(S): 5000 INJECTION INTRAVENOUS; SUBCUTANEOUS at 05:33

## 2021-08-07 RX ADMIN — Medication 81 MILLIGRAM(S): at 13:17

## 2021-08-07 RX ADMIN — OXYCODONE HYDROCHLORIDE 5 MILLIGRAM(S): 5 TABLET ORAL at 20:24

## 2021-08-07 RX ADMIN — HEPARIN SODIUM 5000 UNIT(S): 5000 INJECTION INTRAVENOUS; SUBCUTANEOUS at 22:01

## 2021-08-07 NOTE — PROGRESS NOTE ADULT - NSPROGADDITIONALINFOA_GEN_ALL_CORE
.  Bri Kat MD  Division of Hospital Medicine  [facility]   Pager: [phone]    Anticipate discharge home Monday. pending final MRI read, neuro f/u, and home services set-up.    Plan discussed with patient, son Lev via phone, and medicine NP Savi.

## 2021-08-07 NOTE — PROGRESS NOTE ADULT - PROBLEM SELECTOR PLAN 3
recent hosp admit (admitted 7/19/21) at [facility] for  COVID pneumonia and was discharged on Dexamethasone which he had completed as per son.   - COVID negative then Positive, have bilateral yet small ground glass opacities on imaging, currently afebrile, saturating well on room air, so will cont to monitor. positive COVID swab likely due to viral shedding  - low suspicion of bacterial pneumonia in absence of cough, sputum production  - COVID PCR remain positive on 8/5, discussed with infection control. recommending maintaining isolation precautions until discharge

## 2021-08-07 NOTE — PROGRESS NOTE ADULT - SUBJECTIVE AND OBJECTIVE BOX
[facility] Division of Hospital Medicine  Bri Kat MD  Pager (M-F, 8A-5P): [phone]  Other Times:  [phone]      Patient is a 85y old  Male who presents with a chief complaint of fall (06 Aug 2021 17:48)      SUBJECTIVE / OVERNIGHT EVENTS: no acute events overnight. went for MRI yesterday, still awaiting results. having back pain today. +constipation but states passing flatus.  ADDITIONAL REVIEW OF SYSTEMS:    MEDICATIONS  (STANDING):  aspirin  chewable 81 milliGRAM(s) Oral daily  bisacodyl Suppository 10 milliGRAM(s) Rectal once  heparin   Injectable 5000 Unit(s) SubCutaneous every 8 hours  levETIRAcetam 500 milliGRAM(s) Oral every 12 hours  lidocaine   4% Patch 1 Patch Transdermal every 24 hours  melatonin 3 milliGRAM(s) Oral at bedtime  pantoprazole    Tablet 40 milliGRAM(s) Oral before breakfast  polyethylene glycol 3350 17 Gram(s) Oral two times a day  senna 2 Tablet(s) Oral at bedtime  simvastatin 20 milliGRAM(s) Oral at bedtime    MEDICATIONS  (PRN):  acetaminophen   Tablet .. 650 milliGRAM(s) Oral every 6 hours PRN Mild Pain (1 - 3)  oxyCODONE    IR 5 milliGRAM(s) Oral every 6 hours PRN Severe Pain (7 - 10)      CAPILLARY BLOOD GLUCOSE        I&O's Summary    06 Aug 2021 07:01  -  07 Aug 2021 07:00  --------------------------------------------------------  IN: 240 mL / OUT: 685 mL / NET: -445 mL        PHYSICAL EXAM:  Vital Signs Last 24 Hrs  T(C): 36.3 (07 Aug 2021 12:57), Max: 36.5 (06 Aug 2021 23:57)  T(F): 97.4 (07 Aug 2021 12:57), Max: 97.7 (06 Aug 2021 23:57)  HR: 71 (07 Aug 2021 12:57) (60 - 94)  BP: 108/72 (07 Aug 2021 05:31) (104/69 - 108/72)  BP(mean): --  RR: 18 (07 Aug 2021 12:57) (18 - 18)  SpO2: 96% (07 Aug 2021 12:57) (94% - 96%)    CONSTITUTIONAL: NAD, well-developed, well-groomed  EYES: PERRLA; conjunctiva and sclera clear  ENMT: Moist oral mucosa, no pharyngeal injection or exudates; normal dentition  NECK: Supple, no palpable masses; no thyromegaly  RESPIRATORY: Normal respiratory effort; lungs are clear to auscultation bilaterally  CARDIOVASCULAR: Regular rate and rhythm, normal S1 and S2, no murmur/rub/gallop; No lower extremity edema; Peripheral pulses are 2+ bilaterally  ABDOMEN: Soft, Nondistended,  Nontender to palpation, normoactive bowel sounds  MUSCULOSKELETAL:  No clubbing or cyanosis of digits; no joint swelling or tenderness to palpation  PSYCH: A+O to person, place, and time; affect appropriate  NEUROLOGY: no gross sensory deficits, moving all extremities, occasional tremor in upper extremities but mild - stable  SKIN: No rashes; no palpable lesions    LABS:    08-06    133<L>  |  102  |  22  ----------------------------<  95  4.3   |  22  |  1.31<H>    Ca    9.4      06 Aug 2021 06:22  Phos  3.1     08-06  Mg     2.1     08-06              RADIOLOGY & ADDITIONAL TESTS:  Results Reviewed:   Imaging Personally Reviewed:  Electrocardiogram Personally Reviewed:    COORDINATION OF CARE:  Care Discussed with Consultants/Other Providers [Y]: medicine EZEKIEL Hou  Prior or Outpatient Records Reviewed [Y/N]:

## 2021-08-08 LAB
ANION GAP SERPL CALC-SCNC: 13 MMOL/L — SIGNIFICANT CHANGE UP (ref 5–17)
BUN SERPL-MCNC: 25 MG/DL — HIGH (ref 7–23)
CALCIUM SERPL-MCNC: 9.4 MG/DL — SIGNIFICANT CHANGE UP (ref 8.4–10.5)
CHLORIDE SERPL-SCNC: 101 MMOL/L — SIGNIFICANT CHANGE UP (ref 96–108)
CO2 SERPL-SCNC: 20 MMOL/L — LOW (ref 22–31)
CREAT SERPL-MCNC: 1.41 MG/DL — HIGH (ref 0.5–1.3)
GLUCOSE SERPL-MCNC: 100 MG/DL — HIGH (ref 70–99)
HCT VFR BLD CALC: 35 % — LOW (ref 39–50)
HGB BLD-MCNC: 11.8 G/DL — LOW (ref 13–17)
MAGNESIUM SERPL-MCNC: 2 MG/DL — SIGNIFICANT CHANGE UP (ref 1.6–2.6)
MCHC RBC-ENTMCNC: 32.3 PG — SIGNIFICANT CHANGE UP (ref 27–34)
MCHC RBC-ENTMCNC: 33.7 GM/DL — SIGNIFICANT CHANGE UP (ref 32–36)
MCV RBC AUTO: 95.9 FL — SIGNIFICANT CHANGE UP (ref 80–100)
NRBC # BLD: 0 /100 WBCS — SIGNIFICANT CHANGE UP (ref 0–0)
PHOSPHATE SERPL-MCNC: 3.3 MG/DL — SIGNIFICANT CHANGE UP (ref 2.5–4.5)
PLATELET # BLD AUTO: 155 K/UL — SIGNIFICANT CHANGE UP (ref 150–400)
POTASSIUM SERPL-MCNC: 4.4 MMOL/L — SIGNIFICANT CHANGE UP (ref 3.5–5.3)
POTASSIUM SERPL-SCNC: 4.4 MMOL/L — SIGNIFICANT CHANGE UP (ref 3.5–5.3)
RBC # BLD: 3.65 M/UL — LOW (ref 4.2–5.8)
RBC # FLD: 11.9 % — SIGNIFICANT CHANGE UP (ref 10.3–14.5)
SODIUM SERPL-SCNC: 134 MMOL/L — LOW (ref 135–145)
WBC # BLD: 5.9 K/UL — SIGNIFICANT CHANGE UP (ref 3.8–10.5)
WBC # FLD AUTO: 5.9 K/UL — SIGNIFICANT CHANGE UP (ref 3.8–10.5)

## 2021-08-08 PROCEDURE — 99232 SBSQ HOSP IP/OBS MODERATE 35: CPT

## 2021-08-08 RX ORDER — SODIUM CHLORIDE 9 MG/ML
1000 INJECTION, SOLUTION INTRAVENOUS
Refills: 0 | Status: DISCONTINUED | OUTPATIENT
Start: 2021-08-08 | End: 2021-08-09

## 2021-08-08 RX ADMIN — OXYCODONE HYDROCHLORIDE 5 MILLIGRAM(S): 5 TABLET ORAL at 06:24

## 2021-08-08 RX ADMIN — HEPARIN SODIUM 5000 UNIT(S): 5000 INJECTION INTRAVENOUS; SUBCUTANEOUS at 12:40

## 2021-08-08 RX ADMIN — SIMVASTATIN 20 MILLIGRAM(S): 20 TABLET, FILM COATED ORAL at 21:30

## 2021-08-08 RX ADMIN — POLYETHYLENE GLYCOL 3350 17 GRAM(S): 17 POWDER, FOR SOLUTION ORAL at 17:20

## 2021-08-08 RX ADMIN — POLYETHYLENE GLYCOL 3350 17 GRAM(S): 17 POWDER, FOR SOLUTION ORAL at 06:26

## 2021-08-08 RX ADMIN — SENNA PLUS 2 TABLET(S): 8.6 TABLET ORAL at 21:30

## 2021-08-08 RX ADMIN — LEVETIRACETAM 500 MILLIGRAM(S): 250 TABLET, FILM COATED ORAL at 17:17

## 2021-08-08 RX ADMIN — PANTOPRAZOLE SODIUM 40 MILLIGRAM(S): 20 TABLET, DELAYED RELEASE ORAL at 06:26

## 2021-08-08 RX ADMIN — OXYCODONE HYDROCHLORIDE 5 MILLIGRAM(S): 5 TABLET ORAL at 13:30

## 2021-08-08 RX ADMIN — LIDOCAINE 1 PATCH: 4 CREAM TOPICAL at 01:18

## 2021-08-08 RX ADMIN — LEVETIRACETAM 500 MILLIGRAM(S): 250 TABLET, FILM COATED ORAL at 06:25

## 2021-08-08 RX ADMIN — Medication 81 MILLIGRAM(S): at 12:40

## 2021-08-08 RX ADMIN — SODIUM CHLORIDE 100 MILLILITER(S): 9 INJECTION, SOLUTION INTRAVENOUS at 08:01

## 2021-08-08 RX ADMIN — Medication 3 MILLIGRAM(S): at 21:30

## 2021-08-08 RX ADMIN — HEPARIN SODIUM 5000 UNIT(S): 5000 INJECTION INTRAVENOUS; SUBCUTANEOUS at 21:30

## 2021-08-08 RX ADMIN — OXYCODONE HYDROCHLORIDE 5 MILLIGRAM(S): 5 TABLET ORAL at 12:39

## 2021-08-08 RX ADMIN — HEPARIN SODIUM 5000 UNIT(S): 5000 INJECTION INTRAVENOUS; SUBCUTANEOUS at 06:25

## 2021-08-08 NOTE — PROVIDER CONTACT NOTE (OTHER) - ASSESSMENT
Denies any discomfort, symptomatic at this time
A&OX3. vss. BP 95/62, SBP normally has been >100. no c/o of dizziness or other distress at this time.
pain, guarding, nonverbal and verbal indication of pain worsen with movement.

## 2021-08-08 NOTE — PROGRESS NOTE ADULT - NSPROGADDITIONALINFOA_GEN_ALL_CORE
.  Bri Kat MD  Division of Hospital Medicine  [facility]   Pager: [phone]    Anticipate discharge home Monday once confirmed home services set-up.    Plan discussed with patient, misael Ohara via phone on 8/7, and medicine NP Hayde. .  Bri Kat MD  Division of Hospital Medicine  [facility]   Pager: [phone]    Anticipate discharge home Monday once confirmed home services set-up.    Plan discussed with patient, misael Ohara via phone today and medicine NP Hayde.

## 2021-08-08 NOTE — PROVIDER CONTACT NOTE (OTHER) - SITUATION
Refer to EMAR elevated blood pressure systolic above 160/ notified as per provider orders
BP low 95/62
Back Pain 8/10 not being controlled at this time

## 2021-08-08 NOTE — PROGRESS NOTE ADULT - PROBLEM SELECTOR PLAN 4
- c/w aspirin  - c/w statin  - TTE still pending - c/w aspirin  - c/w statin  - TTE still pending - but can be performed as outpatient which Cardiology agrees with

## 2021-08-08 NOTE — PROGRESS NOTE ADULT - PROBLEM SELECTOR PLAN 3
recent hosp admit (admitted 7/19/21) at [facility] for  COVID pneumonia and was discharged on Dexamethasone which he had completed as per son.   - COVID negative then Positive, have bilateral yet small ground glass opacities on imaging, currently afebrile, saturating well on room air, so will cont to monitor. positive COVID swab likely due to viral shedding  - low suspicion of bacterial pneumonia in absence of cough, sputum production  - COVID PCR remain positive on 8/5, discussed with infection control. recommending maintaining isolation precautions until discharge

## 2021-08-08 NOTE — PROGRESS NOTE ADULT - SUBJECTIVE AND OBJECTIVE BOX
[facility] Division of Hospital Medicine  Bri Kat MD  Pager (M-F, 8A-5P): [phone]  Other Times:  [phone]      Patient is a 85y old  Male who presents with a chief complaint of fall (07 Aug 2021 15:12)      SUBJECTIVE / OVERNIGHT EVENTS:  ADDITIONAL REVIEW OF SYSTEMS:    MEDICATIONS  (STANDING):  aspirin  chewable 81 milliGRAM(s) Oral daily  bisacodyl Suppository 10 milliGRAM(s) Rectal once  heparin   Injectable 5000 Unit(s) SubCutaneous every 8 hours  lactated ringers. 1000 milliLiter(s) (100 mL/Hr) IV Continuous <Continuous>  levETIRAcetam 500 milliGRAM(s) Oral every 12 hours  lidocaine   4% Patch 1 Patch Transdermal every 24 hours  melatonin 3 milliGRAM(s) Oral at bedtime  pantoprazole    Tablet 40 milliGRAM(s) Oral before breakfast  polyethylene glycol 3350 17 Gram(s) Oral two times a day  senna 2 Tablet(s) Oral at bedtime  simvastatin 20 milliGRAM(s) Oral at bedtime    MEDICATIONS  (PRN):  acetaminophen   Tablet .. 650 milliGRAM(s) Oral every 6 hours PRN Mild Pain (1 - 3)  oxyCODONE    IR 5 milliGRAM(s) Oral every 6 hours PRN Severe Pain (7 - 10)      CAPILLARY BLOOD GLUCOSE        I&O's Summary      PHYSICAL EXAM:  Vital Signs Last 24 Hrs  T(C): 36.5 (08 Aug 2021 06:10), Max: 36.6 (07 Aug 2021 20:21)  T(F): 97.7 (08 Aug 2021 06:10), Max: 97.8 (07 Aug 2021 20:21)  HR: 68 (08 Aug 2021 06:10) (68 - 73)  BP: 101/63 (08 Aug 2021 06:10) (97/60 - 109/71)  BP(mean): --  RR: 18 (08 Aug 2021 06:10) (18 - 18)  SpO2: 97% (08 Aug 2021 06:10) (96% - 97%)    CONSTITUTIONAL: NAD, well-developed, well-groomed  EYES: PERRLA; conjunctiva and sclera clear  ENMT: Moist oral mucosa, no pharyngeal injection or exudates; normal dentition  NECK: Supple, no palpable masses; no thyromegaly  RESPIRATORY: Normal respiratory effort; lungs are clear to auscultation bilaterally  CARDIOVASCULAR: Regular rate and rhythm, normal S1 and S2, no murmur/rub/gallop; No lower extremity edema; Peripheral pulses are 2+ bilaterally  ABDOMEN: Soft, Nondistended,  Nontender to palpation, normoactive bowel sounds  MUSCULOSKELETAL:  No clubbing or cyanosis of digits; no joint swelling or tenderness to palpation  PSYCH: A+O to person, place, and time; affect appropriate  NEUROLOGY: CN 2-12 are intact and symmetric; no gross sensory deficits, moving all extremities  SKIN: No rashes; no palpable lesions    LABS:                        11.8   5.90  )-----------( 155      ( 08 Aug 2021 06:55 )             35.0     08-08    134<L>  |  101  |  25<H>  ----------------------------<  100<H>  4.4   |  20<L>  |  1.41<H>    Ca    9.4      08 Aug 2021 06:55  Phos  3.3     08-08  Mg     2.0     08-08        RADIOLOGY & ADDITIONAL TESTS:  Results Reviewed: uptrending BUN/Cr likely 2/2 to dehydration  Imaging Personally Reviewed:  8/6/21 MRI Head w/ and w/o contrast:  Comparison is made with the prior brain CT of 7/30/2021.    The examination is limited by motion artifact.    Ventricular and sulcal prominence is consistent with moderate atrophy. Small vessel white matter ischemic changes are noted. No acute infarcts are identified. There is no old or new hemorrhage.    After contrast administration there is normal intracranial vascular enhancement. No abnormal parenchymal or leptomeningeal enhancement is identified.    The sellar and parasellar structures are unremarkable. The paranasal sinuses are clear. There has been previous bilateral lens replacement surgery.      IMPRESSION: Moderate atrophy and small vessel white matter ischemic changes. No acute infarcts, hemorrhage or mass. No abnormal enhancement.  Electrocardiogram Personally Reviewed:    COORDINATION OF CARE:  Care Discussed with Consultants/Other Providers [Y]: medicine NP Hayde  Prior or Outpatient Records Reviewed [Y/N]:   [facility] Division of Hospital Medicine  Bri Kat MD  Pager (M-F, 8A-6T): [phone]  Other Times:  [phone]      Patient is a 85y old  Male who presents with a chief complaint of fall (07 Aug 2021 15:12)      SUBJECTIVE / OVERNIGHT EVENTS: no acute events overnight. feels well overall but just tired today. pain better controlled today than days prior. had BM yesterday so constipation resolved.   ADDITIONAL REVIEW OF SYSTEMS:    MEDICATIONS  (STANDING):  aspirin  chewable 81 milliGRAM(s) Oral daily  bisacodyl Suppository 10 milliGRAM(s) Rectal once  heparin   Injectable 5000 Unit(s) SubCutaneous every 8 hours  lactated ringers. 1000 milliLiter(s) (100 mL/Hr) IV Continuous <Continuous>  levETIRAcetam 500 milliGRAM(s) Oral every 12 hours  lidocaine   4% Patch 1 Patch Transdermal every 24 hours  melatonin 3 milliGRAM(s) Oral at bedtime  pantoprazole    Tablet 40 milliGRAM(s) Oral before breakfast  polyethylene glycol 3350 17 Gram(s) Oral two times a day  senna 2 Tablet(s) Oral at bedtime  simvastatin 20 milliGRAM(s) Oral at bedtime    MEDICATIONS  (PRN):  acetaminophen   Tablet .. 650 milliGRAM(s) Oral every 6 hours PRN Mild Pain (1 - 3)  oxyCODONE    IR 5 milliGRAM(s) Oral every 6 hours PRN Severe Pain (7 - 10)      CAPILLARY BLOOD GLUCOSE        I&O's Summary      PHYSICAL EXAM:  Vital Signs Last 24 Hrs  T(C): 36.5 (08 Aug 2021 06:10), Max: 36.6 (07 Aug 2021 20:21)  T(F): 97.7 (08 Aug 2021 06:10), Max: 97.8 (07 Aug 2021 20:21)  HR: 68 (08 Aug 2021 06:10) (68 - 73)  BP: 101/63 (08 Aug 2021 06:10) (97/60 - 109/71)  BP(mean): --  RR: 18 (08 Aug 2021 06:10) (18 - 18)  SpO2: 97% (08 Aug 2021 06:10) (96% - 97%)    CONSTITUTIONAL: NAD, well-developed, well-groomed  EYES: PERRLA; conjunctiva and sclera clear  ENMT: +dry oral mucosa, no pharyngeal injection or exudates; normal dentition  NECK: Supple, no palpable masses; no thyromegaly  RESPIRATORY: Normal respiratory effort; lungs are clear to auscultation bilaterally  CARDIOVASCULAR: Regular rate and rhythm, normal S1 and S2, no murmur/rub/gallop; No lower extremity edema; Peripheral pulses are 2+ bilaterally  ABDOMEN: Soft, Nondistended,  Nontender to palpation, normoactive bowel sounds  MUSCULOSKELETAL:  No clubbing or cyanosis of digits; no joint swelling or tenderness to palpation  PSYCH: A+O to person, place, and time; affect appropriate  NEUROLOGY: CN 2-12 are intact and symmetric; no gross sensory deficits, moving all extremities, grossly 5/5 in UE b/l and prox LE b/l, 4+/5 DF/PF b/l  SKIN: No rashes; no palpable lesions    LABS:                        11.8   5.90  )-----------( 155      ( 08 Aug 2021 06:55 )             35.0     08-08    134<L>  |  101  |  25<H>  ----------------------------<  100<H>  4.4   |  20<L>  |  1.41<H>    Ca    9.4      08 Aug 2021 06:55  Phos  3.3     08-08  Mg     2.0     08-08        RADIOLOGY & ADDITIONAL TESTS:  Results Reviewed: uptrending BUN/Cr likely 2/2 to dehydration  Imaging Personally Reviewed:  8/6/21 MRI Head w/ and w/o contrast:  Comparison is made with the prior brain CT of 7/30/2021.    The examination is limited by motion artifact.    Ventricular and sulcal prominence is consistent with moderate atrophy. Small vessel white matter ischemic changes are noted. No acute infarcts are identified. There is no old or new hemorrhage.    After contrast administration there is normal intracranial vascular enhancement. No abnormal parenchymal or leptomeningeal enhancement is identified.    The sellar and parasellar structures are unremarkable. The paranasal sinuses are clear. There has been previous bilateral lens replacement surgery.      IMPRESSION: Moderate atrophy and small vessel white matter ischemic changes. No acute infarcts, hemorrhage or mass. No abnormal enhancement.  Electrocardiogram Personally Reviewed:    COORDINATION OF CARE:  Care Discussed with Consultants/Other Providers [Y]: medicine NP Hayde  Prior or Outpatient Records Reviewed [Y/N]:

## 2021-08-08 NOTE — PROGRESS NOTE ADULT - PROBLEM SELECTOR PLAN 6
SHANITA most likely from hypovolemia. improved with IV hydration  - urine studies unfortunately obtained after patient had received fluids  - but overall clinical picture supports pre-renal etiology due to hypovolemic  - continue to encourage PO intake of fluids  - no evidence of urinary retention  - monitor Cr on BMP periodically and give IVF as needed  - BUN/Cr uptrending today so will give additional LR @ 100cc/hr today for total 1L SHANITA most likely from hypovolemia. improved with IV hydration  - urine studies unfortunately obtained after patient had received fluids  - but overall clinical picture supports pre-renal etiology due to hypovolemic  - continue to encourage PO intake of fluids  - no evidence of urinary retention  - monitor Cr on BMP periodically and give IVF as needed  - looks hypovolemic on exam today. BUN/Cr uptrending today so will give additional LR @ 100cc/hr today for total 1L

## 2021-08-09 ENCOUNTER — TRANSCRIPTION ENCOUNTER (OUTPATIENT)
Age: 86
End: 2021-08-09

## 2021-08-09 VITALS
TEMPERATURE: 98 F | OXYGEN SATURATION: 96 % | HEART RATE: 63 BPM | DIASTOLIC BLOOD PRESSURE: 67 MMHG | RESPIRATION RATE: 18 BRPM | SYSTOLIC BLOOD PRESSURE: 113 MMHG

## 2021-08-09 LAB
ANION GAP SERPL CALC-SCNC: 12 MMOL/L — SIGNIFICANT CHANGE UP (ref 5–17)
BUN SERPL-MCNC: 22 MG/DL — SIGNIFICANT CHANGE UP (ref 7–23)
CALCIUM SERPL-MCNC: 9.5 MG/DL — SIGNIFICANT CHANGE UP (ref 8.4–10.5)
CHLORIDE SERPL-SCNC: 102 MMOL/L — SIGNIFICANT CHANGE UP (ref 96–108)
CO2 SERPL-SCNC: 22 MMOL/L — SIGNIFICANT CHANGE UP (ref 22–31)
CREAT SERPL-MCNC: 1.31 MG/DL — HIGH (ref 0.5–1.3)
GLUCOSE SERPL-MCNC: 123 MG/DL — HIGH (ref 70–99)
POTASSIUM SERPL-MCNC: 4.5 MMOL/L — SIGNIFICANT CHANGE UP (ref 3.5–5.3)
POTASSIUM SERPL-SCNC: 4.5 MMOL/L — SIGNIFICANT CHANGE UP (ref 3.5–5.3)
SODIUM SERPL-SCNC: 136 MMOL/L — SIGNIFICANT CHANGE UP (ref 135–145)

## 2021-08-09 PROCEDURE — U0003: CPT

## 2021-08-09 PROCEDURE — 83935 ASSAY OF URINE OSMOLALITY: CPT

## 2021-08-09 PROCEDURE — 97535 SELF CARE MNGMENT TRAINING: CPT

## 2021-08-09 PROCEDURE — 97166 OT EVAL MOD COMPLEX 45 MIN: CPT

## 2021-08-09 PROCEDURE — 96374 THER/PROPH/DIAG INJ IV PUSH: CPT

## 2021-08-09 PROCEDURE — 84443 ASSAY THYROID STIM HORMONE: CPT

## 2021-08-09 PROCEDURE — 87040 BLOOD CULTURE FOR BACTERIA: CPT

## 2021-08-09 PROCEDURE — 82435 ASSAY OF BLOOD CHLORIDE: CPT

## 2021-08-09 PROCEDURE — 85027 COMPLETE CBC AUTOMATED: CPT

## 2021-08-09 PROCEDURE — 81001 URINALYSIS AUTO W/SCOPE: CPT

## 2021-08-09 PROCEDURE — 71260 CT THORAX DX C+: CPT | Mod: MA

## 2021-08-09 PROCEDURE — 97116 GAIT TRAINING THERAPY: CPT

## 2021-08-09 PROCEDURE — 85025 COMPLETE CBC W/AUTO DIFF WBC: CPT

## 2021-08-09 PROCEDURE — 82330 ASSAY OF CALCIUM: CPT

## 2021-08-09 PROCEDURE — 85014 HEMATOCRIT: CPT

## 2021-08-09 PROCEDURE — 87086 URINE CULTURE/COLONY COUNT: CPT

## 2021-08-09 PROCEDURE — 84132 ASSAY OF SERUM POTASSIUM: CPT

## 2021-08-09 PROCEDURE — 80048 BASIC METABOLIC PNL TOTAL CA: CPT

## 2021-08-09 PROCEDURE — 85730 THROMBOPLASTIN TIME PARTIAL: CPT

## 2021-08-09 PROCEDURE — 97530 THERAPEUTIC ACTIVITIES: CPT

## 2021-08-09 PROCEDURE — 72125 CT NECK SPINE W/O DYE: CPT

## 2021-08-09 PROCEDURE — 95816 EEG AWAKE AND DROWSY: CPT

## 2021-08-09 PROCEDURE — 99232 SBSQ HOSP IP/OBS MODERATE 35: CPT

## 2021-08-09 PROCEDURE — 83930 ASSAY OF BLOOD OSMOLALITY: CPT

## 2021-08-09 PROCEDURE — 84100 ASSAY OF PHOSPHORUS: CPT

## 2021-08-09 PROCEDURE — 82947 ASSAY GLUCOSE BLOOD QUANT: CPT

## 2021-08-09 PROCEDURE — 99285 EMERGENCY DEPT VISIT HI MDM: CPT | Mod: 25

## 2021-08-09 PROCEDURE — C8929: CPT

## 2021-08-09 PROCEDURE — 86140 C-REACTIVE PROTEIN: CPT

## 2021-08-09 PROCEDURE — 97110 THERAPEUTIC EXERCISES: CPT

## 2021-08-09 PROCEDURE — 85018 HEMOGLOBIN: CPT

## 2021-08-09 PROCEDURE — 82533 TOTAL CORTISOL: CPT

## 2021-08-09 PROCEDURE — 84300 ASSAY OF URINE SODIUM: CPT

## 2021-08-09 PROCEDURE — 83690 ASSAY OF LIPASE: CPT

## 2021-08-09 PROCEDURE — 97162 PT EVAL MOD COMPLEX 30 MIN: CPT

## 2021-08-09 PROCEDURE — 86769 SARS-COV-2 COVID-19 ANTIBODY: CPT

## 2021-08-09 PROCEDURE — 83735 ASSAY OF MAGNESIUM: CPT

## 2021-08-09 PROCEDURE — 70553 MRI BRAIN STEM W/O & W/DYE: CPT

## 2021-08-09 PROCEDURE — 71045 X-RAY EXAM CHEST 1 VIEW: CPT

## 2021-08-09 PROCEDURE — A9585: CPT

## 2021-08-09 PROCEDURE — U0005: CPT

## 2021-08-09 PROCEDURE — 85610 PROTHROMBIN TIME: CPT

## 2021-08-09 PROCEDURE — 74177 CT ABD & PELVIS W/CONTRAST: CPT | Mod: MA

## 2021-08-09 PROCEDURE — 84145 PROCALCITONIN (PCT): CPT

## 2021-08-09 PROCEDURE — 82570 ASSAY OF URINE CREATININE: CPT

## 2021-08-09 PROCEDURE — 93306 TTE W/DOPPLER COMPLETE: CPT | Mod: 26

## 2021-08-09 PROCEDURE — 82550 ASSAY OF CK (CPK): CPT

## 2021-08-09 PROCEDURE — 70450 CT HEAD/BRAIN W/O DYE: CPT

## 2021-08-09 PROCEDURE — 84295 ASSAY OF SERUM SODIUM: CPT

## 2021-08-09 PROCEDURE — 83605 ASSAY OF LACTIC ACID: CPT

## 2021-08-09 PROCEDURE — 82803 BLOOD GASES ANY COMBINATION: CPT

## 2021-08-09 PROCEDURE — 80053 COMPREHEN METABOLIC PANEL: CPT

## 2021-08-09 RX ORDER — LEVETIRACETAM 250 MG/1
1 TABLET, FILM COATED ORAL
Qty: 60 | Refills: 0
Start: 2021-08-09 | End: 2021-09-07

## 2021-08-09 RX ORDER — ASPIRIN/CALCIUM CARB/MAGNESIUM 324 MG
1 TABLET ORAL
Qty: 30 | Refills: 0
Start: 2021-08-09 | End: 2021-09-07

## 2021-08-09 RX ORDER — POLYETHYLENE GLYCOL 3350 17 G/17G
17 POWDER, FOR SOLUTION ORAL
Qty: 0 | Refills: 0 | DISCHARGE
Start: 2021-08-09

## 2021-08-09 RX ORDER — LIDOCAINE 4 G/100G
1 CREAM TOPICAL
Qty: 0 | Refills: 0 | DISCHARGE
Start: 2021-08-09

## 2021-08-09 RX ORDER — SENNA PLUS 8.6 MG/1
2 TABLET ORAL
Qty: 0 | Refills: 0 | DISCHARGE
Start: 2021-08-09

## 2021-08-09 RX ORDER — ACETAMINOPHEN 500 MG
2 TABLET ORAL
Qty: 80 | Refills: 0
Start: 2021-08-09 | End: 2021-08-18

## 2021-08-09 RX ORDER — OXYCODONE HYDROCHLORIDE 5 MG/1
1 TABLET ORAL
Qty: 20 | Refills: 0
Start: 2021-08-09 | End: 2021-08-13

## 2021-08-09 RX ADMIN — HEPARIN SODIUM 5000 UNIT(S): 5000 INJECTION INTRAVENOUS; SUBCUTANEOUS at 13:25

## 2021-08-09 RX ADMIN — Medication 81 MILLIGRAM(S): at 13:25

## 2021-08-09 RX ADMIN — LEVETIRACETAM 500 MILLIGRAM(S): 250 TABLET, FILM COATED ORAL at 05:44

## 2021-08-09 RX ADMIN — POLYETHYLENE GLYCOL 3350 17 GRAM(S): 17 POWDER, FOR SOLUTION ORAL at 05:49

## 2021-08-09 RX ADMIN — HEPARIN SODIUM 5000 UNIT(S): 5000 INJECTION INTRAVENOUS; SUBCUTANEOUS at 05:45

## 2021-08-09 RX ADMIN — PANTOPRAZOLE SODIUM 40 MILLIGRAM(S): 20 TABLET, DELAYED RELEASE ORAL at 05:45

## 2021-08-09 NOTE — CHART NOTE - NSCHARTNOTEFT_GEN_A_CORE
BEATRIS SHEA  85y Male    Due to patient's deconditioning and generalized weakness secondary to             Nathaniel Saintus [facility]-BC  #[phone] BEATRIS SHEA  85y Male    Due to patient's deconditioning and generalized weakness secondary to acute L2 compression fx,             Nathaniel Saintus [facility]-BC  #[phone] BEATRIS SHEA  85y Male    Due to patient's deconditioning and generalized weakness secondary to acute L2 compression fx. Patient will require a transport chair. Patient is unable to self-propel in a standard wheelchair. This is necessary to achieve daily tasks and therapies which cannot be achieved with the use of a cane or rolling walker. Patient and family are in agreement with wheelchair use at home and assistance will be provided if needed.      Nathaniel Saintus DNP, AGACNP-BC, FNP-BC  #[phone]

## 2021-08-09 NOTE — DISCHARGE NOTE PROVIDER - NSFOLLOWUPCLINICS_GEN_ALL_ED_FT
[facility]  Neurology  [address] [place] [address] [address] [place] [address]  Phone: [phone]  Fax:   Follow Up Time: 2 weeks

## 2021-08-09 NOTE — PROGRESS NOTE ADULT - PROBLEM SELECTOR PROBLEM 7
Caballero's esophagus with dysplasia

## 2021-08-09 NOTE — DISCHARGE NOTE PROVIDER - NSDCHHCONTACT_GEN_ALL_CORE_FT
DATE:  09/18/2019    REASON FOR ADMISSION:  For debridement of amputation stump.    HISTORY OF PRESENT ILLNESS:  This is a pleasant 55-year-old lady with history  of bilateral BKA.  She presented for debridement of an amputation stump.  She  underwent excisional debridement of necrotic tissue.  She was discharged home  in stable condition.  She tolerated the procedure well.  She will be followed  with me as an outpatient.       _____________________ ____________________________________  DATE AND TIME Pietro Montenegro MD      MS/MEDQ-#[id number]  DD:  09/26/2019 14:14:07      DT:  09/26/2019 19:26:41        [facility]                                MRN#: [id number]  HISTORY AND PHYSICAL          ROOM:                                    [id number]                                                                           
As certified below, I, or a nurse practitioner or physician assistant working with me, had a face-to-face encounter that meets the physician face-to-face encounter requirements.

## 2021-08-09 NOTE — PROGRESS NOTE ADULT - PROBLEM SELECTOR PROBLEM 6
SHANITA (acute kidney injury)

## 2021-08-09 NOTE — PROGRESS NOTE ADULT - ASSESSMENT
86 yo M w/ PMHx of spinal stenosis, CAD (s/p PCI), HLD, CKD, bilateral lung nodules, COPD, spindle cell neoplasm (s/p resection), presents after a likely mechanical fall, found to have acute L2 fracture and small bilateral pulmonary opacities, pending EEG for seizure r/o.
84 yo M w/ PMHx of spinal stenosis, CAD (s/p PCI), HLD, CKD, bilateral lung nodules, COPD, spindle cell neoplasm (s/p resection), presents after a likely mechanical fall, found to have acute L2 fracture and small bilateral pulmonary opacities now with abnormal EEG pending MRI read and TTE to r/o structural abnormalities.
86 yo M w/ PMHx of spinal stenosis, CAD (s/p PCI), HLD, CKD, bilateral lung nodules, COPD, spindle cell neoplasm (s/p resection), presents after a likely mechanical fall, found to have acute L2 fracture and small bilateral pulmonary opacities now with abnormal EEG pending MRI  and TTE to r/o structural abnormalities.
84yo M w/ PMHx of spinal stenosis, CAD (s/p PCI), HLD, CKD, bilateral lung nodules, COPD, spindle cell neoplasm (s/p resection), presents after a likely mechanical fall, found to have acute L2 fracture and small bilateral pulmonary opacities
86 yo M w/ PMHx of spinal stenosis, CAD (s/p PCI), HLD, CKD, bilateral lung nodules, COPD, spindle cell neoplasm (s/p resection), presents after a likely mechanical fall, found to have acute L2 fracture and small bilateral pulmonary opacities.
86 yo M w/ PMHx of spinal stenosis, CAD (s/p PCI), HLD, CKD, bilateral lung nodules, COPD, spindle cell neoplasm (s/p resection), presents after a likely mechanical fall, found to have acute L2 fracture and small bilateral pulmonary opacities now with abnormal EEG pending MRI  and TTE to r/o structural abnormalities.
84 yo M w/ PMHx of spinal stenosis, CAD (s/p PCI), HLD, CKD, bilateral lung nodules, COPD, spindle cell neoplasm (s/p resection), presents after a likely mechanical fall, found to have acute L2 fracture and small bilateral pulmonary opacities, pending EEG for seizure r/o and TTE to r/o structural abnormalities.
84yo M w/ PMHx of spinal stenosis, CAD (s/p PCI), HLD, CKD, bilateral lung nodules, COPD, spindle cell neoplasm (s/p resection), presents after a likely mechanical fall, found to have acute L2 fracture and small bilateral pulmonary opacities
84 yo M w/ PMHx of spinal stenosis, CAD (s/p PCI), HLD, CKD, bilateral lung nodules, COPD, spindle cell neoplasm (s/p resection), presents after a likely mechanical fall, found to have acute L2 fracture and small bilateral pulmonary opacities now with abnormal EEG pending MRI read and TTE to r/o structural abnormalities.
84 yo M w/ PMHx of spinal stenosis, CAD (s/p PCI), HLD, CKD, bilateral lung nodules, COPD, spindle cell neoplasm (s/p resection), presents after a likely mechanical fall, found to have acute L2 fracture and small bilateral pulmonary opacities now with abnormal EEG pending MRI and TTE to r/o structural abnormalities.

## 2021-08-09 NOTE — PROGRESS NOTE ADULT - PROBLEM SELECTOR PLAN 8
DVT ppx: hep subc  Dispo: PT recs KELLY, but patient and son would like to bring him home with home PT
DVT ppx: hep subc  Dispo: PT recs KELLY
DVT ppx: hep subc  Dispo: PT recs KELLY, but patient and son would like to bring him home with home PT

## 2021-08-09 NOTE — PROGRESS NOTE ADULT - PROBLEM SELECTOR PROBLEM 3
Pneumonia due to COVID-19 virus
Pneumonia due to COVID-19 virus
Coronary artery disease involving native heart without angina pectoris, unspecified vessel or lesion type
Pneumonia due to COVID-19 virus
Pneumonia due to COVID-19 virus
Coronary artery disease involving native heart without angina pectoris, unspecified vessel or lesion type
Pneumonia due to COVID-19 virus

## 2021-08-09 NOTE — DISCHARGE NOTE PROVIDER - HOSPITAL COURSE
84 yo M w/ PMHx of spinal stenosis, CAD (s/p PCI), HLD, CKD, bilateral lung nodules, COPD, spindle cell neoplasm (s/p resection), presents after a likely mechanical fall, found to have acute L2 fracture and small bilateral pulmonary opacities. During hospital course, imaging consistent with an acute L2 fracture, seen by Neurosx, deemed no plan for surgical intervention, recommended for pain management & TLSO brace. Patient's cause of fall concerning for ? seizure,           Seizure.  Plan: remote history of possible seizure 15 years ago with shaking and post-ictal state noticed on this fall concerning for possible seizure.  - Neurology consult appreciated.  - abnormal EEG with increased risk for seizures with focal onset in the left occipital region  - c/w keppra 500mg BID  - MRI brain w/ Moderate atrophy and small vessel white matter ischemic changes. No acute infarcts, hemorrhage or mass. No abnormal enhancement.  - Final neuro recs appreciated. 84 yo M w/ PMHx of spinal stenosis, CAD (s/p PCI), HLD, CKD, bilateral lung nodules, COPD, spindle cell neoplasm (s/p resection), presents after a likely mechanical fall, found to have acute L2 fracture and small bilateral pulmonary opacities. During hospital course, imaging consistent with an acute L2 fracture, seen by Neurosx, deemed no plan for surgical intervention, recommended for pain management & TLSO brace. Patient's cause of fall concerning for ? seizure, found w/abnormal EEG with increased risk for seizures with focal onset in the left occipital region. MRI brain w/ Moderate atrophy and small vessel white matter ischemic changes. As per Neuro, c/w Keppra 500mg BID and downtitrate outpatient with Neurology. Patient with hypotension, however as per patient's son, is baseline for patient. TTE noted.    PT recs KELLY, but patient and son would like to bring him home with home PT. Patient deemed medically stable for d/c home with follow-up with Dr. Joy in one week, and Neurology in 2-3 weeks. 84 yo M w/ PMHx of spinal stenosis, CAD (s/p PCI), HLD, CKD, bilateral lung nodules, COPD, spindle cell neoplasm (s/p resection), presents after a likely mechanical fall, found to have acute L2 fracture and small bilateral pulmonary opacities. During hospital course, imaging consistent with an acute L2 fracture, seen by Neurosx, deemed no plan for surgical intervention, recommended for pain management & TLSO brace. Patient's cause of fall concerning for ? seizure, found w/abnormal EEG with increased risk for seizures with focal onset in the left occipital region. MRI brain w/ Moderate atrophy and small vessel white matter ischemic changes. As per Neuro, c/w Keppra 500mg BID and downtitrate outpatient with Neurology. Patient with hypotension, however as per patient's son, is baseline for patient. TTE noted.    PT recs KELLY, but patient and son would like to bring him home with home PT. Patient deemed medically stable for d/c home with follow-up with Dr. Joy in one week, and Neurology in 2-3 weeks.    ATTENDING ATTESTATION  Cleared for dc by all services.   84 yo M w/ PMHx of spinal stenosis, CAD (s/p PCI), HLD, CKD, bilateral lung nodules, COPD, spindle cell neoplasm (s/p resection), presents after a likely mechanical fall, found to have acute L2 fracture and small bilateral pulmonary opacities. During hospital course, imaging consistent with an acute L2 fracture, seen by Neurosx, deemed no plan for surgical intervention, recommended for pain management & TLSO brace. Patient's cause of fall concerning for ? seizure, found w/abnormal EEG with increased risk for seizures with focal onset in the left occipital region. MRI brain w/ Moderate atrophy and small vessel white matter ischemic changes. As per Neuro, c/w Keppra 500mg BID and downtitrate outpatient with Neurology. Patient with hypotension, however as per patient's son, is baseline for patient. TTE noted.    PT recs KELLY, but patient and son would like to bring him home with home PT. Patient deemed medically stable for d/c home with follow-up with Dr. Joy in one week, and Neurology in 2-3 weeks.    ATTENDING ATTESTATION  TTE without gross abnormalities. Outpt cards f/u.   Cleared for dc by all services.  Relevant data discussed at length with son and patient.  Close outpt f/u advised.    DC TIME SPENT: 55 minutes.

## 2021-08-09 NOTE — DISCHARGE NOTE NURSING/CASE MANAGEMENT/SOCIAL WORK - PATIENT PORTAL LINK FT
You can access the FollowMyHealth Patient Portal offered by [facility] by registering at the following website: [email or web address]. By joining [employer]’s FollowMyHealth portal, you will also be able to view your health information using other applications (apps) compatible with our system.

## 2021-08-09 NOTE — PROGRESS NOTE ADULT - PROBLEM SELECTOR PROBLEM 5
Other specified hypotension

## 2021-08-09 NOTE — PROGRESS NOTE ADULT - PROBLEM SELECTOR PLAN 3
recent hosp admit (admitted 7/19/21) at [facility] for  COVID pneumonia and was discharged on Dexamethasone which he had completed as per son.   - COVID negative then Positive, have bilateral yet small ground glass opacities on imaging, currently afebrile, saturating well on room air, so will cont to monitor. Positive COVID swab likely due to viral shedding  - low suspicion of bacterial pneumonia in absence of cough, sputum production  - COVID PCR remain positive on 8/5, case discussed with infection control. Recommending maintaining isolation precautions until discharge

## 2021-08-09 NOTE — PROGRESS NOTE ADULT - PROBLEM SELECTOR PLAN 7
- c/w pantoprazole

## 2021-08-09 NOTE — PROGRESS NOTE ADULT - PROBLEM SELECTOR PROBLEM 2
R/O Seizure
Seizure
Pneumonia due to COVID-19 virus
Pneumonia due to COVID-19 virus
R/O Seizure
Seizure
Seizure
R/O Seizure

## 2021-08-09 NOTE — PROGRESS NOTE ADULT - PROBLEM SELECTOR PLAN 1
acute L2 fracture seen on imaging in setting of recent fall and history of spinal stenosis  - neurosurgery consulted, recs appreciated  - no plan for surgical intervention  - TLSO for comfort  - Rx for pain.  - PT recommending [facility] but as patient and son request home with home PT. Son currently looking into hiring additional home services for assist in the home.
-acute L2 fracture seen on imaging in setting of recent fall and history of spinal stenosis  -consult neurosurgery, appreciate recommendations  -TLSO for comfort  -pain control Tylenol PRN  -physical therapy evaluation is pend
acute L2 fracture seen on imaging in setting of recent fall and history of spinal stenosis  - neurosurgery consulted, recs appreciated  - no plan for surgical intervention  - TLSO for comfort  - pain control Tylenol PRN  - physical therapy evaluation---> [facility]
acute L2 fracture seen on imaging in setting of recent fall and history of spinal stenosis  - neurosurgery consulted, recs appreciated  - no plan for surgical intervention  - TLSO for comfort  - pain control Tylenol PRN, lidocaine patch, can add small dose oxycodone if needed  - PT recommending KELLY but as patient and son request home with home PT. Son currently looking into hiring additional home services for assist in the home
-acute L2 fracture seen on imaging in setting of recent fall and history of spinal stenosis  -consult neurosurgery, appreciate recommendations  -TLSO for comfort  -pain control Tylenol PRN  -physical therapy evaluation---> KELLY
acute L2 fracture seen on imaging in setting of recent fall and history of spinal stenosis  - neurosurgery consulted, recs appreciated  - no plan for surgical intervention  - TLSO for comfort  - pain control Tylenol PRN, lidocaine patch, can add small dose oxycodone if needed  - PT recommending KELLY but as patient and son request home with home PT. Son currently looking into hiring additional home services for assist in the home
acute L2 fracture seen on imaging in setting of recent fall and history of spinal stenosis  - neurosurgery consulted, recs appreciated  - no plan for surgical intervention  - TLSO for comfort  - pain control Tylenol PRN, lidocaine patch, can add small dose oxycodone if needed  - PT recommending KELLY but as patient and son request home with home PT. Son currently looking into hiring additional home services for assist in the home

## 2021-08-09 NOTE — PROGRESS NOTE ADULT - PROBLEM SELECTOR PLAN 5
The recorded BP is for different body position (orthostatic with drop of SBP to the 70's upon standing and no symptoms.   - previous hospitalist spoke with son who stated patient tends to have low BP - in HIE, his baseline SBP tends to be low 100s  - suspect 2/2 to hypovolemia as BP improves with IVF  - TSH and AM cortisol wnl   - f/u TTE - still pending  - possible midodrine pending TTE report if hypotension persists but improved with IVF  - per son, patient is poor water drinker at home as well
The recorded BP is for different body position (orthostatic with drop of SBP to the 70's upon standing and no symptoms.   - previous hospitalist spoke with son who stated patient tends ot have low BP - in HIE, his baseline SBP tends to be low 100s  - suspect 2/2 to hypovolemia as BP improved with IVF  - TSH, AM cortisol wnl   - f/u TTE - still pending  - possible midodrine pending TTE report if hypotension persists but improved with IVF
The recorded BP is for different body position (orthostatic with drop of SBP to the 70's upon standing and no symptoms.   - previous hospitalist spoke with son who stated patient tends ot have low BP - in HIE, his baseline SBP tends to be low 100s  - suspect 2/2 to hypovolemia as BP improved with IVF  - TSH, AM cortisol wnl   - f/u TTE - still pending  - possible midodrine pending TTE report if hypotension persists but improved with IVF  - per son, patient is poor water drinker at home as well
The recorded BP is for different body position (orthostatic with drop of SBP to the 70's upon standing and no symptoms.   - previous hospitalist spoke with son who stated patient tends to have low BP - in HIE, his baseline SBP tends to be low 100s  - suspect 2/2 to hypovolemia as BP improved with IVF  - TSH, AM cortisol wnl   - f/u TTE - still pending  - possible midodrine pending TTE report if hypotension persists but improved with IVF  - per son, patient is poor water drinker at home as well
The recorded BP is for different body position (orthostatic with drop of SBP to the 70's upon standing and no symptoms.   - previous hospitalist spoke with son who stated patient tends ot have low BP - in HIE, his baseline SBP tends to be low 100s  - suspect 2/2 to hypovolemia as BP improved with IVF  - TSH, AM cortisol wnl   - f/u TTE - still pending  - possible midodrine pending TTE report if hypotension persists
The recorded BP is for different body position ( orthostatic with drop of SBP to the 70's upon standing and no symptoms.   I Spoke with son ( Gary  ) who states that his father tend to have low BP and by reviewing the [facility] HIE , his BP tends to be in the low 100's   Not currently on any hypertensive agents   Unsure of etiology   will get TSH , AM cortisol , TTE and will give IVF   closely monitor BP  As discussed with Dr Joy, will consider Midodrine post TTE report
The recorded BP is for different body position (orthostatic with drop of SBP to the 70's upon standing and no symptoms.   - previous hospitalist spoke with son who stated patient tends to have low BP - in HIE, his baseline SBP tends to be low 100s  - suspect 2/2 to hypovolemia as BP improves with IVF  - TSH, AM cortisol wnl   - f/u TTE - still pending  - possible midodrine pending TTE report if hypotension persists but improved with IVF  - per son, patient is poor water drinker at home as well
The recorded BP is for different body position (orthostatic with drop of SBP to the 70's upon standing and no symptoms.   - previous hospitalist spoke with son who stated patient tends to have low BP - in HIE, his baseline SBP tends to be low 100s  - suspect 2/2 to hypovolemia as BP improved with IVF  - TSH, AM cortisol wnl   - f/u TTE - still pending  - possible midodrine pending TTE report if hypotension persists but improved with IVF  - per son, patient is poor water drinker at home as well
The recorded BP is for different body position (orthostatic with drop of SBP to the 70's upon standing and no symptoms.   - previous hospitalist spoke with son who stated patient tends to have low BP - in HIE, his baseline SBP tends to be low 100s  - suspect 2/2 to hypovolemia as BP improves with IVF  - TSH, AM cortisol wnl   - f/u TTE - still pending  - possible midodrine pending TTE report if hypotension persists but improved with IVF  - per son, patient is poor water drinker at home as well

## 2021-08-09 NOTE — PROGRESS NOTE ADULT - PROBLEM SELECTOR PROBLEM 1
Closed wedge compression fracture of L2 vertebra, initial encounter

## 2021-08-09 NOTE — DISCHARGE NOTE PROVIDER - NSDCMRMEDTOKEN_GEN_ALL_CORE_FT
Calcium: 600 milligram(s) orally once a day, am  Folgard oral tablet: 2.2 tab(s) orally once a day, am  LSO Brace: Dx: S32.029A  magnesium chloride: 400 milligram(s) orally once a day, am  pantoprazole 40 mg oral delayed release tablet: 1 tab(s) orally once a day, am  simvastatin 20 mg oral tablet: 1 tab(s) orally once a day (at bedtime)   1 transport chair for Acute L2 Compression Fx:   acetaminophen 325 mg oral tablet: 2 tab(s) orally every 6 hours, As needed, Mild Pain (1 - 3)  aspirin 81 mg oral tablet, chewable: 1 tab(s) orally once a day  Calcium: 600 milligram(s) orally once a day, am  Folgard oral tablet: 2.2 tab(s) orally once a day, am  levETIRAcetam 500 mg oral tablet: 1 tab(s) orally every 12 hours  lidocaine 4% topical film: Apply topically to affected area once a day  LSO Brace: Dx: S32.029A  magnesium chloride: 400 milligram(s) orally once a day, am  oxyCODONE 5 mg oral tablet: 1 tab(s) orally every 6 hours, As needed, Severe Pain (7 - 10) MDD:4  pantoprazole 40 mg oral delayed release tablet: 1 tab(s) orally once a day, am  polyethylene glycol 3350 oral powder for reconstitution: 17 gram(s) orally 2 times a day  senna oral tablet: 2 tab(s) orally once a day (at bedtime)  simvastatin 20 mg oral tablet: 1 tab(s) orally once a day (at bedtime)

## 2021-08-09 NOTE — DISCHARGE NOTE PROVIDER - NSDCCPCAREPLAN_GEN_ALL_CORE_FT
PRINCIPAL DISCHARGE DIAGNOSIS  Diagnosis: Fracture of L2 vertebra  Assessment and Plan of Treatment:       SECONDARY DISCHARGE DIAGNOSES  Diagnosis: Back pain  Assessment and Plan of Treatment:      PRINCIPAL DISCHARGE DIAGNOSIS  Diagnosis: Fracture of L2 vertebra  Assessment and Plan of Treatment: acute L2 fracture seen on imaging in setting of recent fall and history of spinal stenosis  - neurosurgery consulted, recs appreciated  - no plan for surgical intervention  - TLSO for comfort  - Rx for pain.  - PT recommending KELLY but as patient and son request home with home PT. Son currently looking into hiring additional home services for assist in the home.      SECONDARY DISCHARGE DIAGNOSES  Diagnosis: Seizure  Assessment and Plan of Treatment: Remote history of possible seizure 15 years ago with shaking and post-ictal state noticed on this fall concerning for possible seizure.  - Neurology consult appreciated.  - abnormal EEG with increased risk for seizures with focal onset in the left occipital region  - c/w keppra 500mg BID  - MRI brain w/ Moderate atrophy and small vessel white matter ischemic changes. No acute infarcts, hemorrhage or mass. No abnormal enhancement.  - Final neuro recs appreciated.       Diagnosis: Pneumonia due to COVID-19 virus  Assessment and Plan of Treatment: recent hosp admit (admitted 7/19/21) at [facility] for  COVID pneumonia and was discharged on Dexamethasone which he had completed as per son.   - COVID negative then Positive, have bilateral yet small ground glass opacities on imaging, currently afebrile, saturating well on room air, so will cont to monitor. Positive COVID swab likely due to viral shedding  - low suspicion of bacterial pneumonia in absence of cough, sputum production  - COVID PCR remain positive on 8/5, case discussed with infection control. Recommending maintaining isolation precautions until discharge.    Diagnosis: Other specified hypotension  Assessment and Plan of Treatment: The recorded BP is for different body position (orthostatic with drop of SBP to the 70's upon standing and no symptoms.   - previous hospitalist spoke with son who stated patient tends to have low BP - in HIE, his baseline SBP tends to be low 100s  - suspect 2/2 to hypovolemia as BP improves with IVF  - TSH and AM cortisol wnl   - f/u TTE - still pending  - possible midodrine pending TTE report if hypotension persists but improved with IVF     PRINCIPAL DISCHARGE DIAGNOSIS  Diagnosis: Fracture of L2 vertebra  Assessment and Plan of Treatment: acute L2 fracture seen on imaging in setting of recent fall and history of spinal stenosis  - neurosurgery consulted, recs appreciated  - no plan for surgical intervention  - TLSO for comfort  - Rx for pain.  - PT recommending KELLY but as patient and son request home with home PT. Son currently looking into hiring additional home services for assist in the home.      SECONDARY DISCHARGE DIAGNOSES  Diagnosis: Seizure  Assessment and Plan of Treatment: Remote history of possible seizure 15 years ago with shaking and post-ictal state noticed on this fall concerning for possible seizure.  - Neurology consult appreciated.  - abnormal EEG with increased risk for seizures with focal onset in the left occipital region  - c/w keppra 500mg BID  - MRI brain w/ Moderate atrophy and small vessel white matter ischemic changes. No acute infarcts, hemorrhage or mass. No abnormal enhancement.  - Final neuro recs appreciated.       Diagnosis: Pneumonia due to COVID-19 virus  Assessment and Plan of Treatment: recent hosp admit (admitted 7/19/21) at [facility] for  COVID pneumonia and was discharged on Dexamethasone which he had completed as per son.   - COVID negative then Positive, have bilateral yet small ground glass opacities on imaging, currently afebrile, saturating well on room air, so will cont to monitor. Positive COVID swab likely due to viral shedding  - low suspicion of bacterial pneumonia in absence of cough, sputum production  - COVID PCR remain positive on 8/5, case discussed with infection control. Recommending maintaining isolation precautions until discharge.    Diagnosis: Other specified hypotension  Assessment and Plan of Treatment: Low blood pressure likely related to poor oral intake. Encourage oral intake. Patient should be eating 3 full meals a day.

## 2021-08-09 NOTE — PROGRESS NOTE ADULT - PROVIDER SPECIALTY LIST ADULT
Hospitalist

## 2021-08-09 NOTE — PROGRESS NOTE ADULT - PROBLEM SELECTOR PLAN 6
SHANITA most likely from hypovolemia. improved with IV hydration  - urine studies unfortunately obtained after patient had received fluids  - but overall clinical picture supports pre-renal etiology due to hypovolemic  - continue to encourage PO intake of fluids  - no evidence of urinary retention  - monitor Cr on BMP periodically and give IVF as needed

## 2021-08-09 NOTE — PROGRESS NOTE ADULT - PROBLEM SELECTOR PROBLEM 4
Coronary artery disease involving native heart without angina pectoris, unspecified vessel or lesion type
Coronary artery disease involving native heart without angina pectoris, unspecified vessel or lesion type
Caballero's esophagus with dysplasia
Coronary artery disease involving native heart without angina pectoris, unspecified vessel or lesion type
Caballero's esophagus with dysplasia
Coronary artery disease involving native heart without angina pectoris, unspecified vessel or lesion type

## 2021-08-09 NOTE — PROGRESS NOTE ADULT - PROBLEM SELECTOR PLAN 2
remote history of possible seizure 15 years ago with shaking and post-ictal state noticed on this fall concerning for possible seizure.  - Neurology consult appreciated  - abnormal EEG with increased risk for seizures with focal onset in the left occipital region  - Neuro follow-up appreciated  - c/w keppra 500mg BID  - check MRI with epilepsy protocol inpatient in setting of above abnormal EEG as per Neurology - still pending
remote history of possible seizure 15 years ago with shaking and post-ictal state noticed on this fall concerning for possible seizure.  - Neurology consult appreciated  - f/u routine EEG - still pending  - started on keppra 500mg BID  - outpatient MRI with epilepsy protocol as per Neurology - would be difficult to obtain inpatient in setting of his COVID-19 infection
-recent hosp admit ( 7/19 ) at [facility] for  COVID pneumonia and was dc on Dexamethasone which he had completed as per son.   -COVID negative then Positive, have bilateral yet small ground glass opacities on imaging, currently afebrile, saturating well on room air , so will cont to monitor   -low suspicion of bacterial pneumonia in absence of cough, sputum production
remote history of possible seizure 15 years ago with shaking and post-ictal state noticed on this fall concerning for possible seizure.  - Neurology consult appreciated  - abnormal EEG with increased risk for seizures with focal onset in the left occipital region  - Neuro follow-up appreciated  - c/w keppra 500mg BID  - had MRI with epilepsy protocol last night on 8/6, with final read today showing Moderate atrophy and small vessel white matter ischemic changes. No acute infarcts, hemorrhage or mass. No abnormal enhancement.  - will f/u  with Neurology today re: final discharge recs (don't anticipate any changes)
remote history of possible seizure 15 years ago with shaking and post-ictal state noticed on this fall concerning for possible seizure.  - Neurology consult appreciated  - abnormal EEG with increased risk for seizures with focal onset in the left occipital region  - Neuro follow-up appreciated  - c/w keppra 500mg BID  - check MRI with epilepsy protocol inpatient in setting of above abnormal EEG as per Neurology
-recently discharged ~1 week prior to presentation for COVID pneumonia  -COVID negative today but does have bilateral yet small ground glass opacities on imaging  -currently afebrile, very minimal leukocytosis, saturating well on room air/ I have called the contact phone number to have further info about any previous COVID treatment but there was no answer   -low suspicion of bacterial pneumonia in absence of cough, sputum production
remote history of possible seizure 15 years ago with shaking and post-ictal state noticed on this fall concerning for possible seizure.  - Neurology consult appreciated  - abnormal EEG with increased risk for seizures with focal onset in the left occipital region  - Neuro follow-up appreciated  - c/w keppra 500mg BID  - had MRI with epilepsy protocol last night on 8/6, still awaiting final read. will need to follow-up with neurology once read comes back
remote history of possible seizure 15 years ago with shaking and post-ictal state noticed on this fall concerning for possible seizure.  - Neurology consult appreciated.  - abnormal EEG with increased risk for seizures with focal onset in the left occipital region  - c/w keppra 500mg BID  - MRI brain w/ Moderate atrophy and small vessel white matter ischemic changes. No acute infarcts, hemorrhage or mass. No abnormal enhancement.  - Final neuro recs appreciated.
remote history of possible seizure 15 years ago with shaking and post-ictal state noticed on this fall concerning for possible seizure.  - Neurology consult appreciated  - f/u routine EEG  - started on keppra 500mg BID  - outpatient MRI with epilepsy protocol as per Neurology - would be difficult to obtain inpatient in setting of his COVID-19 infection
remote history of possible seizure 15 years ago with shaking and post-ictal state noticed on this fall concerning for possible seizure.  - Neurology consult appreciated  - abnormal EEG with increased risk for seizures with focal onset in the left occipital region  - Neuro follow-up appreciated  - c/w keppra 500mg BID  - check MRI with epilepsy protocol inpatient in setting of above abnormal EEG as per Neurology - scheduled for tonight at 10pm. will give dose of percocet 1-2 hours prior to MRI for his back pain.

## 2021-08-09 NOTE — CHART NOTE - NSCHARTNOTEFT_GEN_A_CORE
MRI Reviewed and Results as Below:    MR Brain:    Ventricular and sulcal prominence is consistent with moderate atrophy. Small vessel white matter ischemic changes are noted. No acute infarcts are identified. There is no old or new hemorrhage.  After contrast administration there is normal intracranial vascular enhancement. No abnormal parenchymal or leptomeningeal enhancement is identified.  The sellar and parasellar structures are unremarkable. The paranasal sinuses are clear. There has been previous bilateral lens replacement surgery.    IMPRESSION: Moderate atrophy and small vessel white matter ischemic changes. No acute infarcts, hemorrhage or mass. No abnormal enhancement.    As patient with abnormal EEG findings, for now would continue on Keppra 500mg bid with further titration if necessary as outpatient.  No further neurological workup inpatient.  Neurology signing off. Please reconsult PRN or call [employer] [phone] with any questions.

## 2021-08-17 ENCOUNTER — NON-APPOINTMENT (OUTPATIENT)
Age: 86
End: 2021-08-17

## 2021-08-23 ENCOUNTER — APPOINTMENT (OUTPATIENT)
Dept: DERMATOLOGY | Facility: CLINIC | Age: 86
End: 2021-08-23
Payer: MEDICARE

## 2021-08-23 PROCEDURE — 99214 OFFICE O/P EST MOD 30 MIN: CPT | Mod: 95

## 2021-08-23 NOTE — ASSESSMENT
[FreeTextEntry1] : I discussed the situation and options at length with Mr. Pacheco and his son.\par Basically, the ideal situation would be an in-person visit as we cannot even visualize the areas well with the low resolution video signal from his laptop. However, pt fractured his back and cannot get out of bed for more than a few minutes at a time.\par We reviewed the history, there has been no s/o recurrence of AFX and rather the most recent bx showed AK. While I cannot r/o SCCIS or SCC on telemedicine, we have limited options given pt's mobility issues. I discussed the concept of risk mitigation by initiating a trial of 5-FU, which should help treat any AK and potentially serve as a treatment for SCCIS. I discussed that the clearance for this would not be reliable and that the tumor could extend past where the topical medicine could work, and this is a significant advantage of excision. Also, there is no data on using this for other ddx and given pt's complicated history, a tissue sample would be ideal, especially with persistent ulceration without a physical/traumatic cause. I reinforced that when possible physically, pt should be brought for an in-person visit.\par \par In the meantime, we agreed to initiate a course of 5-FU on the affected areas (for up to 1 month); pt stated he had some redness in other areas last time so I am prescribing westcort for those areas if needed.\par \par This visit was conducted via live synchronous audio/video telehealth with the patient who attested to being located in [facility] where the provider is licensed to practice medicine.\par

## 2021-08-23 NOTE — HISTORY OF PRESENT ILLNESS
[FreeTextEntry1] : growths on scalp [de-identified] : growths on scalp - ulcers\par sounds similar to situation before where he had persistent ulcers\par had cryo LV still with the ulcers there\par \par Pvs note:\par \par 86 y/o male h/o recent MIS s/p excision 10/8/20 by surg/onc, numerous BCC, SCC, and AFX of the vertex scalp s/p radical resection with skin graft (2018) here for f/u.\par \par 1. h/o persistent ulceration of the vertex scalp. repeatedly biopsied in 8/2019 and 12/2019- both bx w/o evidence of recurrent spindle cell neoplasm. Area is not painful, bleeding, or tender, though pt has no sensation to the area. now crusted over. 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 He was referred for surgical management.\par \par 09/27/2018: Patient is s/p radical resection of frontal scalp spindle cell neoplasm and biopsy of vertex scalp lesion. Skin graft coverage by Dr. Myles. Recovering well. 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 Additional deep margin shows spindle cell lesion felt to represent fibrosing granulation tissue. 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 Myles 12/17/2018. Pathology shows minimal residual disease, margins negative.\par \par 10/01/2019: Patient recently developed firm nodule at the posterior edge of his scalp skin graft. Biopsy performed 08/27/2019 demonstrated atypical epithelioid cells for which an underlying neoplasm could not be ruled out. He is s/p deeper biopsy (9/2019, Dr. Fitzpatrick) which showed no residual neoplasm. \par

## 2021-08-26 ENCOUNTER — APPOINTMENT (OUTPATIENT)
Dept: DERMATOLOGY | Facility: CLINIC | Age: 86
End: 2021-08-26

## 2021-09-01 ENCOUNTER — APPOINTMENT (OUTPATIENT)
Dept: NEUROLOGY | Facility: CLINIC | Age: 86
End: 2021-09-01

## 2021-09-06 ENCOUNTER — NON-APPOINTMENT (OUTPATIENT)
Age: 86
End: 2021-09-06

## 2021-09-09 ENCOUNTER — APPOINTMENT (OUTPATIENT)
Dept: CARDIOLOGY | Facility: CLINIC | Age: 86
End: 2021-09-09

## 2021-09-11 ENCOUNTER — RX RENEWAL (OUTPATIENT)
Age: 86
End: 2021-09-11

## 2021-09-13 ENCOUNTER — NON-APPOINTMENT (OUTPATIENT)
Age: 86
End: 2021-09-13

## 2021-09-15 ENCOUNTER — NON-APPOINTMENT (OUTPATIENT)
Age: 86
End: 2021-09-15

## 2021-09-18 ENCOUNTER — RX RENEWAL (OUTPATIENT)
Age: 86
End: 2021-09-18

## 2021-09-28 ENCOUNTER — APPOINTMENT (OUTPATIENT)
Dept: SURGICAL ONCOLOGY | Facility: CLINIC | Age: 86
End: 2021-09-28
Payer: MEDICARE

## 2021-09-28 VITALS
DIASTOLIC BLOOD PRESSURE: 58 MMHG | SYSTOLIC BLOOD PRESSURE: 91 MMHG | HEIGHT: 68 IN | WEIGHT: 155 LBS | OXYGEN SATURATION: 95 % | BODY MASS INDEX: 23.49 KG/M2 | HEART RATE: 70 BPM

## 2021-09-28 PROCEDURE — 99214 OFFICE O/P EST MOD 30 MIN: CPT

## 2021-09-28 PROCEDURE — 99213 OFFICE O/P EST LOW 20 MIN: CPT

## 2021-10-01 ENCOUNTER — APPOINTMENT (OUTPATIENT)
Dept: NEUROLOGY | Facility: CLINIC | Age: 86
End: 2021-10-01
Payer: MEDICARE

## 2021-10-01 VITALS
DIASTOLIC BLOOD PRESSURE: 65 MMHG | WEIGHT: 145 LBS | BODY MASS INDEX: 21.98 KG/M2 | SYSTOLIC BLOOD PRESSURE: 103 MMHG | HEART RATE: 62 BPM | HEIGHT: 68 IN

## 2021-10-01 DIAGNOSIS — G25.3 MYOCLONUS: ICD-10-CM

## 2021-10-01 PROCEDURE — 99214 OFFICE O/P EST MOD 30 MIN: CPT

## 2021-10-01 PROCEDURE — 95816 EEG AWAKE AND DROWSY: CPT

## 2021-10-01 NOTE — DISCUSSION/SUMMARY
[FreeTextEntry1] : Recent fall with lumbar fracture.\par Hypotension on midodrine now.\par Appears to have orthostatic hypotension with recurrent syncope with some myoclonus p LOC on exam and by history. \par Agree with midodrine and cardiology recs, hydration, Na intake, ?flourinef.\par MRI with atrophy WM changes.  Exam nonfocal.\par \par I question seizure propensity.  \par On LEV empirically with EEG  abn focal slowing in posterior head regions.\par Cont LEV for now, unclear if warranted longterm.  Will decide further p review of prior EEG and new f/u EEG.\par \par Rec f/u AEEG x 48hrs\par Fall precautions /safety.\par \par x time 61min\par RTC 4mo

## 2021-10-01 NOTE — PHYSICAL EXAM
[FreeTextEntry1] : General: lumbar support band, in w/c\par Constitutional: alert and in no acute distress. \par Psychiatric: affect normal, insight and judgment intact.  pleasant, cooperative\par Neurologic: \par Orientation: oriented to person, place, and time \par Attention: normal concentrating ability and attention was not decreased. \par Language: no difficulty naming common objects, repeating a phrase, writing a sentence; fluency, comprehension, and reading intact. \par Fund of knowledge: displays adequate knowledge of personal past history. \par Cranial Nerves: visual acuity intact bilaterally, visual fields full to confrontation, pupils equal round and reactive to light, extraocular motion intact, facial sensation intact symmetrically, face symmetrical, hearing was intact bilaterally, tongue and palate midline, head turning and shoulder shrug symmetric and there was no tongue deviation with protrusion. \par Motor: the patient is right hand dominant. \par muscle tone was normal in all four extremities, muscle strength was normal in all four extremities and normal bulk in all four extremities. \par Coordination:. normal gait. balance was intact. there was no past-pointing. no tremor present. \par Deep tendon reflexes: \par Biceps right 2+. Biceps left 2+.  \par Triceps right 2+. Triceps left 2+.  \par Brachioradialis right 1+. Brachioradialis left 1+.  \par Patella right 2+. Patella left 2+.  \par Ankle jerk right 0+. Ankle jerk left 0+. \par Eyes: the sclera and conjunctiva were normal. \par Neck: the appearance of the neck was normal. \par Musculoskeletal: no clubbing or cyanosis of the fingernails. \par Skin: no lesions, rash\par \par Episode induced at bedside with standing, felt weak in legs, asked to sit, remained mostly responsive, but some repetitive myoclonus in arms noted.  Felt better with legs raised, sitting again.  \par

## 2021-10-01 NOTE — DATA REVIEWED
[de-identified] : MR spine:  L3/L4 L4/L5, spinal stenosis\par  [de-identified] : VEEG 8/3/21 Occasional sharp waves in the left occipital region, maximum O1/P7/O2

## 2021-10-01 NOTE — HISTORY OF PRESENT ILLNESS
[FreeTextEntry1] : \par Hospital Course:  Discharge Date	09-Aug-2021, Admission Date	30-Jul-2021 21:19 \par 86 yo M w/ PMHx of spinal stenosis, CAD (s/p PCI), HLD, CKD, bilateral lung nodules, COPD, spindle cell neoplasm (s/p resection), presented p mechanical fall, found to have acute L2 fracture and small bilateral pulmonary \par opacities. Seen by Neurosx, deemed no plan for surgical intervention, recommended for pain management & TLSO brace. \par Recent hosp admit (admitted 7/19/21) at [facility] for  COVID pneumonia and was discharged on Dexamethasone.\par \par The patient reports that he was at the top of the stairs and suddenly felt very weak, with hands and feet shaking and trembling and then he fell on his back. He denies head strike, denies LOC and states he remembers the entire episode. After he fell, he lay on the ground for about an hour entire his son came and found him and called 911. Prior to the fall, he denied any dizziness, diaphoresis, palpitations, syncope.\par Reportedly abnormal REEG with increased risk for seizures with focal onset in the left occipital region.  As per Neuro, c/w Keppra 500mg BID\par \par Patient with hypotension, however as per patient's son, is baseline for patient. TTE done.  home PT. \par In retrospect concern for >10yrs :"legs give out" intermittently, staring, trembling, glazed over appearing.  Responds right away.  With standing up from seated/lying position, straining.  x few times per year baseline.  \par Increased more as of late.  Gets symptoms as soon as he stands, doing PT, getting up now.  Denies LH/dizziness, but vision gets "brightened" bilaterally, tends to slump to left.  May experience shaking/trembling, overt jerks with LOC x 30 sec.  Now on midodrine per cadiology.  with improvement\par \par 8/2020 MRI brain w/ Moderate atrophy and small vessel white matter ischemic changes. \par \par Played rugby, h/o concussions.\par \par \par

## 2021-10-14 ENCOUNTER — APPOINTMENT (OUTPATIENT)
Dept: NEUROLOGY | Facility: CLINIC | Age: 86
End: 2021-10-14
Payer: MEDICARE

## 2021-10-14 PROCEDURE — 95816 EEG AWAKE AND DROWSY: CPT

## 2021-10-15 PROCEDURE — 95719 EEG PHYS/QHP EA INCR W/O VID: CPT

## 2021-10-15 PROCEDURE — 95700 EEG CONT REC W/VID EEG TECH: CPT

## 2021-10-15 PROCEDURE — 95708 EEG WO VID EA 12-26HR UNMNTR: CPT

## 2021-10-19 NOTE — ASSESSMENT
[FreeTextEntry1] : Continue to monitor.\par Follow up with dermatology for evaluation and biopsy if indicated.\par Follow up in 3-6 months.

## 2021-10-19 NOTE — HISTORY OF PRESENT ILLNESS
[de-identified] : Patient is an 81 y/o male who presented with a raised lesion in the mid-frontal scalp.  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  He is referred for surgical management.\par Patient reports he has had this lesion for several years and does not have complaints of associated pain.  He has no previous history of sarcoma.\par \par 09/27/2018:  Patient is s/p radical resection of frontal scalp spindle cell neoplasm and biopsy of vertex scalp lesion.  Skin graft coverage by Dr. Myles.  Recovering well.  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  Additional deep margin shows spindle cell lesion felt to represent fibrosing granulation tissue.  Vertex scalp lesion demonstrates atypical spindle cell lesion, favoring atypical fibroxanthoma.\par \par 10/11/2018:  Improved.  Skin graft healing well.\par \par 11/08/2018:  Feels well.  Denies pain. Saw Dr. 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 Myles 12/17/2018.  Pathology shows minimal residual disease, margins negative.\par \par 04/02/2019:  Patient presents for 3 months follow up.  Feels well.  He states he is transferring dermatology care to Dr. Fitzpatrick of [facility].  Had benign right mid back biopsy performed last week by dermatology.\par \par 10/01/2019: Patient recently developed firm nodule at the posterior edge of his scalp skin graft.  Biopsy performed 08/27/2019 demonstrated atypical epithelioid cells for which an underlying neoplasm could not be ruled out.  He is s/p deeper biopsy by dermatology last week for which results are pending.  He offers no new complaints.\par \par 01/07/2020: Overall doing well, but developed an area of ulceration in vertex scalp.  Recent biopsy 12/18/2019 did not demonstrate evidence of recurrent malignancy.  He denies pain or drainage.\par \par 07/23/2020: Patient was seen by dermatology yesterday and underwent biopsy of a brown pigmented scalp lesion posterior to skin graft.  He continues to have a nonhealing ulcer at the vertex scalp at site of prior biopsy.  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  he offers no new complaints.\par \par 10/08/2020: Patient underwent scalp excision of melanoma in-situ and nonhealing area 09/11/2020.  A significant portion of his pre-existing skin graft and adjacent scalp.  An additional left parietal scalp lesion was excised as well.\par Pathology: scalp biopsy - small foci of squamous cell carcinoma in situ, epidermal cyst, no melanoma identified.  Parietal scalp lesion - squamous cell carcinoma in situ - extending to one margin.\par Scalp lesion was covered with Integra and is granulating well.  He is pending formal skin graft coverage.\par \par 01/19/2021: Patient is doing well.  He has recovered well from scalp skin graft.  He denies pain.  He has not have dermatology follow up since surgery 09/2020.\par \par 04/13/2021: Patient developed non-healing wound at vertex scalp since his last visit.  Biopsy of the area by dermatology 02/2021 showed actinic keratosis without evidence of malignancy.  He denies pain in the area.\par \par 07/13/2021: Patient remains stable without new malignant skin biopsies.  He denies pain in scalp excision sites.\par \par 09/28/2021: Patient reports increased redness diffusely along scalp, followed by dermatology.  He is applying topical treatment.

## 2021-10-19 NOTE — PHYSICAL EXAM
[FreeTextEntry1] : Scalp: well healed skin graft.  No obvious evidence of recurrent/new disease. Broader area of ulcer at vertex scalp, but no obvious recurrent disease.  Photograph taken with patient's consent.  Mild erythema diffusely over scalp extending to right frontotemporal region.

## 2021-10-27 ENCOUNTER — APPOINTMENT (OUTPATIENT)
Dept: DERMATOLOGY | Facility: CLINIC | Age: 86
End: 2021-10-27
Payer: MEDICARE

## 2021-10-27 DIAGNOSIS — L57.8 OTHER SKIN CHANGES DUE TO CHRONIC EXPOSURE TO NONIONIZING RADIATION: ICD-10-CM

## 2021-10-27 DIAGNOSIS — L82.1 OTHER SEBORRHEIC KERATOSIS: ICD-10-CM

## 2021-10-27 DIAGNOSIS — C80.1 MALIGNANT (PRIMARY) NEOPLASM, UNSPECIFIED: ICD-10-CM

## 2021-10-27 PROCEDURE — 99214 OFFICE O/P EST MOD 30 MIN: CPT | Mod: 25

## 2021-10-27 PROCEDURE — 17004 DESTROY PREMAL LESIONS 15/>: CPT

## 2021-11-15 ENCOUNTER — APPOINTMENT (OUTPATIENT)
Dept: PULMONOLOGY | Facility: CLINIC | Age: 86
End: 2021-11-15
Payer: MEDICARE

## 2021-11-15 VITALS
OXYGEN SATURATION: 92 % | HEART RATE: 97 BPM | SYSTOLIC BLOOD PRESSURE: 114 MMHG | TEMPERATURE: 98 F | RESPIRATION RATE: 16 BRPM | DIASTOLIC BLOOD PRESSURE: 69 MMHG

## 2021-11-15 DIAGNOSIS — R91.1 SOLITARY PULMONARY NODULE: ICD-10-CM

## 2021-11-15 DIAGNOSIS — Z86.16 PERSONAL HISTORY OF COVID-19: ICD-10-CM

## 2021-11-15 PROCEDURE — 99214 OFFICE O/P EST MOD 30 MIN: CPT

## 2021-11-15 NOTE — PHYSICAL EXAM
[No Acute Distress] : no acute distress [Supple] : supple [No JVD] : no jvd [Normal S1, S2] : normal s1, s2 [Clear to Auscultation Bilaterally] : clear to auscultation bilaterally [Normal to Percussion] : normal to percussion [No Abnormalities] : no abnormalities [Benign] : benign [Not Tender] : not tender [No HSM] : no hsm [No Clubbing] : no clubbing [No Cyanosis] : no cyanosis

## 2021-11-16 NOTE — DISCUSSION/SUMMARY
[FreeTextEntry1] : Mild COPD\par Stable pulmonary nodules.\par Pulmonary infiltrates noted on recent CT highly likely related to Covid.\par Cough to be related to GERD.  Possible component related to mild chronic obstructive pulmonary disease.  Possibly related to postnasal drip.  More prominent upon waking in a.m.\par

## 2021-11-16 NOTE — PROCEDURE
[FreeTextEntry1] : ct 7/12/21 chest/abdomen/pelvis reviewed\par \par Reviewed multiple CAT scans including older CAT scans, recent CT at both [facility] and [facility].\par \par  1 / 1 Khris Kay   \par  \par Report date: 7/30/2021 \par  \par  View Order\par \par (Report matches study selected on Patient History pane)\par \par \par   \par \par EXAM: CT ABDOMEN AND PELVIS IC\par \par EXAM: CT CHEST IC\par \par \par PROCEDURE DATE: 07/30/2021\par \par \par \par \par INTERPRETATION: CLINICAL INFORMATION: Trauma, fall, landed on back\par \par COMPARISON: MR lumbar spine 4/20/2021\par \par CONTRAST/COMPLICATIONS:\par IV Contrast: Omnipaque 350 90 cc administered 10 cc discarded\par Oral Contrast: NONE\par Complications: None reported at time of study completion\par \par PROCEDURE:\par CT of the Chest, Abdomen and Pelvis was performed.\par Imaging was performed through the chest in the arterial phase followed by imaging of the abdomen and pelvis in the portal venous phase.\par Sagittal and coronal reformats were performed.\par \par FINDINGS:\par CHEST:\par LUNGS AND LARGE AIRWAYS: Secretions within the trachea.. Wedge-shaped areas of groundglass opacity in the right upper lobe, right lower lobe, and left lower lobe. No lung mass or suspicious nodule. Calcified granulomas in the lower lobes.\par PLEURA: No pleural effusion, hemothorax, or pneumothorax.\par VESSELS: Normal caliber and contour of the thoracic aorta. No evidence of acute traumatic aortic injury. Suboptimal pulmonary arterial opacification. Main pulmonary artery is not dilated. Coronary artery atherosclerotic calcifications.\par HEART: Heart size is normal. No pericardial effusion.\par MEDIASTINUM AND ZEV: Few nonspecific subcentimeter mediastinal lymph nodes.\par CHEST WALL AND LOWER NECK: Within normal limits.\par \par ABDOMEN AND PELVIS:\par LIVER: Within normal limits.\par BILE DUCTS: Mild biliary duct dilation likely related to postcholecystectomy status.\par GALLBLADDER: Cholecystectomy.\par SPLEEN: Within normal limits.\par PANCREAS: Within normal limits.\par ADRENALS: Within normal limits.\par KIDNEYS/URETERS: Kidneys enhance symmetrically without hydronephrosis. Right renal parapelvic cyst.\par \par BLADDER: Within normal limits.\par REPRODUCTIVE ORGANS: Prostate gland is not enlarged.\par \par BOWEL: No bowel obstruction or overt bowel wall thickening. Appendix is normal.\par PERITONEUM: No ascites, pneumoperitoneum, or hemoperitoneum. No mesenteric lymphadenopathy.\par VESSELS: Atherosclerotic calcifications of the aortoiliac tree. Normal caliber abdominal aorta.\par RETROPERITONEUM/LYMPH NODES: No lymphadenopathy.\par ABDOMINAL WALL: Within normal limits.\par BONES: Bones are demineralized. Degenerative changes of the spine. Indeterminate age compression fractures of T3, L1, and L3 and more acute appearing fracture lucency through the L2 vertebral body with mild compression of the vertebral body.\par \par IMPRESSION:\par \par No evidence of acute intrathoracic or intra-abdominal/pelvic traumatic injury.\par \par Acute appearing fracture lucency through the L2 vertebral body with mild compression of the vertebral body. Indeterminate age compression fractures of T3, L1, and L3.\par \par Wedge-shaped groundglass opacities in the right upper lobe, right lower lobe, and left lower lobe for which primary consideration is atypical/viral pneumonia such as Covid 19 given recent hospitalization for Covid 19.\par \par --- End of Report ---\par \par \par \par \par \par \par RAUDEL LOYOLA MD; Resident Radiology\par This document has been electronically signed.\par KHRIS KAY DO; Attending Radiologist\par This document has been electronically signed. Jul 30 2021 8:33PM

## 2021-11-16 NOTE — HISTORY OF PRESENT ILLNESS
[TextBox_4] : hospitalized 5 days in July at [facility] for 5 days, not intubated\par believes received Remdesivir and steroids. \par 1 week later fell and was [facility] for 10 days after vertebral fracture\par \par no issues with breathing\par \par brought in ct done at [facility] for f/u on pancreas\par \par last ct chest [facility]\par \par got flu and COVID booster\par \par Cough worse in AM.\par

## 2021-11-16 NOTE — ASSESSMENT
[FreeTextEntry1] : Continue observation.\par Follow-up CAT scan in 1 year.\par Consider ENT reevaluation.

## 2021-12-02 ENCOUNTER — LABORATORY RESULT (OUTPATIENT)
Age: 86
End: 2021-12-02

## 2021-12-02 ENCOUNTER — NON-APPOINTMENT (OUTPATIENT)
Age: 86
End: 2021-12-02

## 2021-12-02 ENCOUNTER — APPOINTMENT (OUTPATIENT)
Dept: CARDIOLOGY | Facility: CLINIC | Age: 86
End: 2021-12-02
Payer: MEDICARE

## 2021-12-02 VITALS
HEIGHT: 68 IN | BODY MASS INDEX: 21.82 KG/M2 | WEIGHT: 144 LBS | HEART RATE: 57 BPM | DIASTOLIC BLOOD PRESSURE: 58 MMHG | TEMPERATURE: 97.8 F | OXYGEN SATURATION: 99 % | SYSTOLIC BLOOD PRESSURE: 94 MMHG

## 2021-12-02 VITALS — SYSTOLIC BLOOD PRESSURE: 100 MMHG | DIASTOLIC BLOOD PRESSURE: 60 MMHG

## 2021-12-02 DIAGNOSIS — M79.673 PAIN IN UNSPECIFIED FOOT: ICD-10-CM

## 2021-12-02 DIAGNOSIS — Z71.85 ENCOUNTER FOR IMMUNIZATION SAFETY COUNSELING: ICD-10-CM

## 2021-12-02 DIAGNOSIS — M79.672 PAIN IN LEFT FOOT: ICD-10-CM

## 2021-12-02 DIAGNOSIS — E55.9 VITAMIN D DEFICIENCY, UNSPECIFIED: ICD-10-CM

## 2021-12-02 DIAGNOSIS — S32.009A UNSPECIFIED FRACTURE OF UNSPECIFIED LUMBAR VERTEBRA, INITIAL ENCOUNTER FOR CLOSED FRACTURE: ICD-10-CM

## 2021-12-02 PROCEDURE — 99214 OFFICE O/P EST MOD 30 MIN: CPT

## 2021-12-02 PROCEDURE — 93000 ELECTROCARDIOGRAM COMPLETE: CPT

## 2021-12-02 NOTE — HISTORY OF PRESENT ILLNESS
[FreeTextEntry1] : This is an 85 year old gentlemen with a PMH of Melanoma, SQ cell carcinoma, HLD, vitamin D Deficiency, Ochoa's Esophagus, Anemia, and CKD presents today for follow up. He is accompanied by his son. He reports that he had COVID and was hospitalized in July. He recently followed with Dr. Turner. He also was hospitalized s/p fall due to hypotension and fractured his spine. He was hospitalized for approx 10 days for the fall. He still c/o back soreness. He is not taking anything for the pain at this time. He is wearing a brace when he walks. He is also using a wheelchair to assist in getting around. He does c/o burning sensation of heel radiating to toes which is worse in the morning and wakes him up from sleep.
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
## 2022-01-18 ENCOUNTER — APPOINTMENT (OUTPATIENT)
Dept: SURGICAL ONCOLOGY | Facility: CLINIC | Age: 87
End: 2022-01-18

## 2022-01-18 ENCOUNTER — APPOINTMENT (OUTPATIENT)
Dept: SURGICAL ONCOLOGY | Facility: CLINIC | Age: 87
End: 2022-01-18
Payer: MEDICARE

## 2022-01-18 VITALS
HEART RATE: 60 BPM | BODY MASS INDEX: 23.66 KG/M2 | SYSTOLIC BLOOD PRESSURE: 76 MMHG | TEMPERATURE: 97.4 F | OXYGEN SATURATION: 98 % | RESPIRATION RATE: 16 BRPM | HEIGHT: 65 IN | DIASTOLIC BLOOD PRESSURE: 60 MMHG | WEIGHT: 142 LBS

## 2022-01-18 PROCEDURE — 99213 OFFICE O/P EST LOW 20 MIN: CPT

## 2022-01-19 NOTE — PHYSICAL EXAM
[FreeTextEntry1] : Scalp: well healed skin graft.  No obvious evidence of recurrent/new disease. Broader area of ulcer/dry eschar at posterior vertex scalp, but no obvious recurrent disease.  Resolved erythema seen on prior exam.

## 2022-01-19 NOTE — REASON FOR VISIT
[Follow-Up Visit] : a follow-up visit for [Melanoma] : melanoma [Skin Cancer] : skin caner [Other: _____] : [unfilled]

## 2022-01-19 NOTE — HISTORY OF PRESENT ILLNESS
[de-identified] : Patient is an 83 y/o male who presented with a raised lesion in the mid-frontal scalp.  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  He is referred for surgical management.\par Patient reports he has had this lesion for several years and does not have complaints of associated pain.  He has no previous history of sarcoma.\par \par 09/27/2018:  Patient is s/p radical resection of frontal scalp spindle cell neoplasm and biopsy of vertex scalp lesion.  Skin graft coverage by Dr. Myles.  Recovering well.  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  Additional deep margin shows spindle cell lesion felt to represent fibrosing granulation tissue.  Vertex scalp lesion demonstrates atypical spindle cell lesion, favoring atypical fibroxanthoma.\par \par 10/11/2018:  Improved.  Skin graft healing well.\par \par 11/08/2018:  Feels well.  Denies pain. Saw Dr. 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 Myles 12/17/2018.  Pathology shows minimal residual disease, margins negative.\par \par 04/02/2019:  Patient presents for 3 months follow up.  Feels well.  He states he is transferring dermatology care to Dr. Fitzpatrick of [facility].  Had benign right mid back biopsy performed last week by dermatology.\par \par 10/01/2019: Patient recently developed firm nodule at the posterior edge of his scalp skin graft.  Biopsy performed 08/27/2019 demonstrated atypical epithelioid cells for which an underlying neoplasm could not be ruled out.  He is s/p deeper biopsy by dermatology last week for which results are pending.  He offers no new complaints.\par \par 01/07/2020: Overall doing well, but developed an area of ulceration in vertex scalp.  Recent biopsy 12/18/2019 did not demonstrate evidence of recurrent malignancy.  He denies pain or drainage.\par \par 07/23/2020: Patient was seen by dermatology yesterday and underwent biopsy of a brown pigmented scalp lesion posterior to skin graft.  He continues to have a nonhealing ulcer at the vertex scalp at site of prior biopsy.  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  he offers no new complaints.\par \par 10/08/2020: Patient underwent scalp excision of melanoma in-situ and nonhealing area 09/11/2020.  A significant portion of his pre-existing skin graft and adjacent scalp.  An additional left parietal scalp lesion was excised as well.\par Pathology: scalp biopsy - small foci of squamous cell carcinoma in situ, epidermal cyst, no melanoma identified.  Parietal scalp lesion - squamous cell carcinoma in situ - extending to one margin.\par Scalp lesion was covered with Integra and is granulating well.  He is pending formal skin graft coverage.\par \par 01/19/2021: Patient is doing well.  He has recovered well from scalp skin graft.  He denies pain.  He has not have dermatology follow up since surgery 09/2020.\par \par 04/13/2021: Patient developed non-healing wound at vertex scalp since his last visit.  Biopsy of the area by dermatology 02/2021 showed actinic keratosis without evidence of malignancy.  He denies pain in the area.\par \par 07/13/2021: Patient remains stable without new malignant skin biopsies.  He denies pain in scalp excision sites.\par \par 09/28/2021: Patient reports increased redness diffusely along scalp, followed by dermatology.  He is applying topical treatment.\par \par 01/18/2022: Patient reports improvement in scalp irritation/redness.  He is scheduled to see dermatology soon.  Still recovering from back injury.

## 2022-01-26 ENCOUNTER — APPOINTMENT (OUTPATIENT)
Dept: DERMATOLOGY | Facility: CLINIC | Age: 87
End: 2022-01-26

## 2022-01-29 ENCOUNTER — RX RENEWAL (OUTPATIENT)
Age: 87
End: 2022-01-29

## 2022-02-16 ENCOUNTER — LABORATORY RESULT (OUTPATIENT)
Age: 87
End: 2022-02-16

## 2022-02-17 ENCOUNTER — OUTPATIENT (OUTPATIENT)
Dept: OUTPATIENT SERVICES | Facility: HOSPITAL | Age: 87
LOS: 1 days | End: 2022-02-17
Payer: MEDICARE

## 2022-02-17 ENCOUNTER — RESULT REVIEW (OUTPATIENT)
Age: 87
End: 2022-02-17

## 2022-02-17 ENCOUNTER — APPOINTMENT (OUTPATIENT)
Dept: DERMATOLOGY | Facility: CLINIC | Age: 87
End: 2022-02-17
Payer: MEDICARE

## 2022-02-17 ENCOUNTER — NON-APPOINTMENT (OUTPATIENT)
Age: 87
End: 2022-02-17

## 2022-02-17 ENCOUNTER — APPOINTMENT (OUTPATIENT)
Dept: ULTRASOUND IMAGING | Facility: CLINIC | Age: 87
End: 2022-02-17
Payer: MEDICARE

## 2022-02-17 DIAGNOSIS — L30.9 DERMATITIS, UNSPECIFIED: ICD-10-CM

## 2022-02-17 DIAGNOSIS — Z90.49 ACQUIRED ABSENCE OF OTHER SPECIFIED PARTS OF DIGESTIVE TRACT: Chronic | ICD-10-CM

## 2022-02-17 DIAGNOSIS — Z98.890 OTHER SPECIFIED POSTPROCEDURAL STATES: Chronic | ICD-10-CM

## 2022-02-17 DIAGNOSIS — C44.90 UNSPECIFIED MALIGNANT NEOPLASM OF SKIN, UNSPECIFIED: Chronic | ICD-10-CM

## 2022-02-17 DIAGNOSIS — R22.0 LOCALIZED SWELLING, MASS AND LUMP, HEAD: ICD-10-CM

## 2022-02-17 DIAGNOSIS — Z98.41 CATARACT EXTRACTION STATUS, RIGHT EYE: Chronic | ICD-10-CM

## 2022-02-17 DIAGNOSIS — Z87.09 PERSONAL HISTORY OF OTHER DISEASES OF THE RESPIRATORY SYSTEM: Chronic | ICD-10-CM

## 2022-02-17 DIAGNOSIS — Z95.818 PRESENCE OF OTHER CARDIAC IMPLANTS AND GRAFTS: Chronic | ICD-10-CM

## 2022-02-17 DIAGNOSIS — Z98.61 CORONARY ANGIOPLASTY STATUS: Chronic | ICD-10-CM

## 2022-02-17 PROCEDURE — 11103 TANGNTL BX SKIN EA SEP/ADDL: CPT

## 2022-02-17 PROCEDURE — 11102 TANGNTL BX SKIN SINGLE LES: CPT

## 2022-02-17 PROCEDURE — 76536 US EXAM OF HEAD AND NECK: CPT

## 2022-02-17 PROCEDURE — 99215 OFFICE O/P EST HI 40 MIN: CPT | Mod: 25

## 2022-02-17 PROCEDURE — 76536 US EXAM OF HEAD AND NECK: CPT | Mod: 26

## 2022-02-22 ENCOUNTER — APPOINTMENT (OUTPATIENT)
Dept: SURGICAL ONCOLOGY | Facility: CLINIC | Age: 87
End: 2022-02-22
Payer: MEDICARE

## 2022-02-22 ENCOUNTER — LABORATORY RESULT (OUTPATIENT)
Age: 87
End: 2022-02-22

## 2022-02-22 VITALS
BODY MASS INDEX: 25.48 KG/M2 | WEIGHT: 142 LBS | OXYGEN SATURATION: 100 % | TEMPERATURE: 97.8 F | DIASTOLIC BLOOD PRESSURE: 75 MMHG | HEIGHT: 62.75 IN | RESPIRATION RATE: 16 BRPM | HEART RATE: 61 BPM | SYSTOLIC BLOOD PRESSURE: 127 MMHG

## 2022-02-22 PROCEDURE — 99213 OFFICE O/P EST LOW 20 MIN: CPT

## 2022-02-22 PROCEDURE — 10021 FNA BX W/O IMG GDN 1ST LES: CPT

## 2022-02-23 RX ORDER — FAMOTIDINE 40 MG/1
40 TABLET, FILM COATED ORAL
Qty: 180 | Refills: 0 | Status: ACTIVE | COMMUNITY
Start: 2022-02-17

## 2022-02-23 NOTE — PHYSICAL EXAM
[FreeTextEntry1] : Scalp: well healed skin graft.  No obvious evidence of recurrent/new disease. Broader area of ulcer/dry eschar at posterior vertex scalp, but no obvious recurrent disease.  New palpable nodule midline vertex scalp adjacent to chronic eschar, 1.5 cm in diameter.

## 2022-02-23 NOTE — ASSESSMENT
[FreeTextEntry1] : Await cytology.\par Follow up in one month for re-exam, sooner as clinically indicated.

## 2022-02-23 NOTE — HISTORY OF PRESENT ILLNESS
[de-identified] : Patient is an 81 y/o male who presented with a raised lesion in the mid-frontal scalp.  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  He is referred for surgical management.\par Patient reports he has had this lesion for several years and does not have complaints of associated pain.  He has no previous history of sarcoma.\par \par 09/27/2018:  Patient is s/p radical resection of frontal scalp spindle cell neoplasm and biopsy of vertex scalp lesion.  Skin graft coverage by Dr. Myles.  Recovering well.  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  Additional deep margin shows spindle cell lesion felt to represent fibrosing granulation tissue.  Vertex scalp lesion demonstrates atypical spindle cell lesion, favoring atypical fibroxanthoma.\par \par 10/11/2018:  Improved.  Skin graft healing well.\par \par 11/08/2018:  Feels well.  Denies pain. Saw Dr. 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 Myles 12/17/2018.  Pathology shows minimal residual disease, margins negative.\par \par 04/02/2019:  Patient presents for 3 months follow up.  Feels well.  He states he is transferring dermatology care to Dr. Fitzpatrick of [facility].  Had benign right mid back biopsy performed last week by dermatology.\par \par 10/01/2019: Patient recently developed firm nodule at the posterior edge of his scalp skin graft.  Biopsy performed 08/27/2019 demonstrated atypical epithelioid cells for which an underlying neoplasm could not be ruled out.  He is s/p deeper biopsy by dermatology last week for which results are pending.  He offers no new complaints.\par \par 01/07/2020: Overall doing well, but developed an area of ulceration in vertex scalp.  Recent biopsy 12/18/2019 did not demonstrate evidence of recurrent malignancy.  He denies pain or drainage.\par \par 07/23/2020: Patient was seen by dermatology yesterday and underwent biopsy of a brown pigmented scalp lesion posterior to skin graft.  He continues to have a nonhealing ulcer at the vertex scalp at site of prior biopsy.  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  he offers no new complaints.\par \par 10/08/2020: Patient underwent scalp excision of melanoma in-situ and nonhealing area 09/11/2020.  A significant portion of his pre-existing skin graft and adjacent scalp.  An additional left parietal scalp lesion was excised as well.\par Pathology: scalp biopsy - small foci of squamous cell carcinoma in situ, epidermal cyst, no melanoma identified.  Parietal scalp lesion - squamous cell carcinoma in situ - extending to one margin.\par Scalp lesion was covered with Integra and is granulating well.  He is pending formal skin graft coverage.\par \par 01/19/2021: Patient is doing well.  He has recovered well from scalp skin graft.  He denies pain.  He has not have dermatology follow up since surgery 09/2020.\par \par 04/13/2021: Patient developed non-healing wound at vertex scalp since his last visit.  Biopsy of the area by dermatology 02/2021 showed actinic keratosis without evidence of malignancy.  He denies pain in the area.\par \par 07/13/2021: Patient remains stable without new malignant skin biopsies.  He denies pain in scalp excision sites.\par \par 09/28/2021: Patient reports increased redness diffusely along scalp, followed by dermatology.  He is applying topical treatment.\par \par 01/18/2022: Patient reports improvement in scalp irritation/redness.  He is scheduled to see dermatology soon.  Still recovering from back injury.\par \par 02/22/2022: Since his last appointment 01/18/2022, patient developed a raised nodule over the vertex scalp.  Ultrasound 02/17/2022 showed a thin layer of complex fluid with surrounding edema and hyperemia lifting the overlying subcutaneous fat, most consistent with an inflammatory/infectious fluid collection.  He denies fever.

## 2022-02-23 NOTE — PROCEDURE
[FreeTextEntry1] : Consent obtained.\par Scalp prepped with Betadine.\par Fine needle aspiration performed, no gross fluid.\par Slides prepared.\par Patient tolerated procedure well.

## 2022-02-26 ENCOUNTER — RX RENEWAL (OUTPATIENT)
Age: 87
End: 2022-02-26

## 2022-03-01 ENCOUNTER — NON-APPOINTMENT (OUTPATIENT)
Age: 87
End: 2022-03-01

## 2022-03-01 ENCOUNTER — APPOINTMENT (OUTPATIENT)
Dept: CARDIOLOGY | Facility: CLINIC | Age: 87
End: 2022-03-01
Payer: MEDICARE

## 2022-03-01 VITALS
WEIGHT: 141 LBS | HEIGHT: 62.75 IN | SYSTOLIC BLOOD PRESSURE: 98 MMHG | OXYGEN SATURATION: 99 % | HEART RATE: 67 BPM | TEMPERATURE: 97.6 F | BODY MASS INDEX: 25.3 KG/M2 | DIASTOLIC BLOOD PRESSURE: 60 MMHG

## 2022-03-01 VITALS — SYSTOLIC BLOOD PRESSURE: 116 MMHG | DIASTOLIC BLOOD PRESSURE: 70 MMHG

## 2022-03-01 PROCEDURE — 99213 OFFICE O/P EST LOW 20 MIN: CPT

## 2022-03-01 PROCEDURE — 93000 ELECTROCARDIOGRAM COMPLETE: CPT | Mod: PD

## 2022-03-01 RX ORDER — GABAPENTIN 100 MG/1
100 CAPSULE ORAL
Qty: 90 | Refills: 0 | Status: DISCONTINUED | COMMUNITY
Start: 2021-12-02 | End: 2022-03-01

## 2022-03-01 RX ORDER — GABAPENTIN 100 MG/1
100 CAPSULE ORAL
Qty: 30 | Refills: 3 | Status: DISCONTINUED | COMMUNITY
Start: 2021-04-26 | End: 2022-03-01

## 2022-03-01 NOTE — HISTORY OF PRESENT ILLNESS
[FreeTextEntry1] : pt presents for f/u pt with orthoostatic hypotension pt with improvement still with issues on ocassion of dizziness pt denies any chest  pain     ,nausea vomiting diaphoresis\par

## 2022-03-02 ENCOUNTER — INPATIENT (INPATIENT)
Facility: HOSPITAL | Age: 87
LOS: 5 days | Discharge: NURSING, REHAB OR LONG-TERM CARE | End: 2022-03-08
Attending: INTERNAL MEDICINE | Admitting: INTERNAL MEDICINE
Payer: MEDICARE

## 2022-03-02 VITALS
RESPIRATION RATE: 18 BRPM | HEIGHT: 68 IN | OXYGEN SATURATION: 98 % | HEART RATE: 85 BPM | TEMPERATURE: 98 F | SYSTOLIC BLOOD PRESSURE: 99 MMHG | DIASTOLIC BLOOD PRESSURE: 62 MMHG

## 2022-03-02 DIAGNOSIS — Z98.61 CORONARY ANGIOPLASTY STATUS: Chronic | ICD-10-CM

## 2022-03-02 DIAGNOSIS — R07.81 PLEURODYNIA: ICD-10-CM

## 2022-03-02 DIAGNOSIS — S32.82XA MULTIPLE FRACTURES OF PELVIS WITHOUT DISRUPTION OF PELVIC RING, INITIAL ENCOUNTER FOR CLOSED FRACTURE: ICD-10-CM

## 2022-03-02 DIAGNOSIS — Z90.49 ACQUIRED ABSENCE OF OTHER SPECIFIED PARTS OF DIGESTIVE TRACT: Chronic | ICD-10-CM

## 2022-03-02 DIAGNOSIS — Z98.890 OTHER SPECIFIED POSTPROCEDURAL STATES: Chronic | ICD-10-CM

## 2022-03-02 DIAGNOSIS — M54.2 CERVICALGIA: ICD-10-CM

## 2022-03-02 DIAGNOSIS — R93.0 ABNORMAL FINDINGS ON DIAGNOSTIC IMAGING OF SKULL AND HEAD, NOT ELSEWHERE CLASSIFIED: ICD-10-CM

## 2022-03-02 DIAGNOSIS — C44.90 UNSPECIFIED MALIGNANT NEOPLASM OF SKIN, UNSPECIFIED: Chronic | ICD-10-CM

## 2022-03-02 DIAGNOSIS — Z98.41 CATARACT EXTRACTION STATUS, RIGHT EYE: Chronic | ICD-10-CM

## 2022-03-02 DIAGNOSIS — Z95.818 PRESENCE OF OTHER CARDIAC IMPLANTS AND GRAFTS: Chronic | ICD-10-CM

## 2022-03-02 DIAGNOSIS — Z87.09 PERSONAL HISTORY OF OTHER DISEASES OF THE RESPIRATORY SYSTEM: Chronic | ICD-10-CM
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
## 2022-03-02 LAB
ALBUMIN SERPL ELPH-MCNC: 4.4 G/DL — SIGNIFICANT CHANGE UP (ref 3.3–5)
ALP SERPL-CCNC: 64 U/L — SIGNIFICANT CHANGE UP (ref 40–120)
ALT FLD-CCNC: 15 U/L — SIGNIFICANT CHANGE UP (ref 4–41)
ANION GAP SERPL CALC-SCNC: 10 MMOL/L — SIGNIFICANT CHANGE UP (ref 7–14)
APPEARANCE UR: CLEAR — SIGNIFICANT CHANGE UP
AST SERPL-CCNC: 22 U/L — SIGNIFICANT CHANGE UP (ref 4–40)
BASOPHILS # BLD AUTO: 0.03 K/UL — SIGNIFICANT CHANGE UP (ref 0–0.2)
BASOPHILS NFR BLD AUTO: 0.4 % — SIGNIFICANT CHANGE UP (ref 0–2)
BILIRUB SERPL-MCNC: 0.6 MG/DL — SIGNIFICANT CHANGE UP (ref 0.2–1.2)
BILIRUB UR-MCNC: NEGATIVE — SIGNIFICANT CHANGE UP
BUN SERPL-MCNC: 27 MG/DL — HIGH (ref 7–23)
CALCIUM SERPL-MCNC: 10.1 MG/DL — SIGNIFICANT CHANGE UP (ref 8.4–10.5)
CHLORIDE SERPL-SCNC: 100 MMOL/L — SIGNIFICANT CHANGE UP (ref 98–107)
CO2 SERPL-SCNC: 24 MMOL/L — SIGNIFICANT CHANGE UP (ref 22–31)
COLOR SPEC: YELLOW — SIGNIFICANT CHANGE UP
CREAT SERPL-MCNC: 1.37 MG/DL — HIGH (ref 0.5–1.3)
DIFF PNL FLD: NEGATIVE — SIGNIFICANT CHANGE UP
EGFR: 51 ML/MIN/1.73M2 — LOW
EOSINOPHIL # BLD AUTO: 0.04 K/UL — SIGNIFICANT CHANGE UP (ref 0–0.5)
EOSINOPHIL NFR BLD AUTO: 0.5 % — SIGNIFICANT CHANGE UP (ref 0–6)
GLUCOSE SERPL-MCNC: 116 MG/DL — HIGH (ref 70–99)
GLUCOSE UR QL: NEGATIVE — SIGNIFICANT CHANGE UP
HCT VFR BLD CALC: 34.3 % — LOW (ref 39–50)
HGB BLD-MCNC: 11.4 G/DL — LOW (ref 13–17)
IANC: 6.37 K/UL — SIGNIFICANT CHANGE UP (ref 1.5–8.5)
IMM GRANULOCYTES NFR BLD AUTO: 0.3 % — SIGNIFICANT CHANGE UP (ref 0–1.5)
KETONES UR-MCNC: NEGATIVE — SIGNIFICANT CHANGE UP
LEUKOCYTE ESTERASE UR-ACNC: NEGATIVE — SIGNIFICANT CHANGE UP
LYMPHOCYTES # BLD AUTO: 0.88 K/UL — LOW (ref 1–3.3)
LYMPHOCYTES # BLD AUTO: 11.2 % — LOW (ref 13–44)
MCHC RBC-ENTMCNC: 32.9 PG — SIGNIFICANT CHANGE UP (ref 27–34)
MCHC RBC-ENTMCNC: 33.2 GM/DL — SIGNIFICANT CHANGE UP (ref 32–36)
MCV RBC AUTO: 99.1 FL — SIGNIFICANT CHANGE UP (ref 80–100)
MONOCYTES # BLD AUTO: 0.51 K/UL — SIGNIFICANT CHANGE UP (ref 0–0.9)
MONOCYTES NFR BLD AUTO: 6.5 % — SIGNIFICANT CHANGE UP (ref 2–14)
NEUTROPHILS # BLD AUTO: 6.37 K/UL — SIGNIFICANT CHANGE UP (ref 1.8–7.4)
NEUTROPHILS NFR BLD AUTO: 81.1 % — HIGH (ref 43–77)
NITRITE UR-MCNC: NEGATIVE — SIGNIFICANT CHANGE UP
NRBC # BLD: 0 /100 WBCS — SIGNIFICANT CHANGE UP
NRBC # FLD: 0 K/UL — SIGNIFICANT CHANGE UP
PH UR: 6.5 — SIGNIFICANT CHANGE UP (ref 5–8)
PLATELET # BLD AUTO: 175 K/UL — SIGNIFICANT CHANGE UP (ref 150–400)
POTASSIUM SERPL-MCNC: 4.2 MMOL/L — SIGNIFICANT CHANGE UP (ref 3.5–5.3)
POTASSIUM SERPL-SCNC: 4.2 MMOL/L — SIGNIFICANT CHANGE UP (ref 3.5–5.3)
PROT SERPL-MCNC: 6.9 G/DL — SIGNIFICANT CHANGE UP (ref 6–8.3)
PROT UR-MCNC: ABNORMAL
RBC # BLD: 3.46 M/UL — LOW (ref 4.2–5.8)
RBC # FLD: 12 % — SIGNIFICANT CHANGE UP (ref 10.3–14.5)
SODIUM SERPL-SCNC: 134 MMOL/L — LOW (ref 135–145)
SP GR SPEC: 1.02 — SIGNIFICANT CHANGE UP (ref 1–1.05)
TROPONIN T, HIGH SENSITIVITY RESULT: 19 NG/L — SIGNIFICANT CHANGE UP
TROPONIN T, HIGH SENSITIVITY RESULT: 20 NG/L — SIGNIFICANT CHANGE UP
UROBILINOGEN FLD QL: SIGNIFICANT CHANGE UP
WBC # BLD: 7.85 K/UL — SIGNIFICANT CHANGE UP (ref 3.8–10.5)
WBC # FLD AUTO: 7.85 K/UL — SIGNIFICANT CHANGE UP (ref 3.8–10.5)

## 2022-03-02 PROCEDURE — 70450 CT HEAD/BRAIN W/O DYE: CPT | Mod: 26,MA

## 2022-03-02 PROCEDURE — 76376 3D RENDER W/INTRP POSTPROCES: CPT | Mod: 26

## 2022-03-02 PROCEDURE — 73521 X-RAY EXAM HIPS BI 2 VIEWS: CPT | Mod: 26

## 2022-03-02 PROCEDURE — 72131 CT LUMBAR SPINE W/O DYE: CPT | Mod: 26,MA

## 2022-03-02 PROCEDURE — 99285 EMERGENCY DEPT VISIT HI MDM: CPT | Mod: 25

## 2022-03-02 PROCEDURE — 72192 CT PELVIS W/O DYE: CPT | Mod: 26

## 2022-03-02 PROCEDURE — 99223 1ST HOSP IP/OBS HIGH 75: CPT

## 2022-03-02 PROCEDURE — 72125 CT NECK SPINE W/O DYE: CPT | Mod: 26,MA

## 2022-03-02 PROCEDURE — 93010 ELECTROCARDIOGRAM REPORT: CPT

## 2022-03-02 PROCEDURE — 72190 X-RAY EXAM OF PELVIS: CPT | Mod: 26,59

## 2022-03-02 PROCEDURE — 71046 X-RAY EXAM CHEST 2 VIEWS: CPT | Mod: 26

## 2022-03-02 RX ORDER — CHOLECALCIFEROL (VITAMIN D3) 125 MCG
5000 CAPSULE ORAL DAILY
Refills: 0 | Status: DISCONTINUED | OUTPATIENT
Start: 2022-03-02 | End: 2022-03-08

## 2022-03-02 RX ORDER — FAMOTIDINE 10 MG/ML
40 INJECTION INTRAVENOUS DAILY
Refills: 0 | Status: DISCONTINUED | OUTPATIENT
Start: 2022-03-02 | End: 2022-03-08

## 2022-03-02 RX ORDER — ACETAMINOPHEN 500 MG
975 TABLET ORAL ONCE
Refills: 0 | Status: COMPLETED | OUTPATIENT
Start: 2022-03-02 | End: 2022-03-02

## 2022-03-02 RX ORDER — LIDOCAINE 4 G/100G
1 CREAM TOPICAL ONCE
Refills: 0 | Status: COMPLETED | OUTPATIENT
Start: 2022-03-02 | End: 2022-03-02

## 2022-03-02 RX ORDER — MIDODRINE HYDROCHLORIDE 2.5 MG/1
10 TABLET ORAL THREE TIMES A DAY
Refills: 0 | Status: DISCONTINUED | OUTPATIENT
Start: 2022-03-02 | End: 2022-03-08

## 2022-03-02 RX ORDER — ACETAMINOPHEN 500 MG
975 TABLET ORAL EVERY 6 HOURS
Refills: 0 | Status: DISCONTINUED | OUTPATIENT
Start: 2022-03-02 | End: 2022-03-08

## 2022-03-02 RX ORDER — MULTIVIT-MIN/FERROUS GLUCONATE 9 MG/15 ML
1 LIQUID (ML) ORAL DAILY
Refills: 0 | Status: DISCONTINUED | OUTPATIENT
Start: 2022-03-02 | End: 2022-03-02

## 2022-03-02 RX ORDER — CALCIUM CARBONATE 500(1250)
1 TABLET ORAL DAILY
Refills: 0 | Status: DISCONTINUED | OUTPATIENT
Start: 2022-03-02 | End: 2022-03-08

## 2022-03-02 RX ORDER — HEPARIN SODIUM 5000 [USP'U]/ML
5000 INJECTION INTRAVENOUS; SUBCUTANEOUS EVERY 8 HOURS
Refills: 0 | Status: DISCONTINUED | OUTPATIENT
Start: 2022-03-02 | End: 2022-03-08

## 2022-03-02 RX ORDER — TETANUS TOXOID, REDUCED DIPHTHERIA TOXOID AND ACELLULAR PERTUSSIS VACCINE, ADSORBED 5; 2.5; 8; 8; 2.5 [IU]/.5ML; [IU]/.5ML; UG/.5ML; UG/.5ML; UG/.5ML
0.5 SUSPENSION INTRAMUSCULAR ONCE
Refills: 0 | Status: DISCONTINUED | OUTPATIENT
Start: 2022-03-02 | End: 2022-03-08

## 2022-03-02 RX ORDER — ASCORBIC ACID 60 MG
1000 TABLET,CHEWABLE ORAL DAILY
Refills: 0 | Status: DISCONTINUED | OUTPATIENT
Start: 2022-03-02 | End: 2022-03-08

## 2022-03-02 RX ORDER — MIDODRINE HYDROCHLORIDE 2.5 MG/1
10 TABLET ORAL ONCE
Refills: 0 | Status: COMPLETED | OUTPATIENT
Start: 2022-03-02 | End: 2022-03-02

## 2022-03-02 RX ORDER — PANTOPRAZOLE SODIUM 20 MG/1
1 TABLET, DELAYED RELEASE ORAL
Qty: 0 | Refills: 0 | DISCHARGE

## 2022-03-02 RX ORDER — ACETAMINOPHEN 500 MG
1000 TABLET ORAL ONCE
Refills: 0 | Status: COMPLETED | OUTPATIENT
Start: 2022-03-02 | End: 2022-03-02

## 2022-03-02 RX ORDER — ZINC SULFATE TAB 220 MG (50 MG ZINC EQUIVALENT) 220 (50 ZN) MG
220 TAB ORAL DAILY
Refills: 0 | Status: DISCONTINUED | OUTPATIENT
Start: 2022-03-02 | End: 2022-03-08

## 2022-03-02 RX ORDER — ASPIRIN/CALCIUM CARB/MAGNESIUM 324 MG
81 TABLET ORAL DAILY
Refills: 0 | Status: DISCONTINUED | OUTPATIENT
Start: 2022-03-02 | End: 2022-03-08

## 2022-03-02 RX ORDER — SIMVASTATIN 20 MG/1
20 TABLET, FILM COATED ORAL AT BEDTIME
Refills: 0 | Status: DISCONTINUED | OUTPATIENT
Start: 2022-03-02 | End: 2022-03-08

## 2022-03-02 RX ORDER — MAGNESIUM OXIDE 400 MG ORAL TABLET 241.3 MG
400 TABLET ORAL DAILY
Refills: 0 | Status: DISCONTINUED | OUTPATIENT
Start: 2022-03-02 | End: 2022-03-08

## 2022-03-02 RX ORDER — SODIUM CHLORIDE 9 MG/ML
1 INJECTION INTRAMUSCULAR; INTRAVENOUS; SUBCUTANEOUS
Refills: 0 | Status: DISCONTINUED | OUTPATIENT
Start: 2022-03-02 | End: 2022-03-03

## 2022-03-02 RX ADMIN — SIMVASTATIN 20 MILLIGRAM(S): 20 TABLET, FILM COATED ORAL at 22:51

## 2022-03-02 RX ADMIN — Medication 975 MILLIGRAM(S): at 11:07

## 2022-03-02 RX ADMIN — LIDOCAINE 1 PATCH: 4 CREAM TOPICAL at 11:07

## 2022-03-02 RX ADMIN — HEPARIN SODIUM 5000 UNIT(S): 5000 INJECTION INTRAVENOUS; SUBCUTANEOUS at 22:51

## 2022-03-02 RX ADMIN — MIDODRINE HYDROCHLORIDE 10 MILLIGRAM(S): 2.5 TABLET ORAL at 14:36

## 2022-03-02 RX ADMIN — Medication 400 MILLIGRAM(S): at 19:06

## 2022-03-02 NOTE — ED ADULT TRIAGE NOTE - CHIEF COMPLAINT QUOTE
p/t c/o of lt hip and back pain s/p slip and fall this am neg loc, p/t ambulatory @ baseline with walker, unable to walk sine the fall

## 2022-03-02 NOTE — ED ADULT NURSE REASSESSMENT NOTE - NS ED NURSE REASSESS COMMENT FT1
Report received from day shift RN. Pt calm and resting in bed. Pt denies chest pain, SOB, headaches, dizziness, numbness/tingling to hands/feet, or pain at this time. Safety maintained.

## 2022-03-02 NOTE — ED PROVIDER NOTE - OBJECTIVE STATEMENT
85 y/o male with pmhx of COPD, CAD with stents, on aspirin, HLD, spinal stenosis, lumbar fracture, lung nodules, presents to ED for unwitnessed fall. Pt AxOx3. Pt states he woke up to use bathroom at 4am like he usually does, and used walker and fell backwards. Denies LOC or head trauma. Pt with baseline tremor. Pt states he is unsure why he fell, sometimes his legs go weak. Pt c/o neck pain, lower back pain and bilateral hip pain, worse on left. Pt states he cannot walk since fall, 7/10 pain. As per family friend at beside, she takes care of patient at home, states she heard his fall and helped him up. States he last fell in August when he was having convulsions, had work up for seizures and normal tests. Pt states he thinks his blood pressure might drop low, however denies any symptoms, no dizziness, weakness, numbness, tingling, cp, sob, palpitations. Pt did not check blood pressure at home.

## 2022-03-02 NOTE — H&P ADULT - PROBLEM SELECTOR PLAN 1
-possibly stemming from orthostasis; continue with midodrine Nacl tabltes; call PCP Dr Joy for planned increase dose of Nacl   -fall precuations  -orthostatics  PT as tolerated

## 2022-03-02 NOTE — ED PROVIDER NOTE - CLINICAL SUMMARY MEDICAL DECISION MAKING FREE TEXT BOX
87 y/o male with pmhx of COPD, CAD with stents, on aspirin, HLD, spinal stenosis, lumbar fracture, lung nodules, presents to ED for unwitnessed fall. Pt AxOx3. Pt states he woke up to use bathroom at 4am like he usually does, and used walker and fell backwards. Denies LOC or head trauma. Pt with baseline tremor. Pt states he is unsure why he fell, sometimes his legs go weak. Pt c/o neck pain, lower back pain and bilateral hip pain, worse on left. Pt states he cannot walk since fall, 7/10 pain. As per family friend at beside, she takes care of patient at home, states she heard his fall and helped him up. States he last fell in August when he was having convulsions, had work up for seizures and normal tests. Pt states he thinks his blood pressure might drop low, however denies any symptoms, no dizziness, weakness, numbness, tingling, cp, sob, palpitations. Pt did not check blood pressure at home. atraumatic exam, no neuro deficits, pt moving all extremities, equal leg length. plan to check labs, ua and cx, r/o uti, ct head, c-spine, L spine and hip XR check for fracture. EKG. plan to admit

## 2022-03-02 NOTE — ED PROVIDER NOTE - PROGRESS NOTE DETAILS
MARTA Garcia- as per ortho no surgical intervention pt with left iliac bone fracture on CT. pt accepted to medicine

## 2022-03-02 NOTE — H&P ADULT - NSHPLABSRESULTS_GEN_ALL_CORE
11.4   7.85  )-----------( 175      ( 02 Mar 2022 11:01 )             34.3     03-02    134<L>  |  100  |  27<H>  ----------------------------<  116<H>  4.2   |  24  |  1.37<H>    Ca    10.1      02 Mar 2022 11:01    TPro  6.9  /  Alb  4.4  /  TBili  0.6  /  DBili  x   /  AST  22  /  ALT  15  /  AlkPhos  64  03-02    CAPILLARY BLOOD GLUCOSE          Urinalysis Basic - ( 02 Mar 2022 14:23 )    Color: Yellow / Appearance: Clear / S.022 / pH: x  Gluc: x / Ketone: Negative  / Bili: Negative / Urobili: <2 mg/dL   Blood: x / Protein: Trace / Nitrite: Negative   Leuk Esterase: Negative / RBC: x / WBC 0 /HPF   Sq Epi: x / Non Sq Epi: 1 /HPF / Bacteria: Negative      Vital Signs Last 24 Hrs  T(C): 36.7 (22 @ 17:24), Max: 36.7 (22 @ 10:01)  T(F): 98 (22 @ 17:24), Max: 98 (22 @ 10:01)  HR: 67 (22 @ 17:24) (67 - 85)  BP: 127/70 (22 @ 17:24) (99/62 - 127/70)  BP(mean): --  RR: 16 (22 @ 17:24) (16 - 18)  SpO2: 100% (22 @ 17:24) (98% - 100%)

## 2022-03-02 NOTE — CONSULT NOTE ADULT - SUBJECTIVE AND OBJECTIVE BOX
Orthopedics Consult Note:  85 y/o Male with PMH CAD s/p stents on Aspirin, HLD, COPD, SS/chronic lumbar VCFs, lung nodule presents to the ED c/o Lower back and b/l hip pain s/p unwitnessed fall today while ambulating to bathroom. Pt unsure as to why he fell. Per ED, patient being admitted for frequent falls. Pt states had difficulty ambulating after fall secondary to increased pain. Denies pain/injury elsewhere. Pt at baseline ambulates with walker due to increased difficulty from chronic back pain. CT lumbar spine showing left posterior ilium fracture, orthopedics consulted for further recommendations.      PAST MEDICAL HISTORY:  CAD s/p stents  HLD  COPD  Chronic lumbar VCFs      Allergies:  No Known Allergies  Intolerances        Vital Signs Last 24 Hrs  T(C): 36.7 (03-02-22 @ 10:01), Max: 36.7 (03-02-22 @ 10:01)  T(F): 98 (03-02-22 @ 10:01), Max: 98 (03-02-22 @ 10:01)  HR: 69 (03-02-22 @ 12:30) (69 - 85)  BP: 113/67 (03-02-22 @ 12:30) (99/62 - 123/68)  BP(mean): --  RR: 16 (03-02-22 @ 12:30) (16 - 18)  SpO2: 100% (03-02-22 @ 12:30) (98% - 100%)        Labs:                        11.4   7.85  )-----------( 175      ( 02 Mar 2022 11:01 )             34.3     02 Mar 2022 11:01    134    |  100    |  27     ----------------------------<  116    4.2     |  24     |  1.37     Ca    10.1       02 Mar 2022 11:01    TPro  6.9    /  Alb  4.4    /  TBili  0.6    /  DBili  x      /  AST  22     /  ALT  15     /  AlkPhos  64     02 Mar 2022 11:01        Physical Exam:  Gen: NAD  L LE:   Skin intact, +TTP groin, no bony TTP elsewhere  Able to SLR, negative log roll/heel strike  +EHL/FHL/TA/GS function, sensation intact to light touch, DP 1+   Extremities warm, compartments soft        Imaging:   < from: CT Lumbar Spine No Cont (03.02.22 @ 11:55) >  ACC: [id number] EXAM:  CT LUMBAR SPINE                        PROCEDURE DATE:  03/02/2022      INTERPRETATION:  .  CLINICAL INFORMATION: Status post fall. Trauma. Pain.    TECHNIQUE: Multiple transaxial CT images of the lumbar spine was obtained   without contrast. Axial, sagittal and coronal reformatted images were   also reviewed.    COMPARISON: Prior CT examination of the lumbar spine dated 7/30/2021.    FINDINGS: There has been further interval loss of height at the L2   vertebral body which now appears severe when compared to the prior CT   exam. Sclerosis of the vertebral bodies appreciated. Mild posterosuperior   bony retropulsion is appreciated. An additional mild chronic compression   deformity of the L3 vertebral body appears unchanged. Additional mild   chronic compression deformity of the L1 and T11 vertebral bodies are seen.    No acute fractures or dislocations are seen. There is unchanged grade 1   spondylolisthesis of L5 on S1 with bilateral pars defects. No additional   loss of vertebral body height is seen. There is generalized osteopenia.   Lower lumbar facet arthrosis is seen. Degenerative disc disease and   vacuum disc phenomenon is seen most concentrated at the L5-S1 level.    Evaluation of the intraspinal contents is limited on CT modality.   Variable degrees of multilevel canal and foraminal narrowing are noted   secondary to degenerative changes and bulging discs.    There is a curvilinear lucency along the lateral aspect of the left iliac   bone reaching the cortex (series 5, images 249-261).    IMPRESSION: Suspicion of acute curvilinear nondisplaced fracture through   the left iliac bone. Recommend correlation with a dedicated pelvis CT   exam.    No acute fractures or dislocations within thelumbar spine.    Multilevel chronic compression fracture deformities of the T11, L1, L2,   and L3 vertebral bodies with further interval loss of height at L2 when   compared with 7/30/2021. Bony retropulsion at the L2 level is new.    Unchanged grade1 spondylolytic spondylolisthesis at the L5-S1 level.    Diffuse osteopenia.  --- End of Report ---      MATHEW GUTIERREZ MD; Attending Radiologist  This document has been electronically signed. Mar  2 2022 12:22PM  < end of copied text >           Orthopedics Consult Note:  85 y/o Male with PMH CAD s/p stents on Aspirin, HLD, COPD, SS/chronic lumbar VCFs, lung nodule presents to the ED c/o Lower back and b/l hip pain s/p unwitnessed fall today while ambulating to bathroom. Pt unsure as to why he fell. Per ED, patient being admitted for frequent falls. Pt states had difficulty ambulating after fall secondary to increased pain. Denies pain/injury elsewhere. Pt at baseline ambulates with walker due to increased difficulty from chronic back pain. CT lumbar spine showing left posterior ilium fracture, orthopedics consulted for further recommendations.      PAST MEDICAL HISTORY:  CAD s/p stents  HLD  COPD  Chronic lumbar VCFs      Allergies:  No Known Allergies  Intolerances        Vital Signs Last 24 Hrs  T(C): 36.7 (03-02-22 @ 10:01), Max: 36.7 (03-02-22 @ 10:01)  T(F): 98 (03-02-22 @ 10:01), Max: 98 (03-02-22 @ 10:01)  HR: 69 (03-02-22 @ 12:30) (69 - 85)  BP: 113/67 (03-02-22 @ 12:30) (99/62 - 123/68)  BP(mean): --  RR: 16 (03-02-22 @ 12:30) (16 - 18)  SpO2: 100% (03-02-22 @ 12:30) (98% - 100%)        Labs:                        11.4   7.85  )-----------( 175      ( 02 Mar 2022 11:01 )             34.3     02 Mar 2022 11:01    134    |  100    |  27     ----------------------------<  116    4.2     |  24     |  1.37     Ca    10.1       02 Mar 2022 11:01    TPro  6.9    /  Alb  4.4    /  TBili  0.6    /  DBili  x      /  AST  22     /  ALT  15     /  AlkPhos  64     02 Mar 2022 11:01        Physical Exam:  Gen: NAD  L LE:   Skin intact, +TTP sacrum, no bony TTP elsewhere  Able to SLR, negative log roll/heel strike  +EHL/FHL/TA/GS function, sensation intact to light touch, DP 1+   Extremities warm, compartments soft        Imaging:   < from: CT Lumbar Spine No Cont (03.02.22 @ 11:55) >  ACC: [id number] EXAM:  CT LUMBAR SPINE                        PROCEDURE DATE:  03/02/2022      INTERPRETATION:  .  CLINICAL INFORMATION: Status post fall. Trauma. Pain.    TECHNIQUE: Multiple transaxial CT images of the lumbar spine was obtained   without contrast. Axial, sagittal and coronal reformatted images were   also reviewed.    COMPARISON: Prior CT examination of the lumbar spine dated 7/30/2021.    FINDINGS: There has been further interval loss of height at the L2   vertebral body which now appears severe when compared to the prior CT   exam. Sclerosis of the vertebral bodies appreciated. Mild posterosuperior   bony retropulsion is appreciated. An additional mild chronic compression   deformity of the L3 vertebral body appears unchanged. Additional mild   chronic compression deformity of the L1 and T11 vertebral bodies are seen.    No acute fractures or dislocations are seen. There is unchanged grade 1   spondylolisthesis of L5 on S1 with bilateral pars defects. No additional   loss of vertebral body height is seen. There is generalized osteopenia.   Lower lumbar facet arthrosis is seen. Degenerative disc disease and   vacuum disc phenomenon is seen most concentrated at the L5-S1 level.    Evaluation of the intraspinal contents is limited on CT modality.   Variable degrees of multilevel canal and foraminal narrowing are noted   secondary to degenerative changes and bulging discs.    There is a curvilinear lucency along the lateral aspect of the left iliac   bone reaching the cortex (series 5, images 249-261).    IMPRESSION: Suspicion of acute curvilinear nondisplaced fracture through   the left iliac bone. Recommend correlation with a dedicated pelvis CT   exam.    No acute fractures or dislocations within thelumbar spine.    Multilevel chronic compression fracture deformities of the T11, L1, L2,   and L3 vertebral bodies with further interval loss of height at L2 when   compared with 7/30/2021. Bony retropulsion at the L2 level is new.    Unchanged grade1 spondylolytic spondylolisthesis at the L5-S1 level.    Diffuse osteopenia.  --- End of Report ---      MATHEW GUTIERREZ MD; Attending Radiologist  This document has been electronically signed. Mar  2 2022 12:22PM  < end of copied text >      CT pelvis personally reviewed demonstrating L inferior pubic ramus fracture and L nondisplaced posterior ilium fracture

## 2022-03-02 NOTE — ED PROVIDER NOTE - ATTENDING CONTRIBUTION TO CARE
I performed a face-to-face evaluation of the patient and performed a history and physical examination. I agree with the history and physical examination. If this was a PA visit, I personally saw the patient with the PA and performed a substantive portion of the visit including all aspects of the medical decision making.    H/o falls 2/2 low blood pressure. Occurred again. C/o low back, L hip, and neck pain. Check CT hip, back, head, neck. Labs, Pain meds. Admit.

## 2022-03-02 NOTE — H&P ADULT - HISTORY OF PRESENT ILLNESS
87yo M w/ PMHx of spinal stenosis, CAD (s/p PCI 2010 per pt), HLD, CKD, bilateral lung nodules, COPD, spindle cell neoplasm (s/p resection), presents after a fall. Pt reports legs buckling while in bathroom and was unable to reach walker on time. partner notes she immediately came to bathroom after fall and reports no LOC. Pt reports left sided pelvic pain, and well as right sided upper lateral rib region pain. Pt with scalp lacs, imaging noteworthy for  chronic vertebral fractures and multiple left sided pelvic fractures. Seen by ortho who recommend toe-touch weightbearing with walker; no surg intervention. CT head with abnormal soft tissue lesions along scalp vertex, with right parietal calvarial vertex  erosive changes raising suspicion fo malignancy. CXR non-acute; right sided rib series ordered. EKG nsr, hs trop 19,20. TTE from august with normal lv function but suboptimally visualized right heart.  Per partner at bedside and pt, pt suffering from recurrent falls attributed last August to possible seizure disorder, placed on keppra, but discontinued by neurologist in November. Pt currently on midodrine and salt tablets for orthostasis and pcp Dr Joy was set to increase salt tablet dose shortly  85yo M w/ PMHx of spinal stenosis, CAD (s/p PCI 2010 per pt), HLD, CKD, bilateral lung nodules, COPD, spindle cell neoplasm (s/p resection), presents after a fall. Pt reports legs buckling while in bathroom and was unable to reach walker on time. partner notes she immediately came to bathroom after fall and reports no LOC. Pt reports left sided pelvic pain, and well as right sided upper lateral rib region pain. Pt with scalp lacs, imaging noteworthy for  chronic vertebral fractures and multiple left sided pelvic fractures. Seen by ortho who recommend toe-touch weightbearing with walker; no surg intervention. CT spine negative. CT head with abnormal soft tissue lesions along scalp vertex, with right parietal calvarial vertex  erosive changes raising suspicion fo malignancy. CXR non-acute; right sided rib series ordered. EKG nsr, hs trop 19,20. TTE from august with normal lv function but suboptimally visualized right heart.  Per partner at bedside and pt, pt suffering from recurrent falls attributed last August to possible seizure disorder, placed on keppra, but discontinued by neurologist in November. Pt currently on midodrine and salt tablets for orthostasis and pcp Dr Joy was set to increase salt tablet dose shortly

## 2022-03-02 NOTE — H&P ADULT - PROBLEM SELECTOR PLAN 4
CT head with abnormal soft tissue lesions along scalp vertex, with right parietal calvarial vertex  erosive changes raising suspicion fo malignancy; unclear if findings correlate with scalp lacs, so will order MR to evaluate for malignancy

## 2022-03-02 NOTE — H&P ADULT - PROBLEM SELECTOR PLAN 2
-seen by ortho for multiple left sided pelvic fractures  -pain control   toe-touch weightbearing with walker

## 2022-03-02 NOTE — ED ADULT NURSE NOTE - OBJECTIVE STATEMENT
pt received spot 22. pt A+Ox3, coming from home s/p unwitnessed fall this morning c/o b/l hip pain. states his legs gave out and fell to the floor. +head injury. on baby asa. respirations even and unlabored. NSR on CM. VSS. IVSL in place. labs drawn and sent. NAD noted. will monitor.

## 2022-03-02 NOTE — H&P ADULT - NSHPREVIEWOFSYSTEMS_GEN_ALL_CORE
Review of Systems:   CONSTITUTIONAL: No fever, weight loss, or fatigue  EYES: No eye pain, visual disturbances, or discharge  ENMT:  No difficulty hearing, tinnitus, vertigo; No sinus or throat pain  NECK: + pain / stiffness (worse since fall)  RESPIRATORY: No cough, wheezing, chills or hemoptysis; No shortness of breath  CARDIOVASCULAR: No chest pain, palpitations, dizziness, or leg swelling  GASTROINTESTINAL: No abdominal or epigastric pain. No nausea, vomiting, or hematemesis; No diarrhea or constipation. No melena or hematochezia.  GENITOURINARY: No dysuria, frequency, hematuria, or incontinence  NEUROLOGICAL: No headaches, memory loss, loss of strength, numbness, or tremors  SKIN: No itching, burning, rashes, or lesions   LYMPH NODES: No enlarged glands  ENDOCRINE: No heat or cold intolerance; No hair loss  MUSCULOSKELETAL: No joint pain or swelling; No muscle, back, or extremity pain Review of Systems:   CONSTITUTIONAL: No fever, rigors  EYES: No eye pain, visual disturbances, or discharge  ENMT:  No difficulty hearing, tinnitus, vertigo; No sinus or throat pain  NECK: + pain / stiffness (worse since fall)  RESPIRATORY: No cough, wheezing, chills or hemoptysis; No shortness of breath  CARDIOVASCULAR: No chest pain, palpitations, dizziness, or leg swelling  GASTROINTESTINAL: No abdominal or epigastric pain. No nausea, vomiting, or hematemesis; No diarrhea or constipation. No melena or hematochezia.  GENITOURINARY: No dysuria, frequency, hematuria, or incontinence  NEUROLOGICAL: No headaches, memory loss, loss of strength, numbness, or tremors  SKIN: No itching, burning, rashes, or lesions   LYMPH NODES: No enlarged glands  ENDOCRINE: No heat or cold intolerance; No hair loss  MUSCULOSKELETAL: left side pelvic and right sided rib pain

## 2022-03-02 NOTE — ED ADULT NURSE REASSESSMENT NOTE - NS ED NURSE REASSESS COMMENT FT1
Break RN note- patient returning from CT. Patient resting quietly in bed, breathing even and nonlabored. No acute distress. Safety maintained. Awaiting results. Patient stable upon exiting the room.

## 2022-03-02 NOTE — ED PROVIDER NOTE - ENMT, MLM
Airway patent, Nasal mucosa clear. Mouth with normal mucosa. Throat has no vesicles, no oropharyngeal exudates and uvula is midline. Atraumatic no hematoma

## 2022-03-02 NOTE — CONSULT NOTE ADULT - ASSESSMENT
A/P: 87 y/o Male w/ L posterior ilium fracture, r/o LC2 fracture  -Pain control  -NEEDS CT PELVIS  -NEEDS XR AP PELVIS/INLET/OUTLET/JUDET Views  -Please expedite dedicated imaging of pelvis  -PT/OT/WBAT with assistive devices as needed   -No acute orthopedic surgical intervention  -Care per primary team   -Plan to be discussed with Dr. Ramesh, orthopedic attending on call A/P: 87 y/o Male w/ LC2 pelvis fracture  -Pain control  -PT/OT/TTWB with assistive devices as needed   -No acute orthopedic surgical intervention  -Care per primary team     Patient can follow up with Dr. Ramesh in the office 2 weeks after discharge. Call [phone] for appt A/P: 85 y/o Male w/ LC2 pelvis fracture  -Pain control  -PT/OT/ WBAT BLE with assistive devices as needed   -No acute orthopedic surgical intervention  -Care per primary team     Patient can follow up with Dr. Ramesh in the office 2 weeks after discharge. Call [phone] for appt

## 2022-03-03 ENCOUNTER — NON-APPOINTMENT (OUTPATIENT)
Age: 87
End: 2022-03-03

## 2022-03-03 LAB
ANION GAP SERPL CALC-SCNC: 12 MMOL/L — SIGNIFICANT CHANGE UP (ref 7–14)
APTT BLD: 30.8 SEC — SIGNIFICANT CHANGE UP (ref 27–36.3)
BASOPHILS # BLD AUTO: 0.02 K/UL — SIGNIFICANT CHANGE UP (ref 0–0.2)
BASOPHILS NFR BLD AUTO: 0.3 % — SIGNIFICANT CHANGE UP (ref 0–2)
BUN SERPL-MCNC: 26 MG/DL — HIGH (ref 7–23)
CALCIUM SERPL-MCNC: 9.5 MG/DL — SIGNIFICANT CHANGE UP (ref 8.4–10.5)
CHLORIDE SERPL-SCNC: 102 MMOL/L — SIGNIFICANT CHANGE UP (ref 98–107)
CO2 SERPL-SCNC: 23 MMOL/L — SIGNIFICANT CHANGE UP (ref 22–31)
CREAT SERPL-MCNC: 1.35 MG/DL — HIGH (ref 0.5–1.3)
EGFR: 51 ML/MIN/1.73M2 — LOW
EOSINOPHIL # BLD AUTO: 0.16 K/UL — SIGNIFICANT CHANGE UP (ref 0–0.5)
EOSINOPHIL NFR BLD AUTO: 2.7 % — SIGNIFICANT CHANGE UP (ref 0–6)
FLUAV AG NPH QL: SIGNIFICANT CHANGE UP
FLUBV AG NPH QL: SIGNIFICANT CHANGE UP
GLUCOSE SERPL-MCNC: 103 MG/DL — HIGH (ref 70–99)
HCT VFR BLD CALC: 33.1 % — LOW (ref 39–50)
HGB BLD-MCNC: 10.9 G/DL — LOW (ref 13–17)
IANC: 4.14 K/UL — SIGNIFICANT CHANGE UP (ref 1.5–8.5)
IMM GRANULOCYTES NFR BLD AUTO: 0.3 % — SIGNIFICANT CHANGE UP (ref 0–1.5)
INR BLD: 1.18 RATIO — HIGH (ref 0.88–1.16)
LYMPHOCYTES # BLD AUTO: 1.23 K/UL — SIGNIFICANT CHANGE UP (ref 1–3.3)
LYMPHOCYTES # BLD AUTO: 21 % — SIGNIFICANT CHANGE UP (ref 13–44)
MAGNESIUM SERPL-MCNC: 2 MG/DL — SIGNIFICANT CHANGE UP (ref 1.6–2.6)
MCHC RBC-ENTMCNC: 32.9 GM/DL — SIGNIFICANT CHANGE UP (ref 32–36)
MCHC RBC-ENTMCNC: 33 PG — SIGNIFICANT CHANGE UP (ref 27–34)
MCV RBC AUTO: 100.3 FL — HIGH (ref 80–100)
MONOCYTES # BLD AUTO: 0.28 K/UL — SIGNIFICANT CHANGE UP (ref 0–0.9)
MONOCYTES NFR BLD AUTO: 4.8 % — SIGNIFICANT CHANGE UP (ref 2–14)
NEUTROPHILS # BLD AUTO: 4.14 K/UL — SIGNIFICANT CHANGE UP (ref 1.8–7.4)
NEUTROPHILS NFR BLD AUTO: 70.9 % — SIGNIFICANT CHANGE UP (ref 43–77)
NRBC # BLD: 0 /100 WBCS — SIGNIFICANT CHANGE UP
NRBC # FLD: 0 K/UL — SIGNIFICANT CHANGE UP
PLATELET # BLD AUTO: 160 K/UL — SIGNIFICANT CHANGE UP (ref 150–400)
POTASSIUM SERPL-MCNC: 4.5 MMOL/L — SIGNIFICANT CHANGE UP (ref 3.5–5.3)
POTASSIUM SERPL-SCNC: 4.5 MMOL/L — SIGNIFICANT CHANGE UP (ref 3.5–5.3)
PROTHROM AB SERPL-ACNC: 13.7 SEC — HIGH (ref 10.5–13.4)
RBC # BLD: 3.3 M/UL — LOW (ref 4.2–5.8)
RBC # FLD: 12 % — SIGNIFICANT CHANGE UP (ref 10.3–14.5)
RSV RNA NPH QL NAA+NON-PROBE: SIGNIFICANT CHANGE UP
SARS-COV-2 RNA SPEC QL NAA+PROBE: SIGNIFICANT CHANGE UP
SODIUM SERPL-SCNC: 137 MMOL/L — SIGNIFICANT CHANGE UP (ref 135–145)
WBC # BLD: 5.85 K/UL — SIGNIFICANT CHANGE UP (ref 3.8–10.5)
WBC # FLD AUTO: 5.85 K/UL — SIGNIFICANT CHANGE UP (ref 3.8–10.5)

## 2022-03-03 PROCEDURE — 99233 SBSQ HOSP IP/OBS HIGH 50: CPT

## 2022-03-03 PROCEDURE — 99232 SBSQ HOSP IP/OBS MODERATE 35: CPT

## 2022-03-03 RX ORDER — SODIUM CHLORIDE 9 MG/ML
250 INJECTION INTRAMUSCULAR; INTRAVENOUS; SUBCUTANEOUS ONCE
Refills: 0 | Status: DISCONTINUED | OUTPATIENT
Start: 2022-03-03 | End: 2022-03-03

## 2022-03-03 RX ORDER — SODIUM CHLORIDE 9 MG/ML
2 INJECTION INTRAMUSCULAR; INTRAVENOUS; SUBCUTANEOUS
Refills: 0 | Status: DISCONTINUED | OUTPATIENT
Start: 2022-03-04 | End: 2022-03-08

## 2022-03-03 RX ORDER — SODIUM CHLORIDE 9 MG/ML
500 INJECTION INTRAMUSCULAR; INTRAVENOUS; SUBCUTANEOUS ONCE
Refills: 0 | Status: COMPLETED | OUTPATIENT
Start: 2022-03-03 | End: 2022-03-03

## 2022-03-03 RX ADMIN — HEPARIN SODIUM 5000 UNIT(S): 5000 INJECTION INTRAVENOUS; SUBCUTANEOUS at 21:49

## 2022-03-03 RX ADMIN — Medication 81 MILLIGRAM(S): at 14:21

## 2022-03-03 RX ADMIN — ZINC SULFATE TAB 220 MG (50 MG ZINC EQUIVALENT) 220 MILLIGRAM(S): 220 (50 ZN) TAB at 17:51

## 2022-03-03 RX ADMIN — MAGNESIUM OXIDE 400 MG ORAL TABLET 400 MILLIGRAM(S): 241.3 TABLET ORAL at 14:22

## 2022-03-03 RX ADMIN — SODIUM CHLORIDE 1 GRAM(S): 9 INJECTION INTRAMUSCULAR; INTRAVENOUS; SUBCUTANEOUS at 05:26

## 2022-03-03 RX ADMIN — MIDODRINE HYDROCHLORIDE 10 MILLIGRAM(S): 2.5 TABLET ORAL at 05:26

## 2022-03-03 RX ADMIN — MIDODRINE HYDROCHLORIDE 10 MILLIGRAM(S): 2.5 TABLET ORAL at 14:22

## 2022-03-03 RX ADMIN — SODIUM CHLORIDE 1000 MILLILITER(S): 9 INJECTION INTRAMUSCULAR; INTRAVENOUS; SUBCUTANEOUS at 15:01

## 2022-03-03 RX ADMIN — Medication 1 TABLET(S): at 14:22

## 2022-03-03 RX ADMIN — FAMOTIDINE 40 MILLIGRAM(S): 10 INJECTION INTRAVENOUS at 14:21

## 2022-03-03 RX ADMIN — SODIUM CHLORIDE 1 GRAM(S): 9 INJECTION INTRAMUSCULAR; INTRAVENOUS; SUBCUTANEOUS at 17:50

## 2022-03-03 RX ADMIN — HEPARIN SODIUM 5000 UNIT(S): 5000 INJECTION INTRAVENOUS; SUBCUTANEOUS at 05:26

## 2022-03-03 RX ADMIN — MIDODRINE HYDROCHLORIDE 10 MILLIGRAM(S): 2.5 TABLET ORAL at 21:49

## 2022-03-03 RX ADMIN — Medication 1 TABLET(S): at 17:51

## 2022-03-03 RX ADMIN — HEPARIN SODIUM 5000 UNIT(S): 5000 INJECTION INTRAVENOUS; SUBCUTANEOUS at 14:21

## 2022-03-03 RX ADMIN — SIMVASTATIN 20 MILLIGRAM(S): 20 TABLET, FILM COATED ORAL at 21:49

## 2022-03-03 RX ADMIN — Medication 5000 UNIT(S): at 17:52

## 2022-03-03 RX ADMIN — Medication 1000 MILLIGRAM(S): at 14:21

## 2022-03-03 NOTE — PHYSICAL THERAPY INITIAL EVALUATION ADULT - ADDITIONAL COMMENTS
Information regarding pt's prior level of function needs to be verified. Pt reports that he splits his time between his house and his son's apartment (although recently has been staying more at his son's place). His son's apartment is equipped with an elevator. Prior to hospital admission pt was ambulating independently using either a single axis cane or rolling walker. Pt has had a few recent falls where he states that his body shakes and he will find himself starring blankly. Pt also states that he did have a home health aide but recently let her go.    Pt left comfortable in bed, NAD, all lines intact, all precautions maintained, with call bell in reach, HOB elevated, bed alarm on, and RN aware of PT evaluation.

## 2022-03-03 NOTE — PROGRESS NOTE ADULT - PROBLEM SELECTOR PLAN 3
cxr no acute,   Inc Spir  rib series ordered   pain control cxr no acute,   Inc Spir  rib series done - f/u read.   pain control

## 2022-03-03 NOTE — PHYSICAL THERAPY INITIAL EVALUATION ADULT - MANUAL MUSCLE TESTING RESULTS, REHAB EVAL
spinal/cardiac precautions; bilateral UE at least 3+/5, right LE 3/5, left LE 3-/5/grossly assessed due to

## 2022-03-03 NOTE — PROGRESS NOTE ADULT - PROBLEM SELECTOR PLAN 4
CT head with abnormal soft tissue lesions along scalp vertex, with right parietal calvarial vertex  erosive changes raising suspicion fo malignancy; unclear if findings correlate with scalp lacs, so will order MR to evaluate for malignancy Mass on crown of head is a known finding. Has been followed by derm and surgical oncologist. Pt is s/p bx. Pls refer to ACP note on 3/3 for details.  Path concerning for malignancy - this will be further followed up as OP.      DVT ppx- HSQ

## 2022-03-03 NOTE — PROGRESS NOTE ADULT - PROBLEM SELECTOR PLAN 1
-possibly stemming from orthostasis; continue with midodrine Nacl tabltes; call PCP Dr Joy for planned increase dose of Nacl   -fall precuations  -orthostatics  PT as tolerated -possibly stemming from orthostasis; continue with midodrine Nacl tabltes;  -allscripts reviewed - pt salt tab dose inc to 2g BID. Will do the same.   -falls precaution.   -orthostatics check in the AM.   -PT rec KELLY - spoke w/ son who will speak w/ rest of family to decide.      #hypotension/orthostasis  -continue midodrine.   -inc salt tab to 2g BID [home dose]  -will f/u with PCP in AM.

## 2022-03-03 NOTE — PATIENT PROFILE ADULT - FUNCTIONAL ASSESSMENT - BASIC MOBILITY 3.
SUBJECTIVE:  44 year old.The patient has a complaint of gross hematria.  This started 15 hours ago.  quality red uurine Associated symptoms are none. ROS no fever or chills.  History of kidney stone      Reviewed health maintenance  Patient Active Problem List   Diagnosis     Exotropia, alternating     CARDIOVASCULAR SCREENING; LDL GOAL LESS THAN 160     High triglycerides     Giant papillary conjunctivitis left eye     Family history of glaucoma in mother     Coronary artery disease involving native heart without angina pectoris, unspecified vessel or lesion type     S/P CABG (coronary artery bypass graft)     PAF (paroxysmal atrial fibrillation) (H)     Past Medical History:   Diagnosis Date     Heart disease 6-28-21    Had bypass surgery on 7-1-21     History of blood transfusion 7-1-21    1 unit blood 1 unit plasma     Strabismus        OBJECTIVE:  no apparent distress  /70   Pulse 50   Temp 97.3  F (36.3  C) (Tympanic)   Wt 96.2 kg (212 lb)   SpO2 99%   BMI 25.81 kg/m      LUNGS:  CTA B/L, no wheezing or crackles.   Cardiovascular: negative, PMI normal. No lifts, heaves, or thrills. RRR. No murmurs, clicks gallops or rub   Gastrointestinal: Abdomen soft, non-tender. BS normal. No masses, organomegaly       ICD-10-CM    1. Gross hematuria  R31.0 **CBC with platelets differential FUTURE 2mo     **CBC with platelets differential FUTURE 2mo     CANCELED: UA with Microscopic reflex to Culture - lab collect   2. Blood in urine  R31.9 UA with Microscopic reflex to Culture - lab collect     UA with Microscopic reflex to Culture - lab collect     Urine Microscopic   3. Paroxysmal atrial fibrillation (H)  I48.0 Zio Patch Holter 24 hour Adult Pediatric    PLAN: await Zio patch  Total urine in 3 weeks if positive red cells then ct scan and cysto  Follow up with Cardiology 2 weeks          3 = A little assistance

## 2022-03-03 NOTE — CONSULT NOTE ADULT - ASSESSMENT
This is a 86 year old man with a pmhx of spinal stenosis, CAD (s/p PCI 2010 per pt), HLD, CKD, bilateral lung nodules, COPD, spindle cell neoplasm (s/p resection) who presented for a fall after urination. EP consulted for ILR but patient refused. Patient stated that he had an ILR  about 4-5 years ago which he removed 1-2 years after placement since no events were found.    Plan:  - Continuous telemetric monitoring  - Monitor electrolytes and replete K to 4 and Mg to 2  - Obtain orthostatic vitals  - Continue care per primary team    Luis F Sahni PA-C  Patient to be staffed with attending. Please await attending addendum This is a 86 year old man with a pmhx of spinal stenosis, CAD (s/p PCI 2010 per pt), HLD, CKD, bilateral lung nodules, COPD, spindle cell neoplasm (s/p resection) who presented to the ED for a fall after urinating. EP consulted for ILR but patient refused. Patient stated that he had an ILR  about 4-5 years ago which he removed 1-2 years after placement since no events were found.    Plan:  - Continuous telemetric monitoring  - Monitor electrolytes and replete K to 4 and Mg to 2  - Obtain orthostatic vitals  - Continue care per primary team    Luis F Sahni PA-C  Patient to be staffed with attending. Please await attending addendum

## 2022-03-03 NOTE — CONSULT NOTE ADULT - SUBJECTIVE AND OBJECTIVE BOX
Source: patient and Chart    HPI:  This is a 86 year old man with a pmhx of spinal stenosis, CAD (s/p PCI 2010 per pt), HLD, CKD, bilateral lung nodules, COPD, spindle cell neoplasm (s/p resection) who presented to the after a fall.     According to the patient, he woke up, walked to the bathroom, urinated and when attempting to walk out of the bathroom his legs buckled. Patient was unable to reach his walker in time. His partner immediately came to bathroom after fall and reported no LOC. Patient stated that he hit his right hip but denied head trauma. He admitted to pelvic pain. Patient denied fever, chills, diaphoresis, HA, lightheadedness, dizziness, changes in visions, cold like symptoms  (sore throat, cough, conjunctivitis runny nose), CP, palpitation, SOB, abdominal pain, n/v/d, hematuria, melena, hematochezia, numbness and tingling. Patient admitted to mutliple falls in the past which he stated was due to low blood pressure. He stated that his cardiologist is Dr. Joy  who has him on midodrine and salt tablets for orthostasis. He stated that his salt tablets was recently increase.     ED course was significant for T=98F  BP 99/62mmHg, HR 85BPM, RR 18/min spo2 98% on RA. Labs were significant for WBC 7.85, Hgb 11.4, Hgb 34.3, Plt 175, Na 134, K 4.2, BUN 27, sCr 1.37, LFT wln and covid-19 negative. Xray Hip with no fracture or dislocation of the pelvis/bilateral hips.  CT pelvic showed nondisplaced fractures at the left iliac bone, left sacrum and left puboacetabular junction; mildly comminuted; and minimally displaced left  inferior pubic ramus fracture. Head CT showed no acute intracranial hemorrhage, mass effect, or shift of the midline structures. CT head also showed abnormal soft tissue lesions along scalp vertex, with right parietal calvarial vertex  erosive changes raising suspicion fo malignancy. CXR revealed clear lungs. EKG nsr, hs trop 19,20. TTE from august with normal lv function but suboptimally visualized right heart.  Orthopedics were consulted for LC2 pelvic fracture and deemed no acute orthopedic surgical intervention. EP was consulted for ILR. Patient denied a hx of atrial arrythmia but stated that 4 year ago he had an "abnormal bump on the EKG". Patient stated that he had an ILR  about 4-5 year ago which he removed 1-2 years after placement since no events were found. Patient refusing ILR.       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)  Hypercholesteremia (ICD9 272.0)  CAD (Coronary Artery Disease) (ICD9 414.00)s/p stent to LAD 09  Syncope() as a result of Clopedigrel and seizure like jerking- stopped after Plavix was stopped  Cataract  Skin cancerBasal, Squamous - treated surgically  Lung nodulefollowed by annual CT Scan  Spinal stenosis  Sarcomaof scalp  Melanoma in situ of scalp  Chronic kidney disease (CKD)  History of COPD  PAD (peripheral artery disease)  Spindle cell carcinoma 2018  Spinal stenosis  Anemia  Lung nodule bilateral  Fracture of Nose (ICD9 802.0)40 yrs ago  Closed Fracture of Shoulder Blade (ICD9 811.00)L 8 yrs ago  Osteomyelitis (ICD9 730.20)R lower ribs after fracture &#x27;s requiring ABX and surgery  S/P PTCA (percutaneous transluminal coronary angioplasty)- with stent to LAD  Status post placement of implantable loop recorderimplanted in removed in   H/O cataract extraction, right  Skin cancer  S/P arthroscopy of shoulderleft  H/O pneumothoraxright lung s/p nail punctured right lung (30 years ago)  History of laparoscopic cholecystectomy   History of loop recorderwas removed in   S/P skin cancer resection  spindle cell carcinoma 2018 re-excision for residula disease  S/P skin biopsy of scalp          MEDICATIONS  (STANDING):  ascorbic acid 1000 milliGRAM(s) Oral daily  aspirin enteric coated 81 milliGRAM(s) Oral daily  calcium carbonate    500 mG (Tums) Chewable 1 Tablet(s) Chew daily  cholecalciferol 5000 Unit(s) Oral daily  diphtheria/tetanus/pertussis (acellular) Vaccine (ADAcel) 0.5 milliLiter(s) IntraMuscular once  famotidine    Tablet 40 milliGRAM(s) Oral daily  heparin   Injectable 5000 Unit(s) SubCutaneous every 8 hours  magnesium oxide 400 milliGRAM(s) Oral daily  midodrine. 10 milliGRAM(s) Oral three times a day  multivitamin 1 Tablet(s) Oral daily  simvastatin 20 milliGRAM(s) Oral at bedtime  sodium chloride 1 Gram(s) Oral two times a day  zinc sulfate 220 milliGRAM(s) Oral daily    MEDICATIONS  (PRN):  acetaminophen     Tablet .. 975 milliGRAM(s) Oral every 6 hours PRN Moderate Pain (4 - 6), Severe Pain (7 - 10)      FAMILY HISTORY:  Father hx of PE and CAD  Mother hx of bladder cancer    SOCIAL HISTORY:  LIVING SITUATION: Lives with partner and ambulates with a walker    REVIEW OF SYSTEMS:  CONSTITUTIONAL: No fever, weight loss, chills, shakes, or fatigue  RESPIRATORY: No cough, wheezing, hemoptysis, or shortness of breath  CARDIOVASCULAR: per HPI  GASTROINTESTINAL: No abdominal  or epigastric pain, nausea, vomiting, hematemesis, diarrhea, constipation, melena or bright red blood.  NEUROLOGICAL: No headaches, memory loss, slurred speech, limb weakness, loss of strength, numbness, or tremors  MUSCULOSKELETAL: No joint pain or swelling, muscle, or extremity pain, Admitted to chronic back pain, hip pain and pelvic pain    Vital Signs Last 24 Hrs  T(C): 36.9 (03 Mar 2022 05:18), Max: 36.9 (03 Mar 2022 05:18)  T(F): 98.4 (03 Mar 2022 05:18), Max: 98.4 (03 Mar 2022 05:18)  HR: 68 (03 Mar 2022 05:18) (65 - 85)  BP: 90/54 (03 Mar 2022 05:18) (90/54 - 127/70)  BP(mean): --  RR: 16 (03 Mar 2022 05:18) (15 - 18)  SpO2: 99% (03 Mar 2022 05:18) (98% - 100%)    PHYSICAL EXAM:  GENERAL: Well appearing, speaking in full sentence, in NAD  HEART: S1S2 RRR  PULMONARY:CTABL, normal respiratory effort.  ABDOMEN: Bowel sounds present, soft  EXTREMITIES:  Warm, well -perfused, no pedal edema, distal pulses present  NEUROLOGICAL:AOx3     INTERPRETATION OF TELEMETRY: SR     ECG:    I&O's Detail      LABS:                        10.9   5.85  )-----------( 160      ( 03 Mar 2022 06:35 )             33.1     03-03    137  |  102  |  26<H>  ----------------------------<  103<H>  4.5   |  23  |  1.35<H>    Ca    9.5      03 Mar 2022 06:35  Mg     2.00     03-03    TPro  6.9  /  Alb  4.4  /  TBili  0.6  /  DBili  x   /  AST  22  /  ALT  15  /  AlkPhos  64  03-02        PT/INR - ( 03 Mar 2022 06:35 )   PT: 13.7 sec;   INR: 1.18 ratio         PTT - ( 03 Mar 2022 06:35 )  PTT:30.8 sec  Urinalysis Basic - ( 02 Mar 2022 14:23 )    Color: Yellow / Appearance: Clear / S.022 / pH: x  Gluc: x / Ketone: Negative  / Bili: Negative / Urobili: <2 mg/dL   Blood: x / Protein: Trace / Nitrite: Negative   Leuk Esterase: Negative / RBC: x / WBC 0 /HPF   Sq Epi: x / Non Sq Epi: 1 /HPF / Bacteria: Negative      BNP  I&O's Detail    Daily Height in cm: 172.72 (02 Mar 2022 10:01)    Daily     RADIOLOGY & ADDITIONAL STUDIES:        ASSESSMENT:        RECOMMENDATIONS:  - Continuous telemetric monitoring  - Monitor electrolytes and replete K to 4 and Mg to 2  - Continue Eliquis for thromboembolic ppx  given that patient has a [id number] score of   - Continue care per primary team    Patient to be staffed with attending. Please await attending addendum Source: patient and Chart    HPI:  This is a 86 year old man with a pmhx of spinal stenosis, CAD (s/p PCI 2010 per pt), HLD, CKD, bilateral lung nodules, COPD, spindle cell neoplasm (s/p resection) who presented to the after a fall.     According to the patient, he woke up, walked to the bathroom, urinated and when attempting to walk out of the bathroom his legs buckled. Patient was unable to reach his walker in time. His partner immediately came to bathroom after fall and reported no LOC. Patient stated that he hit his right hip but denied head trauma. However, patient was noted to have a scalp laceration according to the EMR notes. He admitted to pelvic pain. Patient denied fever, chills, diaphoresis, HA, lightheadedness, dizziness, changes in visions, cold like symptoms  (sore throat, cough, conjunctivitis runny nose), CP, palpitation, SOB, abdominal pain, n/v/d, hematuria, melena, hematochezia, numbness and tingling. Patient admitted to mutliple falls in the past which he stated was due to low blood pressure. He stated that his cardiologist is Dr. Joy  who has him on midodrine and salt tablets for orthostasis. He stated that his salt tablets was recently increase.     ED course was significant for T=98F  BP 99/62mmHg, HR 85BPM, RR 18/min spo2 98% on RA. Labs were significant for WBC 7.85, Hgb 11.4, Hgb 34.3, Plt 175, Na 134, K 4.2, BUN 27, sCr 1.37, LFT wln, hs trop 19,20 and covid-19 negative. Xray Hip with no fracture or dislocation of the pelvis/bilateral hips.  CT pelvic showed nondisplaced fractures at the left iliac bone, left sacrum and left puboacetabular junction; mildly comminuted; and minimally displaced left  inferior pubic ramus fracture. Head CT showed no acute intracranial hemorrhage, mass effect, or shift of the midline structures. CT head also showed abnormal soft tissue lesions along scalp vertex, with right parietal calvarial vertex  erosive changes raising suspicion fo malignancy. CXR revealed clear lungs. EKG SR 72    . TTE from 2021  showed normal LV function but suboptimally visualized right heart.  Orthopedics were consulted for LC2 pelvic fracture and deemed no acute orthopedic surgical intervention. EP was consulted for ILR. Patient denied a hx of atrial arrythmia but stated that 4 year ago he had an "abnormal bump on the EKG". Patient stated that he had an ILR  about 4-5 years ago which he removed 1-2 years after placement since no events were found. Patient refusing ILR.       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)  Hypercholesteremia (ICD9 272.0)  CAD (Coronary Artery Disease) (ICD9 414.00)s/p stent to LAD 09  Syncope() as a result of Clopedigrel and seizure like jerking- stopped after Plavix was stopped  Cataract  Skin cancerBasal, Squamous - treated surgically  Lung nodulefollowed by annual CT Scan  Spinal stenosis  Sarcomaof scalp  Melanoma in situ of scalp  Chronic kidney disease (CKD)  History of COPD  PAD (peripheral artery disease)  Spindle cell carcinoma 2018  Spinal stenosis  Anemia  Lung nodule bilateral  Fracture of Nose (ICD9 802.0)40 yrs ago  Closed Fracture of Shoulder Blade (ICD9 811.00)L 8 yrs ago  Osteomyelitis (ICD9 730.20)R lower ribs after fracture &#x27;s requiring ABX and surgery  S/P PTCA (percutaneous transluminal coronary angioplasty)- with stent to LAD  Status post placement of implantable loop recorderimplanted in removed in   H/O cataract extraction, right  Skin cancer  S/P arthroscopy of shoulderleft  H/O pneumothoraxright lung s/p nail punctured right lung (30 years ago)  History of laparoscopic cholecystectomy   History of loop recorderwas removed in   S/P skin cancer resection  spindle cell carcinoma 2018 re-excision for residula disease  S/P skin biopsy of scalp          MEDICATIONS  (STANDING):  ascorbic acid 1000 milliGRAM(s) Oral daily  aspirin enteric coated 81 milliGRAM(s) Oral daily  calcium carbonate    500 mG (Tums) Chewable 1 Tablet(s) Chew daily  cholecalciferol 5000 Unit(s) Oral daily  diphtheria/tetanus/pertussis (acellular) Vaccine (ADAcel) 0.5 milliLiter(s) IntraMuscular once  famotidine    Tablet 40 milliGRAM(s) Oral daily  heparin   Injectable 5000 Unit(s) SubCutaneous every 8 hours  magnesium oxide 400 milliGRAM(s) Oral daily  midodrine. 10 milliGRAM(s) Oral three times a day  multivitamin 1 Tablet(s) Oral daily  simvastatin 20 milliGRAM(s) Oral at bedtime  sodium chloride 1 Gram(s) Oral two times a day  zinc sulfate 220 milliGRAM(s) Oral daily    MEDICATIONS  (PRN):  acetaminophen     Tablet .. 975 milliGRAM(s) Oral every 6 hours PRN Moderate Pain (4 - 6), Severe Pain (7 - 10)      FAMILY HISTORY:  Father hx of PE and CAD  Mother hx of bladder cancer    SOCIAL HISTORY:  LIVING SITUATION: Lives with partner and ambulates with a walker    REVIEW OF SYSTEMS:  CONSTITUTIONAL: No fever, weight loss, chills, shakes, or fatigue  RESPIRATORY: No cough, wheezing, hemoptysis, or shortness of breath  CARDIOVASCULAR: per HPI  GASTROINTESTINAL: No abdominal  or epigastric pain, nausea, vomiting, hematemesis, diarrhea, constipation, melena or bright red blood.  NEUROLOGICAL: No headaches, memory loss, slurred speech, limb weakness, loss of strength, numbness, or tremors  MUSCULOSKELETAL: No joint pain or swelling, muscle, or extremity pain, Admitted to chronic back pain, hip pain and pelvic pain    Vital Signs Last 24 Hrs  T(C): 36.9 (03 Mar 2022 05:18), Max: 36.9 (03 Mar 2022 05:18)  T(F): 98.4 (03 Mar 2022 05:18), Max: 98.4 (03 Mar 2022 05:18)  HR: 68 (03 Mar 2022 05:18) (65 - 85)  BP: 90/54 (03 Mar 2022 05:18) (90/54 - 127/70)  BP(mean): --  RR: 16 (03 Mar 2022 05:18) (15 - 18)  SpO2: 99% (03 Mar 2022 05:18) (98% - 100%)    PHYSICAL EXAM:  GENERAL: Well appearing, speaking in full sentence, in NAD  HEART: S1S2 RRR  PULMONARY:CTABL, normal respiratory effort.  ABDOMEN: Bowel sounds present, soft  EXTREMITIES:  Warm, well -perfused, no pedal edema, distal pulses present  NEUROLOGICAL:AOx3     INTERPRETATION OF TELEMETRY: SR HR 70s    ECG: sr 72 qtc 422    I&O's Detail      LABS:                        10.9   5.85  )-----------( 160      ( 03 Mar 2022 06:35 )             33.1     03-03    137  |  102  |  26<H>  ----------------------------<  103<H>  4.5   |  23  |  1.35<H>    Ca    9.5      03 Mar 2022 06:35  Mg     2.00     03-03    TPro  6.9  /  Alb  4.4  /  TBili  0.6  /  DBili  x   /  AST  22  /  ALT  15  /  AlkPhos  64  03-02        PT/INR - ( 03 Mar 2022 06:35 )   PT: 13.7 sec;   INR: 1.18 ratio         PTT - ( 03 Mar 2022 06:35 )  PTT:30.8 sec  Urinalysis Basic - ( 02 Mar 2022 14:23 )    Color: Yellow / Appearance: Clear / S.022 / pH: x  Gluc: x / Ketone: Negative  / Bili: Negative / Urobili: <2 mg/dL   Blood: x / Protein: Trace / Nitrite: Negative   Leuk Esterase: Negative / RBC: x / WBC 0 /HPF   Sq Epi: x / Non Sq Epi: 1 /HPF / Bacteria: Negative      BNP  I&O's Detail    Daily Height in cm: 172.72 (02 Mar 2022 10:01)    Daily     RADIOLOGY & ADDITIONAL STUDIES:    < from: TTE with Doppler (w/Cont) (21 @ 14:08) >  Dimensions:    Normal Values:  LA:     3.0    2.0 - 4.0 cm  Ao:     3.8    2.0 - 3.8 cm  SEPTUM: 0.8    0.6 - 1.2 cm  PWT:    0.9    0.6 - 1.1 cm  LVIDd:  4.1    3.0 - 5.6 cm  LVIDs:  2.2    1.8 - 4.0 cm  Derived variables:  LVMI: 59 g/m2  RWT: 0.43  EF (Visual Estimate): 75 %  Doppler Peak Velocity (m/sec): AoV=1.1  ------------------------------------------------------------------------  Observations:  Mitral Valve: Mitral annular calcification.  Aortic Valve/Aorta: Normal aortic valve.  Normal aortic root size.  Left Atrium: Normal left atrium.  Left Ventricle: Endocardial visualization enhanced with  intravenous injection of Ultrasonic Enhancing Agent  (Definity).  Normal left ventricular internal dimensions and wall  thickness.  Normal left ventricular systolic function. No segmental  wall motion abnormalities.  No left ventricular thrombus.  Impaired LV-relaxation with normal filling pressure.  Right Heart: Suboptimal visualization of the right heart.  Pericardium/Pleura: Normal pericardium with no pericardial  effusion.  Hemodynamic: No evidence of pulmonary hypertension.  ------------------------------------------------------------------------  Conclusions:  Technically difficult study  Endocardial visualization enhanced with intravenous  injection of Ultrasonic Enhancing Agent (Definity).  Normal left ventricular systolic function. No segmental  wall motion abnormalities.  Suboptimal visualization of the right heart.  ------------------------------------------------------------------------  Confirmed on  2021 - 15:15:10 by Godwin Solo MD, FASE  ------------------------------------------------------------------------      < from: CT Pelvis Bony Only No Cont (22 @ 17:14) >  Impression:  Nondisplaced fractures at the left iliac bone, left sacrum and left   puboacetabular junction. Mildly comminuted, minimally displaced left   inferior pubic ramus fracture.    < from: Xray Chest 2 Views PA/Lat (22 @ 12:50) >  IMPRESSION: Clear lungs.    < from: Xray Hip w/ Pelvis 2 Views, Bilateral (22 @ 12:50) >  IMPRESSION:  No fracture or dislocation of the pelvis/bilateral hips.    < end of copied text >    < from: CT Head No Cont (22 @ 11:55) >    IMPRESSION:    Cervical spine CT: No acute fractures or dislocations.    Similar-appearing multilevel cervical spondylosis.    Head CT: No acute intracranial hemorrhage, mass effect, or shift of the   midline structures.    New abnormal soft tissue lesions along the vertex of the scalp with right   parietal calvarial vertex erosive changes when compared to the prior CT   and MRI studies. Findings arehighly concerning for neoplasm such as   squamous cell carcinoma with other neoplasms not excluded. Recommend   correlation with direct visualization and/or biopsy, if clinically   warranted. Further evaluation with a contrast enhanced brain MRI study   can also be obtained to assess for potential dural encroachment, provided   there are no MRI contraindications.    < end of copied text >    < from: CT Cervical Spine No Cont (22 @ 11:55) >  IMPRESSION:    Cervical spine CT: No acute fractures or dislocations.    Similar-appearing multilevel cervical spondylosis.    Head CT: No acute intracranial hemorrhage, mass effect, or shift of the   midline structures.    New abnormal soft tissue lesions along the vertex of the scalp with right   parietal calvarial vertex erosive changes when compared to the prior CT   and MRI studies. Findings arehighly concerning for neoplasm such as   squamous cell carcinoma with other neoplasms not excluded. Recommend   correlation with direct visualization and/or biopsy, if clinically   warranted. Further evaluation with a contrast enhanced brain MRI study   can also be obtained to assess for potential dural encroachment, provided   there are no MRI contraindications.    < from: CT Lumbar Spine No Cont (22 @ 11:55) >  IMPRESSION: Suspicion of acute curvilinear nondisplaced fracture through   the left iliac bone. Recommend correlation with a dedicated pelvis CT   exam.    No acute fractures or dislocations within thelumbar spine.    Multilevel chronic compression fracture deformities of the T11, L1, L2,   and L3 vertebral bodies with further interval loss of height at L2 when   compared with 2021. Bony retropulsion at the L2 level is new.    Unchanged grade1 spondylolytic spondylolisthesis at the L5-S1 level.    Diffuse osteopenia.     Source: patient and Chart    HPI:  This is a 86 year old man with a pmhx of spinal stenosis, CAD (s/p PCI 2010 per pt), HLD, CKD, bilateral lung nodules, COPD, spindle cell neoplasm (s/p resection) who presented to the ED after a fall.     According to the patient, he woke up, walked to the bathroom, urinated and when attempting to walk out of the bathroom his legs buckled. Patient was unable to reach his walker in time. His partner immediately came to bathroom after the fall and reported no LOC. Patient stated that he hit his right hip but denied head trauma. However, patient was noted to have a scalp laceration according to the EMR notes. He admitted to pelvic pain. Patient denied fever, chills, diaphoresis, HA, lightheadedness, dizziness, changes in visions, cold like symptoms  (sore throat, cough, conjunctivitis runny nose), CP, palpitation, SOB, abdominal pain, n/v/d, hematuria, melena, hematochezia, numbness and tingling. Patient admitted to multiple falls in the past which he stated was due to "low blood pressure". He stated that his cardiologist is Dr. Joy who has him on midodrine and salt tablets for orthostasis. He stated that his salt tablets was recently increased for a short duration.     ED course was significant for T=98F, BP 99/62mmHg, HR 85BPM, RR 18/min spo2 98% on RA. Labs were significant for WBC 7.85, Hgb 11.4, Hgb 34.3, Plt 175, Na 134, K 4.2, BUN 27, sCr 1.37, LFT wln, hs trop 19,20 and covid-19 negative. Xray of the Hip showed no fracture or dislocation of the pelvis/bilateral hips.  CT pelvic showed nondisplaced fractures at the left iliac bone, left sacrum and left puboacetabular junction; mildly comminuted; and minimally displaced left  inferior pubic ramus fracture. Head CT showed no acute intracranial hemorrhage, mass effect, or shift of the midline structures. CT head also showed abnormal soft tissue lesions along scalp vertex, with right parietal calvarial vertex  erosive changes raising suspicion fo malignancy. CXR revealed clear lungs. EKG SR 72  . TTE from 2021  showed normal LV function but suboptimally visualized right heart.  Orthopedics were consulted for LC2 pelvic fracture and deemed no acute orthopedic surgical intervention. EP was consulted for ILR. Patient denied a hx of atrial arrythmias but stated that 4 year ago he had an "abnormal bump on the EKG". Patient stated that he had an ILR  about 4-5 years ago which he removed 1-2 years after placement since no events were found. Patient is currently refusing ILR.       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)  Hypercholesteremia (ICD9 272.0)  CAD (Coronary Artery Disease) (ICD9 414.00)s/p stent to LAD 09  Syncope() as a result of Clopedigrel and seizure like jerking- stopped after Plavix was stopped  Cataract  Skin cancerBasal, Squamous - treated surgically  Lung nodulefollowed by annual CT Scan  Spinal stenosis  Sarcomaof scalp  Melanoma in situ of scalp  Chronic kidney disease (CKD)  History of COPD  PAD (peripheral artery disease)  Spindle cell carcinoma 2018  Spinal stenosis  Anemia  Lung nodule bilateral  Fracture of Nose (ICD9 802.0)40 yrs ago  Closed Fracture of Shoulder Blade (ICD9 811.00)L 8 yrs ago  Osteomyelitis (ICD9 730.20)R lower ribs after fracture &#x27;s requiring ABX and surgery  S/P PTCA (percutaneous transluminal coronary angioplasty)- with stent to LAD  Status post placement of implantable loop recorderimplanted in removed in   H/O cataract extraction, right  Skin cancer  S/P arthroscopy of shoulderleft  H/O pneumothoraxright lung s/p nail punctured right lung (30 years ago)  History of laparoscopic cholecystectomy   History of loop recorderwas removed in   S/P skin cancer resection  spindle cell carcinoma 2018 re-excision for residula disease  S/P skin biopsy of scalp    MEDICATIONS  (STANDING):  ascorbic acid 1000 milliGRAM(s) Oral daily  aspirin enteric coated 81 milliGRAM(s) Oral daily  calcium carbonate    500 mG (Tums) Chewable 1 Tablet(s) Chew daily  cholecalciferol 5000 Unit(s) Oral daily  diphtheria/tetanus/pertussis (acellular) Vaccine (ADAcel) 0.5 milliLiter(s) IntraMuscular once  famotidine    Tablet 40 milliGRAM(s) Oral daily  heparin   Injectable 5000 Unit(s) SubCutaneous every 8 hours  magnesium oxide 400 milliGRAM(s) Oral daily  midodrine. 10 milliGRAM(s) Oral three times a day  multivitamin 1 Tablet(s) Oral daily  simvastatin 20 milliGRAM(s) Oral at bedtime  sodium chloride 1 Gram(s) Oral two times a day  zinc sulfate 220 milliGRAM(s) Oral daily    MEDICATIONS  (PRN):  acetaminophen     Tablet .. 975 milliGRAM(s) Oral every 6 hours PRN Moderate Pain (4 - 6), Severe Pain (7 - 10)      FAMILY HISTORY:  Father hx of PE and CAD  Mother hx of bladder cancer    SOCIAL HISTORY:  LIVING SITUATION: Lives with partner and ambulates with a walker    REVIEW OF SYSTEMS:  CONSTITUTIONAL: No fever, weight loss, chills, shakes, or fatigue  RESPIRATORY: No cough, wheezing, hemoptysis, or shortness of breath  CARDIOVASCULAR: per HPI  GASTROINTESTINAL: No abdominal  or epigastric pain, nausea, vomiting, hematemesis, diarrhea, constipation, melena or bright red blood.  NEUROLOGICAL: No headaches, memory loss, slurred speech, limb weakness, loss of strength, numbness, or tremors  MUSCULOSKELETAL: No joint pain or swelling, muscle, or extremity pain, Admitted to chronic back pain, hip pain and pelvic pain    Vital Signs Last 24 Hrs  T(C): 36.9 (03 Mar 2022 05:18), Max: 36.9 (03 Mar 2022 05:18)  T(F): 98.4 (03 Mar 2022 05:18), Max: 98.4 (03 Mar 2022 05:18)  HR: 68 (03 Mar 2022 05:18) (65 - 85)  BP: 90/54 (03 Mar 2022 05:18) (90/54 - 127/70)  BP(mean): --  RR: 16 (03 Mar 2022 05:18) (15 - 18)  SpO2: 99% (03 Mar 2022 05:18) (98% - 100%)    PHYSICAL EXAM:  GENERAL: Well appearing, speaking in full sentence, in NAD  HEENT: Multiple cystic like lesion on the head   HEART: S1S2 RRR  PULMONARY:CTABL, normal respiratory effort.  ABDOMEN: Bowel sounds present, soft  EXTREMITIES:  Warm, well -perfused, no pedal edema, distal pulses present  NEUROLOGICAL:AOx3     INTERPRETATION OF TELEMETRY: SR HR 70s    ECG: sr 72 qtc 422    I&O's Detail      LABS:                        10.9   5.85  )-----------( 160      ( 03 Mar 2022 06:35 )             33.1     03-    137  |  102  |  26<H>  ----------------------------<  103<H>  4.5   |  23  |  1.35<H>    Ca    9.5      03 Mar 2022 06:35  Mg     2.00     03-03    TPro  6.9  /  Alb  4.4  /  TBili  0.6  /  DBili  x   /  AST  22  /  ALT  15  /  AlkPhos  64  03-02        PT/INR - ( 03 Mar 2022 06:35 )   PT: 13.7 sec;   INR: 1.18 ratio         PTT - ( 03 Mar 2022 06:35 )  PTT:30.8 sec  Urinalysis Basic - ( 02 Mar 2022 14:23 )    Color: Yellow / Appearance: Clear / S.022 / pH: x  Gluc: x / Ketone: Negative  / Bili: Negative / Urobili: <2 mg/dL   Blood: x / Protein: Trace / Nitrite: Negative   Leuk Esterase: Negative / RBC: x / WBC 0 /HPF   Sq Epi: x / Non Sq Epi: 1 /HPF / Bacteria: Negative      BNP  I&O's Detail    Daily Height in cm: 172.72 (02 Mar 2022 10:01)    Daily     RADIOLOGY & ADDITIONAL STUDIES:    < from: TTE with Doppler (w/Cont) (21 @ 14:08) >  Dimensions:    Normal Values:  LA:     3.0    2.0 - 4.0 cm  Ao:     3.8    2.0 - 3.8 cm  SEPTUM: 0.8    0.6 - 1.2 cm  PWT:    0.9    0.6 - 1.1 cm  LVIDd:  4.1    3.0 - 5.6 cm  LVIDs:  2.2    1.8 - 4.0 cm  Derived variables:  LVMI: 59 g/m2  RWT: 0.43  EF (Visual Estimate): 75 %  Doppler Peak Velocity (m/sec): AoV=1.1  ------------------------------------------------------------------------  Observations:  Mitral Valve: Mitral annular calcification.  Aortic Valve/Aorta: Normal aortic valve.  Normal aortic root size.  Left Atrium: Normal left atrium.  Left Ventricle: Endocardial visualization enhanced with  intravenous injection of Ultrasonic Enhancing Agent  (Definity).  Normal left ventricular internal dimensions and wall  thickness.  Normal left ventricular systolic function. No segmental  wall motion abnormalities.  No left ventricular thrombus.  Impaired LV-relaxation with normal filling pressure.  Right Heart: Suboptimal visualization of the right heart.  Pericardium/Pleura: Normal pericardium with no pericardial  effusion.  Hemodynamic: No evidence of pulmonary hypertension.  ------------------------------------------------------------------------  Conclusions:  Technically difficult study  Endocardial visualization enhanced with intravenous  injection of Ultrasonic Enhancing Agent (Definity).  Normal left ventricular systolic function. No segmental  wall motion abnormalities.  Suboptimal visualization of the right heart.  ------------------------------------------------------------------------  Confirmed on  2021 - 15:15:10 by Godwin Solo MD, FASE  ------------------------------------------------------------------------      < from: CT Pelvis Bony Only No Cont (22 @ 17:14) >  Impression:  Nondisplaced fractures at the left iliac bone, left sacrum and left   puboacetabular junction. Mildly comminuted, minimally displaced left   inferior pubic ramus fracture.    < from: Xray Chest 2 Views PA/Lat (22 @ 12:50) >  IMPRESSION: Clear lungs.    < from: Xray Hip w/ Pelvis 2 Views, Bilateral (22 @ 12:50) >  IMPRESSION:  No fracture or dislocation of the pelvis/bilateral hips.    < end of copied text >    < from: CT Head No Cont (22 @ 11:55) >    IMPRESSION:    Cervical spine CT: No acute fractures or dislocations.    Similar-appearing multilevel cervical spondylosis.    Head CT: No acute intracranial hemorrhage, mass effect, or shift of the   midline structures.    New abnormal soft tissue lesions along the vertex of the scalp with right   parietal calvarial vertex erosive changes when compared to the prior CT   and MRI studies. Findings arehighly concerning for neoplasm such as   squamous cell carcinoma with other neoplasms not excluded. Recommend   correlation with direct visualization and/or biopsy, if clinically   warranted. Further evaluation with a contrast enhanced brain MRI study   can also be obtained to assess for potential dural encroachment, provided   there are no MRI contraindications.    < end of copied text >    < from: CT Cervical Spine No Cont (22 @ 11:55) >  IMPRESSION:    Cervical spine CT: No acute fractures or dislocations.    Similar-appearing multilevel cervical spondylosis.    Head CT: No acute intracranial hemorrhage, mass effect, or shift of the   midline structures.    New abnormal soft tissue lesions along the vertex of the scalp with right   parietal calvarial vertex erosive changes when compared to the prior CT   and MRI studies. Findings arehighly concerning for neoplasm such as   squamous cell carcinoma with other neoplasms not excluded. Recommend   correlation with direct visualization and/or biopsy, if clinically   warranted. Further evaluation with a contrast enhanced brain MRI study   can also be obtained to assess for potential dural encroachment, provided   there are no MRI contraindications.    < from: CT Lumbar Spine No Cont (22 @ 11:55) >  IMPRESSION: Suspicion of acute curvilinear nondisplaced fracture through   the left iliac bone. Recommend correlation with a dedicated pelvis CT   exam.    No acute fractures or dislocations within thelumbar spine.    Multilevel chronic compression fracture deformities of the T11, L1, L2,   and L3 vertebral bodies with further interval loss of height at L2 when   compared with 2021. Bony retropulsion at the L2 level is new.    Unchanged grade1 spondylolytic spondylolisthesis at the L5-S1 level.    Diffuse osteopenia.

## 2022-03-03 NOTE — PHYSICAL THERAPY INITIAL EVALUATION ADULT - PATIENT PROFILE REVIEW, REHAB EVAL
No Formal Activity Order in the Computer; spoke with RN Judith Izaguirre and Provider Shanel Sharp/yes No Formal Activity Order in the Computer; spoke with RN Judith Izaguirre and Provider Shanel Sharp prior to PT Evaluation--> Pt OK for PT consult/OOB activity/yes

## 2022-03-03 NOTE — PHYSICAL THERAPY INITIAL EVALUATION ADULT - PERTINENT HX OF CURRENT PROBLEM, REHAB EVAL
87yo M w/ PMHx of spinal stenosis, CAD (s/p PCI 2010 per pt), HLD, CKD, bilateral lung nodules, COPD, spindle cell neoplasm (s/p resection), presents after a fall.

## 2022-03-03 NOTE — PHYSICAL THERAPY INITIAL EVALUATION ADULT - DIAGNOSIS, PT EVAL
Pt admitted for Fall; CT Pelvis (+)Nondisplaced fractures at the left iliac bone, left sacrum and left puboacetabular junction. Mildly comminuted, minimally displaced left inferior pubic ramus fracture; CT of L/S No acute fractures or dislocations within the lumbar spine. Multilevel chronic compression fracture deformities of the T11, L1, L2,and L3, CT of Head (-)No acute intracranial hemorrhage, mass effect, or shift of the midline structures; CT of C/S (-)fx; pt presents with decreased strength, decreased balance, and difficulty with ambulation.

## 2022-03-03 NOTE — PROGRESS NOTE ADULT - PROBLEM SELECTOR PLAN 2
-seen by ortho for multiple left sided pelvic fractures  -pain control   toe-touch weightbearing with walker -seen by ortho for multiple left sided pelvic fractures  -pain control - no sig pain currently.    toe-touch weightbearing with walker  -WBAT, assistive device PRN.  -OP follow up.

## 2022-03-03 NOTE — ED ADULT NURSE REASSESSMENT NOTE - NS ED NURSE REASSESS COMMENT FT1
Break RN note -patient resting quietly in bed, breathing even and nonlabored. No acute distress. Cardiac monitor in place- sinus rhythm. Patient appears comfortable. No complaints at this time. Safety maintained. Patient stable upon exiting the room.

## 2022-03-03 NOTE — CHART NOTE - NSCHARTNOTEFT_GEN_A_CORE
Alerted by RN, Pt with BP 76/44mmhg, repeated manually. Pt seen and examined, resting comfortably in bed without complaints. Denies dizziness, lightheadedness, weakness, chest pain, abd pain, N/V/D. Pt states he was walking with PT shortly before RN noted hypotension. He reports feeling well while working with PT. He states he has been having syncopal events prior to admission in which he notes dizziness and states his vision "goes white," but did not note that today working with PT or while hypotensive.    BP: 76/44mmhg HR 67bpm RR 16  O2 sat 100% on RA T 98.3  A+O x 3, NAD, comfortable in bed  RRR +S1S2  CTA b/l no w/r/r  Abd: soft NT/ND  LE no edema                          10.9   5.85  )-----------( 160      ( 03 Mar 2022 06:35 )             33.1   03-03    137  |  102  |  26<H>  ----------------------------<  103<H>  4.5   |  23  |  1.35<H>    Ca    9.5      03 Mar 2022 06:35  Mg     2.00     03-03    TPro  6.9  /  Alb  4.4  /  TBili  0.6  /  DBili  x   /  AST  22  /  ALT  15  /  AlkPhos  64  03-02    85 y/o M, with a PmHx of CAD s/p stents on Aspirin, HLD, COPD, SS/chronic lumbar VCFs, lung nodule, presents to the ED c/o lower back and b/l hip pain s/p unwitnessed fall today while ambulating to bathroom, now with asymptomatic hypotension.   1) Hypotension: Discussed with Dr. Marshall, recommend 500cc bolus given STAT. Orthostatic precautions reviewed with Pt. Will check formal orthostatic vitals tomorrow once BP stabilizes. Will continue to monitor and reassess BP following bolus.

## 2022-03-03 NOTE — CONSULT NOTE ADULT - ATTENDING COMMENTS
86 year old man with a pmhx of spinal stenosis, CAD (s/p PCI 2010 per pt), HLD, CKD, bilateral lung nodules, COPD, spindle cell neoplasm (s/p resection) who presented to the ED for a fall after urinating. EP consulted for ILR but patient refused. Patient stated that he had an ILR  about 4-5 years ago which he removed 1-2 years after placement since no events were found. Cont tele, orthostatics.

## 2022-03-03 NOTE — PHYSICAL THERAPY INITIAL EVALUATION ADULT - ASSISTIVE DEVICE FOR TRANSFER: STAND/SIT, REHAB EVAL
rolling walker
Ears: no ear pain and no hearing problems. Nose: no nasal congestion and no nasal drainage. Mouth/Throat: no dysphagia, no hoarseness and no throat pain. Neck: no lumps, no pain, no stiffness and no swollen glands.

## 2022-03-03 NOTE — PATIENT PROFILE ADULT - FALL HARM RISK - HARM RISK INTERVENTIONS

## 2022-03-03 NOTE — CHART NOTE - NSCHARTNOTEFT_GEN_A_CORE
Pt reports outpatient scalp biopsy performed with Dr. Tobias Dermatologist on [address] ~ 2 weeks ago, results obtained below and reviewed with Dr. Marshall, no need for MRI brain at this time as there is ongoing work up for scalp lesion with outpatient dermatologist and surgeon Dr. Gonzalo Stevens.     Biopsy:   1. Right anterior crown,   shave biopsy  - Epidermis with acute inflammation, dermal fibrosis, telangiectasia  and milxed inflammation  Note:  Step sections have been examined. PAS stain fails to reveal fungal  elements.    2. Right posterior crown,   shave biopsy  - Actinic keratosis, inflamed and crusted, and dermal fibrosis  Note:  Step sections have been examined. PAS stain fails to reveal fungal  elements.    SCALP MASS, FNA: POSITIVE FOR MALIGNANT NEOPLASM  "Cytology slides display a cellular specimen composed of disorganized  groups and singly lying neoplastic small to large cells with high  nuclear:cytoplasmic ratio, occasional bi nucleation, prominent nucleoli, anisonucleosis and moderate amount of cytoplasm. Giant multinucleated  cells and mitotic figures are present. 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 Ancillary studies to  further classify the tumor cannot be performed due to lack of material. Please see concurrent biopsy specimen report [id number]."

## 2022-03-04 LAB
ALBUMIN SERPL ELPH-MCNC: 3.9 G/DL — SIGNIFICANT CHANGE UP (ref 3.3–5)
ALP SERPL-CCNC: 58 U/L — SIGNIFICANT CHANGE UP (ref 40–120)
ALT FLD-CCNC: 14 U/L — SIGNIFICANT CHANGE UP (ref 4–41)
ANION GAP SERPL CALC-SCNC: 12 MMOL/L — SIGNIFICANT CHANGE UP (ref 7–14)
AST SERPL-CCNC: 24 U/L — SIGNIFICANT CHANGE UP (ref 4–40)
BASOPHILS # BLD AUTO: 0.04 K/UL — SIGNIFICANT CHANGE UP (ref 0–0.2)
BASOPHILS NFR BLD AUTO: 0.7 % — SIGNIFICANT CHANGE UP (ref 0–2)
BILIRUB SERPL-MCNC: 0.6 MG/DL — SIGNIFICANT CHANGE UP (ref 0.2–1.2)
BUN SERPL-MCNC: 29 MG/DL — HIGH (ref 7–23)
CALCIUM SERPL-MCNC: 9.1 MG/DL — SIGNIFICANT CHANGE UP (ref 8.4–10.5)
CHLORIDE SERPL-SCNC: 102 MMOL/L — SIGNIFICANT CHANGE UP (ref 98–107)
CO2 SERPL-SCNC: 22 MMOL/L — SIGNIFICANT CHANGE UP (ref 22–31)
CREAT SERPL-MCNC: 1.24 MG/DL — SIGNIFICANT CHANGE UP (ref 0.5–1.3)
EGFR: 57 ML/MIN/1.73M2 — LOW
EOSINOPHIL # BLD AUTO: 0.29 K/UL — SIGNIFICANT CHANGE UP (ref 0–0.5)
EOSINOPHIL NFR BLD AUTO: 5 % — SIGNIFICANT CHANGE UP (ref 0–6)
GLUCOSE BLDC GLUCOMTR-MCNC: 93 MG/DL — SIGNIFICANT CHANGE UP (ref 70–99)
GLUCOSE SERPL-MCNC: 92 MG/DL — SIGNIFICANT CHANGE UP (ref 70–99)
HCT VFR BLD CALC: 31 % — LOW (ref 39–50)
HGB BLD-MCNC: 10 G/DL — LOW (ref 13–17)
IANC: 3.96 K/UL — SIGNIFICANT CHANGE UP (ref 1.5–8.5)
IMM GRANULOCYTES NFR BLD AUTO: 0.2 % — SIGNIFICANT CHANGE UP (ref 0–1.5)
LYMPHOCYTES # BLD AUTO: 1.19 K/UL — SIGNIFICANT CHANGE UP (ref 1–3.3)
LYMPHOCYTES # BLD AUTO: 20.3 % — SIGNIFICANT CHANGE UP (ref 13–44)
MCHC RBC-ENTMCNC: 32.3 GM/DL — SIGNIFICANT CHANGE UP (ref 32–36)
MCHC RBC-ENTMCNC: 32.8 PG — SIGNIFICANT CHANGE UP (ref 27–34)
MCV RBC AUTO: 101.6 FL — HIGH (ref 80–100)
MONOCYTES # BLD AUTO: 0.36 K/UL — SIGNIFICANT CHANGE UP (ref 0–0.9)
MONOCYTES NFR BLD AUTO: 6.2 % — SIGNIFICANT CHANGE UP (ref 2–14)
NEUTROPHILS # BLD AUTO: 3.96 K/UL — SIGNIFICANT CHANGE UP (ref 1.8–7.4)
NEUTROPHILS NFR BLD AUTO: 67.6 % — SIGNIFICANT CHANGE UP (ref 43–77)
NRBC # BLD: 0 /100 WBCS — SIGNIFICANT CHANGE UP
NRBC # FLD: 0 K/UL — SIGNIFICANT CHANGE UP
PLATELET # BLD AUTO: 150 K/UL — SIGNIFICANT CHANGE UP (ref 150–400)
POTASSIUM SERPL-MCNC: 4.4 MMOL/L — SIGNIFICANT CHANGE UP (ref 3.5–5.3)
POTASSIUM SERPL-SCNC: 4.4 MMOL/L — SIGNIFICANT CHANGE UP (ref 3.5–5.3)
PROT SERPL-MCNC: 6.3 G/DL — SIGNIFICANT CHANGE UP (ref 6–8.3)
RBC # BLD: 3.05 M/UL — LOW (ref 4.2–5.8)
RBC # FLD: 11.9 % — SIGNIFICANT CHANGE UP (ref 10.3–14.5)
SODIUM SERPL-SCNC: 136 MMOL/L — SIGNIFICANT CHANGE UP (ref 135–145)
WBC # BLD: 5.85 K/UL — SIGNIFICANT CHANGE UP (ref 3.8–10.5)
WBC # FLD AUTO: 5.85 K/UL — SIGNIFICANT CHANGE UP (ref 3.8–10.5)

## 2022-03-04 PROCEDURE — 99232 SBSQ HOSP IP/OBS MODERATE 35: CPT

## 2022-03-04 PROCEDURE — 71101 X-RAY EXAM UNILAT RIBS/CHEST: CPT | Mod: 26,RT

## 2022-03-04 RX ADMIN — MIDODRINE HYDROCHLORIDE 10 MILLIGRAM(S): 2.5 TABLET ORAL at 14:18

## 2022-03-04 RX ADMIN — MIDODRINE HYDROCHLORIDE 10 MILLIGRAM(S): 2.5 TABLET ORAL at 05:07

## 2022-03-04 RX ADMIN — SIMVASTATIN 20 MILLIGRAM(S): 20 TABLET, FILM COATED ORAL at 22:23

## 2022-03-04 RX ADMIN — MIDODRINE HYDROCHLORIDE 10 MILLIGRAM(S): 2.5 TABLET ORAL at 22:23

## 2022-03-04 RX ADMIN — HEPARIN SODIUM 5000 UNIT(S): 5000 INJECTION INTRAVENOUS; SUBCUTANEOUS at 14:48

## 2022-03-04 RX ADMIN — Medication 1 TABLET(S): at 17:33

## 2022-03-04 RX ADMIN — Medication 1 TABLET(S): at 14:19

## 2022-03-04 RX ADMIN — MAGNESIUM OXIDE 400 MG ORAL TABLET 400 MILLIGRAM(S): 241.3 TABLET ORAL at 14:22

## 2022-03-04 RX ADMIN — HEPARIN SODIUM 5000 UNIT(S): 5000 INJECTION INTRAVENOUS; SUBCUTANEOUS at 05:08

## 2022-03-04 RX ADMIN — HEPARIN SODIUM 5000 UNIT(S): 5000 INJECTION INTRAVENOUS; SUBCUTANEOUS at 22:24

## 2022-03-04 RX ADMIN — FAMOTIDINE 40 MILLIGRAM(S): 10 INJECTION INTRAVENOUS at 14:19

## 2022-03-04 RX ADMIN — Medication 1000 MILLIGRAM(S): at 14:19

## 2022-03-04 RX ADMIN — Medication 5000 UNIT(S): at 17:33

## 2022-03-04 RX ADMIN — Medication 81 MILLIGRAM(S): at 14:18

## 2022-03-04 RX ADMIN — SODIUM CHLORIDE 2 GRAM(S): 9 INJECTION INTRAMUSCULAR; INTRAVENOUS; SUBCUTANEOUS at 05:07

## 2022-03-04 RX ADMIN — ZINC SULFATE TAB 220 MG (50 MG ZINC EQUIVALENT) 220 MILLIGRAM(S): 220 (50 ZN) TAB at 17:33

## 2022-03-04 RX ADMIN — SODIUM CHLORIDE 2 GRAM(S): 9 INJECTION INTRAMUSCULAR; INTRAVENOUS; SUBCUTANEOUS at 17:33

## 2022-03-04 NOTE — PROGRESS NOTE ADULT - PROBLEM SELECTOR PLAN 2
-seen by ortho for multiple left sided pelvic fractures  -pain control - no sig pain currently.    toe-touch weightbearing with walker  -WBAT, assistive device PRN.  -OP follow up.

## 2022-03-04 NOTE — PROGRESS NOTE ADULT - PROBLEM SELECTOR PLAN 1
-possibly stemming from orthostasis  -falls precaution.   -F/u repeat orthostatics.   -PT rec [facility] - family in agreement.      #hypotension/orthostasis  -continue midodrine.   -Cont salt tab 2g BID   -Add on compression stockings.  -Await PCP follow up.   -No sig events on telemetry.

## 2022-03-05 DIAGNOSIS — R79.89 OTHER SPECIFIED ABNORMAL FINDINGS OF BLOOD CHEMISTRY: ICD-10-CM

## 2022-03-05 LAB
-  AMIKACIN: SIGNIFICANT CHANGE UP
-  AMOXICILLIN/CLAVULANIC ACID: SIGNIFICANT CHANGE UP
-  AMPICILLIN/SULBACTAM: SIGNIFICANT CHANGE UP
-  AMPICILLIN: SIGNIFICANT CHANGE UP
-  AZTREONAM: SIGNIFICANT CHANGE UP
-  CEFAZOLIN: SIGNIFICANT CHANGE UP
-  CEFEPIME: SIGNIFICANT CHANGE UP
-  CEFOXITIN: SIGNIFICANT CHANGE UP
-  CEFTRIAXONE: SIGNIFICANT CHANGE UP
-  CIPROFLOXACIN: SIGNIFICANT CHANGE UP
-  ERTAPENEM: SIGNIFICANT CHANGE UP
-  GENTAMICIN: SIGNIFICANT CHANGE UP
-  LEVOFLOXACIN: SIGNIFICANT CHANGE UP
-  MEROPENEM: SIGNIFICANT CHANGE UP
-  NITROFURANTOIN: SIGNIFICANT CHANGE UP
-  PIPERACILLIN/TAZOBACTAM: SIGNIFICANT CHANGE UP
-  TOBRAMYCIN: SIGNIFICANT CHANGE UP
-  TRIMETHOPRIM/SULFAMETHOXAZOLE: SIGNIFICANT CHANGE UP
ANION GAP SERPL CALC-SCNC: 10 MMOL/L — SIGNIFICANT CHANGE UP (ref 7–14)
BUN SERPL-MCNC: 34 MG/DL — HIGH (ref 7–23)
CALCIUM SERPL-MCNC: 9.1 MG/DL — SIGNIFICANT CHANGE UP (ref 8.4–10.5)
CHLORIDE SERPL-SCNC: 103 MMOL/L — SIGNIFICANT CHANGE UP (ref 98–107)
CO2 SERPL-SCNC: 23 MMOL/L — SIGNIFICANT CHANGE UP (ref 22–31)
CREAT SERPL-MCNC: 1.45 MG/DL — HIGH (ref 0.5–1.3)
CULTURE RESULTS: SIGNIFICANT CHANGE UP
EGFR: 47 ML/MIN/1.73M2 — LOW
GLUCOSE SERPL-MCNC: 99 MG/DL — SIGNIFICANT CHANGE UP (ref 70–99)
METHOD TYPE: SIGNIFICANT CHANGE UP
ORGANISM # SPEC MICROSCOPIC CNT: SIGNIFICANT CHANGE UP
ORGANISM # SPEC MICROSCOPIC CNT: SIGNIFICANT CHANGE UP
POTASSIUM SERPL-MCNC: 4.7 MMOL/L — SIGNIFICANT CHANGE UP (ref 3.5–5.3)
POTASSIUM SERPL-SCNC: 4.7 MMOL/L — SIGNIFICANT CHANGE UP (ref 3.5–5.3)
SODIUM SERPL-SCNC: 136 MMOL/L — SIGNIFICANT CHANGE UP (ref 135–145)
SPECIMEN SOURCE: SIGNIFICANT CHANGE UP

## 2022-03-05 PROCEDURE — 99232 SBSQ HOSP IP/OBS MODERATE 35: CPT

## 2022-03-05 RX ORDER — FLUDROCORTISONE ACETATE 0.1 MG/1
0.1 TABLET ORAL DAILY
Refills: 0 | Status: DISCONTINUED | OUTPATIENT
Start: 2022-03-05 | End: 2022-03-07

## 2022-03-05 RX ADMIN — ZINC SULFATE TAB 220 MG (50 MG ZINC EQUIVALENT) 220 MILLIGRAM(S): 220 (50 ZN) TAB at 11:09

## 2022-03-05 RX ADMIN — MIDODRINE HYDROCHLORIDE 10 MILLIGRAM(S): 2.5 TABLET ORAL at 06:19

## 2022-03-05 RX ADMIN — SIMVASTATIN 20 MILLIGRAM(S): 20 TABLET, FILM COATED ORAL at 21:07

## 2022-03-05 RX ADMIN — Medication 5000 UNIT(S): at 11:09

## 2022-03-05 RX ADMIN — HEPARIN SODIUM 5000 UNIT(S): 5000 INJECTION INTRAVENOUS; SUBCUTANEOUS at 13:17

## 2022-03-05 RX ADMIN — MIDODRINE HYDROCHLORIDE 10 MILLIGRAM(S): 2.5 TABLET ORAL at 18:13

## 2022-03-05 RX ADMIN — Medication 1000 MILLIGRAM(S): at 11:09

## 2022-03-05 RX ADMIN — SODIUM CHLORIDE 2 GRAM(S): 9 INJECTION INTRAMUSCULAR; INTRAVENOUS; SUBCUTANEOUS at 18:13

## 2022-03-05 RX ADMIN — Medication 1 TABLET(S): at 11:08

## 2022-03-05 RX ADMIN — Medication 81 MILLIGRAM(S): at 11:08

## 2022-03-05 RX ADMIN — SODIUM CHLORIDE 2 GRAM(S): 9 INJECTION INTRAMUSCULAR; INTRAVENOUS; SUBCUTANEOUS at 06:18

## 2022-03-05 RX ADMIN — HEPARIN SODIUM 5000 UNIT(S): 5000 INJECTION INTRAVENOUS; SUBCUTANEOUS at 21:08

## 2022-03-05 RX ADMIN — FLUDROCORTISONE ACETATE 0.1 MILLIGRAM(S): 0.1 TABLET ORAL at 18:37

## 2022-03-05 RX ADMIN — MIDODRINE HYDROCHLORIDE 10 MILLIGRAM(S): 2.5 TABLET ORAL at 11:08

## 2022-03-05 RX ADMIN — HEPARIN SODIUM 5000 UNIT(S): 5000 INJECTION INTRAVENOUS; SUBCUTANEOUS at 06:20

## 2022-03-05 RX ADMIN — FAMOTIDINE 40 MILLIGRAM(S): 10 INJECTION INTRAVENOUS at 11:08

## 2022-03-05 RX ADMIN — MAGNESIUM OXIDE 400 MG ORAL TABLET 400 MILLIGRAM(S): 241.3 TABLET ORAL at 11:09

## 2022-03-05 RX ADMIN — Medication 1 TABLET(S): at 11:09

## 2022-03-05 NOTE — PROGRESS NOTE ADULT - PROBLEM SELECTOR PLAN 3
Cr trended up. Baseline ~1.3, though historical labs have been higher.  Could be 2' hypotensive episodes.  Trend for now.   Could have underlying CKD3.

## 2022-03-05 NOTE — PROGRESS NOTE ADULT - PROBLEM SELECTOR PLAN 1
-possibly stemming from orthostasis  -falls precaution.   -Remains orthostatic - trend daily.  -PT rec [facility] - family in agreement - f/u SW.      #hypotension/orthostasis  -continue midodrine.   -Cont salt tab 2g BID   -Cont compression stockings (ace wrapped currently)  -Appreciate f/u from Dr Zurita - added florinef on 3/5. Titrate PRN.   -No sig events on telemetry - cont to monitor - can consider coming off tele if no sig events.

## 2022-03-06 LAB
ANION GAP SERPL CALC-SCNC: 12 MMOL/L — SIGNIFICANT CHANGE UP (ref 7–14)
BUN SERPL-MCNC: 35 MG/DL — HIGH (ref 7–23)
CALCIUM SERPL-MCNC: 9.2 MG/DL — SIGNIFICANT CHANGE UP (ref 8.4–10.5)
CHLORIDE SERPL-SCNC: 102 MMOL/L — SIGNIFICANT CHANGE UP (ref 98–107)
CO2 SERPL-SCNC: 22 MMOL/L — SIGNIFICANT CHANGE UP (ref 22–31)
CREAT SERPL-MCNC: 1.45 MG/DL — HIGH (ref 0.5–1.3)
EGFR: 47 ML/MIN/1.73M2 — LOW
GLUCOSE SERPL-MCNC: 101 MG/DL — HIGH (ref 70–99)
POTASSIUM SERPL-MCNC: 4.4 MMOL/L — SIGNIFICANT CHANGE UP (ref 3.5–5.3)
POTASSIUM SERPL-SCNC: 4.4 MMOL/L — SIGNIFICANT CHANGE UP (ref 3.5–5.3)
SODIUM SERPL-SCNC: 136 MMOL/L — SIGNIFICANT CHANGE UP (ref 135–145)

## 2022-03-06 PROCEDURE — 99232 SBSQ HOSP IP/OBS MODERATE 35: CPT

## 2022-03-06 RX ADMIN — Medication 1000 MILLIGRAM(S): at 11:35

## 2022-03-06 RX ADMIN — HEPARIN SODIUM 5000 UNIT(S): 5000 INJECTION INTRAVENOUS; SUBCUTANEOUS at 05:28

## 2022-03-06 RX ADMIN — MAGNESIUM OXIDE 400 MG ORAL TABLET 400 MILLIGRAM(S): 241.3 TABLET ORAL at 11:36

## 2022-03-06 RX ADMIN — Medication 1 TABLET(S): at 11:35

## 2022-03-06 RX ADMIN — MIDODRINE HYDROCHLORIDE 10 MILLIGRAM(S): 2.5 TABLET ORAL at 11:34

## 2022-03-06 RX ADMIN — SODIUM CHLORIDE 2 GRAM(S): 9 INJECTION INTRAMUSCULAR; INTRAVENOUS; SUBCUTANEOUS at 18:35

## 2022-03-06 RX ADMIN — HEPARIN SODIUM 5000 UNIT(S): 5000 INJECTION INTRAVENOUS; SUBCUTANEOUS at 21:39

## 2022-03-06 RX ADMIN — MIDODRINE HYDROCHLORIDE 10 MILLIGRAM(S): 2.5 TABLET ORAL at 05:28

## 2022-03-06 RX ADMIN — HEPARIN SODIUM 5000 UNIT(S): 5000 INJECTION INTRAVENOUS; SUBCUTANEOUS at 14:36

## 2022-03-06 RX ADMIN — SIMVASTATIN 20 MILLIGRAM(S): 20 TABLET, FILM COATED ORAL at 21:39

## 2022-03-06 RX ADMIN — FLUDROCORTISONE ACETATE 0.1 MILLIGRAM(S): 0.1 TABLET ORAL at 05:29

## 2022-03-06 RX ADMIN — ZINC SULFATE TAB 220 MG (50 MG ZINC EQUIVALENT) 220 MILLIGRAM(S): 220 (50 ZN) TAB at 11:36

## 2022-03-06 RX ADMIN — Medication 1 TABLET(S): at 11:36

## 2022-03-06 RX ADMIN — Medication 5000 UNIT(S): at 11:35

## 2022-03-06 RX ADMIN — FAMOTIDINE 40 MILLIGRAM(S): 10 INJECTION INTRAVENOUS at 11:36

## 2022-03-06 RX ADMIN — Medication 81 MILLIGRAM(S): at 11:36

## 2022-03-06 RX ADMIN — MIDODRINE HYDROCHLORIDE 10 MILLIGRAM(S): 2.5 TABLET ORAL at 18:34

## 2022-03-06 RX ADMIN — SODIUM CHLORIDE 2 GRAM(S): 9 INJECTION INTRAMUSCULAR; INTRAVENOUS; SUBCUTANEOUS at 05:29

## 2022-03-06 NOTE — PROGRESS NOTE ADULT - PROBLEM SELECTOR PLAN 3
Cr stable. Baseline ~1.3, though historical labs have been higher.  Trend for now.   Could have underlying CKD3.

## 2022-03-06 NOTE — PROGRESS NOTE ADULT - PROBLEM SELECTOR PLAN 1
-possibly stemming from orthostasis  -falls precaution.   -PT rec [facility] - family in agreement - f/u SW.      #hypotension/orthostasis  -Still w/ hypotension, but asymptomatic. Not orthostatic today.   -continue midodrine.   -Cont salt tab 2g BID   -Cont compression stockings as tolerated (removed today, due to bilateral feet swelling after it was done).  -Appreciate f/u from Dr Zurita - added florinef on 3/5. Titrate PRN.   -No sig events on telemetry - cont to monitor - can consider coming off tele if no sig events in 24h..

## 2022-03-07 ENCOUNTER — TRANSCRIPTION ENCOUNTER (OUTPATIENT)
Age: 87
End: 2022-03-07

## 2022-03-07 LAB
ANION GAP SERPL CALC-SCNC: 10 MMOL/L — SIGNIFICANT CHANGE UP (ref 7–14)
BUN SERPL-MCNC: 34 MG/DL — HIGH (ref 7–23)
CALCIUM SERPL-MCNC: 8.9 MG/DL — SIGNIFICANT CHANGE UP (ref 8.4–10.5)
CHLORIDE SERPL-SCNC: 104 MMOL/L — SIGNIFICANT CHANGE UP (ref 98–107)
CO2 SERPL-SCNC: 22 MMOL/L — SIGNIFICANT CHANGE UP (ref 22–31)
CREAT SERPL-MCNC: 1.28 MG/DL — SIGNIFICANT CHANGE UP (ref 0.5–1.3)
EGFR: 55 ML/MIN/1.73M2 — LOW
GLUCOSE SERPL-MCNC: 94 MG/DL — SIGNIFICANT CHANGE UP (ref 70–99)
HCT VFR BLD CALC: 29.6 % — LOW (ref 39–50)
HGB BLD-MCNC: 9.7 G/DL — LOW (ref 13–17)
MAGNESIUM SERPL-MCNC: 2 MG/DL — SIGNIFICANT CHANGE UP (ref 1.6–2.6)
MCHC RBC-ENTMCNC: 32.6 PG — SIGNIFICANT CHANGE UP (ref 27–34)
MCHC RBC-ENTMCNC: 32.8 GM/DL — SIGNIFICANT CHANGE UP (ref 32–36)
MCV RBC AUTO: 99.3 FL — SIGNIFICANT CHANGE UP (ref 80–100)
NRBC # BLD: 0 /100 WBCS — SIGNIFICANT CHANGE UP
NRBC # FLD: 0 K/UL — SIGNIFICANT CHANGE UP
PHOSPHATE SERPL-MCNC: 3.8 MG/DL — SIGNIFICANT CHANGE UP (ref 2.5–4.5)
PLATELET # BLD AUTO: 152 K/UL — SIGNIFICANT CHANGE UP (ref 150–400)
POTASSIUM SERPL-MCNC: 4.4 MMOL/L — SIGNIFICANT CHANGE UP (ref 3.5–5.3)
POTASSIUM SERPL-SCNC: 4.4 MMOL/L — SIGNIFICANT CHANGE UP (ref 3.5–5.3)
RBC # BLD: 2.98 M/UL — LOW (ref 4.2–5.8)
RBC # FLD: 12 % — SIGNIFICANT CHANGE UP (ref 10.3–14.5)
SODIUM SERPL-SCNC: 136 MMOL/L — SIGNIFICANT CHANGE UP (ref 135–145)
WBC # BLD: 4.71 K/UL — SIGNIFICANT CHANGE UP (ref 3.8–10.5)
WBC # FLD AUTO: 4.71 K/UL — SIGNIFICANT CHANGE UP (ref 3.8–10.5)

## 2022-03-07 PROCEDURE — 99233 SBSQ HOSP IP/OBS HIGH 50: CPT

## 2022-03-07 RX ORDER — HEPARIN SODIUM 5000 [USP'U]/ML
5000 INJECTION INTRAVENOUS; SUBCUTANEOUS
Qty: 0 | Refills: 0 | DISCHARGE
Start: 2022-03-07

## 2022-03-07 RX ORDER — POLYETHYLENE GLYCOL 3350 17 G/17G
17 POWDER, FOR SOLUTION ORAL DAILY
Refills: 0 | Status: DISCONTINUED | OUTPATIENT
Start: 2022-03-07 | End: 2022-03-08

## 2022-03-07 RX ORDER — MAGNESIUM CHLORIDE
400 CRYSTALS MISCELLANEOUS
Qty: 0 | Refills: 0 | DISCHARGE

## 2022-03-07 RX ORDER — SODIUM CHLORIDE 9 MG/ML
2 INJECTION INTRAMUSCULAR; INTRAVENOUS; SUBCUTANEOUS
Qty: 0 | Refills: 0 | DISCHARGE
Start: 2022-03-07

## 2022-03-07 RX ORDER — ZINC SULFATE TAB 220 MG (50 MG ZINC EQUIVALENT) 220 (50 ZN) MG
1 TAB ORAL
Qty: 0 | Refills: 0 | DISCHARGE

## 2022-03-07 RX ORDER — SODIUM CHLORIDE 9 MG/ML
0 INJECTION INTRAMUSCULAR; INTRAVENOUS; SUBCUTANEOUS
Qty: 0 | Refills: 0 | DISCHARGE

## 2022-03-07 RX ORDER — FLUDROCORTISONE ACETATE 0.1 MG/1
0.1 TABLET ORAL
Refills: 0 | Status: DISCONTINUED | OUTPATIENT
Start: 2022-03-07 | End: 2022-03-08

## 2022-03-07 RX ORDER — ZINC SULFATE TAB 220 MG (50 MG ZINC EQUIVALENT) 220 (50 ZN) MG
1 TAB ORAL
Qty: 0 | Refills: 0 | DISCHARGE
Start: 2022-03-07

## 2022-03-07 RX ORDER — ACETAMINOPHEN 500 MG
3 TABLET ORAL
Qty: 0 | Refills: 0 | DISCHARGE
Start: 2022-03-07

## 2022-03-07 RX ORDER — POLYETHYLENE GLYCOL 3350 17 G/17G
17 POWDER, FOR SOLUTION ORAL
Qty: 0 | Refills: 0 | DISCHARGE
Start: 2022-03-07

## 2022-03-07 RX ORDER — SODIUM CHLORIDE 9 MG/ML
1 INJECTION INTRAMUSCULAR; INTRAVENOUS; SUBCUTANEOUS
Qty: 0 | Refills: 0 | DISCHARGE
Start: 2022-03-07

## 2022-03-07 RX ORDER — MULTIVIT-MIN/FERROUS GLUCONATE 9 MG/15 ML
2.2 LIQUID (ML) ORAL
Qty: 0 | Refills: 0 | DISCHARGE

## 2022-03-07 RX ORDER — CALCIUM CARBONATE 500(1250)
1 TABLET ORAL
Qty: 0 | Refills: 0 | DISCHARGE
Start: 2022-03-07

## 2022-03-07 RX ADMIN — MIDODRINE HYDROCHLORIDE 10 MILLIGRAM(S): 2.5 TABLET ORAL at 22:03

## 2022-03-07 RX ADMIN — FLUDROCORTISONE ACETATE 0.1 MILLIGRAM(S): 0.1 TABLET ORAL at 18:10

## 2022-03-07 RX ADMIN — HEPARIN SODIUM 5000 UNIT(S): 5000 INJECTION INTRAVENOUS; SUBCUTANEOUS at 05:33

## 2022-03-07 RX ADMIN — HEPARIN SODIUM 5000 UNIT(S): 5000 INJECTION INTRAVENOUS; SUBCUTANEOUS at 22:03

## 2022-03-07 RX ADMIN — HEPARIN SODIUM 5000 UNIT(S): 5000 INJECTION INTRAVENOUS; SUBCUTANEOUS at 14:27

## 2022-03-07 RX ADMIN — Medication 1 TABLET(S): at 11:22

## 2022-03-07 RX ADMIN — SODIUM CHLORIDE 2 GRAM(S): 9 INJECTION INTRAMUSCULAR; INTRAVENOUS; SUBCUTANEOUS at 05:33

## 2022-03-07 RX ADMIN — Medication 1 TABLET(S): at 11:23

## 2022-03-07 RX ADMIN — Medication 81 MILLIGRAM(S): at 11:22

## 2022-03-07 RX ADMIN — MAGNESIUM OXIDE 400 MG ORAL TABLET 400 MILLIGRAM(S): 241.3 TABLET ORAL at 11:23

## 2022-03-07 RX ADMIN — MIDODRINE HYDROCHLORIDE 10 MILLIGRAM(S): 2.5 TABLET ORAL at 05:33

## 2022-03-07 RX ADMIN — Medication 5000 UNIT(S): at 11:22

## 2022-03-07 RX ADMIN — ZINC SULFATE TAB 220 MG (50 MG ZINC EQUIVALENT) 220 MILLIGRAM(S): 220 (50 ZN) TAB at 11:23

## 2022-03-07 RX ADMIN — Medication 1000 MILLIGRAM(S): at 11:23

## 2022-03-07 RX ADMIN — SIMVASTATIN 20 MILLIGRAM(S): 20 TABLET, FILM COATED ORAL at 22:03

## 2022-03-07 RX ADMIN — SODIUM CHLORIDE 2 GRAM(S): 9 INJECTION INTRAMUSCULAR; INTRAVENOUS; SUBCUTANEOUS at 18:10

## 2022-03-07 RX ADMIN — FLUDROCORTISONE ACETATE 0.1 MILLIGRAM(S): 0.1 TABLET ORAL at 05:34

## 2022-03-07 RX ADMIN — MIDODRINE HYDROCHLORIDE 10 MILLIGRAM(S): 2.5 TABLET ORAL at 13:26

## 2022-03-07 RX ADMIN — FAMOTIDINE 40 MILLIGRAM(S): 10 INJECTION INTRAVENOUS at 11:23

## 2022-03-07 NOTE — DISCHARGE NOTE PROVIDER - NSDCFUADDINST_GEN_ALL_CORE_FT
WEIGHT BEARING AS TOLERATED TO B/L LOWER EXTREMITY  WEIGHT BEARING AS TOLERATED TO B/L LOWER EXTREMITY     Topical recommendations:   Top of head- Cleanse with NS, pat dry. Apply Liquid barrier film to periwound skin (allow to dry). Apply small silicone foam with border. Change every other day or PRN if compromised.

## 2022-03-07 NOTE — PROGRESS NOTE ADULT - PROBLEM SELECTOR PLAN 3
Cr stable. Baseline ~1.3, though historical labs have been higher  Trend for now.   Could have underlying CKD Stage 3

## 2022-03-07 NOTE — DISCHARGE NOTE PROVIDER - HOSPITAL COURSE
85 yo m with recurrent fall complicated by pelvic fractures and orthostatic hypotension.  -Still w/ hypotension, but asymptomatic. Not orthostatic today.   -continue midodrine.   -Cont salt tab 2g BID   -Cont compression stockings as tolerated (removed today, due to bilateral feet swelling after it was done).  -Appreciate f/u from Dr Zurita - added florinef on 3/5. Titrate PRN.   -No sig events on telemetry - cont to monitor - can consider coming off tele if no sig events in 24h..     Problem/Plan - 2:  ·  Problem: Multiple pelvic fractures.   ·  Plan: -seen by ortho for multiple left sided pelvic fractures  -pain control - no sig pain currently.    toe-touch weightbearing with walker  -WBAT, assistive device PRN.  -OP follow up.     Problem/Plan - 3:  ·  Problem: Creatinine elevation.   ·  Plan: Cr stable. Baseline ~1.3, though historical labs have been higher.  Trend for now.   Could have underlying CKD3.     Problem/Plan - 4:  ·  Problem: Rib pain on right side.   ·  Plan: cxr no acute,   Inc Spir  rib series - no acute fx.  pain control.     Problem/Plan - 5:  ·  Problem: Abnormal head CT.   ·  Plan: Mass on crown of head is a known finding. Has been followed by derm and surgical oncologist. Pt is s/p bx. Pls refer to ACP note on 3/3 for details.  Path concerning for malignancy - this will be further followed up as OP.      DVT ppx- HSQ      Dispo- Pending placement to [facility].     Discussed case with Dr. Saucedo on ----, pt cleared for d/c to [facility]  *Incomplete      85 yo m with recurrent fall complicated by pelvic fractures and orthostatic hypotension. Seen by ortho for multiple left sided pelvic fractures. WBAT, assistive device PRN as per ortho, f/u outpt. PT rec [facility]. Pt with +orthostasis, continue midodrine, salt tabs increased to 2g BID, Florinef added as well and uptitrated. Pt remains orthostatic but is asymptomatic. Cont compression stockings as tolerated. No significant events on tele. Pt has mass on crown of head which is a known finding. Has been followed by derm and surgical oncologist. Pt is s/p bx. Pt reports outpatient scalp biopsy performed with Dr. Tobias Dermatologist on [address], results obtained below and reviewed with Dr. Marshall, no need for MRI brain at this time as there is ongoing work up for scalp lesion with outpatient dermatologist and surgeon Dr. Gonzalo Stevens.     Biopsy:   1. Right anterior crown,   shave biopsy  - Epidermis with acute inflammation, dermal fibrosis, telangiectasia and mixed inflammation  Note:  Step sections have been examined. PAS stain fails to reveal fungal elements.    2. Right posterior crown,   shave biopsy  - Actinic keratosis, inflamed and crusted, and dermal fibrosis  Note:  Step sections have been examined. PAS stain fails to reveal fungal elements.    SCALP MASS, FNA: POSITIVE FOR MALIGNANT NEOPLASM  "Cytology slides display a cellular specimen composed of disorganized  groups and singly lying neoplastic small to large cells with high nuclear:cytoplasmic ratio, occasional bi nucleation, prominent nucleoli, anisonucleosis and moderate amount of cytoplasm. Giant multinucleated cells and mitotic figures are present. 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 Ancillary studies to further classify the tumor cannot be performed due to lack of material. Please see concurrent biopsy specimen report [id number].".    Path concerning for malignancy - this will be further followed up as OP.      Dispo- Pending placement to [facility].     Discussed case with Dr. Saucedo on ----, pt cleared for d/c to [facility]  *Incomplete      87 yo m with recurrent fall complicated by pelvic fractures and orthostatic hypotension. Seen by ortho for multiple left sided pelvic fractures. WBAT, assistive device PRN as per ortho, f/u outpt. PT rec [facility]. Pt with +orthostasis, continue midodrine, salt tabs increased to 2g BID, Florinef added as well and uptitrated. Pt remains orthostatic but is asymptomatic. Cont compression stockings as tolerated. No significant events on tele. Pt has mass on crown of head which is a known finding. Has been followed by derm and surgical oncologist. Pt is s/p bx. Pt reports outpatient scalp biopsy performed with Dr. Tobias Dermatologist on [address], results obtained below and reviewed with Dr. Marshall, no need for MRI brain at this time as there is ongoing work up for scalp lesion with outpatient dermatologist and surgeon Dr. Gonzalo Stevens.     Biopsy:   1. Right anterior crown,   shave biopsy  - Epidermis with acute inflammation, dermal fibrosis, telangiectasia and mixed inflammation  Note:  Step sections have been examined. PAS stain fails to reveal fungal elements.    2. Right posterior crown,   shave biopsy  - Actinic keratosis, inflamed and crusted, and dermal fibrosis  Note:  Step sections have been examined. PAS stain fails to reveal fungal elements.    SCALP MASS, FNA: POSITIVE FOR MALIGNANT NEOPLASM  "Cytology slides display a cellular specimen composed of disorganized  groups and singly lying neoplastic small to large cells with high nuclear:cytoplasmic ratio, occasional bi nucleation, prominent nucleoli, anisonucleosis and moderate amount of cytoplasm. Giant multinucleated cells and mitotic figures are present. 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 Ancillary studies to further classify the tumor cannot be performed due to lack of material. Please see concurrent biopsy specimen report [id number].".    Path concerning for malignancy - this will be further followed up as OP.      Dispo- Pending placement to [facility].   Discussed case with Dr. Saucedo on 3/8, pt cleared for d/c to [facility] today.

## 2022-03-07 NOTE — DISCHARGE NOTE PROVIDER - NSDCCPCAREPLAN_GEN_ALL_CORE_FT
PRINCIPAL DISCHARGE DIAGNOSIS  Diagnosis: Orthostatic hypotension  Assessment and Plan of Treatment:       SECONDARY DISCHARGE DIAGNOSES  Diagnosis: Hip fracture, left  Assessment and Plan of Treatment:     Diagnosis: Fall  Assessment and Plan of Treatment:     Diagnosis: Skin lesion of scalp  Assessment and Plan of Treatment:      PRINCIPAL DISCHARGE DIAGNOSIS  Diagnosis: Orthostatic hypotension  Assessment and Plan of Treatment: Your blood pressure drops significantly when you stand up, you are taking salt tabe, midodrine and florinef to help increase your blood pressure. You should wear compression stockings as tolerated. It is very important that you stand up slowly from a laying/sitting position to allow your body time to adjust to changing positions. Sit on the edge of the bed for a few minutes before you stand up. If you feel dizzy or lightheaded immediately sit back down to avoid falling. Continue with physcial therapy to allow for strengthening.      SECONDARY DISCHARGE DIAGNOSES  Diagnosis: Hip fracture, left  Assessment and Plan of Treatment: Weight bearing as tolerated. Physical therapy for strengthening. Follow up with Dr. Ramesh at your scheduled appointment time    Diagnosis: Fall  Assessment and Plan of Treatment: Possible due to orthostatic hypotension.    Diagnosis: Skin lesion of scalp  Assessment and Plan of Treatment: Recent skin biopsy pathology concerning for possible malignancy. You will need to follow up with Dermatology regarding pathology results and treatment options.

## 2022-03-07 NOTE — PROGRESS NOTE ADULT - PROBLEM SELECTOR PLAN 1
-possibly stemming from orthostasis  -falls precaution.   -PT rec [facility] - family in agreement - f/u SW.      #hypotension/orthostasis  -Still w/ hypotension, but asymptomatic. Orthostatic today.   -continue midodrine.   -Cont salt tab 2g BID   -Cont compression stockings as tolerated  -Appreciate f/u from Dr Zurita - added florinef on 3/5-will max dose today to 0.1 mg BID  -No sig events on telemetry - cont to monitor - can consider coming off tele if no sig events in 24h.

## 2022-03-07 NOTE — DISCHARGE NOTE PROVIDER - PROVIDER TOKENS
PROVIDER:[TOKEN:[[id number]]] PROVIDER:[TOKEN:[[id number]]],PROVIDER:[TOKEN:[[id number]]]

## 2022-03-07 NOTE — DISCHARGE NOTE PROVIDER - NSDCACTIVITY_GEN_ALL_CORE
September 9, 2021        Nick Kunz  [address] [place] [address]        Nick was seen in our clinic today and he is excused from work for today and tomorrow.    If you have any questions or concerns, please don't hesitate to call.        Sincerely,        NORAH Mraie.    Electronically Signed     
Walking - Indoors allowed/No heavy lifting/straining

## 2022-03-07 NOTE — DISCHARGE NOTE PROVIDER - CARE PROVIDER_API CALL
Vahid Ramesh)  Orthopedics  [address]  [place] [address]  Phone: [phone]  Fax: [phone]  Follow Up Time:    Vahid Ramesh)  Orthopedics  [address]  [place] [address]  Phone: [phone]  Fax: [phone]  Follow Up Time:     Durga Tobias)  Dermatology  [address] [place] [address]  Phone: [phone]  Fax: [phone]  Follow Up Time:

## 2022-03-07 NOTE — DISCHARGE NOTE PROVIDER - NSDCMRMEDTOKEN_GEN_ALL_CORE_FT
acetaminophen 325 mg oral tablet: 3 tab(s) orally every 6 hours, As needed, Moderate Pain (4 - 6), Severe Pain (7 - 10)  aspirin 81 mg oral tablet, chewable: 1 tab(s) orally once a day  calcium carbonate 500 mg (200 mg elemental calcium) oral tablet, chewable: 1 tab(s) orally once a day  famotidine 40 mg oral tablet: 1 tab(s) orally once a day (at bedtime)  heparin: 5000 unit(s) subcutaneous 3 times a day for DVT ppx  midodrine 10 mg oral tablet: 1 tab(s) orally 3 times a day  Multiple Vitamins oral tablet: 1 tab(s) orally once a day  polyethylene glycol 3350 oral powder for reconstitution: 17 gram(s) orally once a day  simvastatin 20 mg oral tablet: 1 tab(s) orally once a day (at bedtime)  sodium chloride 1 g oral tablet: 2 tab(s) orally 2 times a day  Vitamin C 1000 mg oral tablet: 1 tab(s) orally once a day  Vitamin D3 125 mcg (5000 intl units) oral capsule: 1 cap(s) orally once a day  zinc sulfate 220 mg oral capsule: 1 cap(s) orally once a day   acetaminophen 325 mg oral tablet: 3 tab(s) orally every 6 hours, As needed, Moderate Pain (4 - 6), Severe Pain (7 - 10)  aspirin 81 mg oral tablet, chewable: 1 tab(s) orally once a day  calcium carbonate 500 mg (200 mg elemental calcium) oral tablet, chewable: 1 tab(s) orally once a day  famotidine 40 mg oral tablet: 1 tab(s) orally once a day (at bedtime)  fludrocortisone 0.1 mg oral tablet: 1 tab(s) orally 2 times a day  heparin: 5000 unit(s) subcutaneous 3 times a day for DVT ppx  magnesium oxide 400 mg oral tablet: 1 tab(s) orally once a day  midodrine 10 mg oral tablet: 1 tab(s) orally 3 times a day  Multiple Vitamins oral tablet: 1 tab(s) orally once a day  polyethylene glycol 3350 oral powder for reconstitution: 17 gram(s) orally once a day  simvastatin 20 mg oral tablet: 1 tab(s) orally once a day (at bedtime)  sodium chloride 1 g oral tablet: 2 tab(s) orally 2 times a day  Vitamin C 1000 mg oral tablet: 1 tab(s) orally once a day  Vitamin D3 125 mcg (5000 intl units) oral capsule: 1 cap(s) orally once a day  zinc sulfate 220 mg oral capsule: 1 cap(s) orally once a day

## 2022-03-07 NOTE — DISCHARGE NOTE PROVIDER - NSDCFUADDAPPT_GEN_ALL_CORE_FT
Follow up with your primary care physician for further monitoring in 1-2 weeks. Please call to arrange appointment.     Outpt Derm follow up with Dr. Tobias  Surgeon Gonzalo Giordano Dr.  Surgeon Gonzalo Giordano    Follow up with Dr. Ramesh in the office on March 23rd at 11am Follow up with your primary care physician for further monitoring in 1-2 weeks. Please call to arrange appointment.     Follow up with Dr. Ramesh in the office on March 23rd at 11am    Pathology of scalp lesion concerning for possible malignancy   Outpt Derm follow up with Dr. Tobias in 1-2 weeks - please call for appointment   Surgeon Gonzalo Giordano    Outpt Derm Dr. Tobias  Surgeon Gonzalo Giordano     Follow up with your primary care physician for further monitoring in 1-2 weeks. Please call to arrange appointment.     Follow up with Dr. Ramesh in the office on March 23rd at 11am    Pathology of scalp lesion concerning for possible malignancy   Outpt Derm follow up with Dr. Tobias in 3-4 months - please call for appointment   The office will be calling you with biopsy results  Surgeon Gonzalo Giordano

## 2022-03-07 NOTE — DISCHARGE NOTE PROVIDER - NSDCFUSCHEDAPPT_GEN_ALL_CORE_FT
BEATRIS SHEA ; 03/24/2022 ; NPP Surgonc [address] [facility]  BEATRIS SHEA ; 05/10/2022 ; NP Surgonc [address] [facility]  BEATRIS SHEA ; 05/16/2022 ; NPP [address] BEATRIS SHEA ; 03/23/2022 ; NPP OrthoSurg [address] [facility]  BEATRIS SHEA ; 03/24/2022 ; NPP Surgonc [address] [facility]  BEATRIS SHEA ; 05/10/2022 ; NPP Surgonc [address] [facility]  BEATRIS SHEA ; 05/16/2022 ; NPP [address]

## 2022-03-07 NOTE — DISCHARGE NOTE PROVIDER - CARE PROVIDERS DIRECT ADDRESSES
[email or web address]

## 2022-03-08 ENCOUNTER — TRANSCRIPTION ENCOUNTER (OUTPATIENT)
Age: 87
End: 2022-03-08

## 2022-03-08 VITALS — HEART RATE: 65 BPM | SYSTOLIC BLOOD PRESSURE: 108 MMHG | DIASTOLIC BLOOD PRESSURE: 60 MMHG

## 2022-03-08 LAB
ANION GAP SERPL CALC-SCNC: 10 MMOL/L — SIGNIFICANT CHANGE UP (ref 7–14)
BUN SERPL-MCNC: 38 MG/DL — HIGH (ref 7–23)
CALCIUM SERPL-MCNC: 9 MG/DL — SIGNIFICANT CHANGE UP (ref 8.4–10.5)
CHLORIDE SERPL-SCNC: 105 MMOL/L — SIGNIFICANT CHANGE UP (ref 98–107)
CO2 SERPL-SCNC: 23 MMOL/L — SIGNIFICANT CHANGE UP (ref 22–31)
CREAT SERPL-MCNC: 1.28 MG/DL — SIGNIFICANT CHANGE UP (ref 0.5–1.3)
EGFR: 55 ML/MIN/1.73M2 — LOW
GLUCOSE SERPL-MCNC: 98 MG/DL — SIGNIFICANT CHANGE UP (ref 70–99)
HCT VFR BLD CALC: 28.8 % — LOW (ref 39–50)
HGB BLD-MCNC: 9.6 G/DL — LOW (ref 13–17)
MAGNESIUM SERPL-MCNC: 2.1 MG/DL — SIGNIFICANT CHANGE UP (ref 1.6–2.6)
MCHC RBC-ENTMCNC: 33 PG — SIGNIFICANT CHANGE UP (ref 27–34)
MCHC RBC-ENTMCNC: 33.3 GM/DL — SIGNIFICANT CHANGE UP (ref 32–36)
MCV RBC AUTO: 99 FL — SIGNIFICANT CHANGE UP (ref 80–100)
NRBC # BLD: 0 /100 WBCS — SIGNIFICANT CHANGE UP
NRBC # FLD: 0 K/UL — SIGNIFICANT CHANGE UP
PHOSPHATE SERPL-MCNC: 3.6 MG/DL — SIGNIFICANT CHANGE UP (ref 2.5–4.5)
PLATELET # BLD AUTO: 158 K/UL — SIGNIFICANT CHANGE UP (ref 150–400)
POTASSIUM SERPL-MCNC: 4.4 MMOL/L — SIGNIFICANT CHANGE UP (ref 3.5–5.3)
POTASSIUM SERPL-SCNC: 4.4 MMOL/L — SIGNIFICANT CHANGE UP (ref 3.5–5.3)
RBC # BLD: 2.91 M/UL — LOW (ref 4.2–5.8)
RBC # FLD: 11.9 % — SIGNIFICANT CHANGE UP (ref 10.3–14.5)
SARS-COV-2 RNA SPEC QL NAA+PROBE: SIGNIFICANT CHANGE UP
SODIUM SERPL-SCNC: 138 MMOL/L — SIGNIFICANT CHANGE UP (ref 135–145)
WBC # BLD: 5.52 K/UL — SIGNIFICANT CHANGE UP (ref 3.8–10.5)
WBC # FLD AUTO: 5.52 K/UL — SIGNIFICANT CHANGE UP (ref 3.8–10.5)

## 2022-03-08 PROCEDURE — 99239 HOSP IP/OBS DSCHRG MGMT >30: CPT

## 2022-03-08 RX ORDER — FLUDROCORTISONE ACETATE 0.1 MG/1
1 TABLET ORAL
Qty: 0 | Refills: 0 | DISCHARGE
Start: 2022-03-08

## 2022-03-08 RX ORDER — MAGNESIUM OXIDE 400 MG ORAL TABLET 241.3 MG
1 TABLET ORAL
Qty: 0 | Refills: 0 | DISCHARGE
Start: 2022-03-08

## 2022-03-08 RX ADMIN — FLUDROCORTISONE ACETATE 0.1 MILLIGRAM(S): 0.1 TABLET ORAL at 05:41

## 2022-03-08 RX ADMIN — FAMOTIDINE 40 MILLIGRAM(S): 10 INJECTION INTRAVENOUS at 11:01

## 2022-03-08 RX ADMIN — Medication 5000 UNIT(S): at 11:02

## 2022-03-08 RX ADMIN — HEPARIN SODIUM 5000 UNIT(S): 5000 INJECTION INTRAVENOUS; SUBCUTANEOUS at 05:41

## 2022-03-08 RX ADMIN — SODIUM CHLORIDE 2 GRAM(S): 9 INJECTION INTRAMUSCULAR; INTRAVENOUS; SUBCUTANEOUS at 05:41

## 2022-03-08 RX ADMIN — Medication 1 TABLET(S): at 11:01

## 2022-03-08 RX ADMIN — MIDODRINE HYDROCHLORIDE 10 MILLIGRAM(S): 2.5 TABLET ORAL at 05:42

## 2022-03-08 RX ADMIN — HEPARIN SODIUM 5000 UNIT(S): 5000 INJECTION INTRAVENOUS; SUBCUTANEOUS at 14:17

## 2022-03-08 RX ADMIN — Medication 81 MILLIGRAM(S): at 11:01

## 2022-03-08 RX ADMIN — MIDODRINE HYDROCHLORIDE 10 MILLIGRAM(S): 2.5 TABLET ORAL at 14:17

## 2022-03-08 RX ADMIN — Medication 1 TABLET(S): at 11:00

## 2022-03-08 RX ADMIN — Medication 1000 MILLIGRAM(S): at 11:01

## 2022-03-08 RX ADMIN — MAGNESIUM OXIDE 400 MG ORAL TABLET 400 MILLIGRAM(S): 241.3 TABLET ORAL at 11:02

## 2022-03-08 RX ADMIN — ZINC SULFATE TAB 220 MG (50 MG ZINC EQUIVALENT) 220 MILLIGRAM(S): 220 (50 ZN) TAB at 11:01

## 2022-03-08 NOTE — PROVIDER CONTACT NOTE (OTHER) - SITUATION
BP 87/52
BP 92/50
Patient is hypotensive with a manual BP of 76/44. Patient is asymptomatic; denies dizziness. Currently sitting up in bed eating lunch
Patient BP 82/50
patient hypotensive 90/54
Patient /55 and patient is orthostatics positive
Patient BP 90/50 manually

## 2022-03-08 NOTE — ADVANCED PRACTICE NURSE CONSULT - ASSESSMENT
General: A&Ox4, ambulates with walker at baseline, one person assist, continent of urine and stool. Skin warm, dry, poor skin turgor, scattered areas of hyperpigmentation and hypopigmentation, scattered areas of ecchymosis on bilateral upper extremities.     Top of head with 2 outgrowths of unclear etiology- pending final biopsy results:   Left outgrowth/Hematoma? measuring 2.5cmx2.6xbb7ub extending 1.5cm above skin level exposing 100% maroon/pink discoloration.   Right cyst measuring 2.5cmx2.4btw7yy extending 0.5cm above skin level exposing 25% dried sanguinous drainage unable to be removed while cleansing, 25% friable dermis and 50% intact cyst. Small-moderate serosanguinous drainage, no odor. Periwound skin intact. No induration, no erythema, no increased warmth. Goals of care: manage/absorb drainage, monitor for tissue type changes, follow up with biopsy results.

## 2022-03-08 NOTE — ADVANCED PRACTICE NURSE CONSULT - REASON FOR CONSULT
Patient seen on skin care rounds after wound care referral received for assessment of skin impairment and recommendations of topical management. 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 Patient follows up with his dermatologist Dr Ghislaine belle seen three weeks ago- had 3 biopsy's 2 of which showed a cyst, the last one pending results. Patient stating he hit his head about three weeks ago, unable to see wound so unsure if got larger or not 2/2 to trauma. Patient H/O of CAD s/p stents on Aspirin, HLD, COPD, SS/chronic lumbar VCFs, lung nodule presents to the ED c/o Lower back and b/l hip pain s/p unwitnessed fall while ambulating to bathroom. CT lumbar spine showing left posterior ilium fracture, orthopedics consulted for further recommendations- no ortho interventions at this time. Patient also seen by Electrophysiology for implantable loop recorder.

## 2022-03-08 NOTE — DISCHARGE NOTE NURSING/CASE MANAGEMENT/SOCIAL WORK - NSDCPEFALRISK_GEN_ALL_CORE
For information on Fall & Injury Prevention, visit: [email or web address] OR  [email or web address] OR  https://www.cdc.gov/steadi/patient.html

## 2022-03-08 NOTE — PROVIDER CONTACT NOTE (OTHER) - REASON
Patient /55 and patient is orthostatics positive
Patient is hypotensive with a manual BP of 76/44
patient hypotensive 90/54
Patient BP 90/50 manually
BP 92/50
BP 87/52
Patient BP 82/50

## 2022-03-08 NOTE — PROVIDER CONTACT NOTE (OTHER) - BACKGROUND
Patient admitted s/p fall at home
Patient admitted s/p fall at home with syncope and fractures.
patient admitted s/p fall
Patient admitted s/p fall at home with syncope and fractures.
Patient admitted with neck pain. Found to have syncope and collapse and new hip fracture. Hx of CAD, HLD and COPD
Patient admitted s/p fall at home with syncope and fractures.
Patient admitted s/p fall at home with syncope and fractures.

## 2022-03-08 NOTE — PROGRESS NOTE ADULT - REASON FOR ADMISSION
fall, pelvic fractures

## 2022-03-08 NOTE — PROGRESS NOTE ADULT - PROBLEM SELECTOR PROBLEM 2
Multiple pelvic fractures

## 2022-03-08 NOTE — PROVIDER CONTACT NOTE (OTHER) - ACTION/TREATMENT ORDERED:
MARTA Farrell aware. As per PA, continue to monitor.
500cc NS bolus given; will continue to monitor patient status
provider notified give midodrine as prescribed and reassess
PA notified, pending further recommendations
As per ACP Robreto, will put in order for fluids and continue to give Midodrine as ordered
As per MARTA Landry, will continue to administer midodrine as ordered and notify ACP if patient becomes symptomatic
MARTA Farrell aware. As per PA, reassess BP.

## 2022-03-08 NOTE — DISCHARGE NOTE NURSING/CASE MANAGEMENT/SOCIAL WORK - NSDCFUADDAPPT_GEN_ALL_CORE_FT
Follow up with your primary care physician for further monitoring in 1-2 weeks. Please call to arrange appointment.     Outpt Derm follow up with Dr. Tobias  Surgeon Gonzalo Giordano Dr.  Surgeon Gonzalo Giordano    Follow up with Dr. Ramesh in the office on March 23rd at 11am

## 2022-03-08 NOTE — PROVIDER CONTACT NOTE (OTHER) - RECOMMENDATIONS
Continue to administer midodrine as ordered
Monitor patient for symptoms of hypotension and syncope
MARTA Farrell aware. As per PA, reassess BP.
PA notified, pending further recommendations
MARTA Farrell aware. As per PA, continue to monitor.

## 2022-03-08 NOTE — PROGRESS NOTE ADULT - PROBLEM SELECTOR PROBLEM 3
Rib pain on right side
Creatinine elevation
Rib pain on right side
Creatinine elevation

## 2022-03-08 NOTE — PROGRESS NOTE ADULT - SUBJECTIVE AND OBJECTIVE BOX
[facility] Division of Hospital Medicine  Henrique Azar) MD Rolando  Pager [phone]    SUBJECTIVE:  Chief complaint: fall.    Pt seen and evaluated at bedside this AM. No o/n events. Denies any SOB/CP/NV. Tolerating PO. Still w/ orthostatic hypotension.       ROS: All systems negative except as noted.      Vital Signs Last 24 Hrs  T(C): 36.1 (05 Mar 2022 06:15), Max: 36.7 (04 Mar 2022 14:10)  T(F): 97 (05 Mar 2022 06:15), Max: 98 (04 Mar 2022 14:10)  HR: 64 (05 Mar 2022 06:15) (61 - 66)  BP: 120/66 (05 Mar 2022 06:15) (87/52 - 120/66)  BP(mean): --  RR: 18 (05 Mar 2022 06:15) (16 - 18)  SpO2: 98% (05 Mar 2022 06:15) (98% - 100%)      PHYSICAL EXAM:  Gen- In bed, comfortable, NAD  Resp- CTAB, good effort. No r/r/w. No accessory muscle use.  CVS- RRR, S1S2, no g/r/m. No LE edema.  GI- Soft abd, NT, ND, +BSx4. No HSM.  MSK- No C/C. ROM intact. No crepitus.  Neuro- CN II-XII intact. Speech fluent/face symmetric. Sensation intact.  Skin- Mass at the crown of the head.  Psych- AAOx3. Appropriate mood/affect.      MEDICATION:  MEDICATIONS  (STANDING):  ascorbic acid 1000 milliGRAM(s) Oral daily  aspirin enteric coated 81 milliGRAM(s) Oral daily  calcium carbonate    500 mG (Tums) Chewable 1 Tablet(s) Chew daily  cholecalciferol 5000 Unit(s) Oral daily  diphtheria/tetanus/pertussis (acellular) Vaccine (ADAcel) 0.5 milliLiter(s) IntraMuscular once  famotidine    Tablet 40 milliGRAM(s) Oral daily  fludroCORTISONE 0.1 milliGRAM(s) Oral daily  heparin   Injectable 5000 Unit(s) SubCutaneous every 8 hours  magnesium oxide 400 milliGRAM(s) Oral daily  midodrine. 10 milliGRAM(s) Oral three times a day  multivitamin 1 Tablet(s) Oral daily  simvastatin 20 milliGRAM(s) Oral at bedtime  sodium chloride 2 Gram(s) Oral two times a day  zinc sulfate 220 milliGRAM(s) Oral daily    MEDICATIONS  (PRN):  acetaminophen     Tablet .. 975 milliGRAM(s) Oral every 6 hours PRN Moderate Pain (4 - 6), Severe Pain (7 - 10)      LABORATORY:                          10.0   5.85  )-----------( 150      ( 04 Mar 2022 08:17 )             31.0     03-05    136  |  103  |  34<H>  ----------------------------<  99  4.7   |  23  |  1.45<H>    Ca    9.1      05 Mar 2022 07:23    TPro  6.3  /  Alb  3.9  /  TBili  0.6  /  DBili  x   /  AST  24  /  ALT  14  /  AlkPhos  58  03-04                                                        
[facility] Division of Hospital Medicine  Henrique Azar) MD Rolando  Pager [phone]    SUBJECTIVE:  Chief complaint: fall.    Pt seen and evaluated at bedside this AM. No o/n events. Denies any SOB/CP/NV. Tolerating PO. Noted to have orthostatic hypotension today, but no significant symptoms.       ROS: All systems negative except as noted.      Vital Signs Last 24 Hrs  T(C): 36.6 (04 Mar 2022 15:30), Max: 36.8 (03 Mar 2022 17:55)  T(F): 97.9 (04 Mar 2022 15:30), Max: 98.2 (03 Mar 2022 17:55)  HR: 66 (04 Mar 2022 15:30) (60 - 68)  BP: 109/74 (04 Mar 2022 15:30) (87/52 - 122/59)  BP(mean): --  RR: 18 (04 Mar 2022 15:30) (16 - 18)  SpO2: 100% (04 Mar 2022 15:30) (98% - 100%)      PHYSICAL EXAM:  Gen- In bed, comfortable, NAD  Resp- CTAB, good effort. No r/r/w. No accessory muscle use.  CVS- RRR, S1S2, no g/r/m. No LE edema.  GI- Soft abd, NT, ND, +BSx4. No HSM.  MSK- No C/C. ROM intact. No crepitus.  Neuro- CN II-XII intact. Speech fluent/face symmetric. Sensation intact.  Skin- Mass at the crown of the head.  Psych- AAOx3. Appropriate mood/affect.      MEDICATION:  MEDICATIONS  (STANDING):  ascorbic acid 1000 milliGRAM(s) Oral daily  aspirin enteric coated 81 milliGRAM(s) Oral daily  calcium carbonate    500 mG (Tums) Chewable 1 Tablet(s) Chew daily  cholecalciferol 5000 Unit(s) Oral daily  diphtheria/tetanus/pertussis (acellular) Vaccine (ADAcel) 0.5 milliLiter(s) IntraMuscular once  famotidine    Tablet 40 milliGRAM(s) Oral daily  heparin   Injectable 5000 Unit(s) SubCutaneous every 8 hours  magnesium oxide 400 milliGRAM(s) Oral daily  midodrine. 10 milliGRAM(s) Oral three times a day  multivitamin 1 Tablet(s) Oral daily  simvastatin 20 milliGRAM(s) Oral at bedtime  sodium chloride 2 Gram(s) Oral two times a day  zinc sulfate 220 milliGRAM(s) Oral daily    MEDICATIONS  (PRN):  acetaminophen     Tablet .. 975 milliGRAM(s) Oral every 6 hours PRN Moderate Pain (4 - 6), Severe Pain (7 - 10)      LABORATORY:                          10.0   5.85  )-----------( 150      ( 04 Mar 2022 08:17 )             31.0     03-04    136  |  102  |  29<H>  ----------------------------<  92  4.4   |  22  |  1.24    Ca    9.1      04 Mar 2022 08:17  Mg     2.00     03-03    TPro  6.3  /  Alb  3.9  /  TBili  0.6  /  DBili  x   /  AST  24  /  ALT  14  /  AlkPhos  58  03-04    PT/INR - ( 03 Mar 2022 06:35 )   PT: 13.7 sec;   INR: 1.18 ratio         PTT - ( 03 Mar 2022 06:35 )  PTT:30.8 sec                                      
[facility] Division of Hospital Medicine  Henrique Azar) MD Rolando  Pager [phone]    SUBJECTIVE:  Chief complaint: fall.    Pt seen and evaluated at bedside this AM. No o/n events. BP soft, but not orthostatic per documentation. Remains asymptomatic. Friend at bedside.       ROS: All systems negative except as noted.    Vital Signs Last 24 Hrs  T(C): 36.8 (06 Mar 2022 11:19), Max: 36.8 (06 Mar 2022 11:19)  T(F): 98.2 (06 Mar 2022 11:19), Max: 98.2 (06 Mar 2022 11:19)  HR: 65 (06 Mar 2022 11:19) (62 - 68)  BP: 82/50 (06 Mar 2022 11:19) (82/50 - 115/72)  BP(mean): --  RR: 15 (06 Mar 2022 11:19) (15 - 18)  SpO2: 100% (06 Mar 2022 11:19) (99% - 100%)    PHYSICAL EXAM:  Gen- In bed, comfortable, NAD  Resp- CTAB, good effort. No r/r/w. No accessory muscle use.  CVS- RRR, S1S2, no g/r/m. No LE edema.  GI- Soft abd, NT, ND, +BSx4. No HSM.  MSK- No C/C. ROM intact. No crepitus.  Neuro- CN II-XII intact. Speech fluent/face symmetric. Sensation intact.  Skin- Mass at the crown of the head.  Psych- AAOx3. Appropriate mood/affect.      MEDICATION:  MEDICATIONS  (STANDING):  ascorbic acid 1000 milliGRAM(s) Oral daily  aspirin enteric coated 81 milliGRAM(s) Oral daily  calcium carbonate    500 mG (Tums) Chewable 1 Tablet(s) Chew daily  cholecalciferol 5000 Unit(s) Oral daily  diphtheria/tetanus/pertussis (acellular) Vaccine (ADAcel) 0.5 milliLiter(s) IntraMuscular once  famotidine    Tablet 40 milliGRAM(s) Oral daily  fludroCORTISONE 0.1 milliGRAM(s) Oral daily  heparin   Injectable 5000 Unit(s) SubCutaneous every 8 hours  magnesium oxide 400 milliGRAM(s) Oral daily  midodrine. 10 milliGRAM(s) Oral three times a day  multivitamin 1 Tablet(s) Oral daily  simvastatin 20 milliGRAM(s) Oral at bedtime  sodium chloride 2 Gram(s) Oral two times a day  zinc sulfate 220 milliGRAM(s) Oral daily    MEDICATIONS  (PRN):  acetaminophen     Tablet .. 975 milliGRAM(s) Oral every 6 hours PRN Moderate Pain (4 - 6), Severe Pain (7 - 10)      LABORATORY:      03-06    136  |  102  |  35<H>  ----------------------------<  101<H>  4.4   |  22  |  1.45<H>    Ca    9.2      06 Mar 2022 07:50                                                                    
  Patient is a 86y old  Male who presents with a chief complaint of fall, pelvic fractures (07 Mar 2022 15:07)      INTERVAL HPI/OVERNIGHT EVENTS:  Seen by me this afternoon, feeling great, eager to going to Rehab. Orthostatic today and hypotensive but w/out any symptoms.    Review of Systems: 12 point review of systems otherwise negative    MEDICATIONS  (STANDING):  ascorbic acid 1000 milliGRAM(s) Oral daily  aspirin enteric coated 81 milliGRAM(s) Oral daily  calcium carbonate    500 mG (Tums) Chewable 1 Tablet(s) Chew daily  cholecalciferol 5000 Unit(s) Oral daily  diphtheria/tetanus/pertussis (acellular) Vaccine (ADAcel) 0.5 milliLiter(s) IntraMuscular once  famotidine    Tablet 40 milliGRAM(s) Oral daily  fludroCORTISONE 0.1 milliGRAM(s) Oral two times a day  heparin   Injectable 5000 Unit(s) SubCutaneous every 8 hours  magnesium oxide 400 milliGRAM(s) Oral daily  midodrine. 10 milliGRAM(s) Oral three times a day  multivitamin 1 Tablet(s) Oral daily  polyethylene glycol 3350 17 Gram(s) Oral daily  simvastatin 20 milliGRAM(s) Oral at bedtime  sodium chloride 2 Gram(s) Oral two times a day  zinc sulfate 220 milliGRAM(s) Oral daily    MEDICATIONS  (PRN):  acetaminophen     Tablet .. 975 milliGRAM(s) Oral every 6 hours PRN Moderate Pain (4 - 6), Severe Pain (7 - 10)      Allergies    No Known Allergies    Intolerances          Vital Signs Last 24 Hrs  T(C): 36.5 (07 Mar 2022 11:12), Max: 36.8 (07 Mar 2022 05:30)  T(F): 97.7 (07 Mar 2022 11:12), Max: 98.2 (07 Mar 2022 05:30)  HR: 63 (07 Mar 2022 11:12) (51 - 63)  BP: 110/55 (07 Mar 2022 11:12) (110/55 - 116/71)  BP(mean): --  RR: 16 (07 Mar 2022 11:12) (14 - 18)  SpO2: 98% (07 Mar 2022 11:12) (98% - 100%)  CAPILLARY BLOOD GLUCOSE          03-06 @ 07:01  -  03-07 @ 07:00  --------------------------------------------------------  IN: 0 mL / OUT: 1000 mL / NET: -1000 mL        Physical Exam:    Daily     Daily   General:  Well appearing, NAD, not cachetic  HEENT:  Nonicteric, PERRLA  CV:  RRR, no murmur, no JVD  Lungs:  CTA B/L, no wheezes, rales, rhonchi  Abdomen:  Soft, non-tender, no distended, positive BS  Extremities:  2+ pulses, no c/c, no edema, LE's with ACE wrap in place  Skin:  Lesion on scalp-raised-, non bleeding  :  No guajardo  Neuro:  AAOx3, non-focal, CN II-XII grossly intact  No Restraints    LABS:                        9.7    4.71  )-----------( 152      ( 07 Mar 2022 07:03 )             29.6     03-07    136  |  104  |  34<H>  ----------------------------<  94  4.4   |  22  |  1.28    Ca    8.9      07 Mar 2022 07:03  Phos  3.8     03-07  Mg     2.00     03-07              RADIOLOGY & ADDITIONAL TESTS:  Reviewed by me    
[facility] Division of Hospital Medicine  Henrique Azar) MD Rolando  Pager [phone]    SUBJECTIVE:  Chief complaint: fall.    Pt seen and evaluated at bedside this AM. No o/n events. Denies any SOB/Cp/NV. Feels fine. Had episode of hypotension this afternoon, asymptomaitc.       ROS: All systems negative except as noted.      Vital Signs Last 24 Hrs  T(C): 36.8 (03 Mar 2022 13:30), Max: 36.9 (03 Mar 2022 05:18)  T(F): 98.3 (03 Mar 2022 13:30), Max: 98.4 (03 Mar 2022 05:18)  HR: 64 (03 Mar 2022 16:19) (64 - 78)  BP: 113/47 (03 Mar 2022 16:19) (76/44 - 137/75)  BP(mean): --  RR: 16 (03 Mar 2022 13:30) (15 - 16)  SpO2: 100% (03 Mar 2022 13:30) (99% - 100%)      PHYSICAL EXAM:  Gen- In bed, comfortable, NAD  Eyes- EOMI, PERRLA, nonicteric.  EMNT- Fair dentition. MMM. No tonsilar exudates. No posterior pharynx erythema.  Neck- Supple. No masses. No tracheal deviation.  Resp- CTAB, good effort. No r/r/w. No accessory muscle use.  CVS- RRR, S1S2, no g/r/m. No LE edema.  GI- Soft abd, NT, ND, +BSx4. No HSM.  MSK- No C/C. ROM intact. No crepitus.  Neuro- CN II-XII intact. Speech fluent/face symmetric. Sensation intact.  Skin- Mass at the crown of the head.  Psych- AAOx3. Appropriate mood/affect.      MEDICATION:  MEDICATIONS  (STANDING):  ascorbic acid 1000 milliGRAM(s) Oral daily  aspirin enteric coated 81 milliGRAM(s) Oral daily  calcium carbonate    500 mG (Tums) Chewable 1 Tablet(s) Chew daily  cholecalciferol 5000 Unit(s) Oral daily  diphtheria/tetanus/pertussis (acellular) Vaccine (ADAcel) 0.5 milliLiter(s) IntraMuscular once  famotidine    Tablet 40 milliGRAM(s) Oral daily  heparin   Injectable 5000 Unit(s) SubCutaneous every 8 hours  magnesium oxide 400 milliGRAM(s) Oral daily  midodrine. 10 milliGRAM(s) Oral three times a day  multivitamin 1 Tablet(s) Oral daily  simvastatin 20 milliGRAM(s) Oral at bedtime  sodium chloride 1 Gram(s) Oral two times a day  zinc sulfate 220 milliGRAM(s) Oral daily    MEDICATIONS  (PRN):  acetaminophen     Tablet .. 975 milliGRAM(s) Oral every 6 hours PRN Moderate Pain (4 - 6), Severe Pain (7 - 10)            LABORATORY:                          10.9   5.85  )-----------( 160      ( 03 Mar 2022 06:35 )             33.1     -    137  |  102  |  26<H>  ----------------------------<  103<H>  4.5   |  23  |  1.35<H>    Ca    9.5      03 Mar 2022 06:35  Mg     2.00     -03    TPro  6.9  /  Alb  4.4  /  TBili  0.6  /  DBili  x   /  AST  22  /  ALT  15  /  AlkPhos  64  03-02    PT/INR - ( 03 Mar 2022 06:35 )   PT: 13.7 sec;   INR: 1.18 ratio         PTT - ( 03 Mar 2022 06:35 )  PTT:30.8 sec  Urinalysis Basic - ( 02 Mar 2022 14:23 )    Color: Yellow / Appearance: Clear / S.022 / pH: x  Gluc: x / Ketone: Negative  / Bili: Negative / Urobili: <2 mg/dL   Blood: x / Protein: Trace / Nitrite: Negative   Leuk Esterase: Negative / RBC: x / WBC 0 /HPF   Sq Epi: x / Non Sq Epi: 1 /HPF / Bacteria: Negative                        
  Patient is a 86y old  Male who presents with a chief complaint of fall, pelvic fractures (07 Mar 2022 18:26)      INTERVAL HPI/OVERNIGHT EVENTS:  Seen by me this morning, doing well, not dizzy or lightheaded this AM. Eager to going to Rehab. Good appetite.    Review of Systems: 12 point review of systems otherwise negative    MEDICATIONS  (STANDING):  ascorbic acid 1000 milliGRAM(s) Oral daily  aspirin enteric coated 81 milliGRAM(s) Oral daily  calcium carbonate    500 mG (Tums) Chewable 1 Tablet(s) Chew daily  cholecalciferol 5000 Unit(s) Oral daily  diphtheria/tetanus/pertussis (acellular) Vaccine (ADAcel) 0.5 milliLiter(s) IntraMuscular once  famotidine    Tablet 40 milliGRAM(s) Oral daily  fludroCORTISONE 0.1 milliGRAM(s) Oral two times a day  heparin   Injectable 5000 Unit(s) SubCutaneous every 8 hours  magnesium oxide 400 milliGRAM(s) Oral daily  midodrine. 10 milliGRAM(s) Oral three times a day  multivitamin 1 Tablet(s) Oral daily  polyethylene glycol 3350 17 Gram(s) Oral daily  simvastatin 20 milliGRAM(s) Oral at bedtime  sodium chloride 2 Gram(s) Oral two times a day  zinc sulfate 220 milliGRAM(s) Oral daily    MEDICATIONS  (PRN):  acetaminophen     Tablet .. 975 milliGRAM(s) Oral every 6 hours PRN Moderate Pain (4 - 6), Severe Pain (7 - 10)      Allergies    No Known Allergies    Intolerances          Vital Signs Last 24 Hrs  T(C): 36.6 (08 Mar 2022 10:50), Max: 36.7 (07 Mar 2022 22:00)  T(F): 97.9 (08 Mar 2022 10:50), Max: 98 (07 Mar 2022 22:00)  HR: 65 (08 Mar 2022 14:10) (58 - 65)  BP: 108/60 (08 Mar 2022 14:10) (90/50 - 108/60)  BP(mean): --  RR: 16 (08 Mar 2022 10:50) (16 - 18)  SpO2: 100% (08 Mar 2022 10:50) (99% - 100%)  CAPILLARY BLOOD GLUCOSE          03-07 @ 07:01  -  03-08 @ 07:00  --------------------------------------------------------  IN: 0 mL / OUT: 200 mL / NET: -200 mL        Physical Exam:    Daily     Daily   General:  Well appearing, NAD, not cachetic  HEENT:  Nonicteric, PERRLA, raised lesion on scalp-covered with a dressing-  CV:  RRR, no murmur, no JVD  Lungs:  CTA B/L, no wheezes, rales, rhonchi  Abdomen:  Soft, non-tender, no distended, positive BS  Extremities:  2+ pulses, no c/c, no edema  Skin:  Warm and dry, no rashes  :  No guajardo  Neuro:  AAOx3, non-focal, CN II-XII grossly intact  No Restraints    LABS:                        9.6    5.52  )-----------( 158      ( 08 Mar 2022 07:47 )             28.8     03-08    138  |  105  |  38<H>  ----------------------------<  98  4.4   |  23  |  1.28    Ca    9.0      08 Mar 2022 07:47  Phos  3.6     03-08  Mg     2.10     03-08              RADIOLOGY & ADDITIONAL TESTS:  Reviewed by me

## 2022-03-08 NOTE — PROVIDER CONTACT NOTE (OTHER) - ASSESSMENT
Patient is asymptomatic and denies chest pain, dizziness or shortness of breath. PT at bedside with ORTIZ Orourke checking orthostatics. Patients bed alarm is on and patient is resting comfortably.
BP 90/50, other vitals stable as per flowsheet. Denies any distress or discomfort
BP 92/50, all other vitals stable. Patient denies pain, dizziness or distress.
Patient is asymptomatic and denies feeling dizzy.
BP 87/52, all other vitals stable. Patient denies pain, dizziness or distress. Midodrine given now as ordered.
Patient is hypotensive with a manual BP of 76/44. Patient is asymptomatic; denies dizziness. Currently sitting up in bed eating lunch
patient hypotensive 90/54, denies dizziness, headache, SOB. all other vitals within normal limits

## 2022-03-08 NOTE — DISCHARGE NOTE NURSING/CASE MANAGEMENT/SOCIAL WORK - PATIENT PORTAL LINK FT
You can access the FollowMyHealth Patient Portal offered by [facility] by registering at the following website: [email or web address]. By joining [employer]’s FollowMyHealth portal, you will also be able to view your health information using other applications (apps) compatible with our system.

## 2022-03-08 NOTE — ADVANCED PRACTICE NURSE CONSULT - RECOMMEDATIONS
Recommend follow up with Dr Scanlon (Dermatology) as an outpatient.     Topical recommendations:     Top of head- Cleanse with NS, pat dry. Apply Liquid barrier film to periwound skin (allow to dry). Apply small silicone foam with border. Change every other day or PRN if compromised.    Continue low air loss bed therapy,  heel elevation with offloading boots while in bed, encourage to turn & reposition q2h with Z-flow fluidized pillow, continue measures to decrease friction/shear.   Plan discussed with patient- educated on topical wound therapy to optimize wound healing. Questions answered.      Please contact Wound/Ostomy Care Service Line if we can be of further assistance (ext [phone]).

## 2022-03-08 NOTE — PROGRESS NOTE ADULT - PROBLEM SELECTOR PROBLEM 4
Rib pain on right side
Abnormal head CT
Rib pain on right side
Abnormal head CT
Rib pain on right side
Rib pain on right side

## 2022-03-08 NOTE — PROGRESS NOTE ADULT - PROBLEM SELECTOR PLAN 5
Mass on crown of head is a known finding. Has been followed by derm and surgical oncologist. Pt is s/p bx. Pls refer to ACP note on 3/3 for details  Path concerning for malignancy - this will be further followed up as OP.      DVT ppx- HSQ  Dispo- Planned for discharge to [facility] today, apprec SW assistance    Communication  -Prior attending spoke w/ son Gary on 3/5. Updated on status/POC.
Mass on crown of head is a known finding. Has been followed by derm and surgical oncologist. Pt is s/p bx. Pls refer to ACP note on 3/3 for details.  Path concerning for malignancy - this will be further followed up as OP.      DVT ppx- HSQ    Dispo- Pending placement to [facility]. At this point - med stable. Further titration of med as outpatient
Mass on crown of head is a known finding. Has been followed by derm and surgical oncologist. Pt is s/p bx. Pls refer to ACP note on 3/3 for details.  Path concerning for malignancy - this will be further followed up as OP.      DVT ppx- HSQ      Dispo- Pending placement to [facility] At this point - med stable. Further titration of med as outpatient    Communication  -Spoke w/ son Gary on 3/5. Updated on status/POC. No new changes today.
Mass on crown of head is a known finding. Has been followed by derm and surgical oncologist. Pt is s/p bx. Pls refer to ACP note on 3/3 for details  Path concerning for malignancy - this will be further followed up as OP.      DVT ppx- HSQ  Dispo- Pending placement to [facility], awaiting acceptances.    Communication  -Prior attending spoke w/ son Gary on 3/5. Updated on status/POC

## 2022-03-08 NOTE — PROGRESS NOTE ADULT - PROBLEM SELECTOR PLAN 1
Activity as tolerated -possibly stemming from orthostasis  -falls precaution.   -PT rec KELLY - family in agreement - f/u SW.      #hypotension/orthostasis  -Still w/ hypotension, but asymptomatic.  -continue midodrine.   -Cont salt tab 2g BID   -Cont compression stockings as tolerated  -Appreciate f/u from Dr Zurita - added florinef on 3/5, c/w max dose of 0.1 mg BID on discharge  -No sig events on telemetry

## 2022-03-08 NOTE — PROVIDER CONTACT NOTE (OTHER) - DATE AND TIME:
06-Mar-2022 11:19
Patient called in for Podiatry's phone number
03-Mar-2022 05:20
04-Mar-2022 09:30
08-Mar-2022 10:55
07-Mar-2022 11:15
03-Mar-2022 13:45
04-Mar-2022 09:30

## 2022-03-08 NOTE — PROGRESS NOTE ADULT - ASSESSMENT
85 yo m with recurrent fall complicated by pelvic fractures and orthostatic hypotension.
85 yo m with recurrent fall complicated by pelvic  fractures 
85 yo m with recurrent fall complicated by pelvic fractures and orthostatic hypotension.
87 yo m with recurrent fall complicated by pelvic fractures and orthostatic hypotension.
87 yo m with recurrent fall complicated by pelvic fractures and orthostatic hypotension.
85 yo m with recurrent fall complicated by pelvic fractures and orthostatic hypotension.

## 2022-03-11 DIAGNOSIS — Z87.81 PERSONAL HISTORY OF (HEALED) TRAUMATIC FRACTURE: ICD-10-CM

## 2022-03-14 ENCOUNTER — NON-APPOINTMENT (OUTPATIENT)
Age: 87
End: 2022-03-14

## 2022-03-21 ENCOUNTER — APPOINTMENT (OUTPATIENT)
Dept: CARDIOLOGY | Facility: CLINIC | Age: 87
End: 2022-03-21

## 2022-03-22 ENCOUNTER — NON-APPOINTMENT (OUTPATIENT)
Age: 87
End: 2022-03-22

## 2022-03-22 ENCOUNTER — APPOINTMENT (OUTPATIENT)
Dept: CARDIOLOGY | Facility: CLINIC | Age: 87
End: 2022-03-22
Payer: MEDICARE

## 2022-03-22 VITALS
DIASTOLIC BLOOD PRESSURE: 64 MMHG | TEMPERATURE: 98.1 F | WEIGHT: 139 LBS | SYSTOLIC BLOOD PRESSURE: 103 MMHG | HEIGHT: 62 IN | HEART RATE: 54 BPM | OXYGEN SATURATION: 99 % | BODY MASS INDEX: 25.58 KG/M2

## 2022-03-22 DIAGNOSIS — Z01.818 ENCOUNTER FOR OTHER PREPROCEDURAL EXAMINATION: ICD-10-CM

## 2022-03-22 PROCEDURE — 93000 ELECTROCARDIOGRAM COMPLETE: CPT

## 2022-03-22 PROCEDURE — 99214 OFFICE O/P EST MOD 30 MIN: CPT

## 2022-03-22 RX ORDER — MUPIROCIN 20 MG/G
2 OINTMENT TOPICAL
Qty: 44 | Refills: 2 | Status: DISCONTINUED | COMMUNITY
Start: 2019-10-04 | End: 2022-03-22

## 2022-03-22 RX ORDER — DOXYCYCLINE HYCLATE 100 MG/1
100 CAPSULE ORAL
Qty: 28 | Refills: 0 | Status: DISCONTINUED | COMMUNITY
Start: 2022-02-17 | End: 2022-03-22

## 2022-03-22 RX ORDER — POLYETHYLENE GLYCOL-3350 AND ELECTROLYTES 236; 6.74; 5.86; 2.97; 22.74 G/274.31G; G/274.31G; G/274.31G; G/274.31G; G/274.31G
236 POWDER, FOR SOLUTION ORAL
Qty: 4000 | Refills: 0 | Status: DISCONTINUED | COMMUNITY
Start: 2020-03-02 | End: 2022-03-22

## 2022-03-22 RX ORDER — AMOXICILLIN 500 MG/1
500 TABLET, FILM COATED ORAL
Qty: 21 | Refills: 0 | Status: DISCONTINUED | COMMUNITY
Start: 2020-06-17 | End: 2022-03-22

## 2022-03-22 RX ORDER — MAGNESIUM OXIDE 241.3 MG/1000MG
400 TABLET ORAL DAILY
Qty: 14 | Refills: 0 | Status: ACTIVE | COMMUNITY
Start: 2022-03-22

## 2022-03-22 RX ORDER — FLUTICASONE PROPIONATE 50 UG/1
50 SPRAY, METERED NASAL
Qty: 48 | Refills: 0 | Status: DISCONTINUED | COMMUNITY
Start: 2020-07-07 | End: 2022-03-22

## 2022-03-22 NOTE — HISTORY OF PRESENT ILLNESS
[Preoperative Visit] : for a medical evaluation prior to surgery [Scheduled Procedure ___] : a [unfilled] [Date of Surgery ___] : on [unfilled] [Surgeon Name ___] : surgeon: [unfilled] [FreeTextEntry1] : This is an 85yo male who returns to the office today for hospital follow-up and medical clearance. Patient recently admitted to [facility] from 3/2-3/7 for fall, s/p left hip fracture. No surgical intervention. Patient was also noted to have Ortostatic Hypotension and fall likely due to dizziness from orthostatic Hypotension. Patient was d/c'd with instructions to f/u with Dr. Ramesh and with midodrine, Florinef, and Sodium Chloride tabs. patient states he has not had any further episodes of dizziness. Patient also scheduled for excision of scalp lesion on 4/4 with Dr. Stevens. Patient also inquiring about PT. No further issues or concerns for today.

## 2022-03-22 NOTE — REVIEW OF SYSTEMS
[Joint Pain] : joint pain [Skin Lesions] : skin lesion(s): [Negative] : Heme/Lymph [Rash] : no rash [de-identified] : scalp

## 2022-03-22 NOTE — PHYSICAL EXAM
[General Appearance - Well Developed] : well developed [Normal Appearance] : normal appearance [Well Groomed] : well groomed [General Appearance - Well Nourished] : well nourished [No Deformities] : no deformities [General Appearance - In No Acute Distress] : no acute distress [Normal Conjunctiva] : the conjunctiva exhibited no abnormalities [Eyelids - No Xanthelasma] : the eyelids demonstrated no xanthelasmas [Normal Oral Mucosa] : normal oral mucosa [No Oral Pallor] : no oral pallor [No Oral Cyanosis] : no oral cyanosis [Normal Jugular Venous A Waves Present] : normal jugular venous A waves present [Normal Jugular Venous V Waves Present] : normal jugular venous V waves present [No Jugular Venous Mcgee A Waves] : no jugular venous mcgee A waves [Respiration, Rhythm And Depth] : normal respiratory rhythm and effort [Exaggerated Use Of Accessory Muscles For Inspiration] : no accessory muscle use [Auscultation Breath Sounds / Voice Sounds] : lungs were clear to auscultation bilaterally [Heart Rate And Rhythm] : heart rate and rhythm were normal [Heart Sounds] : normal S1 and S2 [Murmurs] : no murmurs present [Abdomen Soft] : soft [Abdomen Tenderness] : non-tender [Abdomen Mass (___ Cm)] : no abdominal mass palpated [Abnormal Walk] : normal gait [Gait - Sufficient For Exercise Testing] : the gait was sufficient for exercise testing [Nail Clubbing] : no clubbing of the fingernails [Cyanosis, Localized] : no localized cyanosis [Petechial Hemorrhages (___cm)] : no petechial hemorrhages [Skin Color & Pigmentation] : normal skin color and pigmentation [] : no rash [No Venous Stasis] : no venous stasis [No Skin Ulcers] : no skin ulcer [No Xanthoma] : no  xanthoma was observed [Oriented To Time, Place, And Person] : oriented to person, place, and time [Affect] : the affect was normal [Mood] : the mood was normal [No Anxiety] : not feeling anxious [FreeTextEntry1] : scalp lesion

## 2022-03-23 ENCOUNTER — APPOINTMENT (OUTPATIENT)
Dept: ORTHOPEDIC SURGERY | Facility: CLINIC | Age: 87
End: 2022-03-23

## 2022-03-24 ENCOUNTER — APPOINTMENT (OUTPATIENT)
Dept: SURGICAL ONCOLOGY | Facility: CLINIC | Age: 87
End: 2022-03-24
Payer: MEDICARE

## 2022-03-24 ENCOUNTER — OUTPATIENT (OUTPATIENT)
Dept: OUTPATIENT SERVICES | Facility: HOSPITAL | Age: 87
LOS: 1 days | End: 2022-03-24

## 2022-03-24 VITALS
DIASTOLIC BLOOD PRESSURE: 72 MMHG | HEIGHT: 64 IN | SYSTOLIC BLOOD PRESSURE: 138 MMHG | HEART RATE: 63 BPM | WEIGHT: 145.06 LBS | TEMPERATURE: 97 F | OXYGEN SATURATION: 95 % | RESPIRATION RATE: 16 BRPM

## 2022-03-24 VITALS
HEART RATE: 56 BPM | HEIGHT: 62 IN | BODY MASS INDEX: 26.13 KG/M2 | DIASTOLIC BLOOD PRESSURE: 82 MMHG | OXYGEN SATURATION: 97 % | RESPIRATION RATE: 16 BRPM | SYSTOLIC BLOOD PRESSURE: 156 MMHG | WEIGHT: 142 LBS

## 2022-03-24 DIAGNOSIS — Z86.79 PERSONAL HISTORY OF OTHER DISEASES OF THE CIRCULATORY SYSTEM: ICD-10-CM

## 2022-03-24 DIAGNOSIS — D48.5 NEOPLASM OF UNCERTAIN BEHAVIOR OF SKIN: ICD-10-CM

## 2022-03-24 DIAGNOSIS — C44.42 SQUAMOUS CELL CARCINOMA OF SKIN OF SCALP AND NECK: ICD-10-CM

## 2022-03-24 DIAGNOSIS — R22.0 LOCALIZED SWELLING, MASS AND LUMP, HEAD: ICD-10-CM

## 2022-03-24 DIAGNOSIS — Z98.890 OTHER SPECIFIED POSTPROCEDURAL STATES: Chronic | ICD-10-CM

## 2022-03-24 DIAGNOSIS — Z98.61 CORONARY ANGIOPLASTY STATUS: Chronic | ICD-10-CM

## 2022-03-24 DIAGNOSIS — Z87.09 PERSONAL HISTORY OF OTHER DISEASES OF THE RESPIRATORY SYSTEM: Chronic | ICD-10-CM

## 2022-03-24 DIAGNOSIS — C44.90 UNSPECIFIED MALIGNANT NEOPLASM OF SKIN, UNSPECIFIED: Chronic | ICD-10-CM

## 2022-03-24 DIAGNOSIS — Z98.41 CATARACT EXTRACTION STATUS, RIGHT EYE: Chronic | ICD-10-CM

## 2022-03-24 DIAGNOSIS — C44.91 BASAL CELL CARCINOMA OF SKIN, UNSPECIFIED: ICD-10-CM

## 2022-03-24 DIAGNOSIS — Z90.49 ACQUIRED ABSENCE OF OTHER SPECIFIED PARTS OF DIGESTIVE TRACT: Chronic | ICD-10-CM

## 2022-03-24 DIAGNOSIS — I25.10 ATHEROSCLEROTIC HEART DISEASE OF NATIVE CORONARY ARTERY WITHOUT ANGINA PECTORIS: ICD-10-CM

## 2022-03-24 DIAGNOSIS — Z95.818 PRESENCE OF OTHER CARDIAC IMPLANTS AND GRAFTS: Chronic | ICD-10-CM

## 2022-03-24 PROCEDURE — 99213 OFFICE O/P EST LOW 20 MIN: CPT

## 2022-03-24 RX ORDER — ASPIRIN/CALCIUM CARB/MAGNESIUM 324 MG
1 TABLET ORAL
Qty: 30 | Refills: 0
Start: 2022-03-24 | End: 2022-04-22

## 2022-03-24 RX ORDER — SODIUM CHLORIDE 9 MG/ML
1000 INJECTION, SOLUTION INTRAVENOUS
Refills: 0 | Status: DISCONTINUED | OUTPATIENT
Start: 2022-04-04 | End: 2022-04-18

## 2022-03-24 NOTE — H&P PST ADULT - PROBLEM SELECTOR PLAN 1
Scheduled for resection of scalp mass, local scalp flap, possible full thickness skin graft, possible integra with Dr. Stevens on 4/4/22.   Verbal and written pre-op instructions provided to patient. Patient verbalized understanding and will call surgeons office for revised instructions if surgery is rescheduled.   Pepcid for GI prophylaxis provided.   Patient aware of need for COVID testing prior to  procedure at a [facility] facility  and advised to coordinate with surgeon to get tested within 72 hours of procedure.

## 2022-03-24 NOTE — H&P PST ADULT - GAIT/BALANCE
unsteady. pt uses cane with ambulation unsteady. pt presenting to PST in wheelchair, using walker for short distances at home s/p left hip fracture

## 2022-03-24 NOTE — H&P PST ADULT - ASSESSMENT
85 y/o male with CAD s/p stent in LAD, bilateral Lung Nodules, HLD, CKD, spindle cell neoplasm s/p resection in 2018, orthostatic hypotension, COPD, SCC of scalp presents to PST preop for evaluation prior to resection of scalp mass, local scalp flap, possible full thickness skin graft, possible integra with Dr. Stevens.

## 2022-03-24 NOTE — H&P PST ADULT - HISTORY OF PRESENT ILLNESS
85 y/o male with CAD s/p stent in LAD, bilateral Lung Nodules, HLD, CKD, spindle cell neoplasm s/p resection in 2018, orthostatic hypotension,  COPD, SCC of scalp  presents to PST preop for evaluation prior to resection of scalp mass, local scalp flap, possible full thickness skin graft, possible integra with Dr. Stevens.       87 y/o male with CAD s/p stent in LAD, bilateral Lung Nodules, HLD, CKD, spindle cell neoplasm s/p resection in 2018, orthostatic hypotension, COPD, SCC of scalp presents to PST preop for evaluation prior to resection of scalp mass, local scalp flap, possible full thickness skin graft, possible integra with Dr. Stevens.

## 2022-03-24 NOTE — H&P PST ADULT - NSICDXPASTMEDICALHX_GEN_ALL_CORE_FT
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)     CAD (Coronary Artery Disease) (ICD9 414.00) s/p stent to LAD 9/11/09    Cataract     Chronic kidney disease (CKD)     H/O orthostatic hypotension fall 3/2/22    Hip fracture s/p fall 3/2/22    History of COPD     Hypercholesteremia (ICD9 272.0)     Lung nodule followed by annual CT Scan    Lung nodule bilateral    Melanoma in situ of scalp     PAD (peripheral artery disease)     Sarcoma of scalp    Skin cancer Basal, Squamous - treated surgically    Spinal stenosis     Spinal stenosis     Spindle cell carcinoma 2018    Syncope (2016) as a result of Clopedigrel and seizure like jerking- stopped after Plavix was stopped

## 2022-03-25 ENCOUNTER — NON-APPOINTMENT (OUTPATIENT)
Age: 87
End: 2022-03-25

## 2022-03-25 PROBLEM — C44.91 BASAL CELL CARCINOMA: Status: ACTIVE | Noted: 2019-09-27

## 2022-03-25 PROBLEM — D48.5 NEOPLASM OF UNCERTAIN BEHAVIOR OF SCALP: Status: ACTIVE | Noted: 2021-02-25

## 2022-03-25 NOTE — HISTORY OF PRESENT ILLNESS
[de-identified] : Patient is an 81 y/o male who presented with a raised lesion in the mid-frontal scalp.  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  He is referred for surgical management.\par Patient reports he has had this lesion for several years and does not have complaints of associated pain.  He has no previous history of sarcoma.\par \par 09/27/2018:  Patient is s/p radical resection of frontal scalp spindle cell neoplasm and biopsy of vertex scalp lesion.  Skin graft coverage by Dr. Myles.  Recovering well.  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  Additional deep margin shows spindle cell lesion felt to represent fibrosing granulation tissue.  Vertex scalp lesion demonstrates atypical spindle cell lesion, favoring atypical fibroxanthoma.\par \par 10/11/2018:  Improved.  Skin graft healing well.\par \par 11/08/2018:  Feels well.  Denies pain. Saw Dr. 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 Myles 12/17/2018.  Pathology shows minimal residual disease, margins negative.\par \par 04/02/2019:  Patient presents for 3 months follow up.  Feels well.  He states he is transferring dermatology care to Dr. Fitzpatrick of [facility].  Had benign right mid back biopsy performed last week by dermatology.\par \par 10/01/2019: Patient recently developed firm nodule at the posterior edge of his scalp skin graft.  Biopsy performed 08/27/2019 demonstrated atypical epithelioid cells for which an underlying neoplasm could not be ruled out.  He is s/p deeper biopsy by dermatology last week for which results are pending.  He offers no new complaints.\par \par 01/07/2020: Overall doing well, but developed an area of ulceration in vertex scalp.  Recent biopsy 12/18/2019 did not demonstrate evidence of recurrent malignancy.  He denies pain or drainage.\par \par 07/23/2020: Patient was seen by dermatology yesterday and underwent biopsy of a brown pigmented scalp lesion posterior to skin graft.  He continues to have a nonhealing ulcer at the vertex scalp at site of prior biopsy.  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  he offers no new complaints.\par \par 10/08/2020: Patient underwent scalp excision of melanoma in-situ and nonhealing area 09/11/2020.  A significant portion of his pre-existing skin graft and adjacent scalp.  An additional left parietal scalp lesion was excised as well.\par Pathology: scalp biopsy - small foci of squamous cell carcinoma in situ, epidermal cyst, no melanoma identified.  Parietal scalp lesion - squamous cell carcinoma in situ - extending to one margin.\par Scalp lesion was covered with Integra and is granulating well.  He is pending formal skin graft coverage.\par \par 01/19/2021: Patient is doing well.  He has recovered well from scalp skin graft.  He denies pain.  He has not have dermatology follow up since surgery 09/2020.\par \par 04/13/2021: Patient developed non-healing wound at vertex scalp since his last visit.  Biopsy of the area by dermatology 02/2021 showed actinic keratosis without evidence of malignancy.  He denies pain in the area.\par \par 07/13/2021: Patient remains stable without new malignant skin biopsies.  He denies pain in scalp excision sites.\par \par 09/28/2021: Patient reports increased redness diffusely along scalp, followed by dermatology.  He is applying topical treatment.\par \par 01/18/2022: Patient reports improvement in scalp irritation/redness.  He is scheduled to see dermatology soon.  Still recovering from back injury.\par \par 02/22/2022: Since his last appointment 01/18/2022, patient developed a raised nodule over the vertex scalp.  Ultrasound 02/17/2022 showed a thin layer of complex fluid with surrounding edema and hyperemia lifting the overlying subcutaneous fat, most consistent with an inflammatory/infectious fluid collection.  He denies fever.\par \par \par 03/24/2022: FNA from scalp lesion 02/22/22 demonstrated malignant cells, QNS for characterization.  Discussed with patient and son over telephone previously, resection with plastic reconstruction scheduled for 04/04/2022.  He has seen Dr. Brown from plastic surgery.  He offers no complaints, except there is another smaller lesion on right side of vertex scalp adjacent to eschar region.

## 2022-03-25 NOTE — ASSESSMENT
[FreeTextEntry1] : To proceed with resection and plastic reconstruction.\par All questions answered.

## 2022-03-25 NOTE — PHYSICAL EXAM
[FreeTextEntry1] : Scalp: well healed skin graft.  No obvious evidence of recurrent/new disease. Broader area of ulcer/dry eschar at posterior vertex scalp, but no obvious recurrent disease.  New palpable nodule left  midline vertex scalp adjacent to chronic eschar, 1.5 cm in diameter.  New smaller nodule on right side of chronic eschar similar left side. [Normal] : supple, no neck mass and thyroid not enlarged [Normal Neck Lymph Nodes] : normal neck lymph nodes  [Normal Supraclavicular Lymph Nodes] : normal supraclavicular lymph nodes [Normal Groin Lymph Nodes] : normal groin lymph nodes [Normal Axillary Lymph Nodes] : normal axillary lymph nodes [Normal] : oriented to person, place and time, with appropriate affect

## 2022-03-26 ENCOUNTER — NON-APPOINTMENT (OUTPATIENT)
Age: 87
End: 2022-03-26

## 2022-03-28 NOTE — PATIENT PROFILE ADULT - BILL PAYMENT
.  Last appointment 2/17/2022  Next appointment   Future Appointments   Date Time Provider Leanne Montelongo   4/7/2022  8:10 AM Doree Morning, DO [facility]   5/19/2022  8:10 AM Dor Morning, DO [facility]      Last refill:  02/17/2022  DOS: Monthly RX      Patient called in requesting refill of:    cyclobenzaprine (FLEXERIL) 10 MG tablet       HYDROcodone-acetaminophen (NORCO)  MG per tablet       [id number] - [place], [place] - [address] [address], [address] [place] [address] no

## 2022-03-30 ENCOUNTER — NON-APPOINTMENT (OUTPATIENT)
Age: 87
End: 2022-03-30

## 2022-03-30 DIAGNOSIS — R89.9 UNSPECIFIED ABNORMAL FINDING IN SPECIMENS FROM OTHER ORGANS, SYSTEMS AND TISSUES: ICD-10-CM

## 2022-03-30 LAB
25(OH)D3 SERPL-MCNC: 63.4 NG/ML
ALBUMIN SERPL ELPH-MCNC: 4.4 G/DL
ALP BLD-CCNC: 59 U/L
ALT SERPL-CCNC: 13 U/L
ANION GAP SERPL CALC-SCNC: 11 MMOL/L
ANION GAP SERPL CALC-SCNC: 11 MMOL/L
ANION GAP SERPL CALC-SCNC: 12 MMOL/L
AST SERPL-CCNC: 21 U/L
BASOPHILS # BLD AUTO: 0.03 K/UL
BASOPHILS # BLD AUTO: 0.03 K/UL
BASOPHILS # BLD AUTO: 0.05 K/UL
BASOPHILS NFR BLD AUTO: 0.5 %
BASOPHILS NFR BLD AUTO: 0.6 %
BASOPHILS NFR BLD AUTO: 0.8 %
BILIRUB SERPL-MCNC: 0.4 MG/DL
BUN SERPL-MCNC: 30 MG/DL
BUN SERPL-MCNC: 31 MG/DL
BUN SERPL-MCNC: 35 MG/DL
CALCIUM SERPL-MCNC: 10.1 MG/DL
CALCIUM SERPL-MCNC: 10.4 MG/DL
CALCIUM SERPL-MCNC: 9.9 MG/DL
CHLORIDE SERPL-SCNC: 102 MMOL/L
CHLORIDE SERPL-SCNC: 103 MMOL/L
CHLORIDE SERPL-SCNC: 107 MMOL/L
CHOLEST SERPL-MCNC: 114 MG/DL
CK SERPL-CCNC: 50 U/L
CO2 SERPL-SCNC: 24 MMOL/L
CO2 SERPL-SCNC: 24 MMOL/L
CO2 SERPL-SCNC: 26 MMOL/L
CREAT SERPL-MCNC: 1.34 MG/DL
CREAT SERPL-MCNC: 1.44 MG/DL
CREAT SERPL-MCNC: 1.45 MG/DL
EGFR: 48 ML/MIN/1.73M2
EGFR: 52 ML/MIN/1.73M2
EOSINOPHIL # BLD AUTO: 0.11 K/UL
EOSINOPHIL # BLD AUTO: 0.15 K/UL
EOSINOPHIL # BLD AUTO: 0.19 K/UL
EOSINOPHIL NFR BLD AUTO: 2.2 %
EOSINOPHIL NFR BLD AUTO: 2.6 %
EOSINOPHIL NFR BLD AUTO: 2.9 %
ESTIMATED AVERAGE GLUCOSE: 114 MG/DL
GLUCOSE SERPL-MCNC: 91 MG/DL
GLUCOSE SERPL-MCNC: 91 MG/DL
GLUCOSE SERPL-MCNC: 97 MG/DL
HBA1C MFR BLD HPLC: 5.6 %
HCT VFR BLD CALC: 34.6 %
HCT VFR BLD CALC: 34.7 %
HCT VFR BLD CALC: 35.3 %
HDLC SERPL-MCNC: 45 MG/DL
HGB BLD-MCNC: 10.7 G/DL
HGB BLD-MCNC: 10.9 G/DL
HGB BLD-MCNC: 11 G/DL
IMM GRANULOCYTES NFR BLD AUTO: 0.2 %
LDLC SERPL CALC-MCNC: 53 MG/DL
LDLC SERPL DIRECT ASSAY-MCNC: 58 MG/DL
LYMPHOCYTES # BLD AUTO: 1.31 K/UL
LYMPHOCYTES # BLD AUTO: 1.33 K/UL
LYMPHOCYTES # BLD AUTO: 1.38 K/UL
LYMPHOCYTES NFR BLD AUTO: 21.3 %
LYMPHOCYTES NFR BLD AUTO: 23.5 %
LYMPHOCYTES NFR BLD AUTO: 25.7 %
MAGNESIUM SERPL-MCNC: 2.3 MG/DL
MAN DIFF?: NORMAL
MCHC RBC-ENTMCNC: 30.8 GM/DL
MCHC RBC-ENTMCNC: 31.2 GM/DL
MCHC RBC-ENTMCNC: 31.5 GM/DL
MCHC RBC-ENTMCNC: 32 PG
MCHC RBC-ENTMCNC: 32.7 PG
MCHC RBC-ENTMCNC: 32.8 PG
MCV RBC AUTO: 103.9 FL
MCV RBC AUTO: 104.2 FL
MCV RBC AUTO: 105.1 FL
MONOCYTES # BLD AUTO: 0.44 K/UL
MONOCYTES # BLD AUTO: 0.44 K/UL
MONOCYTES # BLD AUTO: 0.49 K/UL
MONOCYTES NFR BLD AUTO: 6.8 %
MONOCYTES NFR BLD AUTO: 8.6 %
MONOCYTES NFR BLD AUTO: 8.6 %
NEUTROPHILS # BLD AUTO: 3.19 K/UL
NEUTROPHILS # BLD AUTO: 3.66 K/UL
NEUTROPHILS # BLD AUTO: 4.42 K/UL
NEUTROPHILS NFR BLD AUTO: 62.7 %
NEUTROPHILS NFR BLD AUTO: 64.6 %
NEUTROPHILS NFR BLD AUTO: 68 %
NONHDLC SERPL-MCNC: 68 MG/DL
PLATELET # BLD AUTO: 200 K/UL
PLATELET # BLD AUTO: 214 K/UL
PLATELET # BLD AUTO: 272 K/UL
POTASSIUM SERPL-SCNC: 4.4 MMOL/L
POTASSIUM SERPL-SCNC: 4.7 MMOL/L
POTASSIUM SERPL-SCNC: 4.8 MMOL/L
PROT SERPL-MCNC: 7.2 G/DL
RBC # BLD: 3.32 M/UL
RBC # BLD: 3.34 M/UL
RBC # BLD: 3.36 M/UL
RBC # FLD: 12.1 %
RBC # FLD: 12.2 %
RBC # FLD: 12.3 %
SODIUM SERPL-SCNC: 137 MMOL/L
SODIUM SERPL-SCNC: 138 MMOL/L
SODIUM SERPL-SCNC: 144 MMOL/L
TRIGL SERPL-MCNC: 75 MG/DL
TSH SERPL-ACNC: 1.99 UIU/ML
WBC # FLD AUTO: 5.09 K/UL
WBC # FLD AUTO: 5.67 K/UL
WBC # FLD AUTO: 6.49 K/UL

## 2022-04-02 NOTE — ASU PATIENT PROFILE, ADULT - FALL HARM RISK - RISK INTERVENTIONS
Assistance OOB with selected safe patient handling equipment/Assistance with ambulation/Communicate Fall Risk and Risk Factors to all staff, patient, and family/Discuss with provider need for PT consult/Monitor gait and stability/Provide patient with walking aids - walker, cane, crutches/Reinforce activity limits and safety measures with patient and family/Use of alarms - bed, chair and/or voice tab/Visual Cue: Yellow wristband/Bed in lowest position, wheels locked, appropriate side rails in place/Call bell, personal items and telephone in reach/Instruct patient to call for assistance before getting out of bed or chair/Non-slip footwear when patient is out of bed/[place] to call system/Physically safe environment - no spills, clutter or unnecessary equipment/Purposeful Proactive Rounding/Room/bathroom lighting operational, light cord in reach

## 2022-04-03 ENCOUNTER — TRANSCRIPTION ENCOUNTER (OUTPATIENT)
Age: 87
End: 2022-04-03

## 2022-04-04 ENCOUNTER — TRANSCRIPTION ENCOUNTER (OUTPATIENT)
Age: 87
End: 2022-04-04

## 2022-04-04 ENCOUNTER — OUTPATIENT (OUTPATIENT)
Dept: OUTPATIENT SERVICES | Facility: HOSPITAL | Age: 87
LOS: 1 days | Discharge: HOME | End: 2022-04-04
Payer: MEDICARE

## 2022-04-04 ENCOUNTER — RESULT REVIEW (OUTPATIENT)
Age: 87
End: 2022-04-04

## 2022-04-04 ENCOUNTER — APPOINTMENT (OUTPATIENT)
Dept: SURGICAL ONCOLOGY | Facility: HOSPITAL | Age: 87
End: 2022-04-04

## 2022-04-04 VITALS
HEART RATE: 59 BPM | DIASTOLIC BLOOD PRESSURE: 68 MMHG | RESPIRATION RATE: 17 BRPM | SYSTOLIC BLOOD PRESSURE: 147 MMHG | OXYGEN SATURATION: 94 %

## 2022-04-04 VITALS
HEART RATE: 56 BPM | TEMPERATURE: 98 F | OXYGEN SATURATION: 98 % | SYSTOLIC BLOOD PRESSURE: 166 MMHG | RESPIRATION RATE: 16 BRPM | WEIGHT: 145.06 LBS | HEIGHT: 64 IN | DIASTOLIC BLOOD PRESSURE: 66 MMHG

## 2022-04-04 DIAGNOSIS — Z95.818 PRESENCE OF OTHER CARDIAC IMPLANTS AND GRAFTS: Chronic | ICD-10-CM

## 2022-04-04 DIAGNOSIS — R22.0 LOCALIZED SWELLING, MASS AND LUMP, HEAD: ICD-10-CM

## 2022-04-04 DIAGNOSIS — Z98.890 OTHER SPECIFIED POSTPROCEDURAL STATES: Chronic | ICD-10-CM

## 2022-04-04 DIAGNOSIS — Z90.49 ACQUIRED ABSENCE OF OTHER SPECIFIED PARTS OF DIGESTIVE TRACT: Chronic | ICD-10-CM

## 2022-04-04 DIAGNOSIS — Z87.09 PERSONAL HISTORY OF OTHER DISEASES OF THE RESPIRATORY SYSTEM: Chronic | ICD-10-CM

## 2022-04-04 DIAGNOSIS — Z98.61 CORONARY ANGIOPLASTY STATUS: Chronic | ICD-10-CM

## 2022-04-04 DIAGNOSIS — C44.90 UNSPECIFIED MALIGNANT NEOPLASM OF SKIN, UNSPECIFIED: Chronic | ICD-10-CM

## 2022-04-04 DIAGNOSIS — Z98.41 CATARACT EXTRACTION STATUS, RIGHT EYE: Chronic | ICD-10-CM

## 2022-04-04 PROCEDURE — 88341 IMHCHEM/IMCYTCHM EA ADD ANTB: CPT | Mod: 26,59

## 2022-04-04 PROCEDURE — 88305 TISSUE EXAM BY PATHOLOGIST: CPT | Mod: 26

## 2022-04-04 PROCEDURE — 11626 EXC S/N/H/F/G MAL+MRG >4 CM: CPT

## 2022-04-04 PROCEDURE — 88360 TUMOR IMMUNOHISTOCHEM/MANUAL: CPT | Mod: 26

## 2022-04-04 PROCEDURE — 88342 IMHCHEM/IMCYTCHM 1ST ANTB: CPT | Mod: 26,59

## 2022-04-04 DEVICE — BONE WAX 2.5GM: Type: IMPLANTABLE DEVICE | Status: FUNCTIONAL

## 2022-04-04 DEVICE — IMPLANTABLE DEVICE: Type: IMPLANTABLE DEVICE | Status: FUNCTIONAL

## 2022-04-04 RX ORDER — ACETAMINOPHEN 500 MG
975 TABLET ORAL ONCE
Refills: 0 | Status: DISCONTINUED | OUTPATIENT
Start: 2022-04-04 | End: 2022-04-18

## 2022-04-04 RX ORDER — FENTANYL CITRATE 50 UG/ML
50 INJECTION INTRAVENOUS ONCE
Refills: 0 | Status: DISCONTINUED | OUTPATIENT
Start: 2022-04-04 | End: 2022-04-04

## 2022-04-04 RX ORDER — ONDANSETRON 8 MG/1
4 TABLET, FILM COATED ORAL ONCE
Refills: 0 | Status: DISCONTINUED | OUTPATIENT
Start: 2022-04-04 | End: 2022-04-18

## 2022-04-04 RX ORDER — OXYCODONE HYDROCHLORIDE 5 MG/1
2.5 TABLET ORAL ONCE
Refills: 0 | Status: DISCONTINUED | OUTPATIENT
Start: 2022-04-04 | End: 2022-04-04

## 2022-04-04 RX ORDER — FENTANYL CITRATE 50 UG/ML
25 INJECTION INTRAVENOUS ONCE
Refills: 0 | Status: DISCONTINUED | OUTPATIENT
Start: 2022-04-04 | End: 2022-04-04

## 2022-04-04 RX ADMIN — OXYCODONE HYDROCHLORIDE 2.5 MILLIGRAM(S): 5 TABLET ORAL at 14:30

## 2022-04-04 RX ADMIN — SODIUM CHLORIDE 30 MILLILITER(S): 9 INJECTION, SOLUTION INTRAVENOUS at 14:32

## 2022-04-04 NOTE — ASU DISCHARGE PLAN (ADULT/PEDIATRIC) - CARE PROVIDER_API CALL
Octavia Brown (MD)  Surgery  [address] [place] [id number]  Phone: [phone]  Fax: [phone]  Follow Up Time: 2 weeks

## 2022-04-04 NOTE — ASU DISCHARGE PLAN (ADULT/PEDIATRIC) - ASU DC SPECIAL INSTRUCTIONSFT
Keep dressing on and intact for 2 weeks.   Do not get wet or remove the dressing until your follow up visit  No heavy lifting, strenuous activity or exercise for 1 week  Ok to restart ASA after 48hrs from the procedure  Ok to shower from the neck down after 2 days

## 2022-04-04 NOTE — ASU DISCHARGE PLAN (ADULT/PEDIATRIC) - CALL YOUR DOCTOR IF YOU HAVE ANY OF THE FOLLOWING:
Bleeding that does not stop/Swelling that gets worse/Pain not relieved by Medications/Fever greater than (need to indicate Fahrenheit or Celsius)/Wound/Surgical Site with redness, or foul smelling discharge or pus Bleeding that does not stop/Swelling that gets worse/Pain not relieved by Medications/Fever greater than (need to indicate Fahrenheit or Celsius)/Wound/Surgical Site with redness, or foul smelling discharge or pus/Nausea and vomiting that does not stop/Unable to urinate/Inability to tolerate liquids or foods/Increased irritability or sluggishness

## 2022-04-04 NOTE — ASU DISCHARGE PLAN (ADULT/PEDIATRIC) - NS MD DC FALL RISK RISK
For information on Fall & Injury Prevention, visit: [email or web address] OR  [email or web address] OR  https://www.cdc.gov/steadi/patient.html

## 2022-04-04 NOTE — ASU DISCHARGE PLAN (ADULT/PEDIATRIC) - DO NOT SUBMERGE DURATION DAY(S)
Keep dressing on and clean for 2 weeks. ok to shower from the neck down. Do not get the head dressing wet

## 2022-04-04 NOTE — ASU DISCHARGE PLAN (ADULT/PEDIATRIC) - FOLLOW UP APPOINTMENTS
890 may also call Recovery Room (PACU) 24/7 @ [phone]/[facility]

## 2022-04-11 ENCOUNTER — RESULT REVIEW (OUTPATIENT)
Age: 87
End: 2022-04-11

## 2022-04-11 ENCOUNTER — APPOINTMENT (OUTPATIENT)
Dept: RADIOLOGY | Facility: IMAGING CENTER | Age: 87
End: 2022-04-11

## 2022-04-11 ENCOUNTER — APPOINTMENT (OUTPATIENT)
Dept: CARDIOLOGY | Facility: CLINIC | Age: 87
End: 2022-04-11
Payer: MEDICARE

## 2022-04-11 ENCOUNTER — NON-APPOINTMENT (OUTPATIENT)
Age: 87
End: 2022-04-11

## 2022-04-11 ENCOUNTER — APPOINTMENT (OUTPATIENT)
Dept: ULTRASOUND IMAGING | Facility: IMAGING CENTER | Age: 87
End: 2022-04-11

## 2022-04-11 ENCOUNTER — OUTPATIENT (OUTPATIENT)
Dept: OUTPATIENT SERVICES | Facility: HOSPITAL | Age: 87
LOS: 1 days | End: 2022-04-11
Payer: MEDICARE

## 2022-04-11 VITALS
WEIGHT: 160 LBS | BODY MASS INDEX: 29.44 KG/M2 | HEIGHT: 62 IN | HEART RATE: 58 BPM | SYSTOLIC BLOOD PRESSURE: 130 MMHG | OXYGEN SATURATION: 94 % | TEMPERATURE: 98.4 F | DIASTOLIC BLOOD PRESSURE: 68 MMHG

## 2022-04-11 DIAGNOSIS — Z98.890 OTHER SPECIFIED POSTPROCEDURAL STATES: Chronic | ICD-10-CM

## 2022-04-11 DIAGNOSIS — Z87.09 PERSONAL HISTORY OF OTHER DISEASES OF THE RESPIRATORY SYSTEM: Chronic | ICD-10-CM

## 2022-04-11 DIAGNOSIS — Z95.818 PRESENCE OF OTHER CARDIAC IMPLANTS AND GRAFTS: Chronic | ICD-10-CM

## 2022-04-11 DIAGNOSIS — Z90.49 ACQUIRED ABSENCE OF OTHER SPECIFIED PARTS OF DIGESTIVE TRACT: Chronic | ICD-10-CM

## 2022-04-11 DIAGNOSIS — R55 SYNCOPE AND COLLAPSE: ICD-10-CM

## 2022-04-11 DIAGNOSIS — Z98.61 CORONARY ANGIOPLASTY STATUS: Chronic | ICD-10-CM

## 2022-04-11 DIAGNOSIS — R47.9 UNSPECIFIED SPEECH DISTURBANCES: ICD-10-CM

## 2022-04-11 DIAGNOSIS — Z98.41 CATARACT EXTRACTION STATUS, RIGHT EYE: Chronic | ICD-10-CM

## 2022-04-11 DIAGNOSIS — R05.9 COUGH, UNSPECIFIED: ICD-10-CM

## 2022-04-11 DIAGNOSIS — C44.90 UNSPECIFIED MALIGNANT NEOPLASM OF SKIN, UNSPECIFIED: Chronic | ICD-10-CM

## 2022-04-11 PROBLEM — S72.009A FRACTURE OF UNSPECIFIED PART OF NECK OF UNSPECIFIED FEMUR, INITIAL ENCOUNTER FOR CLOSED FRACTURE: Chronic | Status: ACTIVE | Noted: 2022-03-24

## 2022-04-11 PROBLEM — Z86.79 PERSONAL HISTORY OF OTHER DISEASES OF THE CIRCULATORY SYSTEM: Chronic | Status: ACTIVE | Noted: 2022-03-24

## 2022-04-11 PROCEDURE — 93970 EXTREMITY STUDY: CPT

## 2022-04-11 PROCEDURE — 71046 X-RAY EXAM CHEST 2 VIEWS: CPT

## 2022-04-11 PROCEDURE — 93970 EXTREMITY STUDY: CPT | Mod: 26

## 2022-04-11 PROCEDURE — 99214 OFFICE O/P EST MOD 30 MIN: CPT

## 2022-04-11 PROCEDURE — 71046 X-RAY EXAM CHEST 2 VIEWS: CPT | Mod: 26

## 2022-04-11 PROCEDURE — 93000 ELECTROCARDIOGRAM COMPLETE: CPT

## 2022-04-11 NOTE — HISTORY OF PRESENT ILLNESS
[FreeTextEntry8] : This is a 86 year male with hcx of malignant tumor on scalp s/p recent removal on 4/4 who presents to the office for acute complaint of JOHN and LE swelling for the last week.  as well unintentional weight gain \par pt reports chronic wet cough. \par \par  \par \par Pt denies any CP, orthopnea,  heart palpitations, dizziness, abdominal pain N/V/D fever or chills\par \par

## 2022-04-11 NOTE — PHYSICAL EXAM
[No Acute Distress] : no acute distress [Well Nourished] : well nourished [Well Developed] : well developed [Well-Appearing] : well-appearing [Normal Sclera/Conjunctiva] : normal sclera/conjunctiva [PERRL] : pupils equal round and reactive to light [EOMI] : extraocular movements intact [Normal Outer Ear/Nose] : the outer ears and nose were normal in appearance [Normal Oropharynx] : the oropharynx was normal [No JVD] : no jugular venous distention [No Lymphadenopathy] : no lymphadenopathy [Supple] : supple [Thyroid Normal, No Nodules] : the thyroid was normal and there were no nodules present [No Respiratory Distress] : no respiratory distress  [No Accessory Muscle Use] : no accessory muscle use [Clear to Auscultation] : lungs were clear to auscultation bilaterally [Normal Rate] : normal rate  [Regular Rhythm] : with a regular rhythm [Normal S1, S2] : normal S1 and S2 [No Murmur] : no murmur heard [No Carotid Bruits] : no carotid bruits [No Abdominal Bruit] : a ~M bruit was not heard ~T in the abdomen [No Varicosities] : no varicosities [Pedal Pulses Present] : the pedal pulses are present [No Palpable Aorta] : no palpable aorta [No Extremity Clubbing/Cyanosis] : no extremity clubbing/cyanosis [Soft] : abdomen soft [Non Tender] : non-tender [Non-distended] : non-distended [No Masses] : no abdominal mass palpated [No HSM] : no HSM [Normal Bowel Sounds] : normal bowel sounds [Normal Posterior Cervical Nodes] : no posterior cervical lymphadenopathy [Normal Anterior Cervical Nodes] : no anterior cervical lymphadenopathy [No CVA Tenderness] : no CVA  tenderness [No Spinal Tenderness] : no spinal tenderness [No Joint Swelling] : no joint swelling [Grossly Normal Strength/Tone] : grossly normal strength/tone [No Rash] : no rash [Coordination Grossly Intact] : coordination grossly intact [No Focal Deficits] : no focal deficits [Normal Gait] : normal gait [Deep Tendon Reflexes (DTR)] : deep tendon reflexes were 2+ and symmetric [Normal Affect] : the affect was normal [Normal Insight/Judgement] : insight and judgment were intact [de-identified] : decreased breath sounds at bases  [de-identified] : 2 + edema

## 2022-04-11 NOTE — REVIEW OF SYSTEMS
[Lower Ext Edema] : lower extremity edema [Shortness Of Breath] : shortness of breath [Cough] : cough [Negative] : Heme/Lymph

## 2022-04-13 LAB — SURGICAL PATHOLOGY STUDY: SIGNIFICANT CHANGE UP

## 2022-04-15 ENCOUNTER — APPOINTMENT (OUTPATIENT)
Dept: CARDIOLOGY | Facility: CLINIC | Age: 87
End: 2022-04-15
Payer: MEDICARE

## 2022-04-15 PROCEDURE — 93306 TTE W/DOPPLER COMPLETE: CPT

## 2022-04-18 ENCOUNTER — NON-APPOINTMENT (OUTPATIENT)
Age: 87
End: 2022-04-18

## 2022-04-19 ENCOUNTER — APPOINTMENT (OUTPATIENT)
Dept: RADIOLOGY | Facility: IMAGING CENTER | Age: 87
End: 2022-04-19
Payer: MEDICARE

## 2022-04-19 ENCOUNTER — OUTPATIENT (OUTPATIENT)
Dept: OUTPATIENT SERVICES | Facility: HOSPITAL | Age: 87
LOS: 1 days | End: 2022-04-19
Payer: MEDICARE

## 2022-04-19 DIAGNOSIS — Z87.09 PERSONAL HISTORY OF OTHER DISEASES OF THE RESPIRATORY SYSTEM: Chronic | ICD-10-CM

## 2022-04-19 DIAGNOSIS — Z98.61 CORONARY ANGIOPLASTY STATUS: Chronic | ICD-10-CM

## 2022-04-19 DIAGNOSIS — C44.90 UNSPECIFIED MALIGNANT NEOPLASM OF SKIN, UNSPECIFIED: Chronic | ICD-10-CM

## 2022-04-19 DIAGNOSIS — Z98.41 CATARACT EXTRACTION STATUS, RIGHT EYE: Chronic | ICD-10-CM

## 2022-04-19 DIAGNOSIS — Z98.890 OTHER SPECIFIED POSTPROCEDURAL STATES: Chronic | ICD-10-CM

## 2022-04-19 DIAGNOSIS — J90 PLEURAL EFFUSION, NOT ELSEWHERE CLASSIFIED: ICD-10-CM

## 2022-04-19 DIAGNOSIS — Z95.818 PRESENCE OF OTHER CARDIAC IMPLANTS AND GRAFTS: Chronic | ICD-10-CM

## 2022-04-19 DIAGNOSIS — Z90.49 ACQUIRED ABSENCE OF OTHER SPECIFIED PARTS OF DIGESTIVE TRACT: Chronic | ICD-10-CM

## 2022-04-19 PROCEDURE — 71046 X-RAY EXAM CHEST 2 VIEWS: CPT

## 2022-04-19 PROCEDURE — 71046 X-RAY EXAM CHEST 2 VIEWS: CPT | Mod: 26

## 2022-04-26 ENCOUNTER — NON-APPOINTMENT (OUTPATIENT)
Age: 87
End: 2022-04-26

## 2022-05-02 ENCOUNTER — APPOINTMENT (OUTPATIENT)
Dept: CARDIOLOGY | Facility: CLINIC | Age: 87
End: 2022-05-02
Payer: MEDICARE

## 2022-05-02 ENCOUNTER — LABORATORY RESULT (OUTPATIENT)
Age: 87
End: 2022-05-02

## 2022-05-02 ENCOUNTER — NON-APPOINTMENT (OUTPATIENT)
Age: 87
End: 2022-05-02

## 2022-05-02 VITALS
BODY MASS INDEX: 25.76 KG/M2 | SYSTOLIC BLOOD PRESSURE: 130 MMHG | WEIGHT: 140 LBS | DIASTOLIC BLOOD PRESSURE: 82 MMHG | HEIGHT: 62 IN | TEMPERATURE: 97.9 F

## 2022-05-02 VITALS — SYSTOLIC BLOOD PRESSURE: 120 MMHG | DIASTOLIC BLOOD PRESSURE: 70 MMHG

## 2022-05-02 DIAGNOSIS — M79.89 OTHER SPECIFIED SOFT TISSUE DISORDERS: ICD-10-CM

## 2022-05-02 DIAGNOSIS — I49.3 VENTRICULAR PREMATURE DEPOLARIZATION: ICD-10-CM

## 2022-05-02 PROCEDURE — 99214 OFFICE O/P EST MOD 30 MIN: CPT

## 2022-05-02 PROCEDURE — 93000 ELECTROCARDIOGRAM COMPLETE: CPT

## 2022-05-02 NOTE — HISTORY OF PRESENT ILLNESS
[FreeTextEntry1] : pt presents for f/u pt doing well  sob improved  on florinef 1 tab po bid / on salt tabs 1 x daily pt denies any chest  pain dizziness ,lightheadedness ,nausea vomiting diaphoresis cxr revealed essentially resolved fluyid \par

## 2022-05-02 NOTE — PHYSICAL EXAM

## 2022-05-04 ENCOUNTER — APPOINTMENT (OUTPATIENT)
Dept: RADIOLOGY | Facility: IMAGING CENTER | Age: 87
End: 2022-05-04
Payer: MEDICARE

## 2022-05-04 ENCOUNTER — OUTPATIENT (OUTPATIENT)
Dept: OUTPATIENT SERVICES | Facility: HOSPITAL | Age: 87
LOS: 1 days | End: 2022-05-04
Payer: MEDICARE

## 2022-05-04 DIAGNOSIS — Z95.818 PRESENCE OF OTHER CARDIAC IMPLANTS AND GRAFTS: Chronic | ICD-10-CM

## 2022-05-04 DIAGNOSIS — C44.90 UNSPECIFIED MALIGNANT NEOPLASM OF SKIN, UNSPECIFIED: Chronic | ICD-10-CM

## 2022-05-04 DIAGNOSIS — Z87.09 PERSONAL HISTORY OF OTHER DISEASES OF THE RESPIRATORY SYSTEM: Chronic | ICD-10-CM

## 2022-05-04 DIAGNOSIS — Z90.49 ACQUIRED ABSENCE OF OTHER SPECIFIED PARTS OF DIGESTIVE TRACT: Chronic | ICD-10-CM

## 2022-05-04 DIAGNOSIS — Z98.890 OTHER SPECIFIED POSTPROCEDURAL STATES: Chronic | ICD-10-CM

## 2022-05-04 DIAGNOSIS — Z98.61 CORONARY ANGIOPLASTY STATUS: Chronic | ICD-10-CM

## 2022-05-04 DIAGNOSIS — Z98.41 CATARACT EXTRACTION STATUS, RIGHT EYE: Chronic | ICD-10-CM

## 2022-05-04 DIAGNOSIS — J90 PLEURAL EFFUSION, NOT ELSEWHERE CLASSIFIED: ICD-10-CM

## 2022-05-04 PROCEDURE — 71046 X-RAY EXAM CHEST 2 VIEWS: CPT

## 2022-05-04 PROCEDURE — 71046 X-RAY EXAM CHEST 2 VIEWS: CPT | Mod: 26

## 2022-05-06 ENCOUNTER — OUTPATIENT (OUTPATIENT)
Dept: OUTPATIENT SERVICES | Facility: HOSPITAL | Age: 87
LOS: 1 days | End: 2022-05-06
Payer: MEDICARE

## 2022-05-06 VITALS
HEIGHT: 62.5 IN | RESPIRATION RATE: 20 BRPM | OXYGEN SATURATION: 98 % | WEIGHT: 141.98 LBS | DIASTOLIC BLOOD PRESSURE: 83 MMHG | TEMPERATURE: 96 F | SYSTOLIC BLOOD PRESSURE: 146 MMHG | HEART RATE: 76 BPM

## 2022-05-06 DIAGNOSIS — S01.00XD UNSPECIFIED OPEN WOUND OF SCALP, SUBSEQUENT ENCOUNTER: ICD-10-CM

## 2022-05-06 DIAGNOSIS — Z98.41 CATARACT EXTRACTION STATUS, RIGHT EYE: Chronic | ICD-10-CM

## 2022-05-06 DIAGNOSIS — L98.9 DISORDER OF THE SKIN AND SUBCUTANEOUS TISSUE, UNSPECIFIED: ICD-10-CM

## 2022-05-06 DIAGNOSIS — Z95.818 PRESENCE OF OTHER CARDIAC IMPLANTS AND GRAFTS: Chronic | ICD-10-CM

## 2022-05-06 DIAGNOSIS — Z98.890 OTHER SPECIFIED POSTPROCEDURAL STATES: Chronic | ICD-10-CM

## 2022-05-06 DIAGNOSIS — Z90.49 ACQUIRED ABSENCE OF OTHER SPECIFIED PARTS OF DIGESTIVE TRACT: Chronic | ICD-10-CM

## 2022-05-06 DIAGNOSIS — C44.90 UNSPECIFIED MALIGNANT NEOPLASM OF SKIN, UNSPECIFIED: Chronic | ICD-10-CM

## 2022-05-06 DIAGNOSIS — Z87.09 PERSONAL HISTORY OF OTHER DISEASES OF THE RESPIRATORY SYSTEM: Chronic | ICD-10-CM

## 2022-05-06 DIAGNOSIS — I25.10 ATHEROSCLEROTIC HEART DISEASE OF NATIVE CORONARY ARTERY WITHOUT ANGINA PECTORIS: ICD-10-CM

## 2022-05-06 DIAGNOSIS — Z98.61 CORONARY ANGIOPLASTY STATUS: Chronic | ICD-10-CM

## 2022-05-06 LAB
ANION GAP SERPL CALC-SCNC: 12 MMOL/L — SIGNIFICANT CHANGE UP (ref 7–14)
BUN SERPL-MCNC: 26 MG/DL — HIGH (ref 7–23)
CALCIUM SERPL-MCNC: 9.7 MG/DL — SIGNIFICANT CHANGE UP (ref 8.4–10.5)
CHLORIDE SERPL-SCNC: 104 MMOL/L — SIGNIFICANT CHANGE UP (ref 98–107)
CO2 SERPL-SCNC: 25 MMOL/L — SIGNIFICANT CHANGE UP (ref 22–31)
CREAT SERPL-MCNC: 1.17 MG/DL — SIGNIFICANT CHANGE UP (ref 0.5–1.3)
EGFR: 61 ML/MIN/1.73M2 — SIGNIFICANT CHANGE UP
GLUCOSE SERPL-MCNC: 95 MG/DL — SIGNIFICANT CHANGE UP (ref 70–99)
HCT VFR BLD CALC: 33.2 % — LOW (ref 39–50)
HGB BLD-MCNC: 10.4 G/DL — LOW (ref 13–17)
MCHC RBC-ENTMCNC: 31.3 GM/DL — LOW (ref 32–36)
MCHC RBC-ENTMCNC: 32.3 PG — SIGNIFICANT CHANGE UP (ref 27–34)
MCV RBC AUTO: 103.1 FL — HIGH (ref 80–100)
NRBC # BLD: 0 /100 WBCS — SIGNIFICANT CHANGE UP
NRBC # FLD: 0 K/UL — SIGNIFICANT CHANGE UP
PLATELET # BLD AUTO: 201 K/UL — SIGNIFICANT CHANGE UP (ref 150–400)
POTASSIUM SERPL-MCNC: 4.7 MMOL/L — SIGNIFICANT CHANGE UP (ref 3.5–5.3)
POTASSIUM SERPL-SCNC: 4.7 MMOL/L — SIGNIFICANT CHANGE UP (ref 3.5–5.3)
RBC # BLD: 3.22 M/UL — LOW (ref 4.2–5.8)
RBC # FLD: 12.4 % — SIGNIFICANT CHANGE UP (ref 10.3–14.5)
SODIUM SERPL-SCNC: 141 MMOL/L — SIGNIFICANT CHANGE UP (ref 135–145)
WBC # BLD: 5.42 K/UL — SIGNIFICANT CHANGE UP (ref 3.8–10.5)
WBC # FLD AUTO: 5.42 K/UL — SIGNIFICANT CHANGE UP (ref 3.8–10.5)

## 2022-05-06 PROCEDURE — 93010 ELECTROCARDIOGRAM REPORT: CPT

## 2022-05-06 RX ORDER — FAMOTIDINE 10 MG/ML
1 INJECTION INTRAVENOUS
Qty: 0 | Refills: 0 | DISCHARGE

## 2022-05-06 RX ORDER — ASCORBIC ACID 60 MG
1 TABLET,CHEWABLE ORAL
Qty: 0 | Refills: 0 | DISCHARGE

## 2022-05-06 NOTE — H&P PST ADULT - PROBLEM SELECTOR PLAN 2
Await prearranged Med/cardiac evaluation with pcp/cardiologist Dr. Joy due to patient's responds to scalp surgery in March 2022  * Instructed to continue aspirin due to h/o cardiac stent  * Instructed to take normal am dose of fludrocortisone, sodium chloride, midodrine, and Famotidine the am of surgery Await prearranged Med/cardiac evaluation with pcp/cardiologist Dr. Joy due to patient's responds to scalp surgery in March 2022  * Await echo done with cardiologist - pcp, Dr. Joy   * Instructed to continue aspirin due to h/o cardiac stent  * Instructed to take normal am dose of fludrocortisone, sodium chloride, midodrine, and Famotidine the am of surgery

## 2022-05-06 NOTE — H&P PST ADULT - PROBLEM SELECTOR PLAN 1
This is an 85 y/o male who is scheduled for surgical preparation scalp wound with placement of integra on 5-12-22  * Instructed to speak with surgeon regarding covid testing  * Given preop instructions

## 2022-05-06 NOTE — H&P PST ADULT - HISTORY OF PRESENT ILLNESS
This is an 87 y/o male who presents with h/o skin cancer (basal cell, squamous cell, and melanoma) of the scalp. Had recent biopsy of his scalp and awaiting results. Intervention recommended. Scheduled for surgical preparation scalp wound with placement  of integra

## 2022-05-06 NOTE — H&P PST ADULT - NSICDXPASTMEDICALHX_GEN_ALL_CORE_FT
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)     CAD (Coronary Artery Disease) (ICD9 414.00) s/p stent to LAD 9/11/09    Cataract     Chronic kidney disease (CKD)     H/O hypotension     H/O orthostatic hypotension fall 3/2/22    Hip fracture s/p fall 3/2/22    History of COPD     Hypercholesteremia (ICD9 272.0)     Lung nodule followed by annual CT Scan    Lung nodule bilateral    Melanoma in situ of scalp     PAD (peripheral artery disease)     Sarcoma of scalp    Skin cancer Basal, Squamous - treated surgically and squamous cell    Spinal stenosis     Spinal stenosis     Spindle cell carcinoma 2018    Syncope (2016) as a result of Clopedigrel and seizure like jerking- stopped after Plavix was stopped

## 2022-05-06 NOTE — H&P PST ADULT - NSICDXPASTSURGICALHX_GEN_ALL_CORE_FT
PAST SURGICAL HISTORY:  Closed Fracture of Shoulder Blade (ICD9 811.00) L 8 yrs ago    Fracture of Nose (ICD9 802.0) 40 yrs ago    H/O cataract extraction, right     H/O pneumothorax right lung s/p nail punctured right lung (30 years ago)    History of laparoscopic cholecystectomy 2010    History of loop recorder was removed in 2016/2017    Osteomyelitis (ICD9 730.20) R lower ribs after fracture 1990's requiring ABX and surgery    S/P arthroscopy of shoulder left    S/P PTCA (percutaneous transluminal coronary angioplasty) 2009- with stent to LAD    S/P skin biopsy of scalp    S/P skin cancer resection spindle cell carcinoma 9/2018 1/2019 re-excision for residula disease    Skin cancer     Status post placement of implantable loop recorder implanted in 2014  removed in 2017     PAST SURGICAL HISTORY:  Closed Fracture of Shoulder Blade (ICD9 811.00) L 8 yrs ago    Fracture of Nose (ICD9 802.0) 40 yrs ago    H/O cataract extraction, right     H/O pneumothorax right lung s/p nail punctured right lung (30 years ago)    History of laparoscopic cholecystectomy 2010    History of loop recorder was removed in 2016/2017    Osteomyelitis (ICD9 730.20) R lower ribs after fracture 1990's requiring ABX and surgery    S/P arthroscopy of shoulder left    S/P PTCA (percutaneous transluminal coronary angioplasty) 2009- with stent to LAD    S/P skin biopsy of scalp    S/P skin cancer resection spindle cell carcinoma 9/2018 1/2019 re-excision for residula disease    Skin cancer has had multiple surgeries on scalp for skin cancer (basal, squamous, and melanoma)    Status post placement of implantable loop recorder implanted in 2014  removed in 2017

## 2022-05-10 ENCOUNTER — RESULT REVIEW (OUTPATIENT)
Age: 87
End: 2022-05-10

## 2022-05-10 ENCOUNTER — APPOINTMENT (OUTPATIENT)
Dept: SURGICAL ONCOLOGY | Facility: CLINIC | Age: 87
End: 2022-05-10
Payer: MEDICARE

## 2022-05-10 VITALS
OXYGEN SATURATION: 97 % | WEIGHT: 140 LBS | SYSTOLIC BLOOD PRESSURE: 115 MMHG | DIASTOLIC BLOOD PRESSURE: 70 MMHG | RESPIRATION RATE: 16 BRPM | HEART RATE: 60 BPM | HEIGHT: 62 IN | BODY MASS INDEX: 25.76 KG/M2

## 2022-05-10 PROBLEM — Z86.79 PERSONAL HISTORY OF OTHER DISEASES OF THE CIRCULATORY SYSTEM: Chronic | Status: ACTIVE | Noted: 2022-05-06

## 2022-05-10 PROCEDURE — 99212 OFFICE O/P EST SF 10 MIN: CPT

## 2022-05-12 ENCOUNTER — NON-APPOINTMENT (OUTPATIENT)
Age: 87
End: 2022-05-12

## 2022-05-12 LAB
ALBUMIN SERPL ELPH-MCNC: 4.1 G/DL
ALP BLD-CCNC: 84 U/L
ALT SERPL-CCNC: 12 U/L
ANION GAP SERPL CALC-SCNC: 11 MMOL/L
ANION GAP SERPL CALC-SCNC: 14 MMOL/L
ANION GAP SERPL CALC-SCNC: 15 MMOL/L
AST SERPL-CCNC: 22 U/L
BASOPHILS # BLD AUTO: 0.03 K/UL
BASOPHILS NFR BLD AUTO: 0.5 %
BASOPHILS NFR BLD AUTO: 0.6 %
BASOPHILS NFR BLD AUTO: 0.7 %
BILIRUB SERPL-MCNC: 0.3 MG/DL
BUN SERPL-MCNC: 19 MG/DL
BUN SERPL-MCNC: 25 MG/DL
BUN SERPL-MCNC: 29 MG/DL
CALCIUM SERPL-MCNC: 9.1 MG/DL
CALCIUM SERPL-MCNC: 9.1 MG/DL
CALCIUM SERPL-MCNC: 9.8 MG/DL
CHLORIDE SERPL-SCNC: 104 MMOL/L
CHLORIDE SERPL-SCNC: 104 MMOL/L
CHLORIDE SERPL-SCNC: 108 MMOL/L
CO2 SERPL-SCNC: 23 MMOL/L
CO2 SERPL-SCNC: 24 MMOL/L
CO2 SERPL-SCNC: 26 MMOL/L
CREAT SERPL-MCNC: 1.15 MG/DL
CREAT SERPL-MCNC: 1.21 MG/DL
CREAT SERPL-MCNC: 1.22 MG/DL
EGFR: 58 ML/MIN/1.73M2
EGFR: 59 ML/MIN/1.73M2
EGFR: 62 ML/MIN/1.73M2
EOSINOPHIL # BLD AUTO: 0.07 K/UL
EOSINOPHIL # BLD AUTO: 0.09 K/UL
EOSINOPHIL # BLD AUTO: 0.2 K/UL
EOSINOPHIL NFR BLD AUTO: 1.4 %
EOSINOPHIL NFR BLD AUTO: 1.5 %
EOSINOPHIL NFR BLD AUTO: 4.4 %
FERRITIN SERPL-MCNC: 352 NG/ML
FOLATE SERPL-MCNC: >20 NG/ML
GLUCOSE SERPL-MCNC: 122 MG/DL
GLUCOSE SERPL-MCNC: 89 MG/DL
GLUCOSE SERPL-MCNC: 91 MG/DL
HAPTOGLOB SERPL-MCNC: 206 MG/DL
HCT VFR BLD CALC: 29.2 %
HCT VFR BLD CALC: 31.2 %
HCT VFR BLD CALC: 33.1 %
HGB BLD-MCNC: 10.2 G/DL
HGB BLD-MCNC: 9 G/DL
HGB BLD-MCNC: 9.7 G/DL
IMM GRANULOCYTES NFR BLD AUTO: 0.2 %
IRON SERPL-MCNC: 44 UG/DL
LDH SERPL-CCNC: 168 U/L
LYMPHOCYTES # BLD AUTO: 1.2 K/UL
LYMPHOCYTES # BLD AUTO: 1.24 K/UL
LYMPHOCYTES # BLD AUTO: 1.41 K/UL
LYMPHOCYTES NFR BLD AUTO: 19.8 %
LYMPHOCYTES NFR BLD AUTO: 26.4 %
LYMPHOCYTES NFR BLD AUTO: 29.4 %
MAN DIFF?: NORMAL
MCHC RBC-ENTMCNC: 30.8 GM/DL
MCHC RBC-ENTMCNC: 30.8 GM/DL
MCHC RBC-ENTMCNC: 31.1 GM/DL
MCHC RBC-ENTMCNC: 32.3 PG
MCHC RBC-ENTMCNC: 32.5 PG
MCHC RBC-ENTMCNC: 32.6 PG
MCV RBC AUTO: 104.7 FL
MCV RBC AUTO: 104.7 FL
MCV RBC AUTO: 105.4 FL
MONOCYTES # BLD AUTO: 0.36 K/UL
MONOCYTES # BLD AUTO: 0.4 K/UL
MONOCYTES # BLD AUTO: 0.5 K/UL
MONOCYTES NFR BLD AUTO: 7.9 %
MONOCYTES NFR BLD AUTO: 8 %
MONOCYTES NFR BLD AUTO: 8.3 %
NEUTROPHILS # BLD AUTO: 2.75 K/UL
NEUTROPHILS # BLD AUTO: 2.88 K/UL
NEUTROPHILS # BLD AUTO: 4.38 K/UL
NEUTROPHILS NFR BLD AUTO: 60 %
NEUTROPHILS NFR BLD AUTO: 60.4 %
NEUTROPHILS NFR BLD AUTO: 70.1 %
NT-PROBNP SERPL-MCNC: 1689 PG/ML
NT-PROBNP SERPL-MCNC: 436 PG/ML
PLATELET # BLD AUTO: 183 K/UL
PLATELET # BLD AUTO: 229 K/UL
PLATELET # BLD AUTO: 262 K/UL
POTASSIUM SERPL-SCNC: 3.5 MMOL/L
POTASSIUM SERPL-SCNC: 3.8 MMOL/L
POTASSIUM SERPL-SCNC: 4.2 MMOL/L
PROT SERPL-MCNC: 6.9 G/DL
RBC # BLD: 2.79 M/UL
RBC # BLD: 2.98 M/UL
RBC # BLD: 3.14 M/UL
RBC # FLD: 12.4 %
RBC # FLD: 12.7 %
RBC # FLD: 13 %
SODIUM SERPL-SCNC: 142 MMOL/L
SODIUM SERPL-SCNC: 142 MMOL/L
SODIUM SERPL-SCNC: 144 MMOL/L
TRANSFERRIN SERPL-MCNC: 173 MG/DL
VIT B12 SERPL-MCNC: >2000 PG/ML
WBC # FLD AUTO: 4.55 K/UL
WBC # FLD AUTO: 4.8 K/UL
WBC # FLD AUTO: 6.25 K/UL

## 2022-05-12 NOTE — HISTORY OF PRESENT ILLNESS
[de-identified] : Patient is an 83 y/o male who presented with a raised lesion in the mid-frontal scalp.  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  He is referred for surgical management.\par Patient reports he has had this lesion for several years and does not have complaints of associated pain.  He has no previous history of sarcoma.\par \par 09/27/2018:  Patient is s/p radical resection of frontal scalp spindle cell neoplasm and biopsy of vertex scalp lesion.  Skin graft coverage by Dr. Myles.  Recovering well.  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  Additional deep margin shows spindle cell lesion felt to represent fibrosing granulation tissue.  Vertex scalp lesion demonstrates atypical spindle cell lesion, favoring atypical fibroxanthoma.\par \par 10/11/2018:  Improved.  Skin graft healing well.\par \par 11/08/2018:  Feels well.  Denies pain. Saw Dr. 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 Myles 12/17/2018.  Pathology shows minimal residual disease, margins negative.\par \par 04/02/2019:  Patient presents for 3 months follow up.  Feels well.  He states he is transferring dermatology care to Dr. Fitzpatrick of [facility].  Had benign right mid back biopsy performed last week by dermatology.\par \par 10/01/2019: Patient recently developed firm nodule at the posterior edge of his scalp skin graft.  Biopsy performed 08/27/2019 demonstrated atypical epithelioid cells for which an underlying neoplasm could not be ruled out.  He is s/p deeper biopsy by dermatology last week for which results are pending.  He offers no new complaints.\par \par 01/07/2020: Overall doing well, but developed an area of ulceration in vertex scalp.  Recent biopsy 12/18/2019 did not demonstrate evidence of recurrent malignancy.  He denies pain or drainage.\par \par 07/23/2020: Patient was seen by dermatology yesterday and underwent biopsy of a brown pigmented scalp lesion posterior to skin graft.  He continues to have a nonhealing ulcer at the vertex scalp at site of prior biopsy.  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  he offers no new complaints.\par \par 10/08/2020: Patient underwent scalp excision of melanoma in-situ and nonhealing area 09/11/2020.  A significant portion of his pre-existing skin graft and adjacent scalp.  An additional left parietal scalp lesion was excised as well.\par Pathology: scalp biopsy - small foci of squamous cell carcinoma in situ, epidermal cyst, no melanoma identified.  Parietal scalp lesion - squamous cell carcinoma in situ - extending to one margin.\par Scalp lesion was covered with Integra and is granulating well.  He is pending formal skin graft coverage.\par \par 01/19/2021: Patient is doing well.  He has recovered well from scalp skin graft.  He denies pain.  He has not have dermatology follow up since surgery 09/2020.\par \par 04/13/2021: Patient developed non-healing wound at vertex scalp since his last visit.  Biopsy of the area by dermatology 02/2021 showed actinic keratosis without evidence of malignancy.  He denies pain in the area.\par \par 07/13/2021: Patient remains stable without new malignant skin biopsies.  He denies pain in scalp excision sites.\par \par 09/28/2021: Patient reports increased redness diffusely along scalp, followed by dermatology.  He is applying topical treatment.\par \par 01/18/2022: Patient reports improvement in scalp irritation/redness.  He is scheduled to see dermatology soon.  Still recovering from back injury.\par \par 02/22/2022: Since his last appointment 01/18/2022, patient developed a raised nodule over the vertex scalp.  Ultrasound 02/17/2022 showed a thin layer of complex fluid with surrounding edema and hyperemia lifting the overlying subcutaneous fat, most consistent with an inflammatory/infectious fluid collection.  He denies fever.\par \par \par 03/24/2022: FNA from scalp lesion 02/22/22 demonstrated malignant cells, QNS for characterization.  Discussed with patient and son over telephone previously, resection with plastic reconstruction scheduled for 04/04/2022.  He has seen Dr. Brown from plastic surgery.  He offers no complaints, except there is another smaller lesion on right side of vertex scalp adjacent to eschar region.\par \par 05/10/2022: Patient is s/p excision of malignant vertex scalp mass with Integra coverage 04/04/2022.  He is recovering well except for central portion of wound with graft loss.\par Intra-operative finding of tumor invasion into skull.\par Pathology: at least 1.3 mm thick, ulcerated melanoma with positive deep margin and focally anterior left margin.  Evidence of melanoma cell with bony fragments.

## 2022-05-12 NOTE — ASSESSMENT
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 Gray, to discuss immunotherapy.\par Possible referral to radiation oncology.\par Resection will require craniectomy and free flap coverage for which patient is a poor candidate.

## 2022-05-14 ENCOUNTER — OUTPATIENT (OUTPATIENT)
Dept: OUTPATIENT SERVICES | Facility: HOSPITAL | Age: 87
LOS: 1 days | Discharge: HOME | End: 2022-05-14

## 2022-05-14 DIAGNOSIS — Z90.49 ACQUIRED ABSENCE OF OTHER SPECIFIED PARTS OF DIGESTIVE TRACT: Chronic | ICD-10-CM

## 2022-05-14 DIAGNOSIS — C43.9 MALIGNANT MELANOMA OF SKIN, UNSPECIFIED: ICD-10-CM

## 2022-05-14 DIAGNOSIS — Z87.09 PERSONAL HISTORY OF OTHER DISEASES OF THE RESPIRATORY SYSTEM: Chronic | ICD-10-CM

## 2022-05-14 DIAGNOSIS — Z98.41 CATARACT EXTRACTION STATUS, RIGHT EYE: Chronic | ICD-10-CM

## 2022-05-14 DIAGNOSIS — Z98.890 OTHER SPECIFIED POSTPROCEDURAL STATES: Chronic | ICD-10-CM

## 2022-05-14 DIAGNOSIS — Z98.61 CORONARY ANGIOPLASTY STATUS: Chronic | ICD-10-CM

## 2022-05-14 DIAGNOSIS — C44.90 UNSPECIFIED MALIGNANT NEOPLASM OF SKIN, UNSPECIFIED: Chronic | ICD-10-CM

## 2022-05-14 DIAGNOSIS — Z95.818 PRESENCE OF OTHER CARDIAC IMPLANTS AND GRAFTS: Chronic | ICD-10-CM

## 2022-05-16 ENCOUNTER — APPOINTMENT (OUTPATIENT)
Dept: PULMONOLOGY | Facility: CLINIC | Age: 87
End: 2022-05-16
Payer: MEDICARE

## 2022-05-16 VITALS
SYSTOLIC BLOOD PRESSURE: 129 MMHG | OXYGEN SATURATION: 95 % | DIASTOLIC BLOOD PRESSURE: 68 MMHG | TEMPERATURE: 98.7 F | HEART RATE: 59 BPM | RESPIRATION RATE: 16 BRPM

## 2022-05-16 DIAGNOSIS — J90 PLEURAL EFFUSION, NOT ELSEWHERE CLASSIFIED: ICD-10-CM

## 2022-05-16 PROCEDURE — 99213 OFFICE O/P EST LOW 20 MIN: CPT

## 2022-05-16 RX ORDER — DOXYCYCLINE 100 MG/1
100 TABLET, FILM COATED ORAL
Qty: 20 | Refills: 0 | Status: DISCONTINUED | COMMUNITY
Start: 2022-04-11 | End: 2022-05-16

## 2022-05-16 NOTE — PROCEDURE
[FreeTextEntry1] : ct 7/12/21 chest/abdomen/pelvis reviewed\par \par Reviewed multiple CAT scans including older CAT scans, recent CT at both [facility] and [facility].\par \par  1 / 1 Khris Kay   \par  \par Report date: 7/30/2021 \par  \par  View Order\par \par (Report matches study selected on Patient History pane)\par \par \par   \par \par EXAM: CT ABDOMEN AND PELVIS IC\par \par EXAM: CT CHEST IC\par \par \par PROCEDURE DATE: 07/30/2021\par \par \par \par \par INTERPRETATION: CLINICAL INFORMATION: Trauma, fall, landed on back\par \par COMPARISON: MR lumbar spine 4/20/2021\par \par CONTRAST/COMPLICATIONS:\par IV Contrast: Omnipaque 350 90 cc administered 10 cc discarded\par Oral Contrast: NONE\par Complications: None reported at time of study completion\par \par PROCEDURE:\par CT of the Chest, Abdomen and Pelvis was performed.\par Imaging was performed through the chest in the arterial phase followed by imaging of the abdomen and pelvis in the portal venous phase.\par Sagittal and coronal reformats were performed.\par \par FINDINGS:\par CHEST:\par LUNGS AND LARGE AIRWAYS: Secretions within the trachea.. Wedge-shaped areas of groundglass opacity in the right upper lobe, right lower lobe, and left lower lobe. No lung mass or suspicious nodule. Calcified granulomas in the lower lobes.\par PLEURA: No pleural effusion, hemothorax, or pneumothorax.\par VESSELS: Normal caliber and contour of the thoracic aorta. No evidence of acute traumatic aortic injury. Suboptimal pulmonary arterial opacification. Main pulmonary artery is not dilated. Coronary artery atherosclerotic calcifications.\par HEART: Heart size is normal. No pericardial effusion.\par MEDIASTINUM AND ZEV: Few nonspecific subcentimeter mediastinal lymph nodes.\par CHEST WALL AND LOWER NECK: Within normal limits.\par \par ABDOMEN AND PELVIS:\par LIVER: Within normal limits.\par BILE DUCTS: Mild biliary duct dilation likely related to postcholecystectomy status.\par GALLBLADDER: Cholecystectomy.\par SPLEEN: Within normal limits.\par PANCREAS: Within normal limits.\par ADRENALS: Within normal limits.\par KIDNEYS/URETERS: Kidneys enhance symmetrically without hydronephrosis. Right renal parapelvic cyst.\par \par BLADDER: Within normal limits.\par REPRODUCTIVE ORGANS: Prostate gland is not enlarged.\par \par BOWEL: No bowel obstruction or overt bowel wall thickening. Appendix is normal.\par PERITONEUM: No ascites, pneumoperitoneum, or hemoperitoneum. No mesenteric lymphadenopathy.\par VESSELS: Atherosclerotic calcifications of the aortoiliac tree. Normal caliber abdominal aorta.\par RETROPERITONEUM/LYMPH NODES: No lymphadenopathy.\par ABDOMINAL WALL: Within normal limits.\par BONES: Bones are demineralized. Degenerative changes of the spine. Indeterminate age compression fractures of T3, L1, and L3 and more acute appearing fracture lucency through the L2 vertebral body with mild compression of the vertebral body.\par \par IMPRESSION:\par \par No evidence of acute intrathoracic or intra-abdominal/pelvic traumatic injury.\par \par Acute appearing fracture lucency through the L2 vertebral body with mild compression of the vertebral body. Indeterminate age compression fractures of T3, L1, and L3.\par \par Wedge-shaped groundglass opacities in the right upper lobe, right lower lobe, and left lower lobe for which primary consideration is atypical/viral pneumonia such as Covid 19 given recent hospitalization for Covid 19.\par \par --- End of Report ---\par \par \par \par \par \par \par RAUDEL LOYOLA MD; Resident Radiology\par This document has been electronically signed.\par KHRIS KAY DO; Attending Radiologist\par This document has been electronically signed. Jul 30 2021 8:33PM

## 2022-05-16 NOTE — ASSESSMENT
[FreeTextEntry1] : For CT/PET\par Review and make further recc. \par Discuss edema with Dr. Joy\par \par

## 2022-05-16 NOTE — DISCUSSION/SUMMARY
[FreeTextEntry1] : Mild COPD\par Stable pulmonary nodules.\par Pulmonary infiltrates noted on prior CT  likely related to Covid.\par Cough improved.  \par Small pleural effusions Improved

## 2022-05-16 NOTE — HISTORY OF PRESENT ILLNESS
[TextBox_4] : \par saw Dr Joy last week and had cxr [address] 2 weeks ago and told was OK\par \par no issues with breathing\par less coughing\par had COVID last year\par no inhaler use\par Some mild leg swelling\par seeing oncologist and getting a pet scan and ct on Saturday at [facility]\par \par \par \par

## 2022-05-17 ENCOUNTER — RESULT REVIEW (OUTPATIENT)
Age: 87
End: 2022-05-17

## 2022-05-17 ENCOUNTER — NON-APPOINTMENT (OUTPATIENT)
Age: 87
End: 2022-05-17

## 2022-05-17 ENCOUNTER — APPOINTMENT (OUTPATIENT)
Dept: HEMATOLOGY ONCOLOGY | Facility: CLINIC | Age: 87
End: 2022-05-17
Payer: MEDICARE

## 2022-05-17 VITALS
HEIGHT: 62.5 IN | RESPIRATION RATE: 16 BRPM | TEMPERATURE: 98.8 F | OXYGEN SATURATION: 98 % | HEART RATE: 61 BPM | WEIGHT: 142.2 LBS | BODY MASS INDEX: 25.51 KG/M2 | DIASTOLIC BLOOD PRESSURE: 75 MMHG | SYSTOLIC BLOOD PRESSURE: 150 MMHG

## 2022-05-17 DIAGNOSIS — S32.030A WEDGE COMPRESSION FRACTURE OF THIRD LUMBAR VERTEBRA, INITIAL ENCOUNTER FOR CLOSED FRACTURE: ICD-10-CM

## 2022-05-17 LAB
25(OH)D3 SERPL-MCNC: 70.8 NG/ML
ALBUMIN SERPL ELPH-MCNC: 4.5 G/DL
ALP BLD-CCNC: 85 U/L
ALT SERPL-CCNC: 15 U/L
ANION GAP SERPL CALC-SCNC: 13 MMOL/L
APTT BLD: 31.2 SEC
AST SERPL-CCNC: 20 U/L
BASOPHILS # BLD AUTO: 0.02 K/UL
BASOPHILS # BLD AUTO: 0.03 K/UL — SIGNIFICANT CHANGE UP (ref 0–0.2)
BASOPHILS NFR BLD AUTO: 0.4 %
BASOPHILS NFR BLD AUTO: 0.5 % — SIGNIFICANT CHANGE UP (ref 0–2)
BILIRUB SERPL-MCNC: 0.5 MG/DL
BUN SERPL-MCNC: 26 MG/DL
CALCIUM SERPL-MCNC: 9.8 MG/DL
CEA SERPL-MCNC: 5.2 NG/ML
CHLORIDE SERPL-SCNC: 103 MMOL/L
CO2 SERPL-SCNC: 29 MMOL/L
CREAT SERPL-MCNC: 1.13 MG/DL
CRP SERPL-MCNC: 6 MG/L
EGFR: 64 ML/MIN/1.73M2
EOSINOPHIL # BLD AUTO: 0.03 K/UL
EOSINOPHIL # BLD AUTO: 0.04 K/UL — SIGNIFICANT CHANGE UP (ref 0–0.5)
EOSINOPHIL NFR BLD AUTO: 0.5 %
EOSINOPHIL NFR BLD AUTO: 0.7 % — SIGNIFICANT CHANGE UP (ref 0–6)
ESTIMATED AVERAGE GLUCOSE: 108 MG/DL
FERRITIN SERPL-MCNC: 239 NG/ML
GLUCOSE SERPL-MCNC: 98 MG/DL
HBA1C MFR BLD HPLC: 5.4 %
HBV SURFACE AB SER QL: NONREACTIVE
HBV SURFACE AG SER QL: NONREACTIVE
HCT VFR BLD CALC: 33 % — LOW (ref 39–50)
HCT VFR BLD CALC: 33.2 %
HCV AB SER QL: NONREACTIVE
HCV S/CO RATIO: 0.2 S/CO
HGB BLD-MCNC: 10.2 G/DL
HGB BLD-MCNC: 10.4 G/DL — LOW (ref 13–17)
IMM GRANULOCYTES NFR BLD AUTO: 0 %
IMM GRANULOCYTES NFR BLD AUTO: 0.2 % — SIGNIFICANT CHANGE UP (ref 0–1.5)
INR PPP: 1.16 RATIO
LYMPHOCYTES # BLD AUTO: 0.6 K/UL
LYMPHOCYTES # BLD AUTO: 0.67 K/UL — LOW (ref 1–3.3)
LYMPHOCYTES # BLD AUTO: 11 % — LOW (ref 13–44)
LYMPHOCYTES NFR BLD AUTO: 11 %
MAN DIFF?: NORMAL
MCHC RBC-ENTMCNC: 30.7 GM/DL
MCHC RBC-ENTMCNC: 31.5 G/DL — LOW (ref 32–36)
MCHC RBC-ENTMCNC: 31.5 PG
MCHC RBC-ENTMCNC: 32.5 PG — SIGNIFICANT CHANGE UP (ref 27–34)
MCV RBC AUTO: 102.5 FL
MCV RBC AUTO: 103.1 FL — HIGH (ref 80–100)
MONOCYTES # BLD AUTO: 0.31 K/UL
MONOCYTES # BLD AUTO: 0.41 K/UL — SIGNIFICANT CHANGE UP (ref 0–0.9)
MONOCYTES NFR BLD AUTO: 5.7 %
MONOCYTES NFR BLD AUTO: 6.8 % — SIGNIFICANT CHANGE UP (ref 2–14)
NEUTROPHILS # BLD AUTO: 4.51 K/UL
NEUTROPHILS # BLD AUTO: 4.91 K/UL — SIGNIFICANT CHANGE UP (ref 1.8–7.4)
NEUTROPHILS NFR BLD AUTO: 80.8 % — HIGH (ref 43–77)
NEUTROPHILS NFR BLD AUTO: 82.4 %
NRBC # BLD: 0 /100 WBCS — SIGNIFICANT CHANGE UP (ref 0–0)
PLATELET # BLD AUTO: 189 K/UL — SIGNIFICANT CHANGE UP (ref 150–400)
PLATELET # BLD AUTO: 201 K/UL
POTASSIUM SERPL-SCNC: 3.6 MMOL/L
PROT SERPL-MCNC: 6.4 G/DL
PT BLD: 13.5 SEC
RBC # BLD: 3.2 M/UL — LOW (ref 4.2–5.8)
RBC # BLD: 3.24 M/UL
RBC # FLD: 13.1 %
RBC # FLD: 13.1 % — SIGNIFICANT CHANGE UP (ref 10.3–14.5)
SODIUM SERPL-SCNC: 145 MMOL/L
TSH SERPL-ACNC: 1.02 UIU/ML
WBC # BLD: 6.07 K/UL — SIGNIFICANT CHANGE UP (ref 3.8–10.5)
WBC # FLD AUTO: 5.47 K/UL
WBC # FLD AUTO: 6.07 K/UL — SIGNIFICANT CHANGE UP (ref 3.8–10.5)

## 2022-05-17 PROCEDURE — G2212 PROLONG OUTPT/OFFICE VIS: CPT

## 2022-05-17 PROCEDURE — 99205 OFFICE O/P NEW HI 60 MIN: CPT

## 2022-05-17 RX ORDER — HYDROCORTISONE VALERATE 2 MG/G
0.2 CREAM TOPICAL
Qty: 1 | Refills: 0 | Status: DISCONTINUED | COMMUNITY
Start: 2021-08-23 | End: 2022-05-17

## 2022-05-17 RX ORDER — FLUOROURACIL 50 MG/G
5 CREAM TOPICAL
Qty: 1 | Refills: 0 | Status: DISCONTINUED | COMMUNITY
Start: 2021-08-23 | End: 2022-05-17

## 2022-05-17 RX ORDER — TRIAMCINOLONE ACETONIDE 1 MG/G
0.1 OINTMENT TOPICAL
Qty: 1 | Refills: 4 | Status: DISCONTINUED | COMMUNITY
Start: 2021-10-27 | End: 2022-05-17

## 2022-05-17 RX ORDER — CYANOCOBALAMIN/FOLIC AC/VIT B6 1-2.2-25MG
2.2-25-1 TABLET ORAL DAILY
Qty: 90 | Refills: 3 | Status: DISCONTINUED | COMMUNITY
Start: 2019-07-11 | End: 2022-05-17

## 2022-05-17 RX ORDER — NYSTATIN AND TRIAMCINOLONE ACETONIDE 100000; 1 MG/G; MG/G
100000-0.1 CREAM TOPICAL
Qty: 15 | Refills: 0 | Status: DISCONTINUED | COMMUNITY
Start: 2022-01-14 | End: 2022-05-17

## 2022-05-17 RX ORDER — BENZONATATE 200 MG/1
200 CAPSULE ORAL
Qty: 40 | Refills: 0 | Status: DISCONTINUED | COMMUNITY
Start: 2022-04-11 | End: 2022-05-17

## 2022-05-17 RX ORDER — CLOBETASOL PROPIONATE 0.5 MG/G
0.05 OINTMENT TOPICAL
Qty: 3 | Refills: 2 | Status: DISCONTINUED | COMMUNITY
Start: 2022-02-17 | End: 2022-05-17

## 2022-05-17 RX ORDER — FUROSEMIDE 40 MG/1
40 TABLET ORAL
Qty: 3 | Refills: 0 | Status: DISCONTINUED | COMMUNITY
Start: 2022-04-11 | End: 2022-05-17

## 2022-05-17 NOTE — HISTORY OF PRESENT ILLNESS
[de-identified] : Mr. 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 He is referred for surgical management.\par Patient reports he has had this lesion for several years and does not have complaints of associated pain. He has no previous history of sarcoma.\par \par 09/27/2018: Patient is s/p radical resection of frontal scalp spindle cell neoplasm and biopsy of vertex scalp lesion. Skin graft coverage by Dr. Myles. Recovering well. 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 Additional deep margin shows spindle cell lesion felt to represent fibrosing granulation tissue. 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 Myles 12/17/2018. Pathology shows minimal residual disease, margins negative.\par \par Patient was seen by dermatology on 7/2020 and underwent biopsy of a brown pigmented scalp lesion posterior to skin graft. He continues to have a nonhealing ulcer at the vertex scalp at site of prior biopsy. 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 A significant portion of his pre-existing skin graft and adjacent scalp. An additional left parietal scalp lesion was excised as well.\par Pathology: scalp biopsy - small foci of squamous cell carcinoma in situ, epidermal cyst, no melanoma identified. Parietal scalp lesion - squamous cell carcinoma in situ - extending to one margin.\par Scalp lesion was covered with Integra and is granulating well. He is pending formal skin graft coverage.\par \par CT head 3/2/22: New abnormal soft tissue lesions along the vertex of the scalp with right  parietal calvarial vertex erosive changes when compared to the prior CT and MRI studies. \par \par Patient is s/p excision of malignant vertex scalp mass with Integra coverage 04/04/2022.\par Pathology: at least 1.3 mm thick, ulcerated melanoma with positive deep margin and focally anterior left margin. Evidence of melanoma cell with bony fragments. \par Specimen Type:   excision\par Macroscopic Tumor:  present\par Tumor Site:   scalp\par Lesion Size:   4.8 cm\par Satellite Nodule(s):   not present\par Pigmentation:   focal\par Histologic Type:  nodular type\par Ulceration:   present\par Depth of Invasion:   > or = 1.3 cm\par \par Pathologic Staging:\par TNM Descriptors:   not applicable\par Primary Tumor (Invasive Carcinoma) (pT):    T4b\par Category (pN):   not applicable\par Distant Metastasis (M):    not applicable\par Margins:   positive, involving\par - deep  margin\par - focal anterior margin at the left side\par Venous Invasion (V):   not present\par Perineural Invasion:   present\par Tumor-Infiltrating Lymphocytes:   present, nonbrisk\par Tumor Regression:    not present\par Mitotic Index:  > 1/mm2\par Additional Pathologic Findings:   actinic keratosis\par Comment(s):   Immunostains for HMB45, melan A, Sox10 and S100 are positive in tumor cells, which are negative for AE1/AE3. Ki67 is high.  [de-identified] : Surgical Oncology: Gonzalo Stevens\par PCP/Cardiology: Justin Joy\par Pulmonary: Alonso Turner\par Renal: Justin Pryor\par GI: Steve Marti\par Dermatology: Durga Tobias\par [facility] Surgeon Denis - follows pancreatic nodule\par \par Lev douglas cell [phone]\par Terence - margarita douglas cell\par

## 2022-05-17 NOTE — CONSULT LETTER
[Dear  ___] : Dear  [unfilled], [Consult Letter:] : I had the pleasure of evaluating your patient, [unfilled]. [Please see my note below.] : Please see my note below. [Consult Closing:] : Thank you very much for allowing me to participate in the care of this patient.  If you have any questions, please do not hesitate to contact me. [Sincerely,] : Sincerely, [DrKana  ___] : Dr. LEAL [DrKana ___] : Dr. LEAL

## 2022-05-18 NOTE — H&P PST ADULT - NSALCOHOLFREQ_GEN_A_CORE_SD
Request per Amberly for addtional information to support use of continuous glucose monitor per Medicare qualifications.    Placed in Dr Paula's inbox       daily

## 2022-05-19 NOTE — H&P ADULT - NEGATIVE OPHTHALMOLOGIC SYMPTOMS

## 2022-05-21 ENCOUNTER — OUTPATIENT (OUTPATIENT)
Dept: OUTPATIENT SERVICES | Facility: HOSPITAL | Age: 87
LOS: 1 days | End: 2022-05-21
Payer: MEDICARE

## 2022-05-21 ENCOUNTER — APPOINTMENT (OUTPATIENT)
Dept: NUCLEAR MEDICINE | Facility: IMAGING CENTER | Age: 87
End: 2022-05-21
Payer: MEDICARE

## 2022-05-21 ENCOUNTER — APPOINTMENT (OUTPATIENT)
Dept: CT IMAGING | Facility: IMAGING CENTER | Age: 87
End: 2022-05-21
Payer: MEDICARE

## 2022-05-21 DIAGNOSIS — Z95.818 PRESENCE OF OTHER CARDIAC IMPLANTS AND GRAFTS: Chronic | ICD-10-CM

## 2022-05-21 DIAGNOSIS — Z98.890 OTHER SPECIFIED POSTPROCEDURAL STATES: Chronic | ICD-10-CM

## 2022-05-21 DIAGNOSIS — Z98.41 CATARACT EXTRACTION STATUS, RIGHT EYE: Chronic | ICD-10-CM

## 2022-05-21 DIAGNOSIS — C43.4 MALIGNANT MELANOMA OF SCALP AND NECK: ICD-10-CM

## 2022-05-21 DIAGNOSIS — Z87.09 PERSONAL HISTORY OF OTHER DISEASES OF THE RESPIRATORY SYSTEM: Chronic | ICD-10-CM

## 2022-05-21 DIAGNOSIS — C44.90 UNSPECIFIED MALIGNANT NEOPLASM OF SKIN, UNSPECIFIED: Chronic | ICD-10-CM

## 2022-05-21 DIAGNOSIS — Z98.61 CORONARY ANGIOPLASTY STATUS: Chronic | ICD-10-CM

## 2022-05-21 DIAGNOSIS — Z90.49 ACQUIRED ABSENCE OF OTHER SPECIFIED PARTS OF DIGESTIVE TRACT: Chronic | ICD-10-CM

## 2022-05-21 PROCEDURE — 70470 CT HEAD/BRAIN W/O & W/DYE: CPT | Mod: 26,MG

## 2022-05-21 PROCEDURE — G1004: CPT

## 2022-05-21 PROCEDURE — 70470 CT HEAD/BRAIN W/O & W/DYE: CPT | Mod: MG

## 2022-05-24 ENCOUNTER — NON-APPOINTMENT (OUTPATIENT)
Age: 87
End: 2022-05-24

## 2022-05-24 ENCOUNTER — RESULT REVIEW (OUTPATIENT)
Age: 87
End: 2022-05-24

## 2022-05-24 ENCOUNTER — APPOINTMENT (OUTPATIENT)
Dept: HEMATOLOGY ONCOLOGY | Facility: CLINIC | Age: 87
End: 2022-05-24
Payer: MEDICARE

## 2022-05-24 ENCOUNTER — APPOINTMENT (OUTPATIENT)
Dept: INFUSION THERAPY | Facility: HOSPITAL | Age: 87
End: 2022-05-24

## 2022-05-24 VITALS
RESPIRATION RATE: 16 BRPM | HEIGHT: 62.48 IN | DIASTOLIC BLOOD PRESSURE: 77 MMHG | TEMPERATURE: 97.4 F | OXYGEN SATURATION: 97 % | SYSTOLIC BLOOD PRESSURE: 199 MMHG | WEIGHT: 142 LBS | HEART RATE: 56 BPM | BODY MASS INDEX: 25.48 KG/M2

## 2022-05-24 DIAGNOSIS — Z51.11 ENCOUNTER FOR ANTINEOPLASTIC CHEMOTHERAPY: ICD-10-CM

## 2022-05-24 LAB
BASOPHILS # BLD AUTO: 0.03 K/UL — SIGNIFICANT CHANGE UP (ref 0–0.2)
BASOPHILS NFR BLD AUTO: 0.6 % — SIGNIFICANT CHANGE UP (ref 0–2)
EOSINOPHIL # BLD AUTO: 0.09 K/UL — SIGNIFICANT CHANGE UP (ref 0–0.5)
EOSINOPHIL NFR BLD AUTO: 1.8 % — SIGNIFICANT CHANGE UP (ref 0–6)
ERYTHROCYTE [SEDIMENTATION RATE] IN BLOOD: 28 MM/HR — HIGH (ref 0–20)
HCT VFR BLD CALC: 33.1 % — LOW (ref 39–50)
HGB BLD-MCNC: 10.5 G/DL — LOW (ref 13–17)
IMM GRANULOCYTES NFR BLD AUTO: 0.2 % — SIGNIFICANT CHANGE UP (ref 0–1.5)
LYMPHOCYTES # BLD AUTO: 1.26 K/UL — SIGNIFICANT CHANGE UP (ref 1–3.3)
LYMPHOCYTES # BLD AUTO: 25.5 % — SIGNIFICANT CHANGE UP (ref 13–44)
MCHC RBC-ENTMCNC: 31.7 G/DL — LOW (ref 32–36)
MCHC RBC-ENTMCNC: 31.9 PG — SIGNIFICANT CHANGE UP (ref 27–34)
MCV RBC AUTO: 100.6 FL — HIGH (ref 80–100)
MONOCYTES # BLD AUTO: 0.43 K/UL — SIGNIFICANT CHANGE UP (ref 0–0.9)
MONOCYTES NFR BLD AUTO: 8.7 % — SIGNIFICANT CHANGE UP (ref 2–14)
NEUTROPHILS # BLD AUTO: 3.13 K/UL — SIGNIFICANT CHANGE UP (ref 1.8–7.4)
NEUTROPHILS NFR BLD AUTO: 63.2 % — SIGNIFICANT CHANGE UP (ref 43–77)
NRBC # BLD: 0 /100 WBCS — SIGNIFICANT CHANGE UP (ref 0–0)
PLATELET # BLD AUTO: 219 K/UL — SIGNIFICANT CHANGE UP (ref 150–400)
RBC # BLD: 3.29 M/UL — LOW (ref 4.2–5.8)
RBC # FLD: 12.9 % — SIGNIFICANT CHANGE UP (ref 10.3–14.5)
WBC # BLD: 4.95 K/UL — SIGNIFICANT CHANGE UP (ref 3.8–10.5)
WBC # FLD AUTO: 4.95 K/UL — SIGNIFICANT CHANGE UP (ref 3.8–10.5)

## 2022-05-24 PROCEDURE — 99214 OFFICE O/P EST MOD 30 MIN: CPT

## 2022-05-24 NOTE — HISTORY OF PRESENT ILLNESS
[de-identified] : Mr. 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 He is referred for surgical management.\par Patient reports he has had this lesion for several years and does not have complaints of associated pain. He has no previous history of sarcoma.\par \par 09/27/2018: Patient is s/p radical resection of frontal scalp spindle cell neoplasm and biopsy of vertex scalp lesion. Skin graft coverage by Dr. Myles. Recovering well. 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 Additional deep margin shows spindle cell lesion felt to represent fibrosing granulation tissue. 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 Myles 12/17/2018. Pathology shows minimal residual disease, margins negative.\par \par Patient was seen by dermatology on 7/2020 and underwent biopsy of a brown pigmented scalp lesion posterior to skin graft. He continues to have a nonhealing ulcer at the vertex scalp at site of prior biopsy. 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 A significant portion of his pre-existing skin graft and adjacent scalp. An additional left parietal scalp lesion was excised as well.\par Pathology: scalp biopsy - small foci of squamous cell carcinoma in situ, epidermal cyst, no melanoma identified. Parietal scalp lesion - squamous cell carcinoma in situ - extending to one margin.\par Scalp lesion was covered with Integra and is granulating well. He is pending formal skin graft coverage.\par \par CT head 3/2/22: New abnormal soft tissue lesions along the vertex of the scalp with right  parietal calvarial vertex erosive changes when compared to the prior CT and MRI studies. \par \par Patient is s/p excision of malignant vertex scalp mass with Integra coverage 04/04/2022.\par Pathology: at least 1.3 mm thick, ulcerated melanoma with positive deep margin and focally anterior left margin. Evidence of melanoma cell with bony fragments. \par Specimen Type:   excision\par Macroscopic Tumor:  present\par Tumor Site:   scalp\par Lesion Size:   4.8 cm\par Satellite Nodule(s):   not present\par Pigmentation:   focal\par Histologic Type:  nodular type\par Ulceration:   present\par Depth of Invasion:   > or = 1.3 cm\par \par Pathologic Staging:\par TNM Descriptors:   not applicable\par Primary Tumor (Invasive Carcinoma) (pT):    T4b\par Category (pN):   not applicable\par Distant Metastasis (M):    not applicable\par Margins:   positive, involving\par - deep  margin\par - focal anterior margin at the left side\par Venous Invasion (V):   not present\par Perineural Invasion:   present\par Tumor-Infiltrating Lymphocytes:   present, nonbrisk\par Tumor Regression:    not present\par Mitotic Index:  > 1/mm2\par Additional Pathologic Findings:   actinic keratosis\par Comment(s):   Immunostains for HMB45, melan A, Sox10 and S100 are positive in tumor cells, which are negative for AE1/AE3. Ki67 is high. \par \par 5/24/2022:\par C1 Nivolumab today.\par We re-reviewed most common irAEs with patient + son.\par The lesion on his scalp continued to intermittently bleeds.\par He will require evaluation with Dr. Acuña (RT).\par CT head performed not read.\par PET/CT pending.  [de-identified] : Surgical Oncology: Gonzalo Stevens\par PCP/Cardiology: Justin Joy\par Pulmonary: Alonso Turner\par Renal: Justin Pryor\par GI: Steve Marti\par Dermatology: Durga Tobias\par [facility] [place] - follows pancreatic nodule\par \par Lev douglas cell [phone]\par Terence - margarita douglas cell\par

## 2022-05-25 LAB
ALBUMIN SERPL ELPH-MCNC: 4.4 G/DL — SIGNIFICANT CHANGE UP (ref 3.3–5)
ALP SERPL-CCNC: 84 U/L — SIGNIFICANT CHANGE UP (ref 40–120)
ALT FLD-CCNC: 14 U/L — SIGNIFICANT CHANGE UP (ref 10–45)
ANION GAP SERPL CALC-SCNC: 13 MMOL/L — SIGNIFICANT CHANGE UP (ref 5–17)
AST SERPL-CCNC: 22 U/L — SIGNIFICANT CHANGE UP (ref 10–40)
BILIRUB SERPL-MCNC: 0.4 MG/DL — SIGNIFICANT CHANGE UP (ref 0.2–1.2)
BUN SERPL-MCNC: 22 MG/DL — SIGNIFICANT CHANGE UP (ref 7–23)
CALCIUM SERPL-MCNC: 9.3 MG/DL — SIGNIFICANT CHANGE UP (ref 8.4–10.5)
CHLORIDE SERPL-SCNC: 103 MMOL/L — SIGNIFICANT CHANGE UP (ref 96–108)
CO2 SERPL-SCNC: 29 MMOL/L — SIGNIFICANT CHANGE UP (ref 22–31)
CREAT SERPL-MCNC: 1.24 MG/DL — SIGNIFICANT CHANGE UP (ref 0.5–1.3)
CRP SERPL-MCNC: 4 MG/L — SIGNIFICANT CHANGE UP
EGFR: 57 ML/MIN/1.73M2 — LOW
GLUCOSE SERPL-MCNC: 85 MG/DL — SIGNIFICANT CHANGE UP (ref 70–99)
POTASSIUM SERPL-MCNC: 3.6 MMOL/L — SIGNIFICANT CHANGE UP (ref 3.5–5.3)
POTASSIUM SERPL-SCNC: 3.6 MMOL/L — SIGNIFICANT CHANGE UP (ref 3.5–5.3)
PROT SERPL-MCNC: 6.2 G/DL — SIGNIFICANT CHANGE UP (ref 6–8.3)
SODIUM SERPL-SCNC: 144 MMOL/L — SIGNIFICANT CHANGE UP (ref 135–145)
T4 FREE+ TSH PNL SERPL: 1.38 UIU/ML — SIGNIFICANT CHANGE UP (ref 0.27–4.2)

## 2022-05-27 ENCOUNTER — NON-APPOINTMENT (OUTPATIENT)
Age: 87
End: 2022-05-27

## 2022-06-02 ENCOUNTER — RX RENEWAL (OUTPATIENT)
Age: 87
End: 2022-06-02

## 2022-06-03 ENCOUNTER — APPOINTMENT (OUTPATIENT)
Dept: RADIATION ONCOLOGY | Facility: CLINIC | Age: 87
End: 2022-06-03

## 2022-06-05 ENCOUNTER — APPOINTMENT (OUTPATIENT)
Dept: NUCLEAR MEDICINE | Facility: IMAGING CENTER | Age: 87
End: 2022-06-05
Payer: MEDICARE

## 2022-06-05 ENCOUNTER — OUTPATIENT (OUTPATIENT)
Dept: OUTPATIENT SERVICES | Facility: HOSPITAL | Age: 87
LOS: 1 days | End: 2022-06-05
Payer: MEDICARE

## 2022-06-05 DIAGNOSIS — Z98.890 OTHER SPECIFIED POSTPROCEDURAL STATES: Chronic | ICD-10-CM

## 2022-06-05 DIAGNOSIS — Z98.41 CATARACT EXTRACTION STATUS, RIGHT EYE: Chronic | ICD-10-CM

## 2022-06-05 DIAGNOSIS — Z90.49 ACQUIRED ABSENCE OF OTHER SPECIFIED PARTS OF DIGESTIVE TRACT: Chronic | ICD-10-CM

## 2022-06-05 DIAGNOSIS — C43.4 MALIGNANT MELANOMA OF SCALP AND NECK: ICD-10-CM

## 2022-06-05 DIAGNOSIS — C44.90 UNSPECIFIED MALIGNANT NEOPLASM OF SKIN, UNSPECIFIED: Chronic | ICD-10-CM

## 2022-06-05 DIAGNOSIS — Z98.61 CORONARY ANGIOPLASTY STATUS: Chronic | ICD-10-CM

## 2022-06-05 DIAGNOSIS — Z95.818 PRESENCE OF OTHER CARDIAC IMPLANTS AND GRAFTS: Chronic | ICD-10-CM

## 2022-06-05 DIAGNOSIS — Z87.09 PERSONAL HISTORY OF OTHER DISEASES OF THE RESPIRATORY SYSTEM: Chronic | ICD-10-CM

## 2022-06-05 PROCEDURE — 78816 PET IMAGE W/CT FULL BODY: CPT | Mod: 26,PI,MH

## 2022-06-06 ENCOUNTER — RX RENEWAL (OUTPATIENT)
Age: 87
End: 2022-06-06

## 2022-06-07 ENCOUNTER — APPOINTMENT (OUTPATIENT)
Dept: RADIATION ONCOLOGY | Facility: CLINIC | Age: 87
End: 2022-06-07
Payer: MEDICARE

## 2022-06-07 ENCOUNTER — NON-APPOINTMENT (OUTPATIENT)
Age: 87
End: 2022-06-07

## 2022-06-07 ENCOUNTER — OUTPATIENT (OUTPATIENT)
Dept: OUTPATIENT SERVICES | Facility: HOSPITAL | Age: 87
LOS: 1 days | Discharge: HOME | End: 2022-06-07
Payer: MEDICARE

## 2022-06-07 VITALS
HEART RATE: 61 BPM | HEIGHT: 63 IN | TEMPERATURE: 98 F | BODY MASS INDEX: 25.16 KG/M2 | RESPIRATION RATE: 16 BRPM | WEIGHT: 142 LBS | DIASTOLIC BLOOD PRESSURE: 73 MMHG | OXYGEN SATURATION: 98 % | SYSTOLIC BLOOD PRESSURE: 122 MMHG

## 2022-06-07 DIAGNOSIS — Z98.41 CATARACT EXTRACTION STATUS, RIGHT EYE: Chronic | ICD-10-CM

## 2022-06-07 DIAGNOSIS — Z98.61 CORONARY ANGIOPLASTY STATUS: Chronic | ICD-10-CM

## 2022-06-07 DIAGNOSIS — Z98.890 OTHER SPECIFIED POSTPROCEDURAL STATES: Chronic | ICD-10-CM

## 2022-06-07 DIAGNOSIS — C44.90 UNSPECIFIED MALIGNANT NEOPLASM OF SKIN, UNSPECIFIED: Chronic | ICD-10-CM

## 2022-06-07 DIAGNOSIS — Z95.818 PRESENCE OF OTHER CARDIAC IMPLANTS AND GRAFTS: Chronic | ICD-10-CM

## 2022-06-07 DIAGNOSIS — Z87.09 PERSONAL HISTORY OF OTHER DISEASES OF THE RESPIRATORY SYSTEM: Chronic | ICD-10-CM

## 2022-06-07 DIAGNOSIS — Z90.49 ACQUIRED ABSENCE OF OTHER SPECIFIED PARTS OF DIGESTIVE TRACT: Chronic | ICD-10-CM

## 2022-06-07 PROCEDURE — 99204 OFFICE O/P NEW MOD 45 MIN: CPT | Mod: 25

## 2022-06-07 PROCEDURE — 77334 RADIATION TREATMENT AID(S): CPT | Mod: 26

## 2022-06-07 PROCEDURE — 77263 THER RADIOLOGY TX PLNG CPLX: CPT

## 2022-06-07 RX ORDER — FLUDROCORTISONE ACETATE 0.1 MG/1
0.1 TABLET ORAL
Qty: 180 | Refills: 1 | Status: DISCONTINUED | COMMUNITY
Start: 2022-03-22 | End: 2022-06-07

## 2022-06-07 NOTE — VITALS
[Maximal Pain Intensity: 0/10] : 0/10 [Least Pain Intensity: 0/10] : 0/10 [60: Requires occasional assistance, but is able to care for most of his/her needs] : 60: Requires occasional assistance, but is able to care for most of his/her needs [ECOG Performance Status: 3 - Capable of only limited self care, confined to bed or chair more than 50% of waking hours] : Performance Status: 3 - Capable of only limited self care, confined to bed or chair more than 50% of waking hours [3 - Distress Level] : Distress Level: 3

## 2022-06-08 NOTE — DISEASE MANAGEMENT
[Clinical] : TNM Stage: c [N/A] : Currently not applicable [FreeTextEntry4] : melanoma [TTNM] : 4b [MTNM] : x [NTNM] : x

## 2022-06-08 NOTE — HISTORY OF PRESENT ILLNESS
[FreeTextEntry1] : Mr. 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 Patient reports he has had this lesion for several years and does not have complaints of associated pain. He has no previous history of sarcoma.\par \par 09/27/2018: Patient is s/p radical resection of frontal scalp spindle cell neoplasm and biopsy of vertex scalp lesion. Skin graft coverage by Dr. Myles. Recovering well. 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 Additional deep margin shows spindle cell lesion felt to represent fibrosing granulation tissue. 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 Myles 12/17/2018. Pathology shows minimal residual disease, margins negative.\par \par Patient was seen by dermatology on 7/2020 and underwent biopsy of a brown pigmented scalp lesion posterior to skin graft. He continues to have a nonhealing ulcer at the vertex scalp at site of prior biopsy. 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 A significant portion of his pre-existing skin graft and adjacent scalp. An additional left parietal scalp lesion was excised as well.\par Pathology: scalp biopsy - small foci of squamous cell carcinoma in situ, epidermal cyst, no melanoma identified. Parietal scalp lesion - squamous cell carcinoma in situ - extending to one margin.\par Scalp lesion was covered with Integra and is granulating well. He is pending formal skin graft coverage.\par \par CT head 3/2/22: New abnormal soft tissue lesions along the vertex of the scalp with right parietal calvarial vertex erosive changes when compared to the prior CT and MRI studies. \par \par Patient is s/p excision of malignant vertex scalp mass with Integra coverage 04/04/2022.\par Pathology: at least 1.3 mm thick, ulcerated melanoma with positive deep margin and focally anterior left margin. Given deep margin there is gross disease. Patient also with continued blkeeding. Skull resection not an option. Evidence of melanoma cell with bony fragments. \par Specimen Type: excision\par Macroscopic Tumor: present\par Tumor Site: scalp\par Lesion Size: 4.8 cm\par Satellite Nodule(s): not present\par Pigmentation: focal\par Histologic Type: nodular type\par Ulceration: present\par Depth of Invasion: > or = 1.3 cm\par \par Pathologic Staging:\par TNM Descriptors: not applicable\par Primary Tumor (Invasive Carcinoma) (pT): T4b\par Category (pN): not applicable\par Distant Metastasis (M): not applicable\par Margins: positive, involving\par - deep margin\par - focal anterior margin at the left side\par Venous Invasion (V): not present\par Perineural Invasion: present\par Tumor-Infiltrating Lymphocytes: present, nonbrisk\par Tumor Regression: not present\par Mitotic Index: > 1/mm2\par Additional Pathologic Findings: actinic keratosis\par Comment(s): Immunostains for HMB45, melan A, Sox10 and S100 are positive in tumor cells, which are negative for AE1/AE3. Ki67 is high. \par \par 5/24/2022:\par C1 Nivolumab today.\par We re-reviewed most common irAEs with patient + son.\par The lesion on his scalp continued to intermittently bleeds.\par He will require evaluation with Dr. Acuña (RT).\par \par CT head performed not read.\par PET/CT pending. \par \par \par \par Interval History: Surgical Oncology: Gonzalo Stevens\par PCP/Cardiology: Justin Joy\par Pulmonary: lAonso Turner\par Renal: Justin Pryor\par GI: Steve Fan\par Dermatology: Durga Tobias\par [facility] Surgeon Denis - follows pancreatic nodule\par \par Lev - son cell [phone]\par Terence - margarita son cell\par

## 2022-06-08 NOTE — REVIEW OF SYSTEMS
[Fatigue] : fatigue [Cough] : cough [Constipation] : constipation [Joint Pain] : joint pain [Muscle Pain] : muscle pain [Muscle Weakness] : muscle weakness [Disturbance Of Gait] : gait disturbance [Dizziness] : dizziness [Fainting] : fainting [Easy Bleeding] : a tendency for easy bleeding [Easy Bruising] : a tendency for easy bruising [Negative] : Allergic/Immunologic [Fever] : no fever [Chills] : no chills [Night Sweats] : no night sweats [Recent Change In Weight] : ~T no recent weight change [Shortness Of Breath] : no shortness of breath [Wheezing] : no wheezing [SOB on Exertion] : no shortness of breath during exertion [Abdominal Pain] : no abdominal pain [Vomiting] : no vomiting [Diarrhea] : no diarrhea [Confused] : no confusion [Difficulty Walking] : no difficulty walking [Swollen Glands] : no swollen glands

## 2022-06-08 NOTE — PHYSICAL EXAM
[Normal] : well developed, well nourished, in no acute distress [de-identified] : large post scalp lesion, bleeding lightly

## 2022-06-14 PROCEDURE — 77301 RADIOTHERAPY DOSE PLAN IMRT: CPT | Mod: 26

## 2022-06-14 PROCEDURE — 77338 DESIGN MLC DEVICE FOR IMRT: CPT | Mod: 26

## 2022-06-14 PROCEDURE — 77300 RADIATION THERAPY DOSE PLAN: CPT | Mod: 26

## 2022-06-20 NOTE — CONSULT LETTER
[Dear  ___] : Dear  [unfilled], [Consult Letter:] : I had the pleasure of evaluating your patient, [unfilled]. [Please see my note below.] : Please see my note below. [Consult Closing:] : Thank you very much for allowing me to participate in the care of this patient.  If you have any questions, please do not hesitate to contact me. [Sincerely,] : Sincerely, [DrKaan  ___] : Dr. LEAL [DrKana ___] : Dr. LEAL

## 2022-06-21 ENCOUNTER — RESULT REVIEW (OUTPATIENT)
Age: 87
End: 2022-06-21

## 2022-06-21 ENCOUNTER — NON-APPOINTMENT (OUTPATIENT)
Age: 87
End: 2022-06-21

## 2022-06-21 ENCOUNTER — APPOINTMENT (OUTPATIENT)
Dept: HEMATOLOGY ONCOLOGY | Facility: CLINIC | Age: 87
End: 2022-06-21
Payer: MEDICARE

## 2022-06-21 ENCOUNTER — APPOINTMENT (OUTPATIENT)
Dept: INFUSION THERAPY | Facility: HOSPITAL | Age: 87
End: 2022-06-21

## 2022-06-21 LAB
ALBUMIN SERPL ELPH-MCNC: 3.9 G/DL — SIGNIFICANT CHANGE UP (ref 3.3–5)
ALP SERPL-CCNC: 76 U/L — SIGNIFICANT CHANGE UP (ref 40–120)
ALT FLD-CCNC: 34 U/L — SIGNIFICANT CHANGE UP (ref 10–45)
ANION GAP SERPL CALC-SCNC: 13 MMOL/L — SIGNIFICANT CHANGE UP (ref 5–17)
AST SERPL-CCNC: 33 U/L — SIGNIFICANT CHANGE UP (ref 10–40)
BASOPHILS # BLD AUTO: 0.04 K/UL — SIGNIFICANT CHANGE UP (ref 0–0.2)
BASOPHILS NFR BLD AUTO: 0.5 % — SIGNIFICANT CHANGE UP (ref 0–2)
BILIRUB SERPL-MCNC: 0.2 MG/DL — SIGNIFICANT CHANGE UP (ref 0.2–1.2)
BUN SERPL-MCNC: 26 MG/DL — HIGH (ref 7–23)
CALCIUM SERPL-MCNC: 9.6 MG/DL — SIGNIFICANT CHANGE UP (ref 8.4–10.5)
CHLORIDE SERPL-SCNC: 102 MMOL/L — SIGNIFICANT CHANGE UP (ref 96–108)
CO2 SERPL-SCNC: 23 MMOL/L — SIGNIFICANT CHANGE UP (ref 22–31)
CREAT SERPL-MCNC: 1.31 MG/DL — HIGH (ref 0.5–1.3)
CRP SERPL-MCNC: 45 MG/L — HIGH
EGFR: 53 ML/MIN/1.73M2 — LOW
EOSINOPHIL # BLD AUTO: 0.11 K/UL — SIGNIFICANT CHANGE UP (ref 0–0.5)
EOSINOPHIL NFR BLD AUTO: 1.5 % — SIGNIFICANT CHANGE UP (ref 0–6)
ERYTHROCYTE [SEDIMENTATION RATE] IN BLOOD: 110 MM/HR — HIGH (ref 0–20)
GLUCOSE SERPL-MCNC: 97 MG/DL — SIGNIFICANT CHANGE UP (ref 70–99)
HCT VFR BLD CALC: 28.5 % — LOW (ref 39–50)
HGB BLD-MCNC: 9.1 G/DL — LOW (ref 13–17)
IMM GRANULOCYTES NFR BLD AUTO: 0.4 % — SIGNIFICANT CHANGE UP (ref 0–1.5)
LYMPHOCYTES # BLD AUTO: 1.51 K/UL — SIGNIFICANT CHANGE UP (ref 1–3.3)
LYMPHOCYTES # BLD AUTO: 20.7 % — SIGNIFICANT CHANGE UP (ref 13–44)
MCHC RBC-ENTMCNC: 31.7 PG — SIGNIFICANT CHANGE UP (ref 27–34)
MCHC RBC-ENTMCNC: 31.9 G/DL — LOW (ref 32–36)
MCV RBC AUTO: 99.3 FL — SIGNIFICANT CHANGE UP (ref 80–100)
MONOCYTES # BLD AUTO: 0.6 K/UL — SIGNIFICANT CHANGE UP (ref 0–0.9)
MONOCYTES NFR BLD AUTO: 8.2 % — SIGNIFICANT CHANGE UP (ref 2–14)
NEUTROPHILS # BLD AUTO: 4.99 K/UL — SIGNIFICANT CHANGE UP (ref 1.8–7.4)
NEUTROPHILS NFR BLD AUTO: 68.7 % — SIGNIFICANT CHANGE UP (ref 43–77)
NRBC # BLD: 0 /100 WBCS — SIGNIFICANT CHANGE UP (ref 0–0)
PLATELET # BLD AUTO: 339 K/UL — SIGNIFICANT CHANGE UP (ref 150–400)
POTASSIUM SERPL-MCNC: 5.4 MMOL/L — HIGH (ref 3.5–5.3)
POTASSIUM SERPL-SCNC: 5.4 MMOL/L — HIGH (ref 3.5–5.3)
PROT SERPL-MCNC: 6.3 G/DL — SIGNIFICANT CHANGE UP (ref 6–8.3)
RBC # BLD: 2.87 M/UL — LOW (ref 4.2–5.8)
RBC # FLD: 12.4 % — SIGNIFICANT CHANGE UP (ref 10.3–14.5)
SODIUM SERPL-SCNC: 138 MMOL/L — SIGNIFICANT CHANGE UP (ref 135–145)
T4 FREE+ TSH PNL SERPL: 1.33 UIU/ML — SIGNIFICANT CHANGE UP (ref 0.27–4.2)
WBC # BLD: 7.28 K/UL — SIGNIFICANT CHANGE UP (ref 3.8–10.5)
WBC # FLD AUTO: 7.28 K/UL — SIGNIFICANT CHANGE UP (ref 3.8–10.5)

## 2022-06-21 PROCEDURE — 99214 OFFICE O/P EST MOD 30 MIN: CPT

## 2022-06-22 NOTE — HISTORY OF PRESENT ILLNESS
[de-identified] : Mr. 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 He is referred for surgical management.\par Patient reports he has had this lesion for several years and does not have complaints of associated pain. He has no previous history of sarcoma.\par \par 09/27/2018: Patient is s/p radical resection of frontal scalp spindle cell neoplasm and biopsy of vertex scalp lesion. Skin graft coverage by Dr. Myles. Recovering well. 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 Additional deep margin shows spindle cell lesion felt to represent fibrosing granulation tissue. 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 Myles 12/17/2018. Pathology shows minimal residual disease, margins negative.\par \par Patient was seen by dermatology on 7/2020 and underwent biopsy of a brown pigmented scalp lesion posterior to skin graft. He continues to have a nonhealing ulcer at the vertex scalp at site of prior biopsy. 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 A significant portion of his pre-existing skin graft and adjacent scalp. An additional left parietal scalp lesion was excised as well.\par Pathology: scalp biopsy - small foci of squamous cell carcinoma in situ, epidermal cyst, no melanoma identified. Parietal scalp lesion - squamous cell carcinoma in situ - extending to one margin.\par Scalp lesion was covered with Integra and is granulating well. He is pending formal skin graft coverage.\par \par CT head 3/2/22: New abnormal soft tissue lesions along the vertex of the scalp with right  parietal calvarial vertex erosive changes when compared to the prior CT and MRI studies. \par \par Patient is s/p excision of malignant vertex scalp mass with Integra coverage 04/04/2022.\par Pathology: at least 1.3 mm thick, ulcerated melanoma with positive deep margin and focally anterior left margin. Evidence of melanoma cell with bony fragments. \par Specimen Type:   excision\par Macroscopic Tumor:  present\par Tumor Site:   scalp\par Lesion Size:   4.8 cm\par Satellite Nodule(s):   not present\par Pigmentation:   focal\par Histologic Type:  nodular type\par Ulceration:   present\par Depth of Invasion:   > or = 1.3 cm\par \par Pathologic Staging:\par TNM Descriptors:   not applicable\par Primary Tumor (Invasive Carcinoma) (pT):    T4b\par Category (pN):   not applicable\par Distant Metastasis (M):    not applicable\par Margins:   positive, involving\par - deep  margin\par - focal anterior margin at the left side\par Venous Invasion (V):   not present\par Perineural Invasion:   present\par Tumor-Infiltrating Lymphocytes:   present, nonbrisk\par Tumor Regression:    not present\par Mitotic Index:  > 1/mm2\par Additional Pathologic Findings:   actinic keratosis\par Comment(s):   Immunostains for HMB45, melan A, Sox10 and S100 are positive in tumor cells, which are negative for AE1/AE3. Ki67 is high. \par \par 5/24/2022:\par C1 Nivolumab today.\par We re-reviewed most common irAEs with patient + son.\par The lesion on his scalp continued to intermittently bleeds.\par He will require evaluation with Dr. Acuña (RT).\par CT head performed not read.\par PET/CT pending. \par \par 6/21/2022:\par C2 Nivolumab today.\par PET/CT on 6/5/2022 revealed soft tissue mass in scalp at vertex.  No evidence of metastatic disease.\par CT on 5/21: Age-appropriate involutional and ischemic gliotic changes. No hemorrhage. There is soft tissue in the parietal extracalvarial region in the midline new since 3/2/2022 suspicious for neoplasm. 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 This can be further evaluated with brain MRI with contrast.\par Son at bedside.  We reviewed pictures.\par The lesions on his scalp appear to be getting larger and new lesions are forming.\par Continues to intermittently bleed.\par Patient is scheduled to start radiation therapy with Dr. Caballero tomorrow, 6/22/2022.\par Plan is to give 30 fractions of treatment.\par  [de-identified] : Surgical Oncology: Gonzalo Stevens\par PCP/Cardiology: Justin Joy\par Pulmonary: Alonso Turner\par Renal: Justin Pryor\par GI: Steve Marti\par Dermatology: Durga Tobias\par [facility] [place] Surgeon Denis - follows pancreatic nodule\par \par [facility] [phone]\par [facility] - [facility]\par

## 2022-06-23 ENCOUNTER — NON-APPOINTMENT (OUTPATIENT)
Age: 87
End: 2022-06-23

## 2022-06-23 PROCEDURE — 77427 RADIATION TX MANAGEMENT X5: CPT

## 2022-06-23 PROCEDURE — 77387B: CUSTOM | Mod: 26

## 2022-06-24 ENCOUNTER — RX RENEWAL (OUTPATIENT)
Age: 87
End: 2022-06-24

## 2022-06-24 PROCEDURE — 77387B: CUSTOM | Mod: 26

## 2022-06-27 ENCOUNTER — RX RENEWAL (OUTPATIENT)
Age: 87
End: 2022-06-27

## 2022-06-27 PROCEDURE — 77387B: CUSTOM | Mod: 26

## 2022-06-28 PROCEDURE — 77387B: CUSTOM | Mod: 26

## 2022-06-29 ENCOUNTER — NON-APPOINTMENT (OUTPATIENT)
Age: 87
End: 2022-06-29

## 2022-06-29 VITALS
OXYGEN SATURATION: 99 % | DIASTOLIC BLOOD PRESSURE: 79 MMHG | SYSTOLIC BLOOD PRESSURE: 138 MMHG | TEMPERATURE: 96.98 F | HEART RATE: 58 BPM | HEIGHT: 63 IN | RESPIRATION RATE: 17 BRPM | BODY MASS INDEX: 23.95 KG/M2 | WEIGHT: 135.14 LBS

## 2022-06-29 PROCEDURE — 77387B: CUSTOM | Mod: 26

## 2022-06-29 NOTE — DISEASE MANAGEMENT
[Clinical] : TNM Stage: c [N/A] : Currently not applicable [FreeTextEntry4] : melanoma [TTNM] : 4b [NTNM] : x [MTNM] : x [de-identified] : 1000 [de-identified] : 4776 [de-identified] : Scalp

## 2022-06-29 NOTE — HISTORY OF PRESENT ILLNESS
[FreeTextEntry1] : Mr. 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 Patient reports he has had this lesion for several years and does not have complaints of associated pain. He has no previous history of sarcoma.\par \par 09/27/2018: Patient is s/p radical resection of frontal scalp spindle cell neoplasm and biopsy of vertex scalp lesion. Skin graft coverage by Dr. Myles. Recovering well. 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 Additional deep margin shows spindle cell lesion felt to represent fibrosing granulation tissue. 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 Myles 12/17/2018. Pathology shows minimal residual disease, margins negative.\par \par Patient was seen by dermatology on 7/2020 and underwent biopsy of a brown pigmented scalp lesion posterior to skin graft. He continues to have a nonhealing ulcer at the vertex scalp at site of prior biopsy. He denies pain or drainage. 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 A significant portion of his pre-existing skin graft and adjacent scalp. An additional left parietal scalp lesion was excised as well.\par Pathology: scalp biopsy - small foci of squamous cell carcinoma in situ, epidermal cyst, no melanoma identified. Parietal scalp lesion - squamous cell carcinoma in situ - extending to one margin.\par Scalp lesion was covered with Integra and is granulating well. He is pending formal skin graft coverage.\par \par CT head 3/2/22: New abnormal soft tissue lesions along the vertex of the scalp with right parietal calvarial vertex erosive changes when compared to the prior CT and MRI studies. \par \par Patient is s/p excision of malignant vertex scalp mass with Integra coverage 04/04/2022.\par Pathology: at least 1.3 mm thick, ulcerated melanoma with positive deep margin and focally anterior left margin. Given deep margin there is gross disease. Patient also with continued bleeding. Skull resection not an option. Evidence of melanoma cell with bony fragments. \par Specimen Type: excision\par Macroscopic Tumor: present\par Tumor Site: scalp\par Lesion Size: 4.8 cm\par Satellite Nodule(s): not present\par Pigmentation: focal\par Histologic Type: nodular type\par Ulceration: present\par Depth of Invasion: > or = 1.3 cm\par \par 6/29/2022 Presents today for OTV. Completed fx 5/30 to scalp. noted with bleeding ulcerated lesions to scalp. Dressings changed, applied sterile gauze soaked in 1 part hydrogen peroxide to 2 parts saline. Patient denies pain.  Tolerating treatment.\par \par \par \par \par \par \par

## 2022-06-30 PROCEDURE — 77387B: CUSTOM | Mod: 26

## 2022-07-01 PROCEDURE — 77387B: CUSTOM | Mod: 26

## 2022-07-01 PROCEDURE — 77427 RADIATION TX MANAGEMENT X5: CPT

## 2022-07-05 PROCEDURE — 77387B: CUSTOM | Mod: 26

## 2022-07-06 ENCOUNTER — NON-APPOINTMENT (OUTPATIENT)
Age: 87
End: 2022-07-06

## 2022-07-06 PROCEDURE — 77387B: CUSTOM | Mod: 26

## 2022-07-06 NOTE — REVIEW OF SYSTEMS
[Fatigue: Grade 0] : Fatigue: Grade 0 [Skin Hyperpigmentation: Grade 0] : Skin Hyperpigmentation: Grade 0 [Dermatitis Radiation: Grade 0] : Dermatitis Radiation: Grade 0 [FreeTextEntry6] : ulcerated multiple scalp lesions.

## 2022-07-06 NOTE — DISEASE MANAGEMENT
[Clinical] : TNM Stage: c [N/A] : Currently not applicable [FreeTextEntry4] : melanoma [TTNM] : 4b [NTNM] : x [MTNM] : x [de-identified] : 3568 [de-identified] : 7494 [de-identified] : Scalp

## 2022-07-06 NOTE — HISTORY OF PRESENT ILLNESS
[FreeTextEntry1] : Mr. 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 Patient reports he has had this lesion for several years and does not have complaints of associated pain. He has no previous history of sarcoma.\par \par 09/27/2018: Patient is s/p radical resection of frontal scalp spindle cell neoplasm and biopsy of vertex scalp lesion. Skin graft coverage by Dr. Myles. Recovering well. 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 Additional deep margin shows spindle cell lesion felt to represent fibrosing granulation tissue. 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 Myles 12/17/2018. Pathology shows minimal residual disease, margins negative.\par \par Patient was seen by dermatology on 7/2020 and underwent biopsy of a brown pigmented scalp lesion posterior to skin graft. He continues to have a nonhealing ulcer at the vertex scalp at site of prior biopsy. He denies pain or drainage. 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 A significant portion of his pre-existing skin graft and adjacent scalp. An additional left parietal scalp lesion was excised as well.\par Pathology: scalp biopsy - small foci of squamous cell carcinoma in situ, epidermal cyst, no melanoma identified. Parietal scalp lesion - squamous cell carcinoma in situ - extending to one margin.\par Scalp lesion was covered with Integra and is granulating well. He is pending formal skin graft coverage.\par \par CT head 3/2/22: New abnormal soft tissue lesions along the vertex of the scalp with right parietal calvarial vertex erosive changes when compared to the prior CT and MRI studies. \par \par Patient is s/p excision of malignant vertex scalp mass with Integra coverage 04/04/2022.\par Pathology: at least 1.3 mm thick, ulcerated melanoma with positive deep margin and focally anterior left margin. Given deep margin there is gross disease. Patient also with continued bleeding. Skull resection not an option. Evidence of melanoma cell with bony fragments. \par Specimen Type: excision\par Macroscopic Tumor: present\par Tumor Site: scalp\par Lesion Size: 4.8 cm\par Satellite Nodule(s): not present\par Pigmentation: focal\par Histologic Type: nodular type\par Ulceration: present\par Depth of Invasion: > or = 1.3 cm\par \par 6/29/2022 Presents today for OTV. Completed fx 5/30 to scalp. noted with bleeding ulcerated lesions to scalp. Dressings changed, applied sterile gauze soaked in 1 part hydrogen peroxide to 2 parts saline. Patient denies pain. Tolerating treatment.\par \par 7/6/2022 OTV Completed fx 9/30. Dressing intact, scalp continues to bleed from ulcerated lesions. \par \par \par \par \par \par \par

## 2022-07-07 PROCEDURE — 77387B: CUSTOM | Mod: 26

## 2022-07-08 ENCOUNTER — OUTPATIENT (OUTPATIENT)
Dept: OUTPATIENT SERVICES | Facility: HOSPITAL | Age: 87
LOS: 1 days | Discharge: HOME | End: 2022-07-08

## 2022-07-08 DIAGNOSIS — Z87.09 PERSONAL HISTORY OF OTHER DISEASES OF THE RESPIRATORY SYSTEM: Chronic | ICD-10-CM

## 2022-07-08 DIAGNOSIS — Z98.890 OTHER SPECIFIED POSTPROCEDURAL STATES: Chronic | ICD-10-CM

## 2022-07-08 DIAGNOSIS — C43.9 MALIGNANT MELANOMA OF SKIN, UNSPECIFIED: ICD-10-CM

## 2022-07-08 DIAGNOSIS — Z98.61 CORONARY ANGIOPLASTY STATUS: Chronic | ICD-10-CM

## 2022-07-08 DIAGNOSIS — Z90.49 ACQUIRED ABSENCE OF OTHER SPECIFIED PARTS OF DIGESTIVE TRACT: Chronic | ICD-10-CM

## 2022-07-08 DIAGNOSIS — Z98.41 CATARACT EXTRACTION STATUS, RIGHT EYE: Chronic | ICD-10-CM

## 2022-07-08 DIAGNOSIS — C44.90 UNSPECIFIED MALIGNANT NEOPLASM OF SKIN, UNSPECIFIED: Chronic | ICD-10-CM

## 2022-07-08 DIAGNOSIS — Z95.818 PRESENCE OF OTHER CARDIAC IMPLANTS AND GRAFTS: Chronic | ICD-10-CM

## 2022-07-08 PROCEDURE — 77427 RADIATION TX MANAGEMENT X5: CPT

## 2022-07-08 PROCEDURE — 77387B: CUSTOM | Mod: 26

## 2022-07-10 LAB — MAGNESIUM SERPL-MCNC: 1.9 MG/DL

## 2022-07-11 PROCEDURE — 77387B: CUSTOM | Mod: 26

## 2022-07-12 ENCOUNTER — APPOINTMENT (OUTPATIENT)
Dept: OTOLARYNGOLOGY | Facility: CLINIC | Age: 87
End: 2022-07-12

## 2022-07-12 VITALS — SYSTOLIC BLOOD PRESSURE: 99 MMHG | DIASTOLIC BLOOD PRESSURE: 61 MMHG

## 2022-07-12 PROCEDURE — 31579 LARYNGOSCOPY TELESCOPIC: CPT

## 2022-07-12 PROCEDURE — 99202 OFFICE O/P NEW SF 15 MIN: CPT | Mod: 25

## 2022-07-12 PROCEDURE — 77387B: CUSTOM | Mod: 26

## 2022-07-12 NOTE — PROCEDURE
[Image(s) Captured] : image(s) captured and filed [Hoarseness] : hoarseness not clearly evaluated by indirect laryngoscopy [None] : none [Rigid Endoscope] : examined with a rigid endoscope [Normal] : normal [de-identified] : Storz laryngoscope:  both vocal cords move well. no lesions

## 2022-07-12 NOTE — REVIEW OF SYSTEMS
[Patient Intake Form Reviewed] : Patient intake form was reviewed [As Noted in HPI] : as noted in HPI [Negative] : Heme/Lymph [FreeTextEntry4] : melanoma of scalp

## 2022-07-12 NOTE — HISTORY OF PRESENT ILLNESS
[de-identified] : 86 yr old presents for initial evaluation of hoarseness-  patient reports hoarseness had been intermittent over the past 3 years, but was only "whispering" past 2 weeks so decided to have vocal cords checked.  Hx of Caballero's.  Quit 50 yrs ago-  used to smoke < 1 PPD.  No c/o dysphagia or SOB.  + c/o phlegm in throat.  No hx of stroke.  Currently receiving RT for melanoma of the scalp with Dr. Caballero and getting immunotherapy 1/month.

## 2022-07-13 ENCOUNTER — NON-APPOINTMENT (OUTPATIENT)
Age: 87
End: 2022-07-13

## 2022-07-13 VITALS
RESPIRATION RATE: 17 BRPM | DIASTOLIC BLOOD PRESSURE: 56 MMHG | HEART RATE: 61 BPM | HEIGHT: 63 IN | OXYGEN SATURATION: 99 % | SYSTOLIC BLOOD PRESSURE: 95 MMHG

## 2022-07-13 PROCEDURE — 77387B: CUSTOM | Mod: 26

## 2022-07-13 RX ORDER — SULFAMETHOXAZOLE AND TRIMETHOPRIM 800; 160 MG/1; MG/1
800-160 TABLET ORAL
Qty: 10 | Refills: 0 | Status: COMPLETED | COMMUNITY
Start: 2022-04-01

## 2022-07-13 NOTE — HISTORY OF PRESENT ILLNESS
[FreeTextEntry1] : Mr. 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 Patient reports he has had this lesion for several years and does not have complaints of associated pain. He has no previous history of sarcoma.\par \par 09/27/2018: Patient is s/p radical resection of frontal scalp spindle cell neoplasm and biopsy of vertex scalp lesion. Skin graft coverage by Dr. Myles. Recovering well. 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 Additional deep margin shows spindle cell lesion felt to represent fibrosing granulation tissue. 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 Myles 12/17/2018. Pathology shows minimal residual disease, margins negative.\par \par Patient was seen by dermatology on 7/2020 and underwent biopsy of a brown pigmented scalp lesion posterior to skin graft. He continues to have a nonhealing ulcer at the vertex scalp at site of prior biopsy. He denies pain or drainage. 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 A significant portion of his pre-existing skin graft and adjacent scalp. An additional left parietal scalp lesion was excised as well.\par Pathology: scalp biopsy - small foci of squamous cell carcinoma in situ, epidermal cyst, no melanoma identified. Parietal scalp lesion - squamous cell carcinoma in situ - extending to one margin.\par Scalp lesion was covered with Integra and is granulating well. He is pending formal skin graft coverage.\par \par CT head 3/2/22: New abnormal soft tissue lesions along the vertex of the scalp with right parietal calvarial vertex erosive changes when compared to the prior CT and MRI studies. \par \par Patient is s/p excision of malignant vertex scalp mass with Integra coverage 04/04/2022.\par Pathology: at least 1.3 mm thick, ulcerated melanoma with positive deep margin and focally anterior left margin. Given deep margin there is gross disease. Patient also with continued bleeding. Skull resection not an option. Evidence of melanoma cell with bony fragments. \par Specimen Type: excision\par Macroscopic Tumor: present\par Tumor Site: scalp\par Lesion Size: 4.8 cm\par Satellite Nodule(s): not present\par Pigmentation: focal\par Histologic Type: nodular type\par Ulceration: present\par Depth of Invasion: > or = 1.3 cm\par \par 6/29/2022 Presents today for OTV. Completed fx 5/30 to scalp. noted with bleeding ulcerated lesions to scalp. Dressings changed, applied sterile gauze soaked in 1 part hydrogen peroxide to 2 parts saline. Patient denies pain. Tolerating treatment.\par \par 7/6/2022 OTV Completed fx 9/30. Dressing intact, scalp continues to bleed from ulcerated lesions. \par \par 7/13/2022 OTV Completed fx 14/30. Patient states doing well. Denies pain. Bleeding continues to scalp lesions when changing dressing. \par \par \par \par \par \par \par \par \par

## 2022-07-13 NOTE — REVIEW OF SYSTEMS
[Skin Hyperpigmentation: Grade 0] : Skin Hyperpigmentation: Grade 0 [Dermatitis Radiation: Grade 0] : Dermatitis Radiation: Grade 0 [Fatigue: Grade 1 - Fatigue relieved by rest] : Fatigue: Grade 1 - Fatigue relieved by rest [FreeTextEntry6] : ulcerated multiple scalp lesions.

## 2022-07-13 NOTE — DISEASE MANAGEMENT
[Clinical] : TNM Stage: c [N/A] : Currently not applicable [FreeTextEntry4] : melanoma [TTNM] : 4b [NTNM] : x [MTNM] : x [de-identified] : 2800 cGy [de-identified] : 1804 [de-identified] : Scalp

## 2022-07-15 PROCEDURE — 77387B: CUSTOM | Mod: 26

## 2022-07-18 PROCEDURE — 77427 RADIATION TX MANAGEMENT X5: CPT

## 2022-07-18 PROCEDURE — 77387B: CUSTOM | Mod: 26

## 2022-07-18 NOTE — H&P PST ADULT - NSICDXFAMILYHX_GEN_ALL_CORE_FT
Patient made aware of 24/7 emergency services. FAMILY HISTORY:  Father  Still living? No  Family history of premature CAD, Age at diagnosis: Age Unknown  Family history of pulmonary embolism, Age at diagnosis: Age Unknown    Mother  Still living? No  Family history of bladder cancer, Age at diagnosis: Age Unknown

## 2022-07-19 PROCEDURE — 77387B: CUSTOM | Mod: 26

## 2022-07-20 ENCOUNTER — LABORATORY RESULT (OUTPATIENT)
Age: 87
End: 2022-07-20

## 2022-07-20 ENCOUNTER — APPOINTMENT (OUTPATIENT)
Dept: DERMATOLOGY | Facility: CLINIC | Age: 87
End: 2022-07-20

## 2022-07-20 ENCOUNTER — NON-APPOINTMENT (OUTPATIENT)
Age: 87
End: 2022-07-20

## 2022-07-20 DIAGNOSIS — C44.320 SQUAMOUS CELL CARCINOMA OF SKIN OF UNSPECIFIED PARTS OF FACE: ICD-10-CM

## 2022-07-20 DIAGNOSIS — C44.529 SQUAMOUS CELL CARCINOMA OF SKIN OF OTHER PART OF TRUNK: ICD-10-CM

## 2022-07-20 DIAGNOSIS — D48.1 NEOPLASM OF UNCERTAIN BEHAVIOR OF CONNECTIVE AND OTHER SOFT TISSUE: ICD-10-CM

## 2022-07-20 PROCEDURE — 99214 OFFICE O/P EST MOD 30 MIN: CPT | Mod: 25

## 2022-07-20 PROCEDURE — 17272 DSTR MAL LES S/N/H/F/G 1.1-2: CPT | Mod: 59

## 2022-07-20 PROCEDURE — 17262 DSTRJ MAL LES T/A/L 1.1-2.0: CPT | Mod: 59

## 2022-07-20 NOTE — HISTORY OF PRESENT ILLNESS
[FreeTextEntry1] : growths [de-identified] : 86 year old male here for 2 new painful growths on right cheek and upper chest.\par \par currently undergoing radiation and immunotherapy s/p excision.

## 2022-07-20 NOTE — ASSESSMENT
[FreeTextEntry1] : 1) benign findings as above- education\par \par 2) melanoma\par tx per radiation and immunotherapy\par \par 3) 2 lesions; likely SCCs\par will bx and D&C for comfort\par \par Shave bx location right temple, upper chest\par diagnosis: r/o SCC\par  \par Shave biopsy performed today over above location, risks and benefits discussed including incomplete removal, not enough tissue for diagnosis scarring and infection, informed consent obtained, pictures taken,  cleaned with alcohol and anesthetized with 1%lido+epi, 0.3 cc total, hemostasis obtained with monsels, vaseline and bandaid placed, tolerated well, wound care reviewed, specimen sent to pathology.\par \par followed by \par \par Risks and benefits were discussed including scarring, bleeding, pain, incomplete treatment and recurrence. Discussed excision and/or Mohs offer more complete treatment. Patient verbalized understanding and prefers to have D&C done. \par \par 2% lido with epi, desiccation and curettage x 3 cycles;  hemostasis with Monsel's solution, bandaid and vaseline applied, wound care instructions reviewed.\par \par Size:  1.2 cm\par Location right temple, upper chest\par

## 2022-07-20 NOTE — PHYSICAL EXAM
[FreeTextEntry3] : AAOx3, pleasant, NAD, no visual lymphadenopathy\par hair, scalp, face, nose, eyelids, ears, lips, oropharynx, neck, chest, abdomen, back, right arm, left arm, nails, and hands examined with all normal findings,\par pertinent findings include:\par \par multiple benign nevi and lentigines\par scaling erythematous papule on right temple and upper chest\par wrapped scalp

## 2022-07-21 ENCOUNTER — RESULT REVIEW (OUTPATIENT)
Age: 87
End: 2022-07-21

## 2022-07-21 ENCOUNTER — NON-APPOINTMENT (OUTPATIENT)
Age: 87
End: 2022-07-21

## 2022-07-21 ENCOUNTER — APPOINTMENT (OUTPATIENT)
Dept: HEMATOLOGY ONCOLOGY | Facility: CLINIC | Age: 87
End: 2022-07-21

## 2022-07-21 ENCOUNTER — APPOINTMENT (OUTPATIENT)
Dept: INFUSION THERAPY | Facility: HOSPITAL | Age: 87
End: 2022-07-21

## 2022-07-21 VITALS
DIASTOLIC BLOOD PRESSURE: 65 MMHG | TEMPERATURE: 98.1 F | WEIGHT: 142.2 LBS | BODY MASS INDEX: 25.19 KG/M2 | SYSTOLIC BLOOD PRESSURE: 121 MMHG | HEART RATE: 62 BPM | OXYGEN SATURATION: 98 % | RESPIRATION RATE: 16 BRPM

## 2022-07-21 LAB
ALBUMIN SERPL ELPH-MCNC: 3.3 G/DL — SIGNIFICANT CHANGE UP (ref 3.3–5)
ALP SERPL-CCNC: 71 U/L — SIGNIFICANT CHANGE UP (ref 40–120)
ALT FLD-CCNC: 26 U/L — SIGNIFICANT CHANGE UP (ref 10–45)
ANION GAP SERPL CALC-SCNC: 13 MMOL/L — SIGNIFICANT CHANGE UP (ref 5–17)
AST SERPL-CCNC: 22 U/L — SIGNIFICANT CHANGE UP (ref 10–40)
BASOPHILS # BLD AUTO: 0.03 K/UL — SIGNIFICANT CHANGE UP (ref 0–0.2)
BASOPHILS NFR BLD AUTO: 0.3 % — SIGNIFICANT CHANGE UP (ref 0–2)
BILIRUB SERPL-MCNC: 0.4 MG/DL — SIGNIFICANT CHANGE UP (ref 0.2–1.2)
BUN SERPL-MCNC: 20 MG/DL — SIGNIFICANT CHANGE UP (ref 7–23)
CALCIUM SERPL-MCNC: 8.8 MG/DL — SIGNIFICANT CHANGE UP (ref 8.4–10.5)
CHLORIDE SERPL-SCNC: 101 MMOL/L — SIGNIFICANT CHANGE UP (ref 96–108)
CO2 SERPL-SCNC: 24 MMOL/L — SIGNIFICANT CHANGE UP (ref 22–31)
CREAT SERPL-MCNC: 1.3 MG/DL — SIGNIFICANT CHANGE UP (ref 0.5–1.3)
CRP SERPL-MCNC: 83 MG/L — HIGH
EGFR: 54 ML/MIN/1.73M2 — LOW
EOSINOPHIL # BLD AUTO: 0.1 K/UL — SIGNIFICANT CHANGE UP (ref 0–0.5)
EOSINOPHIL NFR BLD AUTO: 1 % — SIGNIFICANT CHANGE UP (ref 0–6)
ERYTHROCYTE [SEDIMENTATION RATE] IN BLOOD: >140 MM/HR — SIGNIFICANT CHANGE UP (ref 0–20)
GLUCOSE SERPL-MCNC: 103 MG/DL — HIGH (ref 70–99)
HCT VFR BLD CALC: 21.7 % — LOW (ref 39–50)
HGB BLD-MCNC: 6.6 G/DL — CRITICAL LOW (ref 13–17)
IMM GRANULOCYTES NFR BLD AUTO: 0.5 % — SIGNIFICANT CHANGE UP (ref 0–1.5)
LYMPHOCYTES # BLD AUTO: 0.82 K/UL — LOW (ref 1–3.3)
LYMPHOCYTES # BLD AUTO: 8.4 % — LOW (ref 13–44)
MCHC RBC-ENTMCNC: 29.7 PG — SIGNIFICANT CHANGE UP (ref 27–34)
MCHC RBC-ENTMCNC: 30.4 G/DL — LOW (ref 32–36)
MCV RBC AUTO: 97.7 FL — SIGNIFICANT CHANGE UP (ref 80–100)
MONOCYTES # BLD AUTO: 0.73 K/UL — SIGNIFICANT CHANGE UP (ref 0–0.9)
MONOCYTES NFR BLD AUTO: 7.5 % — SIGNIFICANT CHANGE UP (ref 2–14)
NEUTROPHILS # BLD AUTO: 8.03 K/UL — HIGH (ref 1.8–7.4)
NEUTROPHILS NFR BLD AUTO: 82.3 % — HIGH (ref 43–77)
NRBC # BLD: 0 /100 WBCS — SIGNIFICANT CHANGE UP (ref 0–0)
PLATELET # BLD AUTO: 338 K/UL — SIGNIFICANT CHANGE UP (ref 150–400)
POTASSIUM SERPL-MCNC: 3.9 MMOL/L — SIGNIFICANT CHANGE UP (ref 3.5–5.3)
POTASSIUM SERPL-SCNC: 3.9 MMOL/L — SIGNIFICANT CHANGE UP (ref 3.5–5.3)
PROT SERPL-MCNC: 5.6 G/DL — LOW (ref 6–8.3)
RBC # BLD: 2.22 M/UL — LOW (ref 4.2–5.8)
RBC # FLD: 13.7 % — SIGNIFICANT CHANGE UP (ref 10.3–14.5)
SODIUM SERPL-SCNC: 138 MMOL/L — SIGNIFICANT CHANGE UP (ref 135–145)
T4 FREE+ TSH PNL SERPL: 0.88 UIU/ML — SIGNIFICANT CHANGE UP (ref 0.27–4.2)
WBC # BLD: 9.76 K/UL — SIGNIFICANT CHANGE UP (ref 3.8–10.5)
WBC # FLD AUTO: 9.76 K/UL — SIGNIFICANT CHANGE UP (ref 3.8–10.5)

## 2022-07-21 PROCEDURE — 99214 OFFICE O/P EST MOD 30 MIN: CPT

## 2022-07-21 PROCEDURE — 77387B: CUSTOM | Mod: 26

## 2022-07-21 NOTE — REVIEW OF SYSTEMS
[Fatigue: Grade 1 - Fatigue relieved by rest] : Fatigue: Grade 1 - Fatigue relieved by rest [Skin Hyperpigmentation: Grade 0] : Skin Hyperpigmentation: Grade 0 [Dermatitis Radiation: Grade 0] : Dermatitis Radiation: Grade 0 [FreeTextEntry6] : ulcerated multiple scalp lesions.

## 2022-07-21 NOTE — HISTORY OF PRESENT ILLNESS
[de-identified] : Mr. 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 He is referred for surgical management.\par Patient reports he has had this lesion for several years and does not have complaints of associated pain. He has no previous history of sarcoma.\par \par 09/27/2018: Patient is s/p radical resection of frontal scalp spindle cell neoplasm and biopsy of vertex scalp lesion. Skin graft coverage by Dr. Myles. Recovering well. 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 Additional deep margin shows spindle cell lesion felt to represent fibrosing granulation tissue. 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 Myles 12/17/2018. Pathology shows minimal residual disease, margins negative.\par \par Patient was seen by dermatology on 7/2020 and underwent biopsy of a brown pigmented scalp lesion posterior to skin graft. He continues to have a nonhealing ulcer at the vertex scalp at site of prior biopsy. 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 A significant portion of his pre-existing skin graft and adjacent scalp. An additional left parietal scalp lesion was excised as well.\par Pathology: scalp biopsy - small foci of squamous cell carcinoma in situ, epidermal cyst, no melanoma identified. Parietal scalp lesion - squamous cell carcinoma in situ - extending to one margin.\par Scalp lesion was covered with Integra and is granulating well. He is pending formal skin graft coverage.\par \par CT head 3/2/22: New abnormal soft tissue lesions along the vertex of the scalp with right  parietal calvarial vertex erosive changes when compared to the prior CT and MRI studies. \par \par Patient is s/p excision of malignant vertex scalp mass with Integra coverage 04/04/2022.\par Pathology: at least 1.3 mm thick, ulcerated melanoma with positive deep margin and focally anterior left margin. Evidence of melanoma cell with bony fragments. \par Specimen Type:   excision\par Macroscopic Tumor:  present\par Tumor Site:   scalp\par Lesion Size:   4.8 cm\par Satellite Nodule(s):   not present\par Pigmentation:   focal\par Histologic Type:  nodular type\par Ulceration:   present\par Depth of Invasion:   > or = 1.3 cm\par \par Pathologic Staging:\par TNM Descriptors:   not applicable\par Primary Tumor (Invasive Carcinoma) (pT):    T4b\par Category (pN):   not applicable\par Distant Metastasis (M):    not applicable\par Margins:   positive, involving\par - deep  margin\par - focal anterior margin at the left side\par Venous Invasion (V):   not present\par Perineural Invasion:   present\par Tumor-Infiltrating Lymphocytes:   present, nonbrisk\par Tumor Regression:    not present\par Mitotic Index:  > 1/mm2\par Additional Pathologic Findings:   actinic keratosis\par Comment(s):   Immunostains for HMB45, melan A, Sox10 and S100 are positive in tumor cells, which are negative for AE1/AE3. Ki67 is high. \par \par 5/24/2022:\par C1 Nivolumab today.\par We re-reviewed most common irAEs with patient + son.\par The lesion on his scalp continued to intermittently bleeds.\par He will require evaluation with Dr. Acuña (RT).\par CT head performed not read.\par PET/CT pending. \par \par 6/21/2022:\par C2 Nivolumab today.\par PET/CT on 6/5/2022 revealed soft tissue mass in scalp at vertex.  No evidence of metastatic disease.\par CT on 5/21: Age-appropriate involutional and ischemic gliotic changes. No hemorrhage. There is soft tissue in the parietal extracalvarial region in the midline new since 3/2/2022 suspicious for neoplasm. 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 This can be further evaluated with brain MRI with contrast.\par Son at bedside.  We reviewed pictures.\par The lesions on his scalp appear to be getting larger and new lesions are forming.\par Continues to intermittently bleed.\par Patient is scheduled to start radiation therapy with Dr. Caballero tomorrow, 6/22/2022.\par Plan is to give 30 fractions of treatment.\par \par 7/21/2022:\par C3 Nivolumab today.\par Patient is currently receiving radiation treatment to scalp.\par Today is D#19/30 fractions.\par As per son + patient continues to bleed during dressing changes.\par Dressing changes is performed every other day in radiation by RN and Dr. Caballero.\par Son and patient noticed swelling in the right neck over one week.\par The "lump" has grown and sensitive to touch.\par Will order US neck.\par  [de-identified] : Surgical Oncology: Gonzalo Stevens\par PCP/Cardiology: Justin Joy\par Pulmonary: Alonso Turner\par Renal: Justin Pryor\par GI: Steve Marti\par Dermatology: Durga Tobias\par [facility] - follows pancreatic nodule\par \par [facility] [phone]\par [facility] - [facility]\par

## 2022-07-21 NOTE — HISTORY OF PRESENT ILLNESS
[FreeTextEntry1] : Mr. 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 Patient reports he has had this lesion for several years and does not have complaints of associated pain. He has no previous history of sarcoma.\par \par 09/27/2018: Patient is s/p radical resection of frontal scalp spindle cell neoplasm and biopsy of vertex scalp lesion. Skin graft coverage by Dr. Myles. Recovering well. 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 Additional deep margin shows spindle cell lesion felt to represent fibrosing granulation tissue. 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 Myles 12/17/2018. Pathology shows minimal residual disease, margins negative.\par \par Patient was seen by dermatology on 7/2020 and underwent biopsy of a brown pigmented scalp lesion posterior to skin graft. He continues to have a nonhealing ulcer at the vertex scalp at site of prior biopsy. He denies pain or drainage. 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 A significant portion of his pre-existing skin graft and adjacent scalp. An additional left parietal scalp lesion was excised as well.\par Pathology: scalp biopsy - small foci of squamous cell carcinoma in situ, epidermal cyst, no melanoma identified. Parietal scalp lesion - squamous cell carcinoma in situ - extending to one margin.\par Scalp lesion was covered with Integra and is granulating well. He is pending formal skin graft coverage.\par \par CT head 3/2/22: New abnormal soft tissue lesions along the vertex of the scalp with right parietal calvarial vertex erosive changes when compared to the prior CT and MRI studies. \par \par Patient is s/p excision of malignant vertex scalp mass with Integra coverage 04/04/2022.\par Pathology: at least 1.3 mm thick, ulcerated melanoma with positive deep margin and focally anterior left margin. Given deep margin there is gross disease. Patient also with continued bleeding. Skull resection not an option. Evidence of melanoma cell with bony fragments. \par Specimen Type: excision\par Macroscopic Tumor: present\par Tumor Site: scalp\par Lesion Size: 4.8 cm\par Satellite Nodule(s): not present\par Pigmentation: focal\par Histologic Type: nodular type\par Ulceration: present\par Depth of Invasion: > or = 1.3 cm\par \par 6/29/2022 Presents today for OTV. Completed fx 5/30 to scalp. noted with bleeding ulcerated lesions to scalp. Dressings changed, applied sterile gauze soaked in 1 part hydrogen peroxide to 2 parts saline. Patient denies pain. Tolerating treatment.\par \par 7/6/2022 OTV Completed fx 9/30. Dressing intact, scalp continues to bleed from ulcerated lesions. \par \par 7/13/2022 OTV Completed fx 14/30. Patient states doing well. Denies pain. Bleeding continues to scalp lesions when changing dressing. \par \par 7/20/2022 OTV. Completed fx 18/30. Dressing changed bleeding is less than before. Hgb 6.6  Instructed to go to ED if has any difficulty breathing, chest pain or palpations.\par \par \par \par \par \par \par \par \par

## 2022-07-21 NOTE — DISEASE MANAGEMENT
[FreeTextEntry4] : melanoma [TTNM] : 4b [NTNM] : x [MTNM] : x [de-identified] : [id number] [de-identified] : 4295 [de-identified] : Scalp

## 2022-07-22 ENCOUNTER — NON-APPOINTMENT (OUTPATIENT)
Age: 87
End: 2022-07-22

## 2022-07-22 ENCOUNTER — OUTPATIENT (OUTPATIENT)
Dept: OUTPATIENT SERVICES | Facility: HOSPITAL | Age: 87
LOS: 1 days | End: 2022-07-22
Payer: MEDICARE

## 2022-07-22 DIAGNOSIS — C44.90 UNSPECIFIED MALIGNANT NEOPLASM OF SKIN, UNSPECIFIED: Chronic | ICD-10-CM

## 2022-07-22 DIAGNOSIS — Z90.49 ACQUIRED ABSENCE OF OTHER SPECIFIED PARTS OF DIGESTIVE TRACT: Chronic | ICD-10-CM

## 2022-07-22 DIAGNOSIS — Z87.09 PERSONAL HISTORY OF OTHER DISEASES OF THE RESPIRATORY SYSTEM: Chronic | ICD-10-CM

## 2022-07-22 DIAGNOSIS — Z98.890 OTHER SPECIFIED POSTPROCEDURAL STATES: Chronic | ICD-10-CM

## 2022-07-22 DIAGNOSIS — R11.2 NAUSEA WITH VOMITING, UNSPECIFIED: ICD-10-CM

## 2022-07-22 DIAGNOSIS — Z95.818 PRESENCE OF OTHER CARDIAC IMPLANTS AND GRAFTS: Chronic | ICD-10-CM

## 2022-07-22 DIAGNOSIS — Z98.61 CORONARY ANGIOPLASTY STATUS: Chronic | ICD-10-CM

## 2022-07-22 DIAGNOSIS — Z51.11 ENCOUNTER FOR ANTINEOPLASTIC CHEMOTHERAPY: ICD-10-CM

## 2022-07-22 DIAGNOSIS — C43.9 MALIGNANT MELANOMA OF SKIN, UNSPECIFIED: ICD-10-CM

## 2022-07-22 DIAGNOSIS — Z98.41 CATARACT EXTRACTION STATUS, RIGHT EYE: Chronic | ICD-10-CM

## 2022-07-22 PROCEDURE — 77387B: CUSTOM | Mod: 26

## 2022-07-23 ENCOUNTER — APPOINTMENT (OUTPATIENT)
Dept: INFUSION THERAPY | Facility: HOSPITAL | Age: 87
End: 2022-07-23

## 2022-07-23 PROCEDURE — 78816 PET IMAGE W/CT FULL BODY: CPT

## 2022-07-23 PROCEDURE — 86900 BLOOD TYPING SEROLOGIC ABO: CPT

## 2022-07-23 PROCEDURE — 86923 COMPATIBILITY TEST ELECTRIC: CPT

## 2022-07-23 PROCEDURE — 86850 RBC ANTIBODY SCREEN: CPT

## 2022-07-23 PROCEDURE — 86905 BLOOD TYPING RBC ANTIGENS: CPT

## 2022-07-23 PROCEDURE — A9552: CPT

## 2022-07-23 PROCEDURE — 86901 BLOOD TYPING SEROLOGIC RH(D): CPT

## 2022-07-23 PROCEDURE — P9040: CPT

## 2022-07-24 ENCOUNTER — APPOINTMENT (OUTPATIENT)
Dept: ULTRASOUND IMAGING | Facility: IMAGING CENTER | Age: 87
End: 2022-07-24

## 2022-07-24 ENCOUNTER — OUTPATIENT (OUTPATIENT)
Dept: OUTPATIENT SERVICES | Facility: HOSPITAL | Age: 87
LOS: 1 days | End: 2022-07-24
Payer: MEDICARE

## 2022-07-24 DIAGNOSIS — Z98.41 CATARACT EXTRACTION STATUS, RIGHT EYE: Chronic | ICD-10-CM

## 2022-07-24 DIAGNOSIS — Z98.890 OTHER SPECIFIED POSTPROCEDURAL STATES: Chronic | ICD-10-CM

## 2022-07-24 DIAGNOSIS — C43.4 MALIGNANT MELANOMA OF SCALP AND NECK: ICD-10-CM

## 2022-07-24 DIAGNOSIS — Z98.61 CORONARY ANGIOPLASTY STATUS: Chronic | ICD-10-CM

## 2022-07-24 DIAGNOSIS — Z87.09 PERSONAL HISTORY OF OTHER DISEASES OF THE RESPIRATORY SYSTEM: Chronic | ICD-10-CM

## 2022-07-24 DIAGNOSIS — C44.90 UNSPECIFIED MALIGNANT NEOPLASM OF SKIN, UNSPECIFIED: Chronic | ICD-10-CM

## 2022-07-24 DIAGNOSIS — Z90.49 ACQUIRED ABSENCE OF OTHER SPECIFIED PARTS OF DIGESTIVE TRACT: Chronic | ICD-10-CM

## 2022-07-24 DIAGNOSIS — Z95.818 PRESENCE OF OTHER CARDIAC IMPLANTS AND GRAFTS: Chronic | ICD-10-CM

## 2022-07-24 PROCEDURE — 76536 US EXAM OF HEAD AND NECK: CPT | Mod: 26

## 2022-07-24 PROCEDURE — 76536 US EXAM OF HEAD AND NECK: CPT

## 2022-07-25 DIAGNOSIS — Z51.89 ENCOUNTER FOR OTHER SPECIFIED AFTERCARE: ICD-10-CM

## 2022-07-25 PROCEDURE — 77387B: CUSTOM | Mod: 26

## 2022-07-26 PROCEDURE — 77387B: CUSTOM | Mod: 26

## 2022-07-27 ENCOUNTER — NON-APPOINTMENT (OUTPATIENT)
Age: 87
End: 2022-07-27

## 2022-07-27 VITALS
OXYGEN SATURATION: 99 % | DIASTOLIC BLOOD PRESSURE: 83 MMHG | HEART RATE: 57 BPM | RESPIRATION RATE: 16 BRPM | TEMPERATURE: 99.32 F | SYSTOLIC BLOOD PRESSURE: 164 MMHG

## 2022-07-27 PROCEDURE — 77387B: CUSTOM | Mod: 26

## 2022-07-27 NOTE — HISTORY OF PRESENT ILLNESS
[FreeTextEntry1] : Mr. 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 Patient reports he has had this lesion for several years and does not have complaints of associated pain. He has no previous history of sarcoma.\par \par 09/27/2018: Patient is s/p radical resection of frontal scalp spindle cell neoplasm and biopsy of vertex scalp lesion. Skin graft coverage by Dr. Myles. Recovering well. 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 Additional deep margin shows spindle cell lesion felt to represent fibrosing granulation tissue. 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 Myles 12/17/2018. Pathology shows minimal residual disease, margins negative.\par \par Patient was seen by dermatology on 7/2020 and underwent biopsy of a brown pigmented scalp lesion posterior to skin graft. He continues to have a nonhealing ulcer at the vertex scalp at site of prior biopsy. He denies pain or drainage. 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 A significant portion of his pre-existing skin graft and adjacent scalp. An additional left parietal scalp lesion was excised as well.\par Pathology: scalp biopsy - small foci of squamous cell carcinoma in situ, epidermal cyst, no melanoma identified. Parietal scalp lesion - squamous cell carcinoma in situ - extending to one margin.\par Scalp lesion was covered with Integra and is granulating well. He is pending formal skin graft coverage.\par \par CT head 3/2/22: New abnormal soft tissue lesions along the vertex of the scalp with right parietal calvarial vertex erosive changes when compared to the prior CT and MRI studies. \par \par Patient is s/p excision of malignant vertex scalp mass with Integra coverage 04/04/2022.\par Pathology: at least 1.3 mm thick, ulcerated melanoma with positive deep margin and focally anterior left margin. Given deep margin there is gross disease. Patient also with continued bleeding. Skull resection not an option. Evidence of melanoma cell with bony fragments. \par Specimen Type: excision\par Macroscopic Tumor: present\par Tumor Site: scalp\par Lesion Size: 4.8 cm\par Satellite Nodule(s): not present\par Pigmentation: focal\par Histologic Type: nodular type\par Ulceration: present\par Depth of Invasion: > or = 1.3 cm\par \par 6/29/2022 Presents today for OTV. Completed fx 5/30 to scalp. noted with bleeding ulcerated lesions to scalp. Dressings changed, applied sterile gauze soaked in 1 part hydrogen peroxide to 2 parts saline. Patient denies pain. Tolerating treatment.\par \par 7/6/2022 OTV Completed fx 9/30. Dressing intact, scalp continues to bleed from ulcerated lesions. \par \par 7/13/2022 OTV Completed fx 14/30. Patient states doing well. Denies pain. Bleeding continues to scalp lesions when changing dressing. \par \par 7/20/2022 OTV. Completed fx 18/30. Dressing changed bleeding is less than before. Hgb 6.6  Instructed to go to ED if has any difficulty breathing, chest pain or palpations.\par \par 7/27/2022 OTV. Completed fx 22/30. S/p transfusion on 7/23/2022. Dressing changed continues to bleed in certain areas on scalp. Denies pain. \par \par \par \par \par \par \par \par

## 2022-07-27 NOTE — DISEASE MANAGEMENT
[Clinical] : TNM Stage: c [N/A] : Currently not applicable [FreeTextEntry4] : melanoma [TTNM] : 4b [NTNM] : x [MTNM] : x [de-identified] : [id number] [de-identified] : 7117 [de-identified] : Scalp

## 2022-07-29 ENCOUNTER — NON-APPOINTMENT (OUTPATIENT)
Age: 87
End: 2022-07-29

## 2022-08-01 PROCEDURE — 77387B: CUSTOM | Mod: 26

## 2022-08-01 PROCEDURE — 77427 RADIATION TX MANAGEMENT X5: CPT

## 2022-08-02 PROCEDURE — 77387B: CUSTOM | Mod: 26

## 2022-08-03 ENCOUNTER — NON-APPOINTMENT (OUTPATIENT)
Age: 87
End: 2022-08-03

## 2022-08-03 PROCEDURE — 77387B: CUSTOM | Mod: 26

## 2022-08-03 NOTE — DISEASE MANAGEMENT
[Clinical] : TNM Stage: c [N/A] : Currently not applicable [FreeTextEntry4] : melanoma [TTNM] : 4b [NTNM] : x [MTNM] : x [de-identified] : 7417 [de-identified] : 8369 [de-identified] : Scalp

## 2022-08-04 PROCEDURE — 77427 RADIATION TX MANAGEMENT X5: CPT

## 2022-08-04 PROCEDURE — 77387B: CUSTOM | Mod: 26

## 2022-08-05 PROCEDURE — 77387B: CUSTOM | Mod: 26

## 2022-08-08 PROCEDURE — 77387B: CUSTOM | Mod: 26

## 2022-08-09 PROCEDURE — 77387B: CUSTOM | Mod: 26

## 2022-08-10 ENCOUNTER — NON-APPOINTMENT (OUTPATIENT)
Age: 87
End: 2022-08-10

## 2022-08-10 PROCEDURE — 77387B: CUSTOM | Mod: 26

## 2022-08-10 NOTE — HISTORY OF PRESENT ILLNESS
[FreeTextEntry1] : Mr. 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 Patient reports he has had this lesion for several years and does not have complaints of associated pain. He has no previous history of sarcoma.\par \par 09/27/2018: Patient is s/p radical resection of frontal scalp spindle cell neoplasm and biopsy of vertex scalp lesion. Skin graft coverage by Dr. Myles. Recovering well. 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 Additional deep margin shows spindle cell lesion felt to represent fibrosing granulation tissue. 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 Myles 12/17/2018. Pathology shows minimal residual disease, margins negative.\par \par Patient was seen by dermatology on 7/2020 and underwent biopsy of a brown pigmented scalp lesion posterior to skin graft. He continues to have a nonhealing ulcer at the vertex scalp at site of prior biopsy. He denies pain or drainage. 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 A significant portion of his pre-existing skin graft and adjacent scalp. An additional left parietal scalp lesion was excised as well.\par Pathology: scalp biopsy - small foci of squamous cell carcinoma in situ, epidermal cyst, no melanoma identified. Parietal scalp lesion - squamous cell carcinoma in situ - extending to one margin.\par Scalp lesion was covered with Integra and is granulating well. He is pending formal skin graft coverage.\par \par CT head 3/2/22: New abnormal soft tissue lesions along the vertex of the scalp with right parietal calvarial vertex erosive changes when compared to the prior CT and MRI studies. \par \par Patient is s/p excision of malignant vertex scalp mass with Integra coverage 04/04/2022.\par Pathology: at least 1.3 mm thick, ulcerated melanoma with positive deep margin and focally anterior left margin. Given deep margin there is gross disease. Patient also with continued bleeding. Skull resection not an option. Evidence of melanoma cell with bony fragments. \par Specimen Type: excision\par Macroscopic Tumor: present\par Tumor Site: scalp\par Lesion Size: 4.8 cm\par Satellite Nodule(s): not present\par Pigmentation: focal\par Histologic Type: nodular type\par Ulceration: present\par Depth of Invasion: > or = 1.3 cm\par \par 6/29/2022 Presents today for OTV. Completed fx 5/30 to scalp. noted with bleeding ulcerated lesions to scalp. Dressings changed, applied sterile gauze soaked in 1 part hydrogen peroxide to 2 parts saline. Patient denies pain. Tolerating treatment.\par \par 7/6/2022 OTV Completed fx 9/30. Dressing intact, scalp continues to bleed from ulcerated lesions. \par \par 7/13/2022 OTV Completed fx 14/30. Patient states doing well. Denies pain. Bleeding continues to scalp lesions when changing dressing. \par \par 7/20/2022 OTV. Completed fx 18/30. Dressing changed bleeding is less than before. Hgb 6.6  Instructed to go to ED if has any difficulty breathing, chest pain or palpations.\par \par 7/27/2022 OTV. Completed fx 22/30. S/p transfusion on 7/23/2022. Dressing changed continues to bleed in certain areas on scalp. Denies pain. \par \par 8/3/2022 OTV Completed fx 25/30. Doing well. Feeling better since transfusion. No active bleeding during dressing change.\par \par 8/10/2022 OTV Completed fx 30/30. No active bleeding. Dressing changed. Continues to have ulcerated wound. \par \par \par \par \par \par \par

## 2022-08-10 NOTE — REVIEW OF SYSTEMS
[Fatigue: Grade 1 - Fatigue relieved by rest] : Fatigue: Grade 1 - Fatigue relieved by rest [Alopecia: Grade 1 - Hair loss of <50% of normal for that individual that is not obvious from a distance but only on close inspection; a different hair style may be required to cover the hair loss but it does not require a wig or hair piece to camouflage] : Alopecia: Grade 1 - Hair loss of <50% of normal for that individual that is not obvious from a distance but only on close inspection; a different hair style may be required to cover the hair loss but it does not require a wig or hair piece to camouflage [Skin Hyperpigmentation: Grade 0] : Skin Hyperpigmentation: Grade 0 [Dermatitis Radiation: Grade 0] : Dermatitis Radiation: Grade 0 [FreeTextEntry6] : ulcerated multiple scalp lesions.

## 2022-08-10 NOTE — DISEASE MANAGEMENT
[Clinical] : TNM Stage: c [N/A] : Currently not applicable [FreeTextEntry4] : melanoma [TTNM] : 4b [NTNM] : x [MTNM] : x [de-identified] : [id number] [de-identified] : [id number] [de-identified] : Scalp

## 2022-08-18 ENCOUNTER — RESULT REVIEW (OUTPATIENT)
Age: 87
End: 2022-08-18

## 2022-08-18 ENCOUNTER — APPOINTMENT (OUTPATIENT)
Dept: HEMATOLOGY ONCOLOGY | Facility: CLINIC | Age: 87
End: 2022-08-18

## 2022-08-18 ENCOUNTER — APPOINTMENT (OUTPATIENT)
Dept: INFUSION THERAPY | Facility: HOSPITAL | Age: 87
End: 2022-08-18

## 2022-08-18 VITALS
OXYGEN SATURATION: 98 % | SYSTOLIC BLOOD PRESSURE: 120 MMHG | WEIGHT: 137 LBS | DIASTOLIC BLOOD PRESSURE: 69 MMHG | RESPIRATION RATE: 16 BRPM | BODY MASS INDEX: 24.27 KG/M2 | HEART RATE: 85 BPM | TEMPERATURE: 97 F | HEIGHT: 62.99 IN

## 2022-08-18 LAB
ALBUMIN SERPL ELPH-MCNC: 3.7 G/DL — SIGNIFICANT CHANGE UP (ref 3.3–5)
ALP SERPL-CCNC: 66 U/L — SIGNIFICANT CHANGE UP (ref 40–120)
ALT FLD-CCNC: 15 U/L — SIGNIFICANT CHANGE UP (ref 10–45)
ANION GAP SERPL CALC-SCNC: 14 MMOL/L — SIGNIFICANT CHANGE UP (ref 5–17)
AST SERPL-CCNC: 21 U/L — SIGNIFICANT CHANGE UP (ref 10–40)
BASOPHILS # BLD AUTO: 0.04 K/UL — SIGNIFICANT CHANGE UP (ref 0–0.2)
BASOPHILS NFR BLD AUTO: 0.6 % — SIGNIFICANT CHANGE UP (ref 0–2)
BILIRUB SERPL-MCNC: 0.3 MG/DL — SIGNIFICANT CHANGE UP (ref 0.2–1.2)
BUN SERPL-MCNC: 17 MG/DL — SIGNIFICANT CHANGE UP (ref 7–23)
CALCIUM SERPL-MCNC: 9.4 MG/DL — SIGNIFICANT CHANGE UP (ref 8.4–10.5)
CHLORIDE SERPL-SCNC: 101 MMOL/L — SIGNIFICANT CHANGE UP (ref 96–108)
CO2 SERPL-SCNC: 26 MMOL/L — SIGNIFICANT CHANGE UP (ref 22–31)
CREAT SERPL-MCNC: 1.04 MG/DL — SIGNIFICANT CHANGE UP (ref 0.5–1.3)
CRP SERPL-MCNC: 22 MG/L — HIGH
EGFR: 70 ML/MIN/1.73M2 — SIGNIFICANT CHANGE UP
EOSINOPHIL # BLD AUTO: 0.09 K/UL — SIGNIFICANT CHANGE UP (ref 0–0.5)
EOSINOPHIL NFR BLD AUTO: 1.3 % — SIGNIFICANT CHANGE UP (ref 0–6)
ERYTHROCYTE [SEDIMENTATION RATE] IN BLOOD: 70 MM/HR — HIGH (ref 0–20)
GLUCOSE SERPL-MCNC: 104 MG/DL — HIGH (ref 70–99)
HCT VFR BLD CALC: 30 % — LOW (ref 39–50)
HGB BLD-MCNC: 9.2 G/DL — LOW (ref 13–17)
IMM GRANULOCYTES NFR BLD AUTO: 0.3 % — SIGNIFICANT CHANGE UP (ref 0–1.5)
LYMPHOCYTES # BLD AUTO: 0.86 K/UL — LOW (ref 1–3.3)
LYMPHOCYTES # BLD AUTO: 12.7 % — LOW (ref 13–44)
MCHC RBC-ENTMCNC: 28.7 PG — SIGNIFICANT CHANGE UP (ref 27–34)
MCHC RBC-ENTMCNC: 30.7 G/DL — LOW (ref 32–36)
MCV RBC AUTO: 93.5 FL — SIGNIFICANT CHANGE UP (ref 80–100)
MONOCYTES # BLD AUTO: 0.53 K/UL — SIGNIFICANT CHANGE UP (ref 0–0.9)
MONOCYTES NFR BLD AUTO: 7.8 % — SIGNIFICANT CHANGE UP (ref 2–14)
NEUTROPHILS # BLD AUTO: 5.24 K/UL — SIGNIFICANT CHANGE UP (ref 1.8–7.4)
NEUTROPHILS NFR BLD AUTO: 77.3 % — HIGH (ref 43–77)
NRBC # BLD: 0 /100 WBCS — SIGNIFICANT CHANGE UP (ref 0–0)
PLATELET # BLD AUTO: 278 K/UL — SIGNIFICANT CHANGE UP (ref 150–400)
POTASSIUM SERPL-MCNC: 3.4 MMOL/L — LOW (ref 3.5–5.3)
POTASSIUM SERPL-SCNC: 3.4 MMOL/L — LOW (ref 3.5–5.3)
PROT SERPL-MCNC: 6 G/DL — SIGNIFICANT CHANGE UP (ref 6–8.3)
RBC # BLD: 3.21 M/UL — LOW (ref 4.2–5.8)
RBC # FLD: 15.1 % — HIGH (ref 10.3–14.5)
SODIUM SERPL-SCNC: 141 MMOL/L — SIGNIFICANT CHANGE UP (ref 135–145)
T4 FREE+ TSH PNL SERPL: 1.83 UIU/ML — SIGNIFICANT CHANGE UP (ref 0.27–4.2)
WBC # BLD: 6.78 K/UL — SIGNIFICANT CHANGE UP (ref 3.8–10.5)
WBC # FLD AUTO: 6.78 K/UL — SIGNIFICANT CHANGE UP (ref 3.8–10.5)

## 2022-08-18 PROCEDURE — 86900 BLOOD TYPING SEROLOGIC ABO: CPT

## 2022-08-18 PROCEDURE — 86901 BLOOD TYPING SEROLOGIC RH(D): CPT

## 2022-08-18 PROCEDURE — 99214 OFFICE O/P EST MOD 30 MIN: CPT

## 2022-08-18 PROCEDURE — 86850 RBC ANTIBODY SCREEN: CPT

## 2022-08-18 NOTE — HISTORY OF PRESENT ILLNESS
[de-identified] : Mr. 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 He is referred for surgical management.\par Patient reports he has had this lesion for several years and does not have complaints of associated pain. He has no previous history of sarcoma.\par \par 09/27/2018: Patient is s/p radical resection of frontal scalp spindle cell neoplasm and biopsy of vertex scalp lesion. Skin graft coverage by Dr. Myles. Recovering well. 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 Additional deep margin shows spindle cell lesion felt to represent fibrosing granulation tissue. 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 Myles 12/17/2018. Pathology shows minimal residual disease, margins negative.\par \par Patient was seen by dermatology on 7/2020 and underwent biopsy of a brown pigmented scalp lesion posterior to skin graft. He continues to have a nonhealing ulcer at the vertex scalp at site of prior biopsy. 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 A significant portion of his pre-existing skin graft and adjacent scalp. An additional left parietal scalp lesion was excised as well.\par Pathology: scalp biopsy - small foci of squamous cell carcinoma in situ, epidermal cyst, no melanoma identified. Parietal scalp lesion - squamous cell carcinoma in situ - extending to one margin.\par Scalp lesion was covered with Integra and is granulating well. He is pending formal skin graft coverage.\par \par CT head 3/2/22: New abnormal soft tissue lesions along the vertex of the scalp with right  parietal calvarial vertex erosive changes when compared to the prior CT and MRI studies. \par \par Patient is s/p excision of malignant vertex scalp mass with Integra coverage 04/04/2022.\par Pathology: at least 1.3 mm thick, ulcerated melanoma with positive deep margin and focally anterior left margin. Evidence of melanoma cell with bony fragments. \par Specimen Type:   excision\par Macroscopic Tumor:  present\par Tumor Site:   scalp\par Lesion Size:   4.8 cm\par Satellite Nodule(s):   not present\par Pigmentation:   focal\par Histologic Type:  nodular type\par Ulceration:   present\par Depth of Invasion:   > or = 1.3 cm\par \par Pathologic Staging:\par TNM Descriptors:   not applicable\par Primary Tumor (Invasive Carcinoma) (pT):    T4b\par Category (pN):   not applicable\par Distant Metastasis (M):    not applicable\par Margins:   positive, involving\par - deep  margin\par - focal anterior margin at the left side\par Venous Invasion (V):   not present\par Perineural Invasion:   present\par Tumor-Infiltrating Lymphocytes:   present, nonbrisk\par Tumor Regression:    not present\par Mitotic Index:  > 1/mm2\par Additional Pathologic Findings:   actinic keratosis\par Comment(s):   Immunostains for HMB45, melan A, Sox10 and S100 are positive in tumor cells, which are negative for AE1/AE3. Ki67 is high. \par \par 5/24/2022:\par C1 Nivolumab today.\par We re-reviewed most common irAEs with patient + son.\par The lesion on his scalp continued to intermittently bleeds.\par He will require evaluation with Dr. Acuña (RT).\par CT head performed not read.\par PET/CT pending. \par \par 6/21/2022:\par C2 Nivolumab today.\par PET/CT on 6/5/2022 revealed soft tissue mass in scalp at vertex.  No evidence of metastatic disease.\par CT on 5/21: Age-appropriate involutional and ischemic gliotic changes. No hemorrhage. There is soft tissue in the parietal extracalvarial region in the midline new since 3/2/2022 suspicious for neoplasm. 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 This can be further evaluated with brain MRI with contrast.\par Son at bedside.  We reviewed pictures.\par The lesions on his scalp appear to be getting larger and new lesions are forming.\par Continues to intermittently bleed.\par Patient is scheduled to start radiation therapy with Dr. Caballero tomorrow, 6/22/2022.\par Plan is to give 30 fractions of treatment.\par \par 7/21/2022:\par C3 Nivolumab today.\par Patient is currently receiving radiation treatment to scalp.\par Today is D#19/30 fractions.\par As per son + patient continues to bleed during dressing changes.\par Dressing changes is performed every other day in radiation by RN and Dr. Caballero.\par Son and patient noticed swelling in the right neck over one week.\par The "lump" has grown and sensitive to touch.\par Will order US neck.\par \par 8/18/22\par Completed radiation on 8/10 (30 fx).\par Dressing changes by son at home on daily basis\par Home care and wound care needed at home.\par US is negative for neck - no nodes.\par Wound is clearly flatter over time based on photo documentation. [de-identified] : Surgical Oncology: Gonzalo Stevens\par PCP/Cardiology: Justin Joy\par Pulmonary: Alonso Turner\par Renal: Justin Pryor\par GI: Steve Marti\par Dermatology: Durga Tobias\par [facility] [place] Surgeon Denis - follows pancreatic nodule\par \par [facility] d[facility]las cell [phone]\par Te[facility] - margarita douglas cell\par

## 2022-08-18 NOTE — PHYSICAL EXAM
[Capable of only limited self care, confined to bed or chair more than 50% of waking hours] : Status 3- Capable of only limited self care, confined to bed or chair more than 50% of waking hours [Normal] : affect appropriate [de-identified] : photos show continued improvement and decayed tissue.

## 2022-08-21 NOTE — H&P PST ADULT - NSANTHAGERD_ENT_A_CORE
PT states that for the past two days she has had intermittent episodes of dizziness. Pt states that it feels like the dizziness is lightheaded and worse when she goes from sitting to standing. Pt's dizziness no worse with head movement. Pt also reports that she has had diarrhea over the past week.  no cp. nos ob. no n/v. no fever/chills.. no other complaints.    physical - rrr. ctab abd - soft, nt. no edema. no rash. neuro - strength 5/5 x4. sensation intact x4. CN II-XII intact.     plan - labs and imaging reviewed.  symptoms c/w dehydration. Pt feeling better s/p ivf and meclizine.  symptoms not c/w neuro dizziness. Pt reassured and instructed to return for worsening dizziness, vomiting, or any other concerns. PT states that for the past two days she has had intermittent episodes of dizziness. Pt states that it feels like the dizziness is lightheaded and worse when she goes from sitting to standing. Pt's dizziness no worse with head movement. Pt also reports that she has had diarrhea over the past week.  no cp. nos ob. no n/v. no fever/chills.. no other complaints.    physical - rrr. ctab abd - soft, nt. no edema. no rash. neuro - strength 5/5 x4. sensation intact x4. CN II-XII intact.     plan - labs and imaging reviewed.  Pt still with significant dizziness. will put in obs for mri/mra. Yes

## 2022-08-24 ENCOUNTER — NON-APPOINTMENT (OUTPATIENT)
Age: 87
End: 2022-08-24

## 2022-09-06 ENCOUNTER — RX RENEWAL (OUTPATIENT)
Age: 87
End: 2022-09-06

## 2022-09-12 ENCOUNTER — OUTPATIENT (OUTPATIENT)
Dept: OUTPATIENT SERVICES | Facility: HOSPITAL | Age: 87
LOS: 1 days | Discharge: HOME | End: 2022-09-12

## 2022-09-12 DIAGNOSIS — Z90.49 ACQUIRED ABSENCE OF OTHER SPECIFIED PARTS OF DIGESTIVE TRACT: Chronic | ICD-10-CM

## 2022-09-12 DIAGNOSIS — Z95.818 PRESENCE OF OTHER CARDIAC IMPLANTS AND GRAFTS: Chronic | ICD-10-CM

## 2022-09-12 DIAGNOSIS — C43.9 MALIGNANT MELANOMA OF SKIN, UNSPECIFIED: ICD-10-CM

## 2022-09-12 DIAGNOSIS — Z87.09 PERSONAL HISTORY OF OTHER DISEASES OF THE RESPIRATORY SYSTEM: Chronic | ICD-10-CM

## 2022-09-12 DIAGNOSIS — Z98.890 OTHER SPECIFIED POSTPROCEDURAL STATES: Chronic | ICD-10-CM

## 2022-09-12 DIAGNOSIS — Z98.61 CORONARY ANGIOPLASTY STATUS: Chronic | ICD-10-CM

## 2022-09-12 DIAGNOSIS — Z98.41 CATARACT EXTRACTION STATUS, RIGHT EYE: Chronic | ICD-10-CM

## 2022-09-12 DIAGNOSIS — C44.90 UNSPECIFIED MALIGNANT NEOPLASM OF SKIN, UNSPECIFIED: Chronic | ICD-10-CM

## 2022-09-15 ENCOUNTER — RESULT REVIEW (OUTPATIENT)
Age: 87
End: 2022-09-15

## 2022-09-15 ENCOUNTER — APPOINTMENT (OUTPATIENT)
Dept: INFUSION THERAPY | Facility: HOSPITAL | Age: 87
End: 2022-09-15

## 2022-09-15 ENCOUNTER — APPOINTMENT (OUTPATIENT)
Dept: HEMATOLOGY ONCOLOGY | Facility: CLINIC | Age: 87
End: 2022-09-15

## 2022-09-15 VITALS
RESPIRATION RATE: 16 BRPM | BODY MASS INDEX: 23.48 KG/M2 | DIASTOLIC BLOOD PRESSURE: 74 MMHG | SYSTOLIC BLOOD PRESSURE: 128 MMHG | HEIGHT: 63 IN | HEART RATE: 63 BPM | TEMPERATURE: 98.1 F | WEIGHT: 132.5 LBS | OXYGEN SATURATION: 99 %

## 2022-09-15 DIAGNOSIS — Z51.11 ENCOUNTER FOR ANTINEOPLASTIC CHEMOTHERAPY: ICD-10-CM

## 2022-09-15 LAB
ALBUMIN SERPL ELPH-MCNC: 3.9 G/DL — SIGNIFICANT CHANGE UP (ref 3.3–5)
ALP SERPL-CCNC: 72 U/L — SIGNIFICANT CHANGE UP (ref 40–120)
ALT FLD-CCNC: 18 U/L — SIGNIFICANT CHANGE UP (ref 10–45)
ANION GAP SERPL CALC-SCNC: 14 MMOL/L — SIGNIFICANT CHANGE UP (ref 5–17)
AST SERPL-CCNC: 21 U/L — SIGNIFICANT CHANGE UP (ref 10–40)
BASOPHILS # BLD AUTO: 0.04 K/UL — SIGNIFICANT CHANGE UP (ref 0–0.2)
BASOPHILS NFR BLD AUTO: 0.6 % — SIGNIFICANT CHANGE UP (ref 0–2)
BILIRUB SERPL-MCNC: 0.3 MG/DL — SIGNIFICANT CHANGE UP (ref 0.2–1.2)
BUN SERPL-MCNC: 19 MG/DL — SIGNIFICANT CHANGE UP (ref 7–23)
CALCIUM SERPL-MCNC: 9.5 MG/DL — SIGNIFICANT CHANGE UP (ref 8.4–10.5)
CHLORIDE SERPL-SCNC: 101 MMOL/L — SIGNIFICANT CHANGE UP (ref 96–108)
CO2 SERPL-SCNC: 25 MMOL/L — SIGNIFICANT CHANGE UP (ref 22–31)
CREAT SERPL-MCNC: 1.01 MG/DL — SIGNIFICANT CHANGE UP (ref 0.5–1.3)
CRP SERPL-MCNC: 25 MG/L — HIGH
EGFR: 73 ML/MIN/1.73M2 — SIGNIFICANT CHANGE UP
EOSINOPHIL # BLD AUTO: 0.06 K/UL — SIGNIFICANT CHANGE UP (ref 0–0.5)
EOSINOPHIL NFR BLD AUTO: 1 % — SIGNIFICANT CHANGE UP (ref 0–6)
ERYTHROCYTE [SEDIMENTATION RATE] IN BLOOD: 66 MM/HR — HIGH (ref 0–20)
GLUCOSE SERPL-MCNC: 92 MG/DL — SIGNIFICANT CHANGE UP (ref 70–99)
HCT VFR BLD CALC: 31.9 % — LOW (ref 39–50)
HGB BLD-MCNC: 9.9 G/DL — LOW (ref 13–17)
IMM GRANULOCYTES NFR BLD AUTO: 0.2 % — SIGNIFICANT CHANGE UP (ref 0–0.9)
LYMPHOCYTES # BLD AUTO: 0.78 K/UL — LOW (ref 1–3.3)
LYMPHOCYTES # BLD AUTO: 12.4 % — LOW (ref 13–44)
MCHC RBC-ENTMCNC: 29 PG — SIGNIFICANT CHANGE UP (ref 27–34)
MCHC RBC-ENTMCNC: 31 G/DL — LOW (ref 32–36)
MCV RBC AUTO: 93.5 FL — SIGNIFICANT CHANGE UP (ref 80–100)
MONOCYTES # BLD AUTO: 0.5 K/UL — SIGNIFICANT CHANGE UP (ref 0–0.9)
MONOCYTES NFR BLD AUTO: 7.9 % — SIGNIFICANT CHANGE UP (ref 2–14)
NEUTROPHILS # BLD AUTO: 4.9 K/UL — SIGNIFICANT CHANGE UP (ref 1.8–7.4)
NEUTROPHILS NFR BLD AUTO: 77.9 % — HIGH (ref 43–77)
NRBC # BLD: 0 /100 WBCS — SIGNIFICANT CHANGE UP (ref 0–0)
PLATELET # BLD AUTO: 241 K/UL — SIGNIFICANT CHANGE UP (ref 150–400)
POTASSIUM SERPL-MCNC: 4.3 MMOL/L — SIGNIFICANT CHANGE UP (ref 3.5–5.3)
POTASSIUM SERPL-SCNC: 4.3 MMOL/L — SIGNIFICANT CHANGE UP (ref 3.5–5.3)
PROT SERPL-MCNC: 6.1 G/DL — SIGNIFICANT CHANGE UP (ref 6–8.3)
RBC # BLD: 3.41 M/UL — LOW (ref 4.2–5.8)
RBC # FLD: 15.7 % — HIGH (ref 10.3–14.5)
SODIUM SERPL-SCNC: 139 MMOL/L — SIGNIFICANT CHANGE UP (ref 135–145)
T4 FREE+ TSH PNL SERPL: 1.64 UIU/ML — SIGNIFICANT CHANGE UP (ref 0.27–4.2)
WBC # BLD: 6.29 K/UL — SIGNIFICANT CHANGE UP (ref 3.8–10.5)
WBC # FLD AUTO: 6.29 K/UL — SIGNIFICANT CHANGE UP (ref 3.8–10.5)

## 2022-09-15 PROCEDURE — 99214 OFFICE O/P EST MOD 30 MIN: CPT

## 2022-09-15 NOTE — HISTORY OF PRESENT ILLNESS
[de-identified] : Mr. 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 He is referred for surgical management.\par Patient reports he has had this lesion for several years and does not have complaints of associated pain. He has no previous history of sarcoma.\par \par 09/27/2018: Patient is s/p radical resection of frontal scalp spindle cell neoplasm and biopsy of vertex scalp lesion. Skin graft coverage by Dr. Myles. Recovering well. 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 Additional deep margin shows spindle cell lesion felt to represent fibrosing granulation tissue. 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 Myles 12/17/2018. Pathology shows minimal residual disease, margins negative.\par \par Patient was seen by dermatology on 7/2020 and underwent biopsy of a brown pigmented scalp lesion posterior to skin graft. He continues to have a nonhealing ulcer at the vertex scalp at site of prior biopsy. 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 A significant portion of his pre-existing skin graft and adjacent scalp. An additional left parietal scalp lesion was excised as well.\par Pathology: scalp biopsy - small foci of squamous cell carcinoma in situ, epidermal cyst, no melanoma identified. Parietal scalp lesion - squamous cell carcinoma in situ - extending to one margin.\par Scalp lesion was covered with Integra and is granulating well. He is pending formal skin graft coverage.\par \par CT head 3/2/22: New abnormal soft tissue lesions along the vertex of the scalp with right  parietal calvarial vertex erosive changes when compared to the prior CT and MRI studies. \par \par Patient is s/p excision of malignant vertex scalp mass with Integra coverage 04/04/2022.\par Pathology: at least 1.3 mm thick, ulcerated melanoma with positive deep margin and focally anterior left margin. Evidence of melanoma cell with bony fragments. \par Specimen Type:   excision\par Macroscopic Tumor:  present\par Tumor Site:   scalp\par Lesion Size:   4.8 cm\par Satellite Nodule(s):   not present\par Pigmentation:   focal\par Histologic Type:  nodular type\par Ulceration:   present\par Depth of Invasion:   > or = 1.3 cm\par \par Pathologic Staging:\par TNM Descriptors:   not applicable\par Primary Tumor (Invasive Carcinoma) (pT):    T4b\par Category (pN):   not applicable\par Distant Metastasis (M):    not applicable\par Margins:   positive, involving\par - deep  margin\par - focal anterior margin at the left side\par Venous Invasion (V):   not present\par Perineural Invasion:   present\par Tumor-Infiltrating Lymphocytes:   present, nonbrisk\par Tumor Regression:    not present\par Mitotic Index:  > 1/mm2\par Additional Pathologic Findings:   actinic keratosis\par Comment(s):   Immunostains for HMB45, melan A, Sox10 and S100 are positive in tumor cells, which are negative for AE1/AE3. Ki67 is high. \par \par 5/24/2022:\par C1 Nivolumab today.\par We re-reviewed most common irAEs with patient + son.\par The lesion on his scalp continued to intermittently bleeds.\par He will require evaluation with Dr. Acuña (RT).\par CT head performed not read.\par PET/CT pending. \par \par 6/21/2022:\par C2 Nivolumab today.\par PET/CT on 6/5/2022 revealed soft tissue mass in scalp at vertex.  No evidence of metastatic disease.\par CT on 5/21: Age-appropriate involutional and ischemic gliotic changes. No hemorrhage. There is soft tissue in the parietal extracalvarial region in the midline new since 3/2/2022 suspicious for neoplasm. 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 This can be further evaluated with brain MRI with contrast.\par Son at bedside.  We reviewed pictures.\par The lesions on his scalp appear to be getting larger and new lesions are forming.\par Continues to intermittently bleed.\par Patient is scheduled to start radiation therapy with Dr. Caballero tomorrow, 6/22/2022.\par Plan is to give 30 fractions of treatment.\par \par 7/21/2022:\par C3 Nivolumab today.\par Patient is currently receiving radiation treatment to scalp.\par Today is D#19/30 fractions.\par As per son + patient continues to bleed during dressing changes.\par Dressing changes is performed every other day in radiation by RN and Dr. Caballero.\par Son and patient noticed swelling in the right neck over one week.\par The "lump" has grown and sensitive to touch.\par Will order US neck.\par \par 8/18/22\par Completed radiation on 8/10 (30 fx).\par Dressing changes by son at home on daily basis\par Home care and wound care needed at home.\par US is negative for neck - no nodes.\par Wound is clearly flatter over time based on photo documentation.\par \par 9/15/2022:\par C5 Nivolumab today.\par He is tolerating treatment relatively well and reports no significant irAEs.\par Admits to constipation\par He is using Dulcolax with relief.\par Son and patient have noticed less bleeding from the scalp after completing radiation.\par The lesion on the scalps are responding to treatment.\par More healthy tissue.  The proximal lesion is decomposing.\par Wound is flatter.\par  [de-identified] : Surgical Oncology: Gonzalo Stevens\par PCP/Cardiology: Justin Joy\par Pulmonary: Alonso Turner\par Renal: Justin Pryor\par GI: Steve Marti\par Dermatology: Durga Tobias\par [facility] [place] [facility]geon Denis - follows pancreatic nodule\par \par [facility]las cell [phone]\par Te[facility] - margarita douglas cell\par

## 2022-09-15 NOTE — PHYSICAL EXAM
[Capable of only limited self care, confined to bed or chair more than 50% of waking hours] : Status 3- Capable of only limited self care, confined to bed or chair more than 50% of waking hours [Normal] : affect appropriate [de-identified] : photos show continued improvement and decayed tissue.

## 2022-09-16 ENCOUNTER — APPOINTMENT (OUTPATIENT)
Dept: DERMATOLOGY | Facility: CLINIC | Age: 87
End: 2022-09-16

## 2022-09-16 ENCOUNTER — LABORATORY RESULT (OUTPATIENT)
Age: 87
End: 2022-09-16

## 2022-09-16 DIAGNOSIS — L30.9 DERMATITIS, UNSPECIFIED: ICD-10-CM

## 2022-09-16 DIAGNOSIS — B99.9 UNSPECIFIED INFECTIOUS DISEASE: ICD-10-CM

## 2022-09-16 DIAGNOSIS — D48.5 NEOPLASM OF UNCERTAIN BEHAVIOR OF SKIN: ICD-10-CM

## 2022-09-16 PROCEDURE — 11102 TANGNTL BX SKIN SINGLE LES: CPT

## 2022-09-16 PROCEDURE — 99214 OFFICE O/P EST MOD 30 MIN: CPT | Mod: 25

## 2022-09-16 RX ORDER — MUPIROCIN 20 MG/G
2 OINTMENT TOPICAL 3 TIMES DAILY
Qty: 1 | Refills: 0 | Status: ACTIVE | COMMUNITY
Start: 2022-09-16 | End: 1900-01-01

## 2022-09-21 ENCOUNTER — APPOINTMENT (OUTPATIENT)
Dept: RADIATION ONCOLOGY | Facility: CLINIC | Age: 87
End: 2022-09-21

## 2022-09-21 VITALS
HEART RATE: 69 BPM | SYSTOLIC BLOOD PRESSURE: 86 MMHG | DIASTOLIC BLOOD PRESSURE: 51 MMHG | RESPIRATION RATE: 16 BRPM | BODY MASS INDEX: 23.38 KG/M2 | OXYGEN SATURATION: 100 % | WEIGHT: 132 LBS

## 2022-09-21 PROCEDURE — 99024 POSTOP FOLLOW-UP VISIT: CPT

## 2022-09-21 NOTE — REVIEW OF SYSTEMS
[Fatigue: Grade 1 - Fatigue relieved by rest] : Fatigue: Grade 1 - Fatigue relieved by rest [Skin Hyperpigmentation: Grade 0] : Skin Hyperpigmentation: Grade 0 [Dermatitis Radiation: Grade 2 - Moderate to brisk erythema; patchy moist desquamation, mostly confined to skin folds and creases; moderate edema] : Dermatitis Radiation: Grade 2 - Moderate to brisk erythema; patchy moist desquamation, mostly confined to skin folds and creases; moderate edema

## 2022-09-22 ENCOUNTER — NON-APPOINTMENT (OUTPATIENT)
Age: 87
End: 2022-09-22

## 2022-09-23 ENCOUNTER — APPOINTMENT (OUTPATIENT)
Dept: PULMONOLOGY | Facility: CLINIC | Age: 87
End: 2022-09-23

## 2022-09-23 ENCOUNTER — NON-APPOINTMENT (OUTPATIENT)
Age: 87
End: 2022-09-23

## 2022-09-23 VITALS — OXYGEN SATURATION: 100 % | DIASTOLIC BLOOD PRESSURE: 69 MMHG | SYSTOLIC BLOOD PRESSURE: 106 MMHG | HEART RATE: 69 BPM

## 2022-09-23 DIAGNOSIS — R05.9 COUGH, UNSPECIFIED: ICD-10-CM

## 2022-09-23 DIAGNOSIS — Z23 ENCOUNTER FOR IMMUNIZATION: ICD-10-CM

## 2022-09-23 DIAGNOSIS — R91.8 OTHER NONSPECIFIC ABNORMAL FINDING OF LUNG FIELD: ICD-10-CM

## 2022-09-23 DIAGNOSIS — R49.0 DYSPHONIA: ICD-10-CM

## 2022-09-23 PROCEDURE — 94729 DIFFUSING CAPACITY: CPT

## 2022-09-23 PROCEDURE — G0008: CPT

## 2022-09-23 PROCEDURE — 99214 OFFICE O/P EST MOD 30 MIN: CPT | Mod: 25

## 2022-09-23 PROCEDURE — ZZZZZ: CPT

## 2022-09-23 PROCEDURE — 95012 NITRIC OXIDE EXP GAS DETER: CPT

## 2022-09-23 PROCEDURE — 90662 IIV NO PRSV INCREASED AG IM: CPT

## 2022-09-23 PROCEDURE — 94010 BREATHING CAPACITY TEST: CPT

## 2022-09-23 PROCEDURE — 94727 GAS DIL/WSHOT DETER LNG VOL: CPT

## 2022-09-23 NOTE — HISTORY OF PRESENT ILLNESS
[Former] : former [TextBox_4] : \par has a persistent cough with some yellow mucus for over 1 year but has gotten better over past few weeks. Persistent phlegm yellow.\par no SOB or wheezing. Some nocturnal \par No GERD on meds. \par saw ent for hoarseness told negative exam\par \par had COVID last year\par no inhaler use\par \par seeing oncologist and recent 8/15 ct chest. [facility]\par \par Denies cough with eating or difficulty swallowing.\par \par On immunotherapy for melanoma with Nivolumab\par Cough began prior to starting in 5/22.\par Dr Antione Gray MD\par  [YearQuit] : 1969

## 2022-09-23 NOTE — DISCUSSION/SUMMARY
[FreeTextEntry1] : Cough persistent.  Did begin prior to therapy.  Most likely airways in origin.\par Mild COPD\par Stable pulmonary nodules.\par New groundglass opacity.\par R/O Drug induced disease.  Pulmonary toxicity has been described with nivolumab\par

## 2022-09-23 NOTE — PROCEDURE
[FreeTextEntry1] : ct chest 8/15/22 reviewed report. \par Reported new subpleural ground glass opacities RUL. \par Secretions lower trachea. \par \par 09/23/2022\par Pulmonary function testing\par Normal Flow Rates Normal Lung Volumes. There is a moderate diffusion impairment. \par Function is decreased compared to July 2020.

## 2022-09-23 NOTE — PHYSICAL EXAM
[No Acute Distress] : no acute distress [Supple] : supple [No JVD] : no jvd [Normal S1, S2] : normal s1, s2 [Clear to Auscultation Bilaterally] : clear to auscultation bilaterally [Normal to Percussion] : normal to percussion [No Abnormalities] : no abnormalities [Benign] : benign [Not Tender] : not tender [No HSM] : no hsm [No Clubbing] : no clubbing [No Cyanosis] : no cyanosis [1+ Pitting] : 1+ pitting

## 2022-09-23 NOTE — ASSESSMENT
[FreeTextEntry1] : flu shot given\par Obtain CT for review.\par Consider speech and swallow evaluation.\par Sputum Culture\par Course antibiotic and prednisone if no contraindication.  Will discuss with oncology.\par Follow CT.

## 2022-09-26 NOTE — DISEASE MANAGEMENT
[Clinical] : TNM Stage: c [N/A] : Currently not applicable [FreeTextEntry4] : melanoma [TTNM] : 4b [NTNM] : x [MTNM] : x

## 2022-09-26 NOTE — HISTORY OF PRESENT ILLNESS
[FreeTextEntry1] : Mr. 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 Patient reports he has had this lesion for several years and does not have complaints of associated pain. He has no previous history of sarcoma.\par \par 09/27/2018: Patient is s/p radical resection of frontal scalp spindle cell neoplasm and biopsy of vertex scalp lesion. Skin graft coverage by Dr. Myles. Recovering well. 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 Additional deep margin shows spindle cell lesion felt to represent fibrosing granulation tissue. 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 Myles 12/17/2018. Pathology shows minimal residual disease, margins negative.\par \par Patient was seen by dermatology on 7/2020 and underwent biopsy of a brown pigmented scalp lesion posterior to skin graft. He continues to have a nonhealing ulcer at the vertex scalp at site of prior biopsy. He denies pain or drainage. 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 A significant portion of his pre-existing skin graft and adjacent scalp. An additional left parietal scalp lesion was excised as well.\par Pathology: scalp biopsy - small foci of squamous cell carcinoma in situ, epidermal cyst, no melanoma identified. Parietal scalp lesion - squamous cell carcinoma in situ - extending to one margin.\par Scalp lesion was covered with Integra and is granulating well. He is pending formal skin graft coverage.\par \par CT head 3/2/22: New abnormal soft tissue lesions along the vertex of the scalp with right parietal calvarial vertex erosive changes when compared to the prior CT and MRI studies. \par \par Patient is s/p excision of malignant vertex scalp mass with Integra coverage 04/04/2022.\par Pathology: at least 1.3 mm thick, ulcerated melanoma with positive deep margin and focally anterior left margin. Given deep margin there is gross disease. Patient also with continued bleeding. Skull resection not an option. Evidence of melanoma cell with bony fragments. \par \par 8/10/2022 Completed planned RT to scalp total dose of 6000 cGy. \par \par Presents today for post treatment evaluation. No bleeding, \par \par

## 2022-10-07 LAB — BACTERIA SPT CULT: ABNORMAL

## 2022-10-10 ENCOUNTER — RX RENEWAL (OUTPATIENT)
Age: 87
End: 2022-10-10

## 2022-10-11 ENCOUNTER — RESULT REVIEW (OUTPATIENT)
Age: 87
End: 2022-10-11

## 2022-10-11 ENCOUNTER — APPOINTMENT (OUTPATIENT)
Dept: INFUSION THERAPY | Facility: HOSPITAL | Age: 87
End: 2022-10-11

## 2022-10-11 ENCOUNTER — APPOINTMENT (OUTPATIENT)
Dept: HEMATOLOGY ONCOLOGY | Facility: CLINIC | Age: 87
End: 2022-10-11

## 2022-10-11 VITALS
WEIGHT: 127.43 LBS | RESPIRATION RATE: 16 BRPM | DIASTOLIC BLOOD PRESSURE: 62 MMHG | OXYGEN SATURATION: 96 % | TEMPERATURE: 96.6 F | BODY MASS INDEX: 22.57 KG/M2 | HEART RATE: 69 BPM | SYSTOLIC BLOOD PRESSURE: 90 MMHG

## 2022-10-11 LAB
ALBUMIN SERPL ELPH-MCNC: 4.1 G/DL — SIGNIFICANT CHANGE UP (ref 3.3–5)
ALP SERPL-CCNC: 68 U/L — SIGNIFICANT CHANGE UP (ref 40–120)
ALT FLD-CCNC: 35 U/L — SIGNIFICANT CHANGE UP (ref 10–45)
ANION GAP SERPL CALC-SCNC: 13 MMOL/L — SIGNIFICANT CHANGE UP (ref 5–17)
AST SERPL-CCNC: 29 U/L — SIGNIFICANT CHANGE UP (ref 10–40)
BILIRUB SERPL-MCNC: 0.2 MG/DL — SIGNIFICANT CHANGE UP (ref 0.2–1.2)
BUN SERPL-MCNC: 39 MG/DL — HIGH (ref 7–23)
CALCIUM SERPL-MCNC: 9.9 MG/DL — SIGNIFICANT CHANGE UP (ref 8.4–10.5)
CHLORIDE SERPL-SCNC: 100 MMOL/L — SIGNIFICANT CHANGE UP (ref 96–108)
CO2 SERPL-SCNC: 23 MMOL/L — SIGNIFICANT CHANGE UP (ref 22–31)
CREAT SERPL-MCNC: 1.89 MG/DL — HIGH (ref 0.5–1.3)
CRP SERPL-MCNC: <3 MG/L — SIGNIFICANT CHANGE UP
EGFR: 34 ML/MIN/1.73M2 — LOW
GLUCOSE SERPL-MCNC: 81 MG/DL — SIGNIFICANT CHANGE UP (ref 70–99)
HCT VFR BLD CALC: 35.4 % — LOW (ref 39–50)
HGB BLD-MCNC: 11.1 G/DL — LOW (ref 13–17)
MCHC RBC-ENTMCNC: 29.6 PG — SIGNIFICANT CHANGE UP (ref 27–34)
MCHC RBC-ENTMCNC: 31.4 GM/DL — LOW (ref 32–36)
MCV RBC AUTO: 94.4 FL — SIGNIFICANT CHANGE UP (ref 80–100)
PLATELET # BLD AUTO: 270 K/UL — SIGNIFICANT CHANGE UP (ref 150–400)
POTASSIUM SERPL-MCNC: 5.1 MMOL/L — SIGNIFICANT CHANGE UP (ref 3.5–5.3)
POTASSIUM SERPL-SCNC: 5.1 MMOL/L — SIGNIFICANT CHANGE UP (ref 3.5–5.3)
PROT SERPL-MCNC: 6.7 G/DL — SIGNIFICANT CHANGE UP (ref 6–8.3)
RBC # BLD: 3.75 M/UL — LOW (ref 4.2–5.8)
RBC # FLD: 16.6 % — HIGH (ref 10.3–14.5)
SODIUM SERPL-SCNC: 135 MMOL/L — SIGNIFICANT CHANGE UP (ref 135–145)
WBC # BLD: 8.24 K/UL — SIGNIFICANT CHANGE UP (ref 3.8–10.5)
WBC # FLD AUTO: 8.24 K/UL — SIGNIFICANT CHANGE UP (ref 3.8–10.5)

## 2022-10-11 PROCEDURE — 99214 OFFICE O/P EST MOD 30 MIN: CPT

## 2022-10-11 NOTE — PHYSICAL EXAM
[Capable of only limited self care, confined to bed or chair more than 50% of waking hours] : Status 3- Capable of only limited self care, confined to bed or chair more than 50% of waking hours [Normal] : affect appropriate [de-identified] : photos and exam show substantial improvement. No bleeding, greatly smaller surface area, no infection, no odor and lesion is flat.

## 2022-10-11 NOTE — HISTORY OF PRESENT ILLNESS
[de-identified] : Mr. 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 He is referred for surgical management.\par Patient reports he has had this lesion for several years and does not have complaints of associated pain. He has no previous history of sarcoma.\par \par 09/27/2018: Patient is s/p radical resection of frontal scalp spindle cell neoplasm and biopsy of vertex scalp lesion. Skin graft coverage by Dr. Myles. Recovering well. 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 Additional deep margin shows spindle cell lesion felt to represent fibrosing granulation tissue. 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 Myles 12/17/2018. Pathology shows minimal residual disease, margins negative.\par \par Patient was seen by dermatology on 7/2020 and underwent biopsy of a brown pigmented scalp lesion posterior to skin graft. He continues to have a nonhealing ulcer at the vertex scalp at site of prior biopsy. 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 A significant portion of his pre-existing skin graft and adjacent scalp. An additional left parietal scalp lesion was excised as well.\par Pathology: scalp biopsy - small foci of squamous cell carcinoma in situ, epidermal cyst, no melanoma identified. Parietal scalp lesion - squamous cell carcinoma in situ - extending to one margin.\par Scalp lesion was covered with Integra and is granulating well. He is pending formal skin graft coverage.\par \par CT head 3/2/22: New abnormal soft tissue lesions along the vertex of the scalp with right  parietal calvarial vertex erosive changes when compared to the prior CT and MRI studies. \par \par Patient is s/p excision of malignant vertex scalp mass with Integra coverage 04/04/2022.\par Pathology: at least 1.3 mm thick, ulcerated melanoma with positive deep margin and focally anterior left margin. Evidence of melanoma cell with bony fragments. \par Specimen Type:   excision\par Macroscopic Tumor:  present\par Tumor Site:   scalp\par Lesion Size:   4.8 cm\par Satellite Nodule(s):   not present\par Pigmentation:   focal\par Histologic Type:  nodular type\par Ulceration:   present\par Depth of Invasion:   > or = 1.3 cm\par \par Pathologic Staging:\par TNM Descriptors:   not applicable\par Primary Tumor (Invasive Carcinoma) (pT):    T4b\par Category (pN):   not applicable\par Distant Metastasis (M):    not applicable\par Margins:   positive, involving\par - deep  margin\par - focal anterior margin at the left side\par Venous Invasion (V):   not present\par Perineural Invasion:   present\par Tumor-Infiltrating Lymphocytes:   present, nonbrisk\par Tumor Regression:    not present\par Mitotic Index:  > 1/mm2\par Additional Pathologic Findings:   actinic keratosis\par Comment(s):   Immunostains for HMB45, melan A, Sox10 and S100 are positive in tumor cells, which are negative for AE1/AE3. Ki67 is high. \par \par 5/24/2022:\par C1 Nivolumab today.\par We re-reviewed most common irAEs with patient + son.\par The lesion on his scalp continued to intermittently bleeds.\par He will require evaluation with Dr. Acuña (RT).\par CT head performed not read.\par PET/CT pending. \par \par 6/21/2022:\par C2 Nivolumab today.\par PET/CT on 6/5/2022 revealed soft tissue mass in scalp at vertex.  No evidence of metastatic disease.\par CT on 5/21: Age-appropriate involutional and ischemic gliotic changes. No hemorrhage. There is soft tissue in the parietal extracalvarial region in the midline new since 3/2/2022 suspicious for neoplasm. 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 This can be further evaluated with brain MRI with contrast.\par Son at bedside.  We reviewed pictures.\par The lesions on his scalp appear to be getting larger and new lesions are forming.\par Continues to intermittently bleed.\par Patient is scheduled to start radiation therapy with Dr. Caballero tomorrow, 6/22/2022.\par Plan is to give 30 fractions of treatment.\par \par 7/21/2022:\par C3 Nivolumab today.\par Patient is currently receiving radiation treatment to scalp.\par Today is D#19/30 fractions.\par As per son + patient continues to bleed during dressing changes.\par Dressing changes is performed every other day in radiation by RN and Dr. Caballero.\par Son and patient noticed swelling in the right neck over one week.\par The "lump" has grown and sensitive to touch.\par Will order US neck.\par \par 8/18/22\par Completed radiation on 8/10 (30 fx).\par Dressing changes by son at home on daily basis\par Home care and wound care needed at home.\par US is negative for neck - no nodes.\par Wound is clearly flatter over time based on photo documentation.\par \par 9/15/2022:\par C5 Nivolumab today.\par He is tolerating treatment relatively well and reports no significant irAEs.\par Admits to constipation\par He is using Dulcolax with relief.\par Son and patient have noticed less bleeding from the scalp after completing radiation.\par The lesion on the scalps are responding to treatment.\par More healthy tissue.  The proximal lesion is decomposing.\par Wound is flatter.\par \par 10/11/22\par C6 Nivo today\par No clear irAEs.\par His weight has decreased; despite that son thinks meal portions have been good.\par Scalp lesion is substantially improving over time.\par Patient has gotten weaker and very unstable when walking; and has tremors when moves. \par Also, has had orthostatic hypotension that has worsened and more difficult to manage with medications causing high BPs.\par We reviewed that his clinical response looks good, and will need PET verification.\par Have advised that he go for treatment today, and PET in the next few weeks.\par Also, given ambulation difficulty, will need to see PCP or Neuro again and if no other medical issues found should move forward with more PT.\par  [de-identified] : Surgical Oncology: Gonzalo Stevens\par PCP/Cardiology: Justin Joy\par Pulmonary: Alonso Turner\par Renal: Justin Pryor\par GI: Steve Marti\par Dermatology: Durga Tobias\par [facility] [place] [facility] [place] - follows pancreatic nodule\par \par Lev douglas cell [phone]\par Terence - margarita douglas cell\par

## 2022-10-12 LAB
ERYTHROCYTE [SEDIMENTATION RATE] IN BLOOD: 47 MM/HR — HIGH (ref 0–20)
T3 SERPL-MCNC: 64 NG/DL — LOW (ref 80–200)
T4 FREE SERPL-MCNC: 1.3 NG/DL — SIGNIFICANT CHANGE UP (ref 0.9–1.8)
T4 FREE+ TSH PNL SERPL: 3.71 UIU/ML — SIGNIFICANT CHANGE UP (ref 0.27–4.2)

## 2022-10-18 LAB
CORTICOSTEROID BINDING GLOBULIN RESULT: 1.3 MG/DL — LOW
CORTIS F/TOTAL MFR SERPL: 32 % — SIGNIFICANT CHANGE UP
CORTIS SERPL-MCNC: 12 UG/DL — SIGNIFICANT CHANGE UP
CORTISOL, FREE RESULT: 3.8 UG/DL — HIGH

## 2022-10-20 ENCOUNTER — LABORATORY RESULT (OUTPATIENT)
Age: 87
End: 2022-10-20

## 2022-10-26 ENCOUNTER — APPOINTMENT (OUTPATIENT)
Dept: RADIATION ONCOLOGY | Facility: CLINIC | Age: 87
End: 2022-10-26

## 2022-10-26 VITALS
SYSTOLIC BLOOD PRESSURE: 89 MMHG | HEART RATE: 66 BPM | BODY MASS INDEX: 24.17 KG/M2 | RESPIRATION RATE: 16 BRPM | TEMPERATURE: 98.24 F | OXYGEN SATURATION: 99 % | WEIGHT: 136.46 LBS | DIASTOLIC BLOOD PRESSURE: 56 MMHG

## 2022-10-26 PROCEDURE — 99213 OFFICE O/P EST LOW 20 MIN: CPT

## 2022-10-26 RX ORDER — SULFAMETHOXAZOLE AND TRIMETHOPRIM 800; 160 MG/1; MG/1
800-160 TABLET ORAL TWICE DAILY
Qty: 14 | Refills: 0 | Status: COMPLETED | COMMUNITY
Start: 2022-09-30 | End: 2022-10-26

## 2022-10-26 RX ORDER — PREDNISONE 10 MG/1
10 TABLET ORAL
Qty: 18 | Refills: 0 | Status: COMPLETED | COMMUNITY
Start: 2022-09-30 | End: 2022-10-26

## 2022-10-26 NOTE — REVIEW OF SYSTEMS
[Fatigue: Grade 1 - Fatigue relieved by rest] : Fatigue: Grade 1 - Fatigue relieved by rest [Dermatitis Radiation: Grade 1 - Faint erythema or dry desquamation] : Dermatitis Radiation: Grade 1 - Faint erythema or dry desquamation Kyle Pettit)  Pediatrics  [address] [facility], [address] [place] [address]  Phone: [phone]  Fax: [phone]  Established Patient  Follow Up Time: 1-3 Days

## 2022-10-31 NOTE — HISTORY OF PRESENT ILLNESS
[FreeTextEntry1] : Mr. 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 Patient reports he has had this lesion for several years and does not have complaints of associated pain. He has no previous history of sarcoma.\par \par 09/27/2018: Patient is s/p radical resection of frontal scalp spindle cell neoplasm and biopsy of vertex scalp lesion. Skin graft coverage by Dr. Myles. Recovering well. 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 Additional deep margin shows spindle cell lesion felt to represent fibrosing granulation tissue. 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 Myles 12/17/2018. Pathology shows minimal residual disease, margins negative.\par \par Patient was seen by dermatology on 7/2020 and underwent biopsy of a brown pigmented scalp lesion posterior to skin graft. He continues to have a nonhealing ulcer at the vertex scalp at site of prior biopsy. He denies pain or drainage. 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 A significant portion of his pre-existing skin graft and adjacent scalp. An additional left parietal scalp lesion was excised as well.\par Pathology: scalp biopsy - small foci of squamous cell carcinoma in situ, epidermal cyst, no melanoma identified. Parietal scalp lesion - squamous cell carcinoma in situ - extending to one margin.\par Scalp lesion was covered with Integra and is granulating well. He is pending formal skin graft coverage.\par \par CT head 3/2/22: New abnormal soft tissue lesions along the vertex of the scalp with right parietal calvarial vertex erosive changes when compared to the prior CT and MRI studies. \par \par Patient is s/p excision of malignant vertex scalp mass with Integra coverage 04/04/2022.\par Pathology: at least 1.3 mm thick, ulcerated melanoma with positive deep margin and focally anterior left margin. Given deep margin there is gross disease. Patient also with continued bleeding. Skull resection not an option. Evidence of melanoma cell with bony fragments. \par \par 8/10/2022 Completed planned RT to scalp total dose of 6000 cGy. \par \par Presents today for follow up. \par \par

## 2022-11-01 ENCOUNTER — APPOINTMENT (OUTPATIENT)
Dept: NUCLEAR MEDICINE | Facility: IMAGING CENTER | Age: 87
End: 2022-11-01

## 2022-11-01 ENCOUNTER — OUTPATIENT (OUTPATIENT)
Dept: OUTPATIENT SERVICES | Facility: HOSPITAL | Age: 87
LOS: 1 days | End: 2022-11-01
Payer: MEDICARE

## 2022-11-01 DIAGNOSIS — Z90.49 ACQUIRED ABSENCE OF OTHER SPECIFIED PARTS OF DIGESTIVE TRACT: Chronic | ICD-10-CM

## 2022-11-01 DIAGNOSIS — Z98.890 OTHER SPECIFIED POSTPROCEDURAL STATES: Chronic | ICD-10-CM

## 2022-11-01 DIAGNOSIS — C43.4 MALIGNANT MELANOMA OF SCALP AND NECK: ICD-10-CM

## 2022-11-01 DIAGNOSIS — Z95.818 PRESENCE OF OTHER CARDIAC IMPLANTS AND GRAFTS: Chronic | ICD-10-CM

## 2022-11-01 DIAGNOSIS — Z98.61 CORONARY ANGIOPLASTY STATUS: Chronic | ICD-10-CM

## 2022-11-01 DIAGNOSIS — Z98.41 CATARACT EXTRACTION STATUS, RIGHT EYE: Chronic | ICD-10-CM

## 2022-11-01 DIAGNOSIS — Z87.09 PERSONAL HISTORY OF OTHER DISEASES OF THE RESPIRATORY SYSTEM: Chronic | ICD-10-CM

## 2022-11-01 DIAGNOSIS — C44.90 UNSPECIFIED MALIGNANT NEOPLASM OF SKIN, UNSPECIFIED: Chronic | ICD-10-CM

## 2022-11-01 PROCEDURE — 78816 PET IMAGE W/CT FULL BODY: CPT

## 2022-11-01 PROCEDURE — A9552: CPT

## 2022-11-01 PROCEDURE — 78816 PET IMAGE W/CT FULL BODY: CPT | Mod: 26,PS,MH

## 2022-11-07 ENCOUNTER — APPOINTMENT (OUTPATIENT)
Dept: DERMATOLOGY | Facility: CLINIC | Age: 87
End: 2022-11-07

## 2022-11-07 PROCEDURE — 17000 DESTRUCT PREMALG LESION: CPT

## 2022-11-07 PROCEDURE — 99213 OFFICE O/P EST LOW 20 MIN: CPT | Mod: 25

## 2022-11-07 PROCEDURE — 17003 DESTRUCT PREMALG LES 2-14: CPT

## 2022-11-07 NOTE — PHYSICAL EXAM
[Alert] : alert [Oriented x 3] : ~L oriented x 3 [Full Body Skin Exam Performed] : performed [FreeTextEntry3] : - Extensive photodamage\par - Rough erythematous gritty papules on central chest, L chest, R shoulder, L upper back, R upper back, R cheek\par - Upper back EDC site well healed - ISABEL\par - Chronic wound on vertex scalp\par - Brown patch on L thigh\par - Scattered symmetric brown macules \par - No head and neck LAD

## 2022-11-07 NOTE — HISTORY OF PRESENT ILLNESS
[FreeTextEntry1] : F/u SCC, skin growths [de-identified] : PCP: ADIS OSHEA\par \par BEATRIS SHEA is a 87 year M who presents for evaluation of:\par \par Skin growth - central chest x 2-3 weeks. also notes scaly papules on right cheek, left upper back.\par He has an extensive history of NMSC. Last treated for SCC on the upper back and right cheek x 2 with EDC. \par \par Skin cancer history: \par 2018 - AFX s/p excision with incidental finding of poorly diff SCC. Margins negative. Reconstructed with skin graft. \par 2020 - Melanoma in situ of the scalp (adjacent to skin graft) s/p excision with Integra reconstruction.\par 2022 - Soft tissue lesions of scalp - dx with [id number] melanoma s/p excision and RT to scalp 6000 cGy given gross disease at deep margin and evidence of melanoma within bony fragments. Currently on Nivolumab (C1 5/24/22, C6 10/11/22). PET CT on 11/2 without metastasis and interval response to therapy of scalp lesion

## 2022-11-07 NOTE — ASSESSMENT
[FreeTextEntry1] : 1. [id number] melanoma of the scalp s/p excision, RT currently on immunotherapy\par - Follow up with rad onc and med onc \par - No concerning skin lesions on examination today\par \par 2. Actinic keratosis, hypertrophic actinic keratosis\par - Central chest, L chest, R shoulder, L upper back, R upper back, R cheek - 6 total\par - The risks/benefits/alternatives of cryo-destruction was explained to the patient which, include but are not limited to redness, swelling, pain, blistering, scar, discoloration of skin, and recurrence. The patient expressed understanding of these risks and agreed to the procedure. #6 lesions treated with 2 cycle of LN2. The procedure was well tolerated, without complication. We have discussed related skin care.\par \par 3. Hx of SCC s/p EDC right upper back - ISABEL\par \par 4. Solar lentigo\par 5. Benign appearing nevi\par 6. Skin cancer screening\par - A complete skin exam was performed\par - No other concerning lesions identified\par - Call for new or changing lesions\par \par RTC 6 months TBSE, sooner PRN

## 2022-11-11 ENCOUNTER — RESULT REVIEW (OUTPATIENT)
Age: 87
End: 2022-11-11

## 2022-11-11 ENCOUNTER — APPOINTMENT (OUTPATIENT)
Dept: HEMATOLOGY ONCOLOGY | Facility: CLINIC | Age: 87
End: 2022-11-11

## 2022-11-11 ENCOUNTER — APPOINTMENT (OUTPATIENT)
Dept: INFUSION THERAPY | Facility: HOSPITAL | Age: 87
End: 2022-11-11

## 2022-11-11 VITALS
WEIGHT: 134.48 LBS | TEMPERATURE: 97.6 F | HEIGHT: 62.2 IN | OXYGEN SATURATION: 99 % | HEART RATE: 66 BPM | SYSTOLIC BLOOD PRESSURE: 93 MMHG | RESPIRATION RATE: 16 BRPM | DIASTOLIC BLOOD PRESSURE: 57 MMHG | BODY MASS INDEX: 24.44 KG/M2

## 2022-11-11 LAB
ALBUMIN SERPL ELPH-MCNC: 3.6 G/DL — SIGNIFICANT CHANGE UP (ref 3.3–5)
ALP SERPL-CCNC: 71 U/L — SIGNIFICANT CHANGE UP (ref 40–120)
ALT FLD-CCNC: 12 U/L — SIGNIFICANT CHANGE UP (ref 10–45)
ANION GAP SERPL CALC-SCNC: 11 MMOL/L — SIGNIFICANT CHANGE UP (ref 5–17)
AST SERPL-CCNC: 20 U/L — SIGNIFICANT CHANGE UP (ref 10–40)
BASOPHILS # BLD AUTO: 0.04 K/UL — SIGNIFICANT CHANGE UP (ref 0–0.2)
BASOPHILS NFR BLD AUTO: 0.7 % — SIGNIFICANT CHANGE UP (ref 0–2)
BILIRUB SERPL-MCNC: 0.2 MG/DL — SIGNIFICANT CHANGE UP (ref 0.2–1.2)
BUN SERPL-MCNC: 28 MG/DL — HIGH (ref 7–23)
CALCIUM SERPL-MCNC: 9.4 MG/DL — SIGNIFICANT CHANGE UP (ref 8.4–10.5)
CHLORIDE SERPL-SCNC: 103 MMOL/L — SIGNIFICANT CHANGE UP (ref 96–108)
CO2 SERPL-SCNC: 24 MMOL/L — SIGNIFICANT CHANGE UP (ref 22–31)
CREAT SERPL-MCNC: 1.24 MG/DL — SIGNIFICANT CHANGE UP (ref 0.5–1.3)
CRP SERPL-MCNC: 20 MG/L — HIGH
EGFR: 57 ML/MIN/1.73M2 — LOW
EOSINOPHIL # BLD AUTO: 0.13 K/UL — SIGNIFICANT CHANGE UP (ref 0–0.5)
EOSINOPHIL NFR BLD AUTO: 2.2 % — SIGNIFICANT CHANGE UP (ref 0–6)
ERYTHROCYTE [SEDIMENTATION RATE] IN BLOOD: 85 MM/HR — HIGH (ref 0–20)
GLUCOSE SERPL-MCNC: 91 MG/DL — SIGNIFICANT CHANGE UP (ref 70–99)
HCT VFR BLD CALC: 32.2 % — LOW (ref 39–50)
HGB BLD-MCNC: 10.2 G/DL — LOW (ref 13–17)
IMM GRANULOCYTES NFR BLD AUTO: 0.3 % — SIGNIFICANT CHANGE UP (ref 0–0.9)
LYMPHOCYTES # BLD AUTO: 1.16 K/UL — SIGNIFICANT CHANGE UP (ref 1–3.3)
LYMPHOCYTES # BLD AUTO: 19.7 % — SIGNIFICANT CHANGE UP (ref 13–44)
MCHC RBC-ENTMCNC: 30.3 PG — SIGNIFICANT CHANGE UP (ref 27–34)
MCHC RBC-ENTMCNC: 31.7 G/DL — LOW (ref 32–36)
MCV RBC AUTO: 95.5 FL — SIGNIFICANT CHANGE UP (ref 80–100)
MONOCYTES # BLD AUTO: 0.56 K/UL — SIGNIFICANT CHANGE UP (ref 0–0.9)
MONOCYTES NFR BLD AUTO: 9.5 % — SIGNIFICANT CHANGE UP (ref 2–14)
NEUTROPHILS # BLD AUTO: 3.99 K/UL — SIGNIFICANT CHANGE UP (ref 1.8–7.4)
NEUTROPHILS NFR BLD AUTO: 67.6 % — SIGNIFICANT CHANGE UP (ref 43–77)
NRBC # BLD: 0 /100 WBCS — SIGNIFICANT CHANGE UP (ref 0–0)
PLATELET # BLD AUTO: 274 K/UL — SIGNIFICANT CHANGE UP (ref 150–400)
POTASSIUM SERPL-MCNC: 4.6 MMOL/L — SIGNIFICANT CHANGE UP (ref 3.5–5.3)
POTASSIUM SERPL-SCNC: 4.6 MMOL/L — SIGNIFICANT CHANGE UP (ref 3.5–5.3)
PROT SERPL-MCNC: 5.9 G/DL — LOW (ref 6–8.3)
RBC # BLD: 3.37 M/UL — LOW (ref 4.2–5.8)
RBC # FLD: 15.6 % — HIGH (ref 10.3–14.5)
SODIUM SERPL-SCNC: 138 MMOL/L — SIGNIFICANT CHANGE UP (ref 135–145)
T4 FREE+ TSH PNL SERPL: 3.14 UIU/ML — SIGNIFICANT CHANGE UP (ref 0.27–4.2)
WBC # BLD: 5.9 K/UL — SIGNIFICANT CHANGE UP (ref 3.8–10.5)
WBC # FLD AUTO: 5.9 K/UL — SIGNIFICANT CHANGE UP (ref 3.8–10.5)

## 2022-11-11 PROCEDURE — 99214 OFFICE O/P EST MOD 30 MIN: CPT

## 2022-11-11 NOTE — PHYSICAL EXAM
[Capable of only limited self care, confined to bed or chair more than 50% of waking hours] : Status 3- Capable of only limited self care, confined to bed or chair more than 50% of waking hours [Normal] : affect appropriate [de-identified] : photos and exam show substantial improvement. No bleeding, greatly smaller surface area, no infection, no odor and lesion is flat.

## 2022-11-11 NOTE — HISTORY OF PRESENT ILLNESS
[de-identified] : Mr. 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 He is referred for surgical management.\par Patient reports he has had this lesion for several years and does not have complaints of associated pain. He has no previous history of sarcoma.\par \par 09/27/2018: Patient is s/p radical resection of frontal scalp spindle cell neoplasm and biopsy of vertex scalp lesion. Skin graft coverage by Dr. Myles. Recovering well. 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 Additional deep margin shows spindle cell lesion felt to represent fibrosing granulation tissue. 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 Myles 12/17/2018. Pathology shows minimal residual disease, margins negative.\par \par Patient was seen by dermatology on 7/2020 and underwent biopsy of a brown pigmented scalp lesion posterior to skin graft. He continues to have a nonhealing ulcer at the vertex scalp at site of prior biopsy. 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 A significant portion of his pre-existing skin graft and adjacent scalp. An additional left parietal scalp lesion was excised as well.\par Pathology: scalp biopsy - small foci of squamous cell carcinoma in situ, epidermal cyst, no melanoma identified. Parietal scalp lesion - squamous cell carcinoma in situ - extending to one margin.\par Scalp lesion was covered with Integra and is granulating well. He is pending formal skin graft coverage.\par \par CT head 3/2/22: New abnormal soft tissue lesions along the vertex of the scalp with right  parietal calvarial vertex erosive changes when compared to the prior CT and MRI studies. \par \par Patient is s/p excision of malignant vertex scalp mass with Integra coverage 04/04/2022.\par Pathology: at least 1.3 mm thick, ulcerated melanoma with positive deep margin and focally anterior left margin. Evidence of melanoma cell with bony fragments. \par Specimen Type:   excision\par Macroscopic Tumor:  present\par Tumor Site:   scalp\par Lesion Size:   4.8 cm\par Satellite Nodule(s):   not present\par Pigmentation:   focal\par Histologic Type:  nodular type\par Ulceration:   present\par Depth of Invasion:   > or = 1.3 cm\par \par Pathologic Staging:\par TNM Descriptors:   not applicable\par Primary Tumor (Invasive Carcinoma) (pT):    T4b\par Category (pN):   not applicable\par Distant Metastasis (M):    not applicable\par Margins:   positive, involving\par - deep  margin\par - focal anterior margin at the left side\par Venous Invasion (V):   not present\par Perineural Invasion:   present\par Tumor-Infiltrating Lymphocytes:   present, nonbrisk\par Tumor Regression:    not present\par Mitotic Index:  > 1/mm2\par Additional Pathologic Findings:   actinic keratosis\par Comment(s):   Immunostains for HMB45, melan A, Sox10 and S100 are positive in tumor cells, which are negative for AE1/AE3. Ki67 is high. \par \par 5/24/2022:\par C1 Nivolumab today.\par We re-reviewed most common irAEs with patient + son.\par The lesion on his scalp continued to intermittently bleeds.\par He will require evaluation with Dr. Acuña (RT).\par CT head performed not read.\par PET/CT pending. \par \par 6/21/2022:\par C2 Nivolumab today.\par PET/CT on 6/5/2022 revealed soft tissue mass in scalp at vertex.  No evidence of metastatic disease.\par CT on 5/21: Age-appropriate involutional and ischemic gliotic changes. No hemorrhage. There is soft tissue in the parietal extracalvarial region in the midline new since 3/2/2022 suspicious for neoplasm. 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 This can be further evaluated with brain MRI with contrast.\par Son at bedside.  We reviewed pictures.\par The lesions on his scalp appear to be getting larger and new lesions are forming.\par Continues to intermittently bleed.\par Patient is scheduled to start radiation therapy with Dr. Caballero tomorrow, 6/22/2022.\par Plan is to give 30 fractions of treatment.\par \par 7/21/2022:\par C3 Nivolumab today.\par Patient is currently receiving radiation treatment to scalp.\par Today is D#19/30 fractions.\par As per son + patient continues to bleed during dressing changes.\par Dressing changes is performed every other day in radiation by RN and Dr. Caballero.\par Son and patient noticed swelling in the right neck over one week.\par The "lump" has grown and sensitive to touch.\par Will order US neck.\par \par 8/18/22\par Completed radiation on 8/10 (30 fx).\par Dressing changes by son at home on daily basis\par Home care and wound care needed at home.\par US is negative for neck - no nodes.\par Wound is clearly flatter over time based on photo documentation.\par \par 9/15/2022:\par C5 Nivolumab today.\par He is tolerating treatment relatively well and reports no significant irAEs.\par Admits to constipation\par He is using Dulcolax with relief.\par Son and patient have noticed less bleeding from the scalp after completing radiation.\par The lesion on the scalps are responding to treatment.\par More healthy tissue.  The proximal lesion is decomposing.\par Wound is flatter.\par \par 10/11/22\par C6 Nivo today\par No clear irAEs.\par His weight has decreased; despite that son thinks meal portions have been good.\par Scalp lesion is substantially improving over time.\par Patient has gotten weaker and very unstable when walking; and has tremors when moves. \par Also, has had orthostatic hypotension that has worsened and more difficult to manage with medications causing high BPs.\par We reviewed that his clinical response looks good, and will need PET verification.\par Have advised that he go for treatment today, and PET in the next few weeks.\par Also, given ambulation difficulty, will need to see PCP or Neuro again and if no other medical issues found should move forward with more PT.\par \par 11/11/22\par C7 Nivolumab today.\par No major irAEs.\par He underwent PET/CT on 11/1/2022 which revealed Interval response to therapy in the scalp lesion, with minimal residual FDG uptake. No metastases identified\par Hypotension: HIs systolic BP in the office today in the 90s.  He takes Midodrine 10 mg PO TID.\par His systolic BP in the AM was 105 and he took his Midodrine.\par Patient has been following with Dr. Joy (Cardiology).\par  [de-identified] : Surgical Oncology: Gonzalo Stevens\par PCP/Cardiology: Justin Joy\par Pulmonary: Alonso Turner\par Renal: Justin Pryor\par GI: Steve Marti\par Dermatology: Durga Tobias\par [facility] [place] - follows pancreatic nodule\par \par Lev douglas cell [phone]\par Terence - margarita douglas cell\par

## 2022-11-14 ENCOUNTER — APPOINTMENT (OUTPATIENT)
Dept: DERMATOLOGY | Facility: CLINIC | Age: 87
End: 2022-11-14

## 2022-11-17 LAB — ACID FAST STN SPT: NORMAL

## 2022-12-05 ENCOUNTER — APPOINTMENT (OUTPATIENT)
Dept: RADIATION ONCOLOGY | Facility: CLINIC | Age: 87
End: 2022-12-05

## 2022-12-05 VITALS
TEMPERATURE: 98.2 F | RESPIRATION RATE: 15 BRPM | HEIGHT: 62.2 IN | DIASTOLIC BLOOD PRESSURE: 55 MMHG | OXYGEN SATURATION: 96 % | BODY MASS INDEX: 25.38 KG/M2 | HEART RATE: 75 BPM | SYSTOLIC BLOOD PRESSURE: 88 MMHG | WEIGHT: 139.66 LBS

## 2022-12-05 PROCEDURE — 99213 OFFICE O/P EST LOW 20 MIN: CPT

## 2022-12-05 RX ORDER — TRAMADOL HYDROCHLORIDE 50 MG/1
50 TABLET, COATED ORAL
Qty: 12 | Refills: 0 | Status: DISCONTINUED | COMMUNITY
Start: 2022-04-01 | End: 2022-12-05

## 2022-12-07 ENCOUNTER — OUTPATIENT (OUTPATIENT)
Dept: OUTPATIENT SERVICES | Facility: HOSPITAL | Age: 87
LOS: 1 days | Discharge: HOME | End: 2022-12-07

## 2022-12-07 DIAGNOSIS — Z98.890 OTHER SPECIFIED POSTPROCEDURAL STATES: Chronic | ICD-10-CM

## 2022-12-07 DIAGNOSIS — C43.9 MALIGNANT MELANOMA OF SKIN, UNSPECIFIED: ICD-10-CM

## 2022-12-07 DIAGNOSIS — Z98.41 CATARACT EXTRACTION STATUS, RIGHT EYE: Chronic | ICD-10-CM

## 2022-12-07 DIAGNOSIS — Z87.09 PERSONAL HISTORY OF OTHER DISEASES OF THE RESPIRATORY SYSTEM: Chronic | ICD-10-CM

## 2022-12-07 DIAGNOSIS — Z98.61 CORONARY ANGIOPLASTY STATUS: Chronic | ICD-10-CM

## 2022-12-07 DIAGNOSIS — Z90.49 ACQUIRED ABSENCE OF OTHER SPECIFIED PARTS OF DIGESTIVE TRACT: Chronic | ICD-10-CM

## 2022-12-07 DIAGNOSIS — C44.90 UNSPECIFIED MALIGNANT NEOPLASM OF SKIN, UNSPECIFIED: Chronic | ICD-10-CM

## 2022-12-07 DIAGNOSIS — Z95.818 PRESENCE OF OTHER CARDIAC IMPLANTS AND GRAFTS: Chronic | ICD-10-CM

## 2022-12-12 ENCOUNTER — APPOINTMENT (OUTPATIENT)
Dept: HEMATOLOGY ONCOLOGY | Facility: CLINIC | Age: 87
End: 2022-12-12

## 2022-12-12 ENCOUNTER — RESULT REVIEW (OUTPATIENT)
Age: 87
End: 2022-12-12

## 2022-12-12 ENCOUNTER — APPOINTMENT (OUTPATIENT)
Dept: INFUSION THERAPY | Facility: HOSPITAL | Age: 87
End: 2022-12-12

## 2022-12-12 VITALS
RESPIRATION RATE: 14 BRPM | HEART RATE: 63 BPM | TEMPERATURE: 97.4 F | DIASTOLIC BLOOD PRESSURE: 62 MMHG | OXYGEN SATURATION: 96 % | BODY MASS INDEX: 25.64 KG/M2 | WEIGHT: 141.09 LBS | SYSTOLIC BLOOD PRESSURE: 93 MMHG

## 2022-12-12 LAB
ALBUMIN SERPL ELPH-MCNC: 3.9 G/DL — SIGNIFICANT CHANGE UP (ref 3.3–5)
ALP SERPL-CCNC: 86 U/L — SIGNIFICANT CHANGE UP (ref 40–120)
ALT FLD-CCNC: 21 U/L — SIGNIFICANT CHANGE UP (ref 10–45)
ANION GAP SERPL CALC-SCNC: 12 MMOL/L — SIGNIFICANT CHANGE UP (ref 5–17)
AST SERPL-CCNC: 23 U/L — SIGNIFICANT CHANGE UP (ref 10–40)
BILIRUB SERPL-MCNC: 0.2 MG/DL — SIGNIFICANT CHANGE UP (ref 0.2–1.2)
BUN SERPL-MCNC: 30 MG/DL — HIGH (ref 7–23)
CALCIUM SERPL-MCNC: 9.4 MG/DL — SIGNIFICANT CHANGE UP (ref 8.4–10.5)
CHLORIDE SERPL-SCNC: 102 MMOL/L — SIGNIFICANT CHANGE UP (ref 96–108)
CO2 SERPL-SCNC: 23 MMOL/L — SIGNIFICANT CHANGE UP (ref 22–31)
CREAT SERPL-MCNC: 1.34 MG/DL — HIGH (ref 0.5–1.3)
CRP SERPL-MCNC: 38 MG/L — HIGH
EGFR: 52 ML/MIN/1.73M2 — LOW
GLUCOSE SERPL-MCNC: 91 MG/DL — SIGNIFICANT CHANGE UP (ref 70–99)
POTASSIUM SERPL-MCNC: 4.4 MMOL/L — SIGNIFICANT CHANGE UP (ref 3.5–5.3)
POTASSIUM SERPL-SCNC: 4.4 MMOL/L — SIGNIFICANT CHANGE UP (ref 3.5–5.3)
PROT SERPL-MCNC: 6.1 G/DL — SIGNIFICANT CHANGE UP (ref 6–8.3)
SODIUM SERPL-SCNC: 138 MMOL/L — SIGNIFICANT CHANGE UP (ref 135–145)

## 2022-12-12 PROCEDURE — 99214 OFFICE O/P EST MOD 30 MIN: CPT

## 2022-12-12 RX ORDER — FAMOTIDINE 10 MG/ML
1 INJECTION INTRAVENOUS
Qty: 0 | Refills: 0 | DISCHARGE

## 2022-12-12 RX ORDER — ZINC SULFATE TAB 220 MG (50 MG ZINC EQUIVALENT) 220 (50 ZN) MG
1 TAB ORAL
Qty: 0 | Refills: 0 | DISCHARGE

## 2022-12-12 RX ORDER — SIMVASTATIN 20 MG/1
1 TABLET, FILM COATED ORAL
Qty: 0 | Refills: 0 | DISCHARGE

## 2022-12-12 RX ORDER — CALCIUM CARBONATE 500(1250)
600 TABLET ORAL
Qty: 0 | Refills: 0 | DISCHARGE

## 2022-12-12 RX ORDER — ASCORBIC ACID 60 MG
1 TABLET,CHEWABLE ORAL
Qty: 0 | Refills: 0 | DISCHARGE

## 2022-12-12 RX ORDER — MIDODRINE HYDROCHLORIDE 2.5 MG/1
1 TABLET ORAL
Qty: 0 | Refills: 0 | DISCHARGE

## 2022-12-12 RX ORDER — ASPIRIN/CALCIUM CARB/MAGNESIUM 324 MG
1 TABLET ORAL
Qty: 0 | Refills: 0 | DISCHARGE

## 2022-12-12 RX ORDER — CHOLECALCIFEROL (VITAMIN D3) 125 MCG
1 CAPSULE ORAL
Qty: 0 | Refills: 0 | DISCHARGE

## 2022-12-12 NOTE — PHYSICAL EXAM
[Capable of only limited self care, confined to bed or chair more than 50% of waking hours] : Status 3- Capable of only limited self care, confined to bed or chair more than 50% of waking hours [Normal] : affect appropriate [de-identified] : photos and exam show substantial improvement. No bleeding, greatly smaller surface area, no infection, no odor and lesion is flat.

## 2022-12-12 NOTE — HISTORY OF PRESENT ILLNESS
[de-identified] : Mr. 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 He is referred for surgical management.\par Patient reports he has had this lesion for several years and does not have complaints of associated pain. He has no previous history of sarcoma.\par \par 09/27/2018: Patient is s/p radical resection of frontal scalp spindle cell neoplasm and biopsy of vertex scalp lesion. Skin graft coverage by Dr. Myles. Recovering well. 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 Additional deep margin shows spindle cell lesion felt to represent fibrosing granulation tissue. 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 Myles 12/17/2018. Pathology shows minimal residual disease, margins negative.\par \par Patient was seen by dermatology on 7/2020 and underwent biopsy of a brown pigmented scalp lesion posterior to skin graft. He continues to have a nonhealing ulcer at the vertex scalp at site of prior biopsy. 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 A significant portion of his pre-existing skin graft and adjacent scalp. An additional left parietal scalp lesion was excised as well.\par Pathology: scalp biopsy - small foci of squamous cell carcinoma in situ, epidermal cyst, no melanoma identified. Parietal scalp lesion - squamous cell carcinoma in situ - extending to one margin.\par Scalp lesion was covered with Integra and is granulating well. He is pending formal skin graft coverage.\par \par CT head 3/2/22: New abnormal soft tissue lesions along the vertex of the scalp with right  parietal calvarial vertex erosive changes when compared to the prior CT and MRI studies. \par \par Patient is s/p excision of malignant vertex scalp mass with Integra coverage 04/04/2022.\par Pathology: at least 1.3 mm thick, ulcerated melanoma with positive deep margin and focally anterior left margin. Evidence of melanoma cell with bony fragments. \par Specimen Type:   excision\par Macroscopic Tumor:  present\par Tumor Site:   scalp\par Lesion Size:   4.8 cm\par Satellite Nodule(s):   not present\par Pigmentation:   focal\par Histologic Type:  nodular type\par Ulceration:   present\par Depth of Invasion:   > or = 1.3 cm\par \par Pathologic Staging:\par TNM Descriptors:   not applicable\par Primary Tumor (Invasive Carcinoma) (pT):    T4b\par Category (pN):   not applicable\par Distant Metastasis (M):    not applicable\par Margins:   positive, involving\par - deep  margin\par - focal anterior margin at the left side\par Venous Invasion (V):   not present\par Perineural Invasion:   present\par Tumor-Infiltrating Lymphocytes:   present, nonbrisk\par Tumor Regression:    not present\par Mitotic Index:  > 1/mm2\par Additional Pathologic Findings:   actinic keratosis\par Comment(s):   Immunostains for HMB45, melan A, Sox10 and S100 are positive in tumor cells, which are negative for AE1/AE3. Ki67 is high. \par \par 5/24/2022:\par C1 Nivolumab today.\par We re-reviewed most common irAEs with patient + son.\par The lesion on his scalp continued to intermittently bleeds.\par He will require evaluation with Dr. Acuña (RT).\par CT head performed not read.\par PET/CT pending. \par \par 6/21/2022:\par C2 Nivolumab today.\par PET/CT on 6/5/2022 revealed soft tissue mass in scalp at vertex.  No evidence of metastatic disease.\par CT on 5/21: Age-appropriate involutional and ischemic gliotic changes. No hemorrhage. There is soft tissue in the parietal extracalvarial region in the midline new since 3/2/2022 suspicious for neoplasm. 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 This can be further evaluated with brain MRI with contrast.\par Son at bedside.  We reviewed pictures.\par The lesions on his scalp appear to be getting larger and new lesions are forming.\par Continues to intermittently bleed.\par Patient is scheduled to start radiation therapy with Dr. Caballero tomorrow, 6/22/2022.\par Plan is to give 30 fractions of treatment.\par \par 7/21/2022:\par C3 Nivolumab today.\par Patient is currently receiving radiation treatment to scalp.\par Today is D#19/30 fractions.\par As per son + patient continues to bleed during dressing changes.\par Dressing changes is performed every other day in radiation by RN and Dr. Caballero.\par Son and patient noticed swelling in the right neck over one week.\par The "lump" has grown and sensitive to touch.\par Will order US neck.\par \par 8/18/22\par Completed radiation on 8/10 (30 fx).\par Dressing changes by son at home on daily basis\par Home care and wound care needed at home.\par US is negative for neck - no nodes.\par Wound is clearly flatter over time based on photo documentation.\par \par 9/15/2022:\par C5 Nivolumab today.\par He is tolerating treatment relatively well and reports no significant irAEs.\par Admits to constipation\par He is using Dulcolax with relief.\par Son and patient have noticed less bleeding from the scalp after completing radiation.\par The lesion on the scalps are responding to treatment.\par More healthy tissue.  The proximal lesion is decomposing.\par Wound is flatter.\par \par 10/11/22\par C6 Nivo today\par No clear irAEs.\par His weight has decreased; despite that son thinks meal portions have been good.\par Scalp lesion is substantially improving over time.\par Patient has gotten weaker and very unstable when walking; and has tremors when moves. \par Also, has had orthostatic hypotension that has worsened and more difficult to manage with medications causing high BPs.\par We reviewed that his clinical response looks good, and will need PET verification.\par Have advised that he go for treatment today, and PET in the next few weeks.\par Also, given ambulation difficulty, will need to see PCP or Neuro again and if no other medical issues found should move forward with more PT.\par \par 12/12/22\par C8 Nivolumab today.\par No major irAEs.\par He underwent PET/CT on 11/1/2022 which revealed Interval response to therapy in the scalp lesion, with minimal residual FDG uptake. No metastases identified.\par The lesion on his scalp is responding appropriately to treatment.\par Patient has f/u with dermatology on 01/2023 for debridement of scalp.\par Hypotension: HIs systolic BP in the office today in the 90s.  He takes Midodrine 10 mg PO TID.\par His systolic BP in the AM was 105 and he took his Midodrine.\par Will give 500 mg of fluids with Nivolumab.\par Patient at baseline generally stays in sitting or laying position at home.\par Family does try to keep patient engage when they're available.\par At times walk with him around the house when he is having his good days.\par Uses the at home bicycle for 10 minutes/day when feeling energetic.\par  [de-identified] : Surgical Oncology: Gonzalo Stevens\par PCP/Cardiology: Justin Joy\par Pulmonary: Alonso Turner\par Renal: Justin Pryor\par GI: Steve Marti\par Dermatology: Durga Tobias\par [facility] - follows pancreatic nodule\par \par Lev douglas cell [phone]\par Te[facility] - margarita douglas cell\par

## 2022-12-13 DIAGNOSIS — E86.0 DEHYDRATION: ICD-10-CM

## 2022-12-13 DIAGNOSIS — Z51.11 ENCOUNTER FOR ANTINEOPLASTIC CHEMOTHERAPY: ICD-10-CM

## 2022-12-13 LAB
ERYTHROCYTE [SEDIMENTATION RATE] IN BLOOD: 99 MM/HR — HIGH (ref 0–20)
HCT VFR BLD CALC: 32.3 % — LOW (ref 39–50)
HGB BLD-MCNC: 9.5 G/DL — LOW (ref 13–17)
MCHC RBC-ENTMCNC: 29.4 GM/DL — LOW (ref 32–36)
MCHC RBC-ENTMCNC: 29.9 PG — SIGNIFICANT CHANGE UP (ref 27–34)
MCV RBC AUTO: 101.6 FL — HIGH (ref 80–100)
PLATELET # BLD AUTO: 305 K/UL — SIGNIFICANT CHANGE UP (ref 150–400)
RBC # BLD: 3.18 M/UL — LOW (ref 4.2–5.8)
RBC # FLD: 14.8 % — HIGH (ref 10.3–14.5)
T4 FREE+ TSH PNL SERPL: 1.79 UIU/ML — SIGNIFICANT CHANGE UP (ref 0.27–4.2)
WBC # BLD: 5.43 K/UL — SIGNIFICANT CHANGE UP (ref 3.8–10.5)
WBC # FLD AUTO: 5.43 K/UL — SIGNIFICANT CHANGE UP (ref 3.8–10.5)

## 2022-12-21 NOTE — PROGRESS NOTE ADULT - SUBJECTIVE AND OBJECTIVE BOX
[de-identified] : 82-year-old female status post bilateral total knee arthroplasty she has persistent anterior medial knee pain consistent with bilateral pes bursitis. She received repeat bilateral pes injections today which she tolerated well. Looking at her hips, she has mild hip osteoarthritis. She is not a candidate for a staged bilateral BRIAN.  We suspect that her pain is related stenosis of the lower spine. We gave her a referral to physiatry. She will f/u in 3 months. \par  INTERNAL MEDICINE PROGRESS NOTE    Dr. Kal Kang DO  Attending Physician  Division of Hospital Medicine  [facility]  Available via Microsoft Teams (preferred)  Also available via Pager [phone]    SUBJECTIVE:  No acute complaints    REVIEW OF SYSTEMS   10 point review of systems negative except for above.     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)    Hypercholesteremia (ICD9 272.0)    CAD (Coronary Artery Disease) (ICD9 414.00)  s/p stent to LAD 9/11/09    Syncope  (2016) as a result of Clopedigrel and seizure like jerking- stopped after Plavix was stopped    Cataract    Skin cancer  Basal, Squamous - treated surgically    Lung nodule  followed by annual CT Scan    Spinal stenosis    Sarcoma  of scalp    Melanoma in situ of scalp    Chronic kidney disease (CKD)    History of COPD    PAD (peripheral artery disease)    Spindle cell carcinoma  2018    Spinal stenosis    Anemia    Lung nodule  bilateral    Fracture of Nose (ICD9 802.0)  40 yrs ago    Closed Fracture of Shoulder Blade (ICD9 811.00)  L 8 yrs ago    Osteomyelitis (ICD9 730.20)  R lower ribs after fracture 1990&#x27;s requiring ABX and surgery    S/P PTCA (percutaneous transluminal coronary angioplasty)  2009- with stent to LAD    Status post placement of implantable loop recorder  implanted in 2014  removed in 2017    H/O cataract extraction, right    Skin cancer    S/P arthroscopy of shoulder  left    H/O pneumothorax  right lung s/p nail punctured right lung (30 years ago)    History of laparoscopic cholecystectomy  2010    History of loop recorder  was removed in 2016/2017    S/P skin cancer resection  spindle cell carcinoma 9/2018 1/2019 re-excision for residula disease    S/P skin biopsy  of scalp        MEDICATIONS  (STANDING):  aspirin  chewable 81 milliGRAM(s) Oral daily  bisacodyl Suppository 10 milliGRAM(s) Rectal once  heparin   Injectable 5000 Unit(s) SubCutaneous every 8 hours  levETIRAcetam 500 milliGRAM(s) Oral every 12 hours  lidocaine   4% Patch 1 Patch Transdermal every 24 hours  melatonin 3 milliGRAM(s) Oral at bedtime  pantoprazole    Tablet 40 milliGRAM(s) Oral before breakfast  polyethylene glycol 3350 17 Gram(s) Oral two times a day  senna 2 Tablet(s) Oral at bedtime  simvastatin 20 milliGRAM(s) Oral at bedtime    MEDICATIONS  (PRN):  acetaminophen   Tablet .. 650 milliGRAM(s) Oral every 6 hours PRN Mild Pain (1 - 3)  oxyCODONE    IR 5 milliGRAM(s) Oral every 6 hours PRN Severe Pain (7 - 10)      Allergies    No Known Allergies    Intolerances        T(C): 36.7 (08-09-21 @ 12:09), Max: 36.7 (08-09-21 @ 12:09)  T(F): 98 (08-09-21 @ 12:09), Max: 98 (08-09-21 @ 12:09)  HR: 65 (08-09-21 @ 12:09) (65 - 72)  BP: 102/64 (08-09-21 @ 12:09) (100/61 - 102/64)  ABP: --  ABP(mean): --  RR: 18 (08-09-21 @ 12:09) (18 - 18)  SpO2: 97% (08-09-21 @ 12:09) (97% - 98%)      CONSTITUTIONAL: No acute distress.   HEENT:  Conjunctiva clear B/L.  Moist oral mucosa.   Cardiovascular: RRR with no murmurs. No JVD noted. No lower extremity edema B/L. Extremities are warm and well perfused. Radial pulses 2+ B/L. Dorsalis pedis pulses 2+ B/L.    Respiratory: Lungs CTAB. No wrr. No accessory muscle use.   Gastrointestinal:  Soft, nontender. Non-distended. Non-rigid. No CVA tenderness B/L.  MSK:  No joint swelling. No joint erythema B/L. No midline spinal tenderness.  Neurologic:  Alert and awake. Moving all extremities. Following commands. Making eye contact.    Skin:  No rashes noted. No skin erythema noted.   Psych:  Normal affect. Normal Mood.     LABS                        11.8   5.90  )-----------( 155      ( 08 Aug 2021 06:55 )             35.0     08-09    136  |  102  |  22  ----------------------------<  123<H>  4.5   |  22  |  1.31<H>    Ca    9.5      09 Aug 2021 07:01  Phos  3.3     08-08  Mg     2.0     08-08            ALL RECENT STUDIES REVIEWED INCLUDING REPORTS AVAILABLE     CARE DISCUSSED WITH ALL CONSULTANTS

## 2023-01-01 ENCOUNTER — RX RENEWAL (OUTPATIENT)
Age: 88
End: 2023-01-01

## 2023-01-06 ENCOUNTER — APPOINTMENT (OUTPATIENT)
Dept: INFUSION THERAPY | Facility: HOSPITAL | Age: 88
End: 2023-01-06

## 2023-01-06 ENCOUNTER — RESULT REVIEW (OUTPATIENT)
Age: 88
End: 2023-01-06

## 2023-01-06 ENCOUNTER — APPOINTMENT (OUTPATIENT)
Dept: HEMATOLOGY ONCOLOGY | Facility: CLINIC | Age: 88
End: 2023-01-06
Payer: MEDICARE

## 2023-01-06 VITALS
DIASTOLIC BLOOD PRESSURE: 61 MMHG | TEMPERATURE: 97 F | HEART RATE: 60 BPM | BODY MASS INDEX: 25.84 KG/M2 | RESPIRATION RATE: 16 BRPM | WEIGHT: 142.2 LBS | OXYGEN SATURATION: 95 % | SYSTOLIC BLOOD PRESSURE: 98 MMHG

## 2023-01-06 LAB
ALBUMIN SERPL ELPH-MCNC: 4.3 G/DL — SIGNIFICANT CHANGE UP (ref 3.3–5)
ALP SERPL-CCNC: 91 U/L — SIGNIFICANT CHANGE UP (ref 40–120)
ALT FLD-CCNC: 20 U/L — SIGNIFICANT CHANGE UP (ref 10–45)
ANION GAP SERPL CALC-SCNC: 14 MMOL/L — SIGNIFICANT CHANGE UP (ref 5–17)
AST SERPL-CCNC: 26 U/L — SIGNIFICANT CHANGE UP (ref 10–40)
BILIRUB SERPL-MCNC: 0.2 MG/DL — SIGNIFICANT CHANGE UP (ref 0.2–1.2)
BUN SERPL-MCNC: 29 MG/DL — HIGH (ref 7–23)
CALCIUM SERPL-MCNC: 10 MG/DL — SIGNIFICANT CHANGE UP (ref 8.4–10.5)
CHLORIDE SERPL-SCNC: 102 MMOL/L — SIGNIFICANT CHANGE UP (ref 96–108)
CO2 SERPL-SCNC: 24 MMOL/L — SIGNIFICANT CHANGE UP (ref 22–31)
CREAT SERPL-MCNC: 1.36 MG/DL — HIGH (ref 0.5–1.3)
CRP SERPL-MCNC: 31 MG/L — HIGH
EGFR: 51 ML/MIN/1.73M2 — LOW
GLUCOSE SERPL-MCNC: 90 MG/DL — SIGNIFICANT CHANGE UP (ref 70–99)
HCT VFR BLD CALC: 34.5 % — LOW (ref 39–50)
HGB BLD-MCNC: 10.6 G/DL — LOW (ref 13–17)
MCHC RBC-ENTMCNC: 30.2 PG — SIGNIFICANT CHANGE UP (ref 27–34)
MCHC RBC-ENTMCNC: 30.7 GM/DL — LOW (ref 32–36)
MCV RBC AUTO: 98.3 FL — SIGNIFICANT CHANGE UP (ref 80–100)
PLATELET # BLD AUTO: 289 K/UL — SIGNIFICANT CHANGE UP (ref 150–400)
POTASSIUM SERPL-MCNC: 4.6 MMOL/L — SIGNIFICANT CHANGE UP (ref 3.5–5.3)
POTASSIUM SERPL-SCNC: 4.6 MMOL/L — SIGNIFICANT CHANGE UP (ref 3.5–5.3)
PROT SERPL-MCNC: 6.9 G/DL — SIGNIFICANT CHANGE UP (ref 6–8.3)
RBC # BLD: 3.51 M/UL — LOW (ref 4.2–5.8)
RBC # FLD: 14 % — SIGNIFICANT CHANGE UP (ref 10.3–14.5)
SODIUM SERPL-SCNC: 140 MMOL/L — SIGNIFICANT CHANGE UP (ref 135–145)
T4 FREE+ TSH PNL SERPL: 2.85 UIU/ML — SIGNIFICANT CHANGE UP (ref 0.27–4.2)
WBC # BLD: 5.58 K/UL — SIGNIFICANT CHANGE UP (ref 3.8–10.5)
WBC # FLD AUTO: 5.58 K/UL — SIGNIFICANT CHANGE UP (ref 3.8–10.5)

## 2023-01-06 PROCEDURE — 99214 OFFICE O/P EST MOD 30 MIN: CPT

## 2023-01-06 NOTE — HISTORY OF PRESENT ILLNESS
[de-identified] : Mr. 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 He is referred for surgical management.\par Patient reports he has had this lesion for several years and does not have complaints of associated pain. He has no previous history of sarcoma.\par \par 09/27/2018: Patient is s/p radical resection of frontal scalp spindle cell neoplasm and biopsy of vertex scalp lesion. Skin graft coverage by Dr. Myles. Recovering well. 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 Additional deep margin shows spindle cell lesion felt to represent fibrosing granulation tissue. 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 Myles 12/17/2018. Pathology shows minimal residual disease, margins negative.\par \par Patient was seen by dermatology on 7/2020 and underwent biopsy of a brown pigmented scalp lesion posterior to skin graft. He continues to have a nonhealing ulcer at the vertex scalp at site of prior biopsy. 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 A significant portion of his pre-existing skin graft and adjacent scalp. An additional left parietal scalp lesion was excised as well.\par Pathology: scalp biopsy - small foci of squamous cell carcinoma in situ, epidermal cyst, no melanoma identified. Parietal scalp lesion - squamous cell carcinoma in situ - extending to one margin.\par Scalp lesion was covered with Integra and is granulating well. He is pending formal skin graft coverage.\par \par CT head 3/2/22: New abnormal soft tissue lesions along the vertex of the scalp with right  parietal calvarial vertex erosive changes when compared to the prior CT and MRI studies. \par \par Patient is s/p excision of malignant vertex scalp mass with Integra coverage 04/04/2022.\par Pathology: at least 1.3 mm thick, ulcerated melanoma with positive deep margin and focally anterior left margin. Evidence of melanoma cell with bony fragments. \par Specimen Type:   excision\par Macroscopic Tumor:  present\par Tumor Site:   scalp\par Lesion Size:   4.8 cm\par Satellite Nodule(s):   not present\par Pigmentation:   focal\par Histologic Type:  nodular type\par Ulceration:   present\par Depth of Invasion:   > or = 1.3 cm\par \par Pathologic Staging:\par TNM Descriptors:   not applicable\par Primary Tumor (Invasive Carcinoma) (pT):    T4b\par Category (pN):   not applicable\par Distant Metastasis (M):    not applicable\par Margins:   positive, involving\par - deep  margin\par - focal anterior margin at the left side\par Venous Invasion (V):   not present\par Perineural Invasion:   present\par Tumor-Infiltrating Lymphocytes:   present, nonbrisk\par Tumor Regression:    not present\par Mitotic Index:  > 1/mm2\par Additional Pathologic Findings:   actinic keratosis\par Comment(s):   Immunostains for HMB45, melan A, Sox10 and S100 are positive in tumor cells, which are negative for AE1/AE3. Ki67 is high. \par \par 5/24/2022:\par C1 Nivolumab today.\par We re-reviewed most common irAEs with patient + son.\par The lesion on his scalp continued to intermittently bleeds.\par He will require evaluation with Dr. Acuña (RT).\par CT head performed not read.\par PET/CT pending. \par \par 6/21/2022:\par C2 Nivolumab today.\par PET/CT on 6/5/2022 revealed soft tissue mass in scalp at vertex.  No evidence of metastatic disease.\par CT on 5/21: Age-appropriate involutional and ischemic gliotic changes. No hemorrhage. There is soft tissue in the parietal extracalvarial region in the midline new since 3/2/2022 suspicious for neoplasm. 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 This can be further evaluated with brain MRI with contrast.\par Son at bedside.  We reviewed pictures.\par The lesions on his scalp appear to be getting larger and new lesions are forming.\par Continues to intermittently bleed.\par Patient is scheduled to start radiation therapy with Dr. Caballero tomorrow, 6/22/2022.\par Plan is to give 30 fractions of treatment.\par \par 7/21/2022:\par C3 Nivolumab today.\par Patient is currently receiving radiation treatment to scalp.\par Today is D#19/30 fractions.\par As per son + patient continues to bleed during dressing changes.\par Dressing changes is performed every other day in radiation by RN and Dr. Caballero.\par Son and patient noticed swelling in the right neck over one week.\par The "lump" has grown and sensitive to touch.\par Will order US neck.\par \par 8/18/22\par Completed radiation on 8/10 (30 fx).\par Dressing changes by son at home on daily basis\par Home care and wound care needed at home.\par US is negative for neck - no nodes.\par Wound is clearly flatter over time based on photo documentation.\par \par 9/15/2022:\par C5 Nivolumab today.\par He is tolerating treatment relatively well and reports no significant irAEs.\par Admits to constipation\par He is using Dulcolax with relief.\par Son and patient have noticed less bleeding from the scalp after completing radiation.\par The lesion on the scalps are responding to treatment.\par More healthy tissue.  The proximal lesion is decomposing.\par Wound is flatter.\par \par 10/11/22\par C6 Nivo today\par No clear irAEs.\par His weight has decreased; despite that son thinks meal portions have been good.\par Scalp lesion is substantially improving over time.\par Patient has gotten weaker and very unstable when walking; and has tremors when moves. \par Also, has had orthostatic hypotension that has worsened and more difficult to manage with medications causing high BPs.\par We reviewed that his clinical response looks good, and will need PET verification.\par Have advised that he go for treatment today, and PET in the next few weeks.\par Also, given ambulation difficulty, will need to see PCP or Neuro again and if no other medical issues found should move forward with more PT.\par \par 12/12/22\par C8 Nivolumab today.\par No major irAEs.\par He underwent PET/CT on 11/1/2022 which revealed Interval response to therapy in the scalp lesion, with minimal residual FDG uptake. No metastases identified.\par The lesion on his scalp is responding appropriately to treatment.\par Patient has f/u with dermatology on 01/2023 for debridement of scalp.\par \par Hypotension: HIs systolic BP in the office today in the 90s.  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 \par On 8/10/2022 completed RT to scalp total dose of 6000 cGy.  \par Excellent clinical response with restaging PET; response to therapy in the scalp lesion, with minimal residual FDG uptake. No metastases were identified. \par Patient has f/u with dermatology for debridement of scalp - to see if it is needed.\par \par Hypotension: He takes Midodrine 10 mg PO BID as needed.\par Will give 500 mg of fluids with Nivolumab.\par Patient at baseline generally stays in sitting or laying position at home.\par Family does try to keep patient engage when they're available.\par At times walk with him around the house when he is having his good days.\par Uses the at home bicycle for 10 minutes/day when feeling energetic. [de-identified] : Surgical Oncology: Gonzalo Stevens\par PCP/Cardiology: Justin Joy\par Pulmonary: Alonso Turner\par Renal: Justin Pryor\par GI: Steve Marti\par Dermatology: Durga Tobias\par [facility] - follows pancreatic nodule\par \par Lev douglas cell [phone]\par Te[facility] - margarita douglas cell\par

## 2023-01-06 NOTE — PHYSICAL EXAM
[Capable of only limited self care, confined to bed or chair more than 50% of waking hours] : Status 3- Capable of only limited self care, confined to bed or chair more than 50% of waking hours [Normal] : affect appropriate [de-identified] : photos and exam show substantial improvement. No bleeding, greatly smaller surface area, no infection, no odor and lesion is flat.

## 2023-01-07 LAB — ERYTHROCYTE [SEDIMENTATION RATE] IN BLOOD: 57 MM/HR — HIGH (ref 0–20)

## 2023-01-09 ENCOUNTER — NON-APPOINTMENT (OUTPATIENT)
Age: 88
End: 2023-01-09

## 2023-01-10 ENCOUNTER — APPOINTMENT (OUTPATIENT)
Dept: DERMATOLOGY | Facility: CLINIC | Age: 88
End: 2023-01-10
Payer: MEDICARE

## 2023-01-10 PROCEDURE — 99214 OFFICE O/P EST MOD 30 MIN: CPT

## 2023-01-10 RX ORDER — MUPIROCIN 20 MG/G
2 OINTMENT TOPICAL
Qty: 1 | Refills: 1 | Status: ACTIVE | COMMUNITY
Start: 2023-01-10 | End: 1900-01-01

## 2023-01-12 NOTE — HISTORY OF PRESENT ILLNESS
[FreeTextEntry1] : F/u SCC, skin growths [de-identified] : PCP: ADIS OSHEA\par \par Last TBSE with Dr. Barriga on 11/2022 \par Here for wound care - no specific wound care\par applying vaseline and nonstick dressing\par denies pain, bleeding\par \par Skin cancer history: \par 2018 - AFX s/p excision with incidental finding of poorly diff SCC. Margins negative. Reconstructed with skin graft. \par 2020 - Melanoma in situ of the scalp (adjacent to skin graft) s/p excision with Integra reconstruction.\par 2022 - Soft tissue lesions of scalp - dx with [id number] melanoma s/p excision and RT to scalp 6000 cGy given gross disease at deep margin and evidence of melanoma within bony fragments. Currently on Nivolumab (C1 5/24/22, C6 10/11/22). PET CT on 11/2 without metastasis and interval response to therapy of scalp lesion

## 2023-01-12 NOTE — ASSESSMENT
[FreeTextEntry1] : 1. [id number] melanoma of the scalp s/p excision, RT currently on immunotherapy\par - Follow up with rad onc and med onc \par \par 2. Wound, from prior excision and RT\par - Discussed goals of malignant wound care including keeping wound clean, mitigating symptoms and odor, and infection prevention.\par - Area cleansed with alcohol. Gentle debridement with forceps/scissors\par - recommend cleaning as follows - dilute dakins 0.25%- 1 parts dakins with 4 parts water. Soak on gauze and let sit for 10-15 mins prior to wound care\par - mupirocin ointment followed by nonstick gauze - change once/day more frequently if soaking through\par \par We will reassess in 3 weeks - counseled on likely need to repeat debridement.\par \par

## 2023-01-24 NOTE — H&P PST ADULT - VENOUS THROMBOEMBOLISM CURRENT STATUS
-- DO NOT REPLY / DO NOT REPLY ALL --  -- Message is from [employer] ([facility]) --    General Patient Message:     Patient called the office stating she is going to [place] on 02/01/2023.  Please call to advise     Caller Information       Type Contact Phone/Fax    01/24/2023 11:30 AM CST Phone (Incoming) Ivelisse Davis (Self) [phone] (H)        Alternative phone number: NA     Can a detailed message be left? Yes    Message Turnaround: [facility]:    Refer to site's KB page for routing instructions    Please give this turnaround time to the caller:   \"You can expect to receive a response 2-3 business days after your provider's clinical team reviews the message\"               (1) abnormal pulmonary function (COPD)/(1) varicose veins/(2) malignancy (present or previous)

## 2023-01-31 ENCOUNTER — APPOINTMENT (OUTPATIENT)
Dept: DERMATOLOGY | Facility: CLINIC | Age: 88
End: 2023-01-31
Payer: MEDICARE

## 2023-01-31 DIAGNOSIS — L98.499 NON-PRESSURE CHRONIC ULCER OF SKIN OF OTHER SITES WITH UNSPECIFIED SEVERITY: ICD-10-CM

## 2023-01-31 PROCEDURE — 99214 OFFICE O/P EST MOD 30 MIN: CPT

## 2023-01-31 RX ORDER — SODIUM HYPOCHLORITE 2.5 MG/ML
0.25 SOLUTION TOPICAL
Qty: 1 | Refills: 0 | Status: ACTIVE | COMMUNITY
Start: 2023-01-31 | End: 1900-01-01

## 2023-01-31 NOTE — HISTORY OF PRESENT ILLNESS
[FreeTextEntry1] : F/u SCC, skin growths [de-identified] : PCP: ADIS OSHEA\par \par Last TBSE with Dr. Barriga on 11/2022 \par cleaning with peroxide and gauze\par applying mupirocin ointment and nonstick dressing\par denies pain, bleeding\par \par Skin cancer history: \par 2018 - AFX s/p excision with incidental finding of poorly diff SCC. Margins negative. Reconstructed with skin graft. \par 2020 - Melanoma in situ of the scalp (adjacent to skin graft) s/p excision with Integra reconstruction.\par 2022 - Soft tissue lesions of scalp - dx with [id number] melanoma s/p excision and RT to scalp 6000 cGy given gross disease at deep margin and evidence of melanoma within bony fragments. Currently on Nivolumab (C1 5/24/22, C6 10/11/22). PET CT on 11/2 without metastasis and interval response to therapy of scalp lesion

## 2023-01-31 NOTE — ASSESSMENT
[FreeTextEntry1] : 1. [id number] melanoma of the scalp s/p excision, RT currently on immunotherapy\par - Follow up with rad onc and med onc \par \par 2. Wound, from prior excision and RT\par - Discussed goals of malignant wound care including keeping wound clean, mitigating symptoms and odor, and infection prevention.\par - Area cleansed with alcohol. Gentle debridement with forceps/scissors\par - recommend cleaning as follows - dilute dakins 0.25%- 1 parts dakins with 4 parts water. Soak on gauze and let sit for 10-15 mins prior to wound care\par - mupirocin ointment followed by nonstick gauze - change once/day more frequently if soaking through\par - we will refer to Dr. Hong in wound care to evaluate including ordering absorptive/antibacterial dressings\par \par Followup pending wound care plan\par \par

## 2023-02-03 ENCOUNTER — RESULT REVIEW (OUTPATIENT)
Age: 88
End: 2023-02-03

## 2023-02-03 ENCOUNTER — APPOINTMENT (OUTPATIENT)
Dept: INFUSION THERAPY | Facility: HOSPITAL | Age: 88
End: 2023-02-03

## 2023-02-03 ENCOUNTER — APPOINTMENT (OUTPATIENT)
Dept: HEMATOLOGY ONCOLOGY | Facility: CLINIC | Age: 88
End: 2023-02-03
Payer: MEDICARE

## 2023-02-03 VITALS
HEART RATE: 73 BPM | SYSTOLIC BLOOD PRESSURE: 91 MMHG | HEIGHT: 62.2 IN | TEMPERATURE: 97.5 F | DIASTOLIC BLOOD PRESSURE: 58 MMHG | WEIGHT: 145.95 LBS | RESPIRATION RATE: 16 BRPM | BODY MASS INDEX: 26.52 KG/M2

## 2023-02-03 LAB
ALBUMIN SERPL ELPH-MCNC: 4.2 G/DL — SIGNIFICANT CHANGE UP (ref 3.3–5)
ALP SERPL-CCNC: 94 U/L — SIGNIFICANT CHANGE UP (ref 40–120)
ALT FLD-CCNC: 12 U/L — SIGNIFICANT CHANGE UP (ref 10–45)
ANION GAP SERPL CALC-SCNC: 12 MMOL/L — SIGNIFICANT CHANGE UP (ref 5–17)
AST SERPL-CCNC: 18 U/L — SIGNIFICANT CHANGE UP (ref 10–40)
BASOPHILS # BLD AUTO: 0.04 K/UL — SIGNIFICANT CHANGE UP (ref 0–0.2)
BASOPHILS NFR BLD AUTO: 0.7 % — SIGNIFICANT CHANGE UP (ref 0–2)
BILIRUB SERPL-MCNC: 0.2 MG/DL — SIGNIFICANT CHANGE UP (ref 0.2–1.2)
BUN SERPL-MCNC: 27 MG/DL — HIGH (ref 7–23)
CALCIUM SERPL-MCNC: 10.4 MG/DL — SIGNIFICANT CHANGE UP (ref 8.4–10.5)
CHLORIDE SERPL-SCNC: 104 MMOL/L — SIGNIFICANT CHANGE UP (ref 96–108)
CO2 SERPL-SCNC: 26 MMOL/L — SIGNIFICANT CHANGE UP (ref 22–31)
CREAT SERPL-MCNC: 1.48 MG/DL — HIGH (ref 0.5–1.3)
CRP SERPL-MCNC: 5 MG/L — HIGH
EGFR: 46 ML/MIN/1.73M2 — LOW
EOSINOPHIL # BLD AUTO: 0.19 K/UL — SIGNIFICANT CHANGE UP (ref 0–0.5)
EOSINOPHIL NFR BLD AUTO: 3.4 % — SIGNIFICANT CHANGE UP (ref 0–6)
ERYTHROCYTE [SEDIMENTATION RATE] IN BLOOD: 67 MM/HR — HIGH (ref 0–20)
GLUCOSE SERPL-MCNC: 81 MG/DL — SIGNIFICANT CHANGE UP (ref 70–99)
HCT VFR BLD CALC: 33.1 % — LOW (ref 39–50)
HGB BLD-MCNC: 10.5 G/DL — LOW (ref 13–17)
IMM GRANULOCYTES NFR BLD AUTO: 0.2 % — SIGNIFICANT CHANGE UP (ref 0–0.9)
LYMPHOCYTES # BLD AUTO: 0.96 K/UL — LOW (ref 1–3.3)
LYMPHOCYTES # BLD AUTO: 17.3 % — SIGNIFICANT CHANGE UP (ref 13–44)
MCHC RBC-ENTMCNC: 31 PG — SIGNIFICANT CHANGE UP (ref 27–34)
MCHC RBC-ENTMCNC: 31.7 G/DL — LOW (ref 32–36)
MCV RBC AUTO: 97.6 FL — SIGNIFICANT CHANGE UP (ref 80–100)
MONOCYTES # BLD AUTO: 0.41 K/UL — SIGNIFICANT CHANGE UP (ref 0–0.9)
MONOCYTES NFR BLD AUTO: 7.4 % — SIGNIFICANT CHANGE UP (ref 2–14)
NEUTROPHILS # BLD AUTO: 3.93 K/UL — SIGNIFICANT CHANGE UP (ref 1.8–7.4)
NEUTROPHILS NFR BLD AUTO: 71 % — SIGNIFICANT CHANGE UP (ref 43–77)
NRBC # BLD: 0 /100 WBCS — SIGNIFICANT CHANGE UP (ref 0–0)
PLATELET # BLD AUTO: 254 K/UL — SIGNIFICANT CHANGE UP (ref 150–400)
POTASSIUM SERPL-MCNC: 4.5 MMOL/L — SIGNIFICANT CHANGE UP (ref 3.5–5.3)
POTASSIUM SERPL-SCNC: 4.5 MMOL/L — SIGNIFICANT CHANGE UP (ref 3.5–5.3)
PROT SERPL-MCNC: 6.8 G/DL — SIGNIFICANT CHANGE UP (ref 6–8.3)
RBC # BLD: 3.39 M/UL — LOW (ref 4.2–5.8)
RBC # FLD: 13.8 % — SIGNIFICANT CHANGE UP (ref 10.3–14.5)
SODIUM SERPL-SCNC: 142 MMOL/L — SIGNIFICANT CHANGE UP (ref 135–145)
WBC # BLD: 5.54 K/UL — SIGNIFICANT CHANGE UP (ref 3.8–10.5)
WBC # FLD AUTO: 5.54 K/UL — SIGNIFICANT CHANGE UP (ref 3.8–10.5)

## 2023-02-03 PROCEDURE — 99214 OFFICE O/P EST MOD 30 MIN: CPT

## 2023-02-03 NOTE — PHYSICAL EXAM
[Capable of only limited self care, confined to bed or chair more than 50% of waking hours] : Status 3- Capable of only limited self care, confined to bed or chair more than 50% of waking hours [Normal] : affect appropriate [de-identified] : photos and exam show substantial improvement. No bleeding, greatly smaller surface area, no infection, no odor and lesion is flat.

## 2023-02-03 NOTE — HISTORY OF PRESENT ILLNESS
[de-identified] : Mr. 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 He is referred for surgical management.\par Patient reports he has had this lesion for several years and does not have complaints of associated pain. He has no previous history of sarcoma.\par \par 09/27/2018: Patient is s/p radical resection of frontal scalp spindle cell neoplasm and biopsy of vertex scalp lesion. Skin graft coverage by Dr. Myles. Recovering well. 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 Additional deep margin shows spindle cell lesion felt to represent fibrosing granulation tissue. 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 Myles 12/17/2018. Pathology shows minimal residual disease, margins negative.\par \par Patient was seen by dermatology on 7/2020 and underwent biopsy of a brown pigmented scalp lesion posterior to skin graft. He continues to have a nonhealing ulcer at the vertex scalp at site of prior biopsy. 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 A significant portion of his pre-existing skin graft and adjacent scalp. An additional left parietal scalp lesion was excised as well.\par Pathology: scalp biopsy - small foci of squamous cell carcinoma in situ, epidermal cyst, no melanoma identified. Parietal scalp lesion - squamous cell carcinoma in situ - extending to one margin.\par Scalp lesion was covered with Integra and is granulating well. He is pending formal skin graft coverage.\par \par CT head 3/2/22: New abnormal soft tissue lesions along the vertex of the scalp with right  parietal calvarial vertex erosive changes when compared to the prior CT and MRI studies. \par \par Patient is s/p excision of malignant vertex scalp mass with Integra coverage 04/04/2022.\par Pathology: at least 1.3 mm thick, ulcerated melanoma with positive deep margin and focally anterior left margin. Evidence of melanoma cell with bony fragments. \par Specimen Type:   excision\par Macroscopic Tumor:  present\par Tumor Site:   scalp\par Lesion Size:   4.8 cm\par Satellite Nodule(s):   not present\par Pigmentation:   focal\par Histologic Type:  nodular type\par Ulceration:   present\par Depth of Invasion:   > or = 1.3 cm\par \par Pathologic Staging:\par TNM Descriptors:   not applicable\par Primary Tumor (Invasive Carcinoma) (pT):    T4b\par Category (pN):   not applicable\par Distant Metastasis (M):    not applicable\par Margins:   positive, involving\par - deep  margin\par - focal anterior margin at the left side\par Venous Invasion (V):   not present\par Perineural Invasion:   present\par Tumor-Infiltrating Lymphocytes:   present, nonbrisk\par Tumor Regression:    not present\par Mitotic Index:  > 1/mm2\par Additional Pathologic Findings:   actinic keratosis\par Comment(s):   Immunostains for HMB45, melan A, Sox10 and S100 are positive in tumor cells, which are negative for AE1/AE3. Ki67 is high. \par \par 5/24/2022:\par C1 Nivolumab today.\par We re-reviewed most common irAEs with patient + son.\par The lesion on his scalp continued to intermittently bleeds.\par He will require evaluation with Dr. Acuña (RT).\par CT head performed not read.\par PET/CT pending. \par \par 6/21/2022:\par C2 Nivolumab today.\par PET/CT on 6/5/2022 revealed soft tissue mass in scalp at vertex.  No evidence of metastatic disease.\par CT on 5/21: Age-appropriate involutional and ischemic gliotic changes. No hemorrhage. There is soft tissue in the parietal extracalvarial region in the midline new since 3/2/2022 suspicious for neoplasm. 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 This can be further evaluated with brain MRI with contrast.\par Son at bedside.  We reviewed pictures.\par The lesions on his scalp appear to be getting larger and new lesions are forming.\par Continues to intermittently bleed.\par Patient is scheduled to start radiation therapy with Dr. Caballero tomorrow, 6/22/2022.\par Plan is to give 30 fractions of treatment.\par \par 7/21/2022:\par C3 Nivolumab today.\par Patient is currently receiving radiation treatment to scalp.\par Today is D#19/30 fractions.\par As per son + patient continues to bleed during dressing changes.\par Dressing changes is performed every other day in radiation by RN and Dr. Caballero.\par Son and patient noticed swelling in the right neck over one week.\par The "lump" has grown and sensitive to touch.\par Will order US neck.\par \par 8/18/22\par Completed radiation on 8/10 (30 fx).\par Dressing changes by son at home on daily basis\par Home care and wound care needed at home.\par US is negative for neck - no nodes.\par Wound is clearly flatter over time based on photo documentation.\par \par 9/15/2022:\par C5 Nivolumab today.\par He is tolerating treatment relatively well and reports no significant irAEs.\par Admits to constipation\par He is using Dulcolax with relief.\par Son and patient have noticed less bleeding from the scalp after completing radiation.\par The lesion on the scalps are responding to treatment.\par More healthy tissue.  The proximal lesion is decomposing.\par Wound is flatter.\par \par 10/11/22\par C6 Nivo today\par No clear irAEs.\par His weight has decreased; despite that son thinks meal portions have been good.\par Scalp lesion is substantially improving over time.\par Patient has gotten weaker and very unstable when walking; and has tremors when moves. \par Also, has had orthostatic hypotension that has worsened and more difficult to manage with medications causing high BPs.\par We reviewed that his clinical response looks good, and will need PET verification.\par Have advised that he go for treatment today, and PET in the next few weeks.\par Also, given ambulation difficulty, will need to see PCP or Neuro again and if no other medical issues found should move forward with more PT.\par \par 12/12/22\par C8 Nivolumab today.\par No major irAEs.\par He underwent PET/CT on 11/1/2022 which revealed Interval response to therapy in the scalp lesion, with minimal residual FDG uptake. No metastases identified.\par The lesion on his scalp is responding appropriately to treatment.\par Patient has f/u with dermatology on 01/2023 for debridement of scalp.\par \par Hypotension: HIs systolic BP in the office today in the 90s.  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 \par On 8/10/2022 completed RT to scalp total dose of 6000 cGy.  \par Excellent clinical response with restaging PET; response to therapy in the scalp lesion, with minimal residual FDG uptake. No metastases were identified. \par Patient has f/u with dermatology for debridement of scalp - to see if it is needed.\par \par Hypotension: He takes Midodrine 10 mg PO BID as needed.\par Will give 500 mg of fluids with Nivolumab.\par Patient at baseline generally stays in sitting or laying position at home.\par Family does try to keep patient engage when they're available.\par At times walk with him around the house when he is having his good days.\par Uses the at home bicycle for 10 minutes/day when feeling energetic.\par \par 02/03/2023:\par C10 Nivolumab.\par No major irAEs.\par Patient completely debridement of his scalp.\par The scalp is healing however would like patient to be evaluated by wound specialist.\par Labs reviewed.\par Patient PSA is elevated.  He will f/u with his urologist on 06/2023. [de-identified] : Surgical Oncology: Gonzalo Stevens\par PCP/Cardiology: Justin Joy\par Pulmonary: Alonso Turner\par Renal: Justin Pryor\par GI: Steve Marti\par Dermatology: Durga Tobias\par [facility] Surgeon Denis - follows pancreatic nodule\par \par Lev douglas cell [phone]\par Terence - margarita douglas cell\par

## 2023-02-04 LAB — T4 FREE+ TSH PNL SERPL: 3.18 UIU/ML — SIGNIFICANT CHANGE UP (ref 0.27–4.2)

## 2023-02-23 ENCOUNTER — OUTPATIENT (OUTPATIENT)
Dept: OUTPATIENT SERVICES | Facility: HOSPITAL | Age: 88
LOS: 1 days | Discharge: HOME | End: 2023-02-23

## 2023-02-23 DIAGNOSIS — C44.90 UNSPECIFIED MALIGNANT NEOPLASM OF SKIN, UNSPECIFIED: Chronic | ICD-10-CM

## 2023-02-23 DIAGNOSIS — Z90.49 ACQUIRED ABSENCE OF OTHER SPECIFIED PARTS OF DIGESTIVE TRACT: Chronic | ICD-10-CM

## 2023-02-23 DIAGNOSIS — Z98.890 OTHER SPECIFIED POSTPROCEDURAL STATES: Chronic | ICD-10-CM

## 2023-02-23 DIAGNOSIS — Z95.818 PRESENCE OF OTHER CARDIAC IMPLANTS AND GRAFTS: Chronic | ICD-10-CM

## 2023-02-23 DIAGNOSIS — C43.9 MALIGNANT MELANOMA OF SKIN, UNSPECIFIED: ICD-10-CM

## 2023-02-23 DIAGNOSIS — Z98.61 CORONARY ANGIOPLASTY STATUS: Chronic | ICD-10-CM

## 2023-02-23 DIAGNOSIS — Z87.09 PERSONAL HISTORY OF OTHER DISEASES OF THE RESPIRATORY SYSTEM: Chronic | ICD-10-CM

## 2023-02-23 DIAGNOSIS — Z98.41 CATARACT EXTRACTION STATUS, RIGHT EYE: Chronic | ICD-10-CM

## 2023-03-03 ENCOUNTER — RESULT REVIEW (OUTPATIENT)
Age: 88
End: 2023-03-03

## 2023-03-03 ENCOUNTER — APPOINTMENT (OUTPATIENT)
Dept: HEMATOLOGY ONCOLOGY | Facility: CLINIC | Age: 88
End: 2023-03-03
Payer: MEDICARE

## 2023-03-03 ENCOUNTER — APPOINTMENT (OUTPATIENT)
Dept: INFUSION THERAPY | Facility: HOSPITAL | Age: 88
End: 2023-03-03

## 2023-03-03 VITALS
RESPIRATION RATE: 16 BRPM | TEMPERATURE: 97.1 F | HEART RATE: 62 BPM | SYSTOLIC BLOOD PRESSURE: 93 MMHG | OXYGEN SATURATION: 96 % | BODY MASS INDEX: 27.24 KG/M2 | WEIGHT: 149.91 LBS | DIASTOLIC BLOOD PRESSURE: 57 MMHG

## 2023-03-03 DIAGNOSIS — Z51.11 ENCOUNTER FOR ANTINEOPLASTIC CHEMOTHERAPY: ICD-10-CM

## 2023-03-03 LAB
BASOPHILS # BLD AUTO: 0.04 K/UL — SIGNIFICANT CHANGE UP (ref 0–0.2)
BASOPHILS NFR BLD AUTO: 0.6 % — SIGNIFICANT CHANGE UP (ref 0–2)
EOSINOPHIL # BLD AUTO: 0.13 K/UL — SIGNIFICANT CHANGE UP (ref 0–0.5)
EOSINOPHIL NFR BLD AUTO: 2.1 % — SIGNIFICANT CHANGE UP (ref 0–6)
ERYTHROCYTE [SEDIMENTATION RATE] IN BLOOD: 56 MM/HR — HIGH (ref 0–20)
HCT VFR BLD CALC: 33 % — LOW (ref 39–50)
HGB BLD-MCNC: 10.3 G/DL — LOW (ref 13–17)
IMM GRANULOCYTES NFR BLD AUTO: 0.3 % — SIGNIFICANT CHANGE UP (ref 0–0.9)
LYMPHOCYTES # BLD AUTO: 1.07 K/UL — SIGNIFICANT CHANGE UP (ref 1–3.3)
LYMPHOCYTES # BLD AUTO: 17.1 % — SIGNIFICANT CHANGE UP (ref 13–44)
MCHC RBC-ENTMCNC: 30.8 PG — SIGNIFICANT CHANGE UP (ref 27–34)
MCHC RBC-ENTMCNC: 31.2 G/DL — LOW (ref 32–36)
MCV RBC AUTO: 98.8 FL — SIGNIFICANT CHANGE UP (ref 80–100)
MONOCYTES # BLD AUTO: 0.46 K/UL — SIGNIFICANT CHANGE UP (ref 0–0.9)
MONOCYTES NFR BLD AUTO: 7.3 % — SIGNIFICANT CHANGE UP (ref 2–14)
NEUTROPHILS # BLD AUTO: 4.54 K/UL — SIGNIFICANT CHANGE UP (ref 1.8–7.4)
NEUTROPHILS NFR BLD AUTO: 72.6 % — SIGNIFICANT CHANGE UP (ref 43–77)
NRBC # BLD: 0 /100 WBCS — SIGNIFICANT CHANGE UP (ref 0–0)
PLATELET # BLD AUTO: 237 K/UL — SIGNIFICANT CHANGE UP (ref 150–400)
RBC # BLD: 3.34 M/UL — LOW (ref 4.2–5.8)
RBC # FLD: 13.2 % — SIGNIFICANT CHANGE UP (ref 10.3–14.5)
WBC # BLD: 6.26 K/UL — SIGNIFICANT CHANGE UP (ref 3.8–10.5)
WBC # FLD AUTO: 6.26 K/UL — SIGNIFICANT CHANGE UP (ref 3.8–10.5)

## 2023-03-03 PROCEDURE — 99214 OFFICE O/P EST MOD 30 MIN: CPT

## 2023-03-03 NOTE — HISTORY OF PRESENT ILLNESS
[de-identified] : Mr. 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 He is referred for surgical management.\par Patient reports he has had this lesion for several years and does not have complaints of associated pain. He has no previous history of sarcoma.\par \par 09/27/2018: Patient is s/p radical resection of frontal scalp spindle cell neoplasm and biopsy of vertex scalp lesion. Skin graft coverage by Dr. Myles. Recovering well. 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 Additional deep margin shows spindle cell lesion felt to represent fibrosing granulation tissue. 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 Myles 12/17/2018. Pathology shows minimal residual disease, margins negative.\par \par Patient was seen by dermatology on 7/2020 and underwent biopsy of a brown pigmented scalp lesion posterior to skin graft. He continues to have a nonhealing ulcer at the vertex scalp at site of prior biopsy. 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 A significant portion of his pre-existing skin graft and adjacent scalp. An additional left parietal scalp lesion was excised as well.\par Pathology: scalp biopsy - small foci of squamous cell carcinoma in situ, epidermal cyst, no melanoma identified. Parietal scalp lesion - squamous cell carcinoma in situ - extending to one margin.\par Scalp lesion was covered with Integra and is granulating well. He is pending formal skin graft coverage.\par \par CT head 3/2/22: New abnormal soft tissue lesions along the vertex of the scalp with right  parietal calvarial vertex erosive changes when compared to the prior CT and MRI studies. \par \par Patient is s/p excision of malignant vertex scalp mass with Integra coverage 04/04/2022.\par Pathology: at least 1.3 mm thick, ulcerated melanoma with positive deep margin and focally anterior left margin. Evidence of melanoma cell with bony fragments. \par Specimen Type:   excision\par Macroscopic Tumor:  present\par Tumor Site:   scalp\par Lesion Size:   4.8 cm\par Satellite Nodule(s):   not present\par Pigmentation:   focal\par Histologic Type:  nodular type\par Ulceration:   present\par Depth of Invasion:   > or = 1.3 cm\par \par Pathologic Staging:\par TNM Descriptors:   not applicable\par Primary Tumor (Invasive Carcinoma) (pT):    T4b\par Category (pN):   not applicable\par Distant Metastasis (M):    not applicable\par Margins:   positive, involving\par - deep  margin\par - focal anterior margin at the left side\par Venous Invasion (V):   not present\par Perineural Invasion:   present\par Tumor-Infiltrating Lymphocytes:   present, nonbrisk\par Tumor Regression:    not present\par Mitotic Index:  > 1/mm2\par Additional Pathologic Findings:   actinic keratosis\par Comment(s):   Immunostains for HMB45, melan A, Sox10 and S100 are positive in tumor cells, which are negative for AE1/AE3. Ki67 is high. \par \par 5/24/2022:\par C1 Nivolumab today.\par We re-reviewed most common irAEs with patient + son.\par The lesion on his scalp continued to intermittently bleeds.\par He will require evaluation with Dr. Acuña (RT).\par CT head performed not read.\par PET/CT pending. \par \par 6/21/2022:\par C2 Nivolumab today.\par PET/CT on 6/5/2022 revealed soft tissue mass in scalp at vertex.  No evidence of metastatic disease.\par CT on 5/21: Age-appropriate involutional and ischemic gliotic changes. No hemorrhage. There is soft tissue in the parietal extracalvarial region in the midline new since 3/2/2022 suspicious for neoplasm. 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 This can be further evaluated with brain MRI with contrast.\par Son at bedside.  We reviewed pictures.\par The lesions on his scalp appear to be getting larger and new lesions are forming.\par Continues to intermittently bleed.\par Patient is scheduled to start radiation therapy with Dr. Caballero tomorrow, 6/22/2022.\par Plan is to give 30 fractions of treatment.\par \par 7/21/2022:\par C3 Nivolumab today.\par Patient is currently receiving radiation treatment to scalp.\par Today is D#19/30 fractions.\par As per son + patient continues to bleed during dressing changes.\par Dressing changes is performed every other day in radiation by RN and Dr. Caballero.\par Son and patient noticed swelling in the right neck over one week.\par The "lump" has grown and sensitive to touch.\par Will order US neck.\par \par 8/18/22\par Completed radiation on 8/10 (30 fx).\par Dressing changes by son at home on daily basis\par Home care and wound care needed at home.\par US is negative for neck - no nodes.\par Wound is clearly flatter over time based on photo documentation.\par \par 9/15/2022:\par C5 Nivolumab today.\par He is tolerating treatment relatively well and reports no significant irAEs.\par Admits to constipation\par He is using Dulcolax with relief.\par Son and patient have noticed less bleeding from the scalp after completing radiation.\par The lesion on the scalps are responding to treatment.\par More healthy tissue.  The proximal lesion is decomposing.\par Wound is flatter.\par \par 10/11/22\par C6 Nivo today\par No clear irAEs.\par His weight has decreased; despite that son thinks meal portions have been good.\par Scalp lesion is substantially improving over time.\par Patient has gotten weaker and very unstable when walking; and has tremors when moves. \par Also, has had orthostatic hypotension that has worsened and more difficult to manage with medications causing high BPs.\par We reviewed that his clinical response looks good, and will need PET verification.\par Have advised that he go for treatment today, and PET in the next few weeks.\par Also, given ambulation difficulty, will need to see PCP or Neuro again and if no other medical issues found should move forward with more PT.\par \par 12/12/22\par C8 Nivolumab today.\par No major irAEs.\par He underwent PET/CT on 11/1/2022 which revealed Interval response to therapy in the scalp lesion, with minimal residual FDG uptake. No metastases identified.\par The lesion on his scalp is responding appropriately to treatment.\par Patient has f/u with dermatology on 01/2023 for debridement of scalp.\par \par Hypotension: HIs systolic BP in the office today in the 90s.  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 \par On 8/10/2022 completed RT to scalp total dose of 6000 cGy.  \par Excellent clinical response with restaging PET; response to therapy in the scalp lesion, with minimal residual FDG uptake. No metastases were identified. \par Patient has f/u with dermatology for debridement of scalp - to see if it is needed.\par \par Hypotension: He takes Midodrine 10 mg PO BID as needed.\par Will give 500 mg of fluids with Nivolumab.\par Patient at baseline generally stays in sitting or laying position at home.\par Family does try to keep patient engage when they're available.\par At times walk with him around the house when he is having his good days.\par Uses the at home bicycle for 10 minutes/day when feeling energetic.\par \par 03/03/2023:\par C11 Nivolumab.\par No major irAEs.\par Patient completely debridement of his scalp.\par The scalp is healing properly.\par Labs reviewed.\par Patient PSA is elevated.  He will f/u with his urologist on 06/2023.\par Will perform signatera elliot. [de-identified] : Surgical Oncology: Gonzalo Stevens\par PCP/Cardiology: Justin Joy\par Pulmonary: Alonso Turner\par Renal: Justin Pryor\par GI: Steve Marti\par Dermatology: Durga Tobias\par [facility] Surgeon Denis - follows pancreatic nodule\par \par [facility] cell [phone]\par Terence - margarita douglas cell\par

## 2023-03-03 NOTE — PHYSICAL EXAM
[Capable of only limited self care, confined to bed or chair more than 50% of waking hours] : Status 3- Capable of only limited self care, confined to bed or chair more than 50% of waking hours [Normal] : affect appropriate [de-identified] : photos and exam show substantial improvement. No bleeding, greatly smaller surface area, no infection, no odor and lesion is flat.

## 2023-03-04 LAB
ALBUMIN SERPL ELPH-MCNC: 3.9 G/DL — SIGNIFICANT CHANGE UP (ref 3.3–5)
ALP SERPL-CCNC: 80 U/L — SIGNIFICANT CHANGE UP (ref 40–120)
ALT FLD-CCNC: 17 U/L — SIGNIFICANT CHANGE UP (ref 10–45)
ANION GAP SERPL CALC-SCNC: 11 MMOL/L — SIGNIFICANT CHANGE UP (ref 5–17)
AST SERPL-CCNC: 51 U/L — HIGH (ref 10–40)
BILIRUB SERPL-MCNC: 0.2 MG/DL — SIGNIFICANT CHANGE UP (ref 0.2–1.2)
BUN SERPL-MCNC: 29 MG/DL — HIGH (ref 7–23)
CALCIUM SERPL-MCNC: 9.7 MG/DL — SIGNIFICANT CHANGE UP (ref 8.4–10.5)
CHLORIDE SERPL-SCNC: 104 MMOL/L — SIGNIFICANT CHANGE UP (ref 96–108)
CO2 SERPL-SCNC: 25 MMOL/L — SIGNIFICANT CHANGE UP (ref 22–31)
CREAT SERPL-MCNC: 1.37 MG/DL — HIGH (ref 0.5–1.3)
CRP SERPL-MCNC: <3 MG/L — SIGNIFICANT CHANGE UP
EGFR: 50 ML/MIN/1.73M2 — LOW
GLUCOSE SERPL-MCNC: 84 MG/DL — SIGNIFICANT CHANGE UP (ref 70–99)
POTASSIUM SERPL-MCNC: 6.1 MMOL/L — HIGH (ref 3.5–5.3)
POTASSIUM SERPL-SCNC: 6.1 MMOL/L — HIGH (ref 3.5–5.3)
PROT SERPL-MCNC: 6.2 G/DL — SIGNIFICANT CHANGE UP (ref 6–8.3)
SODIUM SERPL-SCNC: 139 MMOL/L — SIGNIFICANT CHANGE UP (ref 135–145)
T4 FREE+ TSH PNL SERPL: 2.79 UIU/ML — SIGNIFICANT CHANGE UP (ref 0.27–4.2)

## 2023-03-30 ENCOUNTER — APPOINTMENT (OUTPATIENT)
Dept: WOUND CARE | Facility: CLINIC | Age: 88
End: 2023-03-30
Payer: MEDICARE

## 2023-03-30 DIAGNOSIS — Z85.828 PERSONAL HISTORY OF OTHER MALIGNANT NEOPLASM OF SKIN: ICD-10-CM

## 2023-03-30 PROCEDURE — 99204 OFFICE O/P NEW MOD 45 MIN: CPT

## 2023-03-30 PROCEDURE — 99214 OFFICE O/P EST MOD 30 MIN: CPT

## 2023-03-31 ENCOUNTER — RESULT REVIEW (OUTPATIENT)
Age: 88
End: 2023-03-31

## 2023-03-31 ENCOUNTER — APPOINTMENT (OUTPATIENT)
Dept: INFUSION THERAPY | Facility: HOSPITAL | Age: 88
End: 2023-03-31

## 2023-03-31 ENCOUNTER — APPOINTMENT (OUTPATIENT)
Dept: HEMATOLOGY ONCOLOGY | Facility: CLINIC | Age: 88
End: 2023-03-31
Payer: MEDICARE

## 2023-03-31 VITALS
HEART RATE: 68 BPM | TEMPERATURE: 97.7 F | DIASTOLIC BLOOD PRESSURE: 41 MMHG | SYSTOLIC BLOOD PRESSURE: 73 MMHG | OXYGEN SATURATION: 98 %

## 2023-03-31 VITALS — DIASTOLIC BLOOD PRESSURE: 55 MMHG | SYSTOLIC BLOOD PRESSURE: 93 MMHG

## 2023-03-31 LAB
BASOPHILS # BLD AUTO: 0.04 K/UL — SIGNIFICANT CHANGE UP (ref 0–0.2)
BASOPHILS NFR BLD AUTO: 0.7 % — SIGNIFICANT CHANGE UP (ref 0–2)
EOSINOPHIL # BLD AUTO: 0.1 K/UL — SIGNIFICANT CHANGE UP (ref 0–0.5)
EOSINOPHIL NFR BLD AUTO: 1.7 % — SIGNIFICANT CHANGE UP (ref 0–6)
ERYTHROCYTE [SEDIMENTATION RATE] IN BLOOD: 85 MM/HR — HIGH (ref 0–20)
HCT VFR BLD CALC: 34.1 % — LOW (ref 39–50)
HGB BLD-MCNC: 11 G/DL — LOW (ref 13–17)
IMM GRANULOCYTES NFR BLD AUTO: 0.2 % — SIGNIFICANT CHANGE UP (ref 0–0.9)
LYMPHOCYTES # BLD AUTO: 1.01 K/UL — SIGNIFICANT CHANGE UP (ref 1–3.3)
LYMPHOCYTES # BLD AUTO: 16.8 % — SIGNIFICANT CHANGE UP (ref 13–44)
MCHC RBC-ENTMCNC: 31.4 PG — SIGNIFICANT CHANGE UP (ref 27–34)
MCHC RBC-ENTMCNC: 32.3 G/DL — SIGNIFICANT CHANGE UP (ref 32–36)
MCV RBC AUTO: 97.4 FL — SIGNIFICANT CHANGE UP (ref 80–100)
MONOCYTES # BLD AUTO: 0.48 K/UL — SIGNIFICANT CHANGE UP (ref 0–0.9)
MONOCYTES NFR BLD AUTO: 8 % — SIGNIFICANT CHANGE UP (ref 2–14)
NEUTROPHILS # BLD AUTO: 4.36 K/UL — SIGNIFICANT CHANGE UP (ref 1.8–7.4)
NEUTROPHILS NFR BLD AUTO: 72.6 % — SIGNIFICANT CHANGE UP (ref 43–77)
NRBC # BLD: 0 /100 WBCS — SIGNIFICANT CHANGE UP (ref 0–0)
PLATELET # BLD AUTO: 236 K/UL — SIGNIFICANT CHANGE UP (ref 150–400)
RBC # BLD: 3.5 M/UL — LOW (ref 4.2–5.8)
RBC # FLD: 12.4 % — SIGNIFICANT CHANGE UP (ref 10.3–14.5)
T4 FREE+ TSH PNL SERPL: 2.16 UIU/ML — SIGNIFICANT CHANGE UP (ref 0.27–4.2)
WBC # BLD: 6 K/UL — SIGNIFICANT CHANGE UP (ref 3.8–10.5)
WBC # FLD AUTO: 6 K/UL — SIGNIFICANT CHANGE UP (ref 3.8–10.5)

## 2023-03-31 PROCEDURE — 99214 OFFICE O/P EST MOD 30 MIN: CPT

## 2023-03-31 NOTE — PHYSICAL EXAM
[Capable of only limited self care, confined to bed or chair more than 50% of waking hours] : Status 3- Capable of only limited self care, confined to bed or chair more than 50% of waking hours [Normal] : affect appropriate [Thin] : thin [de-identified] : photos and exam show improvement. No bleeding, greatly smaller surface area, no infection, no odor and lesion is flat. Lesion recently debrided.

## 2023-03-31 NOTE — PLAN
[FreeTextEntry1] : 3/30/23\par Plan - supplies ordered\par instructed son to wash with soap and water (dove or baby shampoo), soak with vashe, apply hydrogel to crusty areas/adaptic/aquacel/stockinette\par follow up 3 - 4 weeks

## 2023-03-31 NOTE — HISTORY OF PRESENT ILLNESS
[de-identified] : Mr. 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 He is referred for surgical management.\par Patient reports he has had this lesion for several years and does not have complaints of associated pain. He has no previous history of sarcoma.\par \par 09/27/2018: Patient is s/p radical resection of frontal scalp spindle cell neoplasm and biopsy of vertex scalp lesion. Skin graft coverage by Dr. Myles. Recovering well. 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 Additional deep margin shows spindle cell lesion felt to represent fibrosing granulation tissue. 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 Myles 12/17/2018. Pathology shows minimal residual disease, margins negative.\par \par Patient was seen by dermatology on 7/2020 and underwent biopsy of a brown pigmented scalp lesion posterior to skin graft. He continues to have a nonhealing ulcer at the vertex scalp at site of prior biopsy. 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 A significant portion of his pre-existing skin graft and adjacent scalp. An additional left parietal scalp lesion was excised as well.\par Pathology: scalp biopsy - small foci of squamous cell carcinoma in situ, epidermal cyst, no melanoma identified. Parietal scalp lesion - squamous cell carcinoma in situ - extending to one margin.\par Scalp lesion was covered with Integra and is granulating well. He is pending formal skin graft coverage.\par \par CT head 3/2/22: New abnormal soft tissue lesions along the vertex of the scalp with right  parietal calvarial vertex erosive changes when compared to the prior CT and MRI studies. \par \par Patient is s/p excision of malignant vertex scalp mass with Integra coverage 04/04/2022.\par Pathology: at least 1.3 mm thick, ulcerated melanoma with positive deep margin and focally anterior left margin. Evidence of melanoma cell with bony fragments. \par Specimen Type:   excision\par Macroscopic Tumor:  present\par Tumor Site:   scalp\par Lesion Size:   4.8 cm\par Satellite Nodule(s):   not present\par Pigmentation:   focal\par Histologic Type:  nodular type\par Ulceration:   present\par Depth of Invasion:   > or = 1.3 cm\par \par Pathologic Staging:\par TNM Descriptors:   not applicable\par Primary Tumor (Invasive Carcinoma) (pT):    T4b\par Category (pN):   not applicable\par Distant Metastasis (M):    not applicable\par Margins:   positive, involving\par - deep  margin\par - focal anterior margin at the left side\par Venous Invasion (V):   not present\par Perineural Invasion:   present\par Tumor-Infiltrating Lymphocytes:   present, nonbrisk\par Tumor Regression:    not present\par Mitotic Index:  > 1/mm2\par Additional Pathologic Findings:   actinic keratosis\par Comment(s):   Immunostains for HMB45, melan A, Sox10 and S100 are positive in tumor cells, which are negative for AE1/AE3. Ki67 is high. \par \par 5/24/2022:\par C1 Nivolumab today.\par We re-reviewed most common irAEs with patient + son.\par The lesion on his scalp continued to intermittently bleeds.\par He will require evaluation with Dr. Acuña (RT).\par CT head performed not read.\par PET/CT pending. \par \par 6/21/2022:\par C2 Nivolumab today.\par PET/CT on 6/5/2022 revealed soft tissue mass in scalp at vertex.  No evidence of metastatic disease.\par CT on 5/21: Age-appropriate involutional and ischemic gliotic changes. No hemorrhage. There is soft tissue in the parietal extracalvarial region in the midline new since 3/2/2022 suspicious for neoplasm. 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 This can be further evaluated with brain MRI with contrast.\par Son at bedside.  We reviewed pictures.\par The lesions on his scalp appear to be getting larger and new lesions are forming.\par Continues to intermittently bleed.\par Patient is scheduled to start radiation therapy with Dr. Caballero tomorrow, 6/22/2022.\par Plan is to give 30 fractions of treatment.\par \par 7/21/2022:\par C3 Nivolumab today.\par Patient is currently receiving radiation treatment to scalp.\par Today is D#19/30 fractions.\par As per son + patient continues to bleed during dressing changes.\par Dressing changes is performed every other day in radiation by RN and Dr. Caballero.\par Son and patient noticed swelling in the right neck over one week.\par The "lump" has grown and sensitive to touch.\par Will order US neck.\par \par 8/18/22\par Completed radiation on 8/10 (30 fx).\par Dressing changes by son at home on daily basis\par Home care and wound care needed at home.\par US is negative for neck - no nodes.\par Wound is clearly flatter over time based on photo documentation.\par \par 9/15/2022:\par C5 Nivolumab today.\par He is tolerating treatment relatively well and reports no significant irAEs.\par Admits to constipation\par He is using Dulcolax with relief.\par Son and patient have noticed less bleeding from the scalp after completing radiation.\par The lesion on the scalps are responding to treatment.\par More healthy tissue.  The proximal lesion is decomposing.\par Wound is flatter.\par \par 10/11/22\par C6 Nivo today\par No clear irAEs.\par His weight has decreased; despite that son thinks meal portions have been good.\par Scalp lesion is substantially improving over time.\par Patient has gotten weaker and very unstable when walking; and has tremors when moves. \par Also, has had orthostatic hypotension that has worsened and more difficult to manage with medications causing high BPs.\par We reviewed that his clinical response looks good, and will need PET verification.\par Have advised that he go for treatment today, and PET in the next few weeks.\par Also, given ambulation difficulty, will need to see PCP or Neuro again and if no other medical issues found should move forward with more PT.\par \par 12/12/22\par C8 Nivolumab today.\par No major irAEs.\par He underwent PET/CT on 11/1/2022 which revealed Interval response to therapy in the scalp lesion, with minimal residual FDG uptake. No metastases identified.\par The lesion on his scalp is responding appropriately to treatment.\par Patient has f/u with dermatology on 01/2023 for debridement of scalp.\par \par Hypotension: HIs systolic BP in the office today in the 90s.  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 \par On 8/10/2022 completed RT to scalp total dose of 6000 cGy.  \par Excellent clinical response with restaging PET; response to therapy in the scalp lesion, with minimal residual FDG uptake. No metastases were identified. \par Patient has f/u with dermatology for debridement of scalp - to see if it is needed.\par \par Hypotension: He takes Midodrine 10 mg PO BID as needed.\par Will give 500 mg of fluids with Nivolumab.\par Patient at baseline generally stays in sitting or laying position at home.\par Family does try to keep patient engage when they're available.\par At times walk with him around the house when he is having his good days.\par Uses the at home bicycle for 10 minutes/day when feeling energetic.\par \par 03/03/2023:\par C11 Nivolumab.\par No major irAEs.\par Patient completely debridement of his scalp.\par The scalp is healing properly.\par Labs reviewed.\par Patient PSA is elevated.  He will f/u with his urologist on 06/2023.\par Will perform signatera elliot.\par \par 03/31/2023\par C12 Nivolumab today.\par No major irAEs.\par Admits to constipation and is using DucloLax.\par He has BM every other day.\par He underwent scalp debridement on 03/30/2023 with wound care.\par Patient has history of hypotension and is currently on Midodrine 10 mg daily if systolic BP is very low and salt tabs if systolic BP ~ 120s.\par His BP in the AM was in the 120s and was given salt tabs in the AM.\par While checking patient weight, patient lost his balance and near syncope.\par His initial BP prior the incident was in the 70s.  He was given water afterwards and systolic went up to the 90s.\par Will discuss with Dr Joy about starting midodrine 5 mg every day in AM (or 10 mg etc), as opposed to PRN.\par Patient will need to be seen by surgical oncology to asses the wound on the scalp and for disease remission. [de-identified] : Surgical Oncology: Gonzalo Stevens\par PCP/Cardiology: Justin Joy\par Pulmonary: Alonso Turner\par Renal: Justin Pryor\par GI: Steve Marti\par Dermatology: Durga Tobias\par [facility] [place] Surgeon Denis - follows pancreatic nodule\par \par [facility] [phone]\par [facility]\par

## 2023-03-31 NOTE — REVIEW OF SYSTEMS
[Cough] : cough [SOB on Exertion] : shortness of breath during exertion [Constipation] : constipation [Arthralgias] : arthralgias [Joint Stiffness] : joint stiffness [Skin Wound] : skin wound [Negative] : Endocrine [FreeTextEntry3] : eyes burn

## 2023-03-31 NOTE — ASSESSMENT
[FreeTextEntry1] : Mr. BEATRIS SHEA   presents to the office with a wound for over 1 year duration.  The wound is located on  the scalp.  The patient has complaints of pain when being cleaned   The patient has been dressing the wound with wound cleanser and telfa. The patient denies fevers or chills.  The patient has localized pain to the wound upon dressing changes.  The patient has no other complaints or associated symptoms.  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  with room lights dimmed  The fluorescence imaging device was placed 8-12 cm from the patient and the clinical darkness required for imaging was obtained  \par The Sudiksha i:X fluorescence imaging device was used to report presence and location  cyan and whit  fluorescence, indicative of bacteria at loads greater than 104 CFU/g in real-time. Images reported to have  fluorescence. The wound was -mechanically cleansed with Dakins 0.125%/Iodine/0.9% saline) and/or underwent excisional debridement.  Fluorescence images acquired after cleansing demonstrated reduction in bacteria.\par

## 2023-03-31 NOTE — DATA REVIEWED
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 \par On 8/10/2022 completed RT to scalp total dose of 6000 cGy. \par Excellent clinical response with restaging PET; response to therapy in the scalp lesion, with minimal residual FDG uptake. No metastases were identified. \par Patient has f/u with dermatology for debridement of scalp - to see if it is needed.\par \par Hypotension: He takes Midodrine 10 mg PO BID as needed.\par Will give 500 mg of fluids with Nivolumab.\par Patient at baseline generally stays in sitting or laying position at home.\par Family does try to keep patient engage when they're available.\par At times walk with him around the house when he is having his good days.\par Uses the at home bicycle for 10 minutes/day when feeling energetic.\par \par 03/03/2023:\par C11 Nivolumab.\par No major irAEs.\par Patient completely debridement of his scalp.\par The scalp is healing properly.\par Labs reviewed.\par Patient PSA is elevated. He will f/u with his urologist on 06/2023.\par Will perform signatera elliot. \par \par \par \par Interval History: Surgical Oncology: Gonzalo Stevens\par PCP/Cardiology: Justin Joy\par Pulmonary: Alonso Turner\par Renal: Justin Pryor\par GI: Steve Fan\par Dermatology: Durga Tobias\par [facility] [place] Surgeon Denis - follows pancreatic nodule\par

## 2023-03-31 NOTE — HISTORY OF PRESENT ILLNESS
[FreeTextEntry1] : Mr. BEATRIS SHEA   presents to the office with a wound for over 1 year duration.  The wound is located on  the scalp.  The patient has complaints of pain when being cleaned   The patient has been dressing the wound with wound cleanser and telfa. The patient denies fevers or chills.  The patient has localized pain to the wound upon dressing changes.  The patient has no other complaints or associated symptoms.  \par First dx with melanoma in 2021, s/p mohs early 2022, margins were not clear, then started radiation for 30 days, started 6/2022\par In addition to radiation started immunotherapy till present, finishing this june 6/2023\par Today wound found to be covered in thick yellow/brown crust, has been left open to air \par mechanically debrided of all non viable crust\par

## 2023-03-31 NOTE — PHYSICAL EXAM
[Ankle Swelling Bilaterally] : bilaterally  [Skin Ulcer] : ulcer [Oriented to Person] : oriented to person [Oriented to Place] : oriented to place [Oriented to Time] : oriented to time [Calm] : calm [Please See PDF for Tissue Analytics] : Please See PDF for [employer]. [Ankle Swelling (On Exam)] : not present [de-identified] : well groomed, NAD [de-identified] : weak arms and legs

## 2023-04-01 LAB
ALBUMIN SERPL ELPH-MCNC: 4.3 G/DL — SIGNIFICANT CHANGE UP (ref 3.3–5)
ALP SERPL-CCNC: 99 U/L — SIGNIFICANT CHANGE UP (ref 40–120)
ALT FLD-CCNC: 13 U/L — SIGNIFICANT CHANGE UP (ref 10–45)
ANION GAP SERPL CALC-SCNC: 14 MMOL/L — SIGNIFICANT CHANGE UP (ref 5–17)
AST SERPL-CCNC: 25 U/L — SIGNIFICANT CHANGE UP (ref 10–40)
BILIRUB SERPL-MCNC: 0.2 MG/DL — SIGNIFICANT CHANGE UP (ref 0.2–1.2)
BUN SERPL-MCNC: 28 MG/DL — HIGH (ref 7–23)
CALCIUM SERPL-MCNC: 9.5 MG/DL — SIGNIFICANT CHANGE UP (ref 8.4–10.5)
CHLORIDE SERPL-SCNC: 102 MMOL/L — SIGNIFICANT CHANGE UP (ref 96–108)
CK SERPL-CCNC: 58 U/L — SIGNIFICANT CHANGE UP (ref 30–200)
CO2 SERPL-SCNC: 22 MMOL/L — SIGNIFICANT CHANGE UP (ref 22–31)
CREAT SERPL-MCNC: 1.43 MG/DL — HIGH (ref 0.5–1.3)
CRP SERPL-MCNC: 32 MG/L — HIGH
EGFR: 48 ML/MIN/1.73M2 — LOW
GLUCOSE SERPL-MCNC: 100 MG/DL — HIGH (ref 70–99)
POTASSIUM SERPL-MCNC: 4.9 MMOL/L — SIGNIFICANT CHANGE UP (ref 3.5–5.3)
POTASSIUM SERPL-SCNC: 4.9 MMOL/L — SIGNIFICANT CHANGE UP (ref 3.5–5.3)
PROT SERPL-MCNC: 6.4 G/DL — SIGNIFICANT CHANGE UP (ref 6–8.3)
SODIUM SERPL-SCNC: 137 MMOL/L — SIGNIFICANT CHANGE UP (ref 135–145)

## 2023-04-08 ENCOUNTER — RX RENEWAL (OUTPATIENT)
Age: 88
End: 2023-04-08

## 2023-04-13 PROBLEM — Z85.828 HISTORY OF SKIN CANCER: Status: ACTIVE | Noted: 2022-09-16

## 2023-04-21 ENCOUNTER — OUTPATIENT (OUTPATIENT)
Dept: OUTPATIENT SERVICES | Facility: HOSPITAL | Age: 88
LOS: 1 days | Discharge: HOME | End: 2023-04-21

## 2023-04-21 DIAGNOSIS — Z95.818 PRESENCE OF OTHER CARDIAC IMPLANTS AND GRAFTS: Chronic | ICD-10-CM

## 2023-04-21 DIAGNOSIS — Z98.890 OTHER SPECIFIED POSTPROCEDURAL STATES: Chronic | ICD-10-CM

## 2023-04-21 DIAGNOSIS — C43.9 MALIGNANT MELANOMA OF SKIN, UNSPECIFIED: ICD-10-CM

## 2023-04-21 DIAGNOSIS — Z90.49 ACQUIRED ABSENCE OF OTHER SPECIFIED PARTS OF DIGESTIVE TRACT: Chronic | ICD-10-CM

## 2023-04-21 DIAGNOSIS — Z98.61 CORONARY ANGIOPLASTY STATUS: Chronic | ICD-10-CM

## 2023-04-21 DIAGNOSIS — C44.90 UNSPECIFIED MALIGNANT NEOPLASM OF SKIN, UNSPECIFIED: Chronic | ICD-10-CM

## 2023-04-21 DIAGNOSIS — Z87.09 PERSONAL HISTORY OF OTHER DISEASES OF THE RESPIRATORY SYSTEM: Chronic | ICD-10-CM

## 2023-04-21 DIAGNOSIS — Z98.41 CATARACT EXTRACTION STATUS, RIGHT EYE: Chronic | ICD-10-CM

## 2023-04-28 ENCOUNTER — RESULT REVIEW (OUTPATIENT)
Age: 88
End: 2023-04-28

## 2023-04-28 ENCOUNTER — APPOINTMENT (OUTPATIENT)
Dept: HEMATOLOGY ONCOLOGY | Facility: CLINIC | Age: 88
End: 2023-04-28
Payer: MEDICARE

## 2023-04-28 ENCOUNTER — APPOINTMENT (OUTPATIENT)
Dept: INFUSION THERAPY | Facility: HOSPITAL | Age: 88
End: 2023-04-28

## 2023-04-28 VITALS
OXYGEN SATURATION: 98 % | HEART RATE: 67 BPM | RESPIRATION RATE: 16 BRPM | WEIGHT: 147.71 LBS | SYSTOLIC BLOOD PRESSURE: 102 MMHG | BODY MASS INDEX: 26.84 KG/M2 | TEMPERATURE: 97.2 F | DIASTOLIC BLOOD PRESSURE: 69 MMHG

## 2023-04-28 DIAGNOSIS — Z51.11 ENCOUNTER FOR ANTINEOPLASTIC CHEMOTHERAPY: ICD-10-CM

## 2023-04-28 LAB
ALBUMIN SERPL ELPH-MCNC: 4 G/DL — SIGNIFICANT CHANGE UP (ref 3.3–5)
ALP SERPL-CCNC: 91 U/L — SIGNIFICANT CHANGE UP (ref 40–120)
ALT FLD-CCNC: 12 U/L — SIGNIFICANT CHANGE UP (ref 10–45)
ANION GAP SERPL CALC-SCNC: 14 MMOL/L — SIGNIFICANT CHANGE UP (ref 5–17)
AST SERPL-CCNC: 20 U/L — SIGNIFICANT CHANGE UP (ref 10–40)
BASOPHILS # BLD AUTO: 0.04 K/UL — SIGNIFICANT CHANGE UP (ref 0–0.2)
BASOPHILS NFR BLD AUTO: 0.6 % — SIGNIFICANT CHANGE UP (ref 0–2)
BILIRUB SERPL-MCNC: 0.2 MG/DL — SIGNIFICANT CHANGE UP (ref 0.2–1.2)
BUN SERPL-MCNC: 31 MG/DL — HIGH (ref 7–23)
CALCIUM SERPL-MCNC: 10.2 MG/DL — SIGNIFICANT CHANGE UP (ref 8.4–10.5)
CHLORIDE SERPL-SCNC: 102 MMOL/L — SIGNIFICANT CHANGE UP (ref 96–108)
CK SERPL-CCNC: 41 U/L — SIGNIFICANT CHANGE UP (ref 30–200)
CO2 SERPL-SCNC: 23 MMOL/L — SIGNIFICANT CHANGE UP (ref 22–31)
CREAT SERPL-MCNC: 1.63 MG/DL — HIGH (ref 0.5–1.3)
CRP SERPL-MCNC: 9 MG/L — HIGH
EGFR: 41 ML/MIN/1.73M2 — LOW
EOSINOPHIL # BLD AUTO: 0.13 K/UL — SIGNIFICANT CHANGE UP (ref 0–0.5)
EOSINOPHIL NFR BLD AUTO: 2.1 % — SIGNIFICANT CHANGE UP (ref 0–6)
ERYTHROCYTE [SEDIMENTATION RATE] IN BLOOD: 82 MM/HR — HIGH (ref 0–20)
GLUCOSE SERPL-MCNC: 99 MG/DL — SIGNIFICANT CHANGE UP (ref 70–99)
HCT VFR BLD CALC: 34.2 % — LOW (ref 39–50)
HGB BLD-MCNC: 10.9 G/DL — LOW (ref 13–17)
IMM GRANULOCYTES NFR BLD AUTO: 0.3 % — SIGNIFICANT CHANGE UP (ref 0–0.9)
LYMPHOCYTES # BLD AUTO: 0.96 K/UL — LOW (ref 1–3.3)
LYMPHOCYTES # BLD AUTO: 15.6 % — SIGNIFICANT CHANGE UP (ref 13–44)
MCHC RBC-ENTMCNC: 30.6 PG — SIGNIFICANT CHANGE UP (ref 27–34)
MCHC RBC-ENTMCNC: 31.9 G/DL — LOW (ref 32–36)
MCV RBC AUTO: 96.1 FL — SIGNIFICANT CHANGE UP (ref 80–100)
MONOCYTES # BLD AUTO: 0.48 K/UL — SIGNIFICANT CHANGE UP (ref 0–0.9)
MONOCYTES NFR BLD AUTO: 7.8 % — SIGNIFICANT CHANGE UP (ref 2–14)
NEUTROPHILS # BLD AUTO: 4.53 K/UL — SIGNIFICANT CHANGE UP (ref 1.8–7.4)
NEUTROPHILS NFR BLD AUTO: 73.6 % — SIGNIFICANT CHANGE UP (ref 43–77)
NRBC # BLD: 0 /100 WBCS — SIGNIFICANT CHANGE UP (ref 0–0)
PLATELET # BLD AUTO: 252 K/UL — SIGNIFICANT CHANGE UP (ref 150–400)
POTASSIUM SERPL-MCNC: 4.5 MMOL/L — SIGNIFICANT CHANGE UP (ref 3.5–5.3)
POTASSIUM SERPL-SCNC: 4.5 MMOL/L — SIGNIFICANT CHANGE UP (ref 3.5–5.3)
PROT SERPL-MCNC: 6.3 G/DL — SIGNIFICANT CHANGE UP (ref 6–8.3)
RBC # BLD: 3.56 M/UL — LOW (ref 4.2–5.8)
RBC # FLD: 12.1 % — SIGNIFICANT CHANGE UP (ref 10.3–14.5)
SODIUM SERPL-SCNC: 138 MMOL/L — SIGNIFICANT CHANGE UP (ref 135–145)
T4 FREE+ TSH PNL SERPL: 2.84 UIU/ML — SIGNIFICANT CHANGE UP (ref 0.27–4.2)
WBC # BLD: 6.16 K/UL — SIGNIFICANT CHANGE UP (ref 3.8–10.5)
WBC # FLD AUTO: 6.16 K/UL — SIGNIFICANT CHANGE UP (ref 3.8–10.5)

## 2023-04-28 PROCEDURE — 99214 OFFICE O/P EST MOD 30 MIN: CPT

## 2023-04-28 NOTE — PHYSICAL EXAM
[Capable of only limited self care, confined to bed or chair more than 50% of waking hours] : Status 3- Capable of only limited self care, confined to bed or chair more than 50% of waking hours [Thin] : thin [Normal] : affect appropriate [de-identified] : photos and exam show continued, slow improvement.

## 2023-04-28 NOTE — HISTORY OF PRESENT ILLNESS
[de-identified] : Mr. 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 He is referred for surgical management.\par Patient reports he has had this lesion for several years and does not have complaints of associated pain. He has no previous history of sarcoma.\par \par 09/27/2018: Patient is s/p radical resection of frontal scalp spindle cell neoplasm and biopsy of vertex scalp lesion. Skin graft coverage by Dr. Myles. Recovering well. 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 Additional deep margin shows spindle cell lesion felt to represent fibrosing granulation tissue. 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 Myles 12/17/2018. Pathology shows minimal residual disease, margins negative.\par \par Patient was seen by dermatology on 7/2020 and underwent biopsy of a brown pigmented scalp lesion posterior to skin graft. He continues to have a nonhealing ulcer at the vertex scalp at site of prior biopsy. 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 A significant portion of his pre-existing skin graft and adjacent scalp. An additional left parietal scalp lesion was excised as well.\par Pathology: scalp biopsy - small foci of squamous cell carcinoma in situ, epidermal cyst, no melanoma identified. Parietal scalp lesion - squamous cell carcinoma in situ - extending to one margin.\par Scalp lesion was covered with Integra and is granulating well. He is pending formal skin graft coverage.\par \par CT head 3/2/22: New abnormal soft tissue lesions along the vertex of the scalp with right  parietal calvarial vertex erosive changes when compared to the prior CT and MRI studies. \par \par Patient is s/p excision of malignant vertex scalp mass with Integra coverage 04/04/2022.\par Pathology: at least 1.3 mm thick, ulcerated melanoma with positive deep margin and focally anterior left margin. Evidence of melanoma cell with bony fragments. \par Specimen Type:   excision\par Macroscopic Tumor:  present\par Tumor Site:   scalp\par Lesion Size:   4.8 cm\par Satellite Nodule(s):   not present\par Pigmentation:   focal\par Histologic Type:  nodular type\par Ulceration:   present\par Depth of Invasion:   > or = 1.3 cm\par \par Pathologic Staging:\par TNM Descriptors:   not applicable\par Primary Tumor (Invasive Carcinoma) (pT):    T4b\par Category (pN):   not applicable\par Distant Metastasis (M):    not applicable\par Margins:   positive, involving\par - deep  margin\par - focal anterior margin at the left side\par Venous Invasion (V):   not present\par Perineural Invasion:   present\par Tumor-Infiltrating Lymphocytes:   present, nonbrisk\par Tumor Regression:    not present\par Mitotic Index:  > 1/mm2\par Additional Pathologic Findings:   actinic keratosis\par Comment(s):   Immunostains for HMB45, melan A, Sox10 and S100 are positive in tumor cells, which are negative for AE1/AE3. Ki67 is high. \par \par 5/24/2022:\par C1 Nivolumab today.\par We re-reviewed most common irAEs with patient + son.\par The lesion on his scalp continued to intermittently bleeds.\par He will require evaluation with Dr. Acuña (RT).\par CT head performed not read.\par PET/CT pending. \par \par 6/21/2022:\par C2 Nivolumab today.\par PET/CT on 6/5/2022 revealed soft tissue mass in scalp at vertex.  No evidence of metastatic disease.\par CT on 5/21: Age-appropriate involutional and ischemic gliotic changes. No hemorrhage. There is soft tissue in the parietal extracalvarial region in the midline new since 3/2/2022 suspicious for neoplasm. 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 This can be further evaluated with brain MRI with contrast.\par Son at bedside.  We reviewed pictures.\par The lesions on his scalp appear to be getting larger and new lesions are forming.\par Continues to intermittently bleed.\par Patient is scheduled to start radiation therapy with Dr. Caballero tomorrow, 6/22/2022.\par Plan is to give 30 fractions of treatment.\par \par 7/21/2022:\par C3 Nivolumab today.\par Patient is currently receiving radiation treatment to scalp.\par Today is D#19/30 fractions.\par As per son + patient continues to bleed during dressing changes.\par Dressing changes is performed every other day in radiation by RN and Dr. Caballero.\par Son and patient noticed swelling in the right neck over one week.\par The "lump" has grown and sensitive to touch.\par Will order US neck.\par \par 8/18/22\par Completed radiation on 8/10 (30 fx).\par Dressing changes by son at home on daily basis\par Home care and wound care needed at home.\par US is negative for neck - no nodes.\par Wound is clearly flatter over time based on photo documentation.\par \par 9/15/2022:\par C5 Nivolumab today.\par He is tolerating treatment relatively well and reports no significant irAEs.\par Admits to constipation\par He is using Dulcolax with relief.\par Son and patient have noticed less bleeding from the scalp after completing radiation.\par The lesion on the scalps are responding to treatment.\par More healthy tissue.  The proximal lesion is decomposing.\par Wound is flatter.\par \par 10/11/22\par C6 Nivo today\par No clear irAEs.\par His weight has decreased; despite that son thinks meal portions have been good.\par Scalp lesion is substantially improving over time.\par Patient has gotten weaker and very unstable when walking; and has tremors when moves. \par Also, has had orthostatic hypotension that has worsened and more difficult to manage with medications causing high BPs.\par We reviewed that his clinical response looks good, and will need PET verification.\par Have advised that he go for treatment today, and PET in the next few weeks.\par Also, given ambulation difficulty, will need to see PCP or Neuro again and if no other medical issues found should move forward with more PT.\par \par 12/12/22\par C8 Nivolumab today.\par No major irAEs.\par He underwent PET/CT on 11/1/2022 which revealed Interval response to therapy in the scalp lesion, with minimal residual FDG uptake. No metastases identified.\par The lesion on his scalp is responding appropriately to treatment.\par Patient has f/u with dermatology on 01/2023 for debridement of scalp.\par \par Hypotension: HIs systolic BP in the office today in the 90s.  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 \par On 8/10/2022 completed RT to scalp total dose of 6000 cGy.  \par Excellent clinical response with restaging PET; response to therapy in the scalp lesion, with minimal residual FDG uptake. No metastases were identified. \par Patient has f/u with dermatology for debridement of scalp - to see if it is needed.\par \par Hypotension: He takes Midodrine 10 mg PO BID as needed.\par Will give 500 mg of fluids with Nivolumab.\par Patient at baseline generally stays in sitting or laying position at home.\par Family does try to keep patient engage when they're available.\par At times walk with him around the house when he is having his good days.\par Uses the at home bicycle for 10 minutes/day when feeling energetic.\par \par 03/03/2023:\par C11 Nivolumab.\par No major irAEs.\par Patient completely debridement of his scalp.\par The scalp is healing properly.\par Labs reviewed.\par Patient PSA is elevated.  He will f/u with his urologist on 06/2023.\par Will perform signatera elliot.\par \par 03/31/2023\par C12 Nivolumab today.\par No major irAEs.\par Admits to constipation and is using DucloLax.\par He has BM every other day.\par He underwent scalp debridement on 03/30/2023 with wound care.\par Patient has history of hypotension and is currently on Midodrine 10 mg daily if systolic BP is very low and salt tabs if systolic BP ~ 120s.\par His BP in the AM was in the 120s and was given salt tabs in the AM.\par While checking patient weight, patient lost his balance and near syncope.\par His initial BP prior the incident was in the 70s.  He was given water afterwards and systolic went up to the 90s.\par Will discuss with Dr Joy about starting midodrine 5 mg every day in AM (or 10 mg etc), as opposed to PRN.\par Patient will need to be seen by surgical oncology to asses the wound on the scalp and for disease remission.\par \par 4/28/23\par Signatera ctDNA from 3/3/23 is negative.\par Will repeat q 3 months with home blood draw.\par [phone]\par We agree to discontinue nivolumab after this dose today #13 (ie 1 year).\par No irAEs.\par BP better maintained with midodrine 5 mg q AM.\par Patient will RTO in 3 months, then q 6 months. [de-identified] : Surgical Oncology: Gonzalo Stevens\par PCP/Cardiology: Justin Joy\par Pulmonary: Alonso Turner\par Renal: Justin Pryor\par GI: Steve Marti\par Dermatology: Durga Tobias\par [facility] [place] [facility] [place] - follows pancreatic nodule\par \par [facility] cell [phone]\par [facility]\par

## 2023-05-02 ENCOUNTER — APPOINTMENT (OUTPATIENT)
Dept: WOUND CARE | Facility: CLINIC | Age: 88
End: 2023-05-02
Payer: MEDICARE

## 2023-05-02 VITALS — TEMPERATURE: 98.2 F

## 2023-05-02 PROCEDURE — 99214 OFFICE O/P EST MOD 30 MIN: CPT

## 2023-05-04 NOTE — ASSESSMENT
[FreeTextEntry1] : Mr. BEATRIS SHEA   presents to the office with a wound for over 1 year duration.  The wound is located on  the scalp.  The patient has complaints of pain when being cleaned   The patient has been dressing the wound with wound cleanser and telfa. The patient denies fevers or chills.  The patient has localized pain to the wound upon dressing changes.  The patient has no other complaints or associated symptoms.  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  with room lights dimmed  The fluorescence imaging device was placed 8-12 cm from the patient and the clinical darkness required for imaging was obtained  \par The Samanage i:X fluorescence imaging device was used to report presence and location  cyan and whit  fluorescence, indicative of bacteria at loads greater than 104 CFU/g in real-time. Images reported to have  fluorescence. The wound was -mechanically cleansed with Dakins 0.125%/Iodine/0.9% saline) and/or underwent excisional debridement.  Fluorescence images acquired after cleansing demonstrated reduction in bacteria.\par  \par 5/2/23\par Patient here for follow up of mohs surgery of the scalp. \par Wound bed crusted with slough,  exudate is milky and purulent, no odor\par wound culture\par Mechanical debridement\par Washed with Vashe\par HydrogelAdaptic/Aquacel/gauze/tape\par supplies ordered\par \par \par \par

## 2023-05-04 NOTE — PHYSICAL EXAM
[Ankle Swelling Bilaterally] : bilaterally  [Skin Ulcer] : ulcer [Oriented to Person] : oriented to person [Oriented to Place] : oriented to place [Oriented to Time] : oriented to time [Calm] : calm [Please See PDF for Tissue Analytics] : Please See PDF for [employer]. [Ankle Swelling (On Exam)] : not present [de-identified] : well groomed, NAD [de-identified] : weak arms and legs

## 2023-05-04 NOTE — PLAN
[FreeTextEntry1] : 3/30/23\par Plan - supplies ordered\par instructed son to wash with soap and water (dove or baby shampoo), soak with vashe, apply hydrogel to crusty areas/adaptic/aquacel/stockinette\par follow up 3 - 4 weeks\par \par 5/2/23\par Plan - supplies ordered\par instructed son to wash with soap and water (dove or baby shampoo), soak with vashe, apply hydrogel to crusty areas/adaptic/aquacel/gauze/tape\par follow up 3 - 4 weeks

## 2023-05-04 NOTE — DATA REVIEWED
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 \par On 8/10/2022 completed RT to scalp total dose of 6000 cGy. \par Excellent clinical response with restaging PET; response to therapy in the scalp lesion, with minimal residual FDG uptake. No metastases were identified. \par Patient has f/u with dermatology for debridement of scalp - to see if it is needed.\par \par Hypotension: He takes Midodrine 10 mg PO BID as needed.\par Will give 500 mg of fluids with Nivolumab.\par Patient at baseline generally stays in sitting or laying position at home.\par Family does try to keep patient engage when they're available.\par At times walk with him around the house when he is having his good days.\par Uses the at home bicycle for 10 minutes/day when feeling energetic.\par \par 03/03/2023:\par C11 Nivolumab.\par No major irAEs.\par Patient completely debridement of his scalp.\par The scalp is healing properly.\par Labs reviewed.\par Patient PSA is elevated. He will f/u with his urologist on 06/2023.\par Will perform signatera elliot. \par \par \par \par Interval History: Surgical Oncology: Gonzalo Stevens\par PCP/Cardiology: Justin Joy\par Pulmonary: Alonso Turner\par Renal: Justin Pryor\par GI: Steve Fan\par Dermatology: Durga Tobias\par [facility] [place] [facility] [place] - follows pancreatic nodule\par

## 2023-05-05 DIAGNOSIS — B95.8 UNSPECIFIED STAPHYLOCOCCUS AS THE CAUSE OF DISEASES CLASSIFIED ELSEWHERE: ICD-10-CM

## 2023-05-05 LAB — BACTERIA SPEC CULT: ABNORMAL

## 2023-05-05 RX ORDER — SULFAMETHOXAZOLE AND TRIMETHOPRIM 800; 160 MG/1; MG/1
800-160 TABLET ORAL TWICE DAILY
Qty: 14 | Refills: 0 | Status: ACTIVE | COMMUNITY
Start: 2023-05-05 | End: 1900-01-01

## 2023-05-09 ENCOUNTER — APPOINTMENT (OUTPATIENT)
Dept: SURGICAL ONCOLOGY | Facility: CLINIC | Age: 88
End: 2023-05-09
Payer: MEDICARE

## 2023-05-09 VITALS
OXYGEN SATURATION: 85 % | HEART RATE: 70 BPM | WEIGHT: 147 LBS | SYSTOLIC BLOOD PRESSURE: 90 MMHG | HEIGHT: 62 IN | DIASTOLIC BLOOD PRESSURE: 55 MMHG | BODY MASS INDEX: 27.05 KG/M2

## 2023-05-09 PROCEDURE — 99213 OFFICE O/P EST LOW 20 MIN: CPT

## 2023-05-09 NOTE — PHYSICAL EXAM
[FreeTextEntry1] : Large vertex scalp chronic wound with eschar.  No clinical evidence of residual/recurrent melanoma.

## 2023-05-09 NOTE — HISTORY OF PRESENT ILLNESS
[de-identified] : Patient is an 81 y/o male who presented with a raised lesion in the mid-frontal scalp.  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  He is referred for surgical management.\par Patient reports he has had this lesion for several years and does not have complaints of associated pain.  He has no previous history of sarcoma.\par \par 09/27/2018:  Patient is s/p radical resection of frontal scalp spindle cell neoplasm and biopsy of vertex scalp lesion.  Skin graft coverage by Dr. Myles.  Recovering well.  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  Additional deep margin shows spindle cell lesion felt to represent fibrosing granulation tissue.  Vertex scalp lesion demonstrates atypical spindle cell lesion, favoring atypical fibroxanthoma.\par \par 10/11/2018:  Improved.  Skin graft healing well.\par \par 11/08/2018:  Feels well.  Denies pain. Saw Dr. 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 Myles 12/17/2018.  Pathology shows minimal residual disease, margins negative.\par \par 04/02/2019:  Patient presents for 3 months follow up.  Feels well.  He states he is transferring dermatology care to Dr. Fitzpatrick of [facility].  Had benign right mid back biopsy performed last week by dermatology.\par \par 10/01/2019: Patient recently developed firm nodule at the posterior edge of his scalp skin graft.  Biopsy performed 08/27/2019 demonstrated atypical epithelioid cells for which an underlying neoplasm could not be ruled out.  He is s/p deeper biopsy by dermatology last week for which results are pending.  He offers no new complaints.\par \par 01/07/2020: Overall doing well, but developed an area of ulceration in vertex scalp.  Recent biopsy 12/18/2019 did not demonstrate evidence of recurrent malignancy.  He denies pain or drainage.\par \par 07/23/2020: Patient was seen by dermatology yesterday and underwent biopsy of a brown pigmented scalp lesion posterior to skin graft.  He continues to have a nonhealing ulcer at the vertex scalp at site of prior biopsy.  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  he offers no new complaints.\par \par 10/08/2020: Patient underwent scalp excision of melanoma in-situ and nonhealing area 09/11/2020.  A significant portion of his pre-existing skin graft and adjacent scalp.  An additional left parietal scalp lesion was excised as well.\par Pathology: scalp biopsy - small foci of squamous cell carcinoma in situ, epidermal cyst, no melanoma identified.  Parietal scalp lesion - squamous cell carcinoma in situ - extending to one margin.\par Scalp lesion was covered with Integra and is granulating well.  He is pending formal skin graft coverage.\par \par 01/19/2021: Patient is doing well.  He has recovered well from scalp skin graft.  He denies pain.  He has not have dermatology follow up since surgery 09/2020.\par \par 04/13/2021: Patient developed non-healing wound at vertex scalp since his last visit.  Biopsy of the area by dermatology 02/2021 showed actinic keratosis without evidence of malignancy.  He denies pain in the area.\par \par 07/13/2021: Patient remains stable without new malignant skin biopsies.  He denies pain in scalp excision sites.\par \par 09/28/2021: Patient reports increased redness diffusely along scalp, followed by dermatology.  He is applying topical treatment.\par \par 01/18/2022: Patient reports improvement in scalp irritation/redness.  He is scheduled to see dermatology soon.  Still recovering from back injury.\par \par 02/22/2022: Since his last appointment 01/18/2022, patient developed a raised nodule over the vertex scalp.  Ultrasound 02/17/2022 showed a thin layer of complex fluid with surrounding edema and hyperemia lifting the overlying subcutaneous fat, most consistent with an inflammatory/infectious fluid collection.  He denies fever.\par \par \par 03/24/2022: FNA from scalp lesion 02/22/22 demonstrated malignant cells, QNS for characterization.  Discussed with patient and son over telephone previously, resection with plastic reconstruction scheduled for 04/04/2022.  He has seen Dr. Brown from plastic surgery.  He offers no complaints, except there is another smaller lesion on right side of vertex scalp adjacent to eschar region.\par \par 05/10/2022: Patient is s/p excision of malignant vertex scalp mass with Integra coverage 04/04/2022.  He is recovering well except for central portion of wound with graft loss.\par Intra-operative finding of tumor invasion into skull.\par Pathology: at least 1.3 mm thick, ulcerated melanoma with positive deep margin and focally anterior left margin.  Evidence of melanoma cell with bony fragments.\par \par 05/09/2023: Patient has completed immunotherapy and radiation therapy for locally advanced scalp melanoma.  He has a chronic nonhealing scalp wound being treated by wound care specialists.\par PET/CT 11/2022 shows response to scalp melanoma with minimal residual FDG activity.

## 2023-05-10 ENCOUNTER — APPOINTMENT (OUTPATIENT)
Dept: DERMATOLOGY | Facility: CLINIC | Age: 88
End: 2023-05-10
Payer: MEDICARE

## 2023-05-10 DIAGNOSIS — Z12.83 ENCOUNTER FOR SCREENING FOR MALIGNANT NEOPLASM OF SKIN: ICD-10-CM

## 2023-05-10 DIAGNOSIS — Z86.018 PERSONAL HISTORY OF OTHER BENIGN NEOPLASM: ICD-10-CM

## 2023-05-10 DIAGNOSIS — L98.8 OTHER SPECIFIED DISORDERS OF THE SKIN AND SUBCUTANEOUS TISSUE: ICD-10-CM

## 2023-05-10 PROCEDURE — 99214 OFFICE O/P EST MOD 30 MIN: CPT | Mod: 25

## 2023-05-10 PROCEDURE — 17004 DESTROY PREMAL LESIONS 15/>: CPT

## 2023-05-10 RX ORDER — BETAMETHASONE DIPROPIONATE 0.5 MG/G
0.05 OINTMENT TOPICAL TWICE DAILY
Qty: 1 | Refills: 3 | Status: ACTIVE | COMMUNITY
Start: 2023-05-10 | End: 1900-01-01

## 2023-05-10 NOTE — HISTORY OF PRESENT ILLNESS
[FreeTextEntry1] : F/u SCC, skin growths [de-identified] : PCP: ADIS OSHEA\par \par BEATRIS SHEA is a 87 year M who presents for evaluation of:\par \par Skin growths on scalp\par He has an extensive history of NMSC. Last treated for SCC on the upper back and right cheek x 2 with EDC. \par \par Skin cancer history: \par 2018 - AFX s/p excision with incidental finding of poorly diff SCC. Margins negative. Reconstructed with skin graft. \par 2020 - Melanoma in situ of the scalp (adjacent to skin graft) s/p excision with Integra reconstruction.\par 2022 - Soft tissue lesions of scalp - dx with [id number] melanoma s/p excision and RT to scalp 6000 cGy given gross disease at deep margin and evidence of melanoma within bony fragments. Currently on Nivolumab (C1 5/24/22, C6 10/11/22). PET CT on 11/2 without metastasis and interval response to therapy of scalp lesion

## 2023-05-10 NOTE — ASSESSMENT
[FreeTextEntry1] : 1. [id number] melanoma of the scalp s/p excision, RT currently on immunotherapy\par - Follow up with rad onc and med onc \par - No concerning skin lesions on examination today\par \par 2. Actinic keratosis, hypertrophic actinic keratosis\par - scalp - 16 total\par - The risks/benefits/alternatives of cryo-destruction was explained to the patient which, include but are not limited to redness, swelling, pain, blistering, scar, discoloration of skin, and recurrence. The patient expressed understanding of these risks and agreed to the procedure. #6 lesions treated with 2 cycle of LN2. The procedure was well tolerated, without complication. We have discussed related skin care.\par \par 3. Hx of SCC s/p EDC right upper back - ISABEL\par \par 4. Solar lentigo\par 5. Benign appearing nevi\par 6. Skin cancer screening\par - A complete skin exam was performed\par - No other concerning lesions identified\par - Call for new or changing lesions\par \par 7. EPS\par betamethasone ointment BID PRN; SED\par \par RTC 6 months TBSE, sooner PRN

## 2023-05-10 NOTE — PHYSICAL EXAM
[Alert] : alert [Oriented x 3] : ~L oriented x 3 [Full Body Skin Exam Performed] : performed [FreeTextEntry3] : - Extensive photodamage\par - Rough erythematous gritty papules on central chest, L chest, R shoulder, L upper back, R upper back, R cheek\par - Upper back EDC site well healed - ISABEL\par - Chronic wound on vertex scalp\par - Brown patch on L thigh\par - Scattered symmetric brown macules \par - No head and neck LAD\par - scaling erythematous papules on scalp

## 2023-05-30 ENCOUNTER — APPOINTMENT (OUTPATIENT)
Dept: WOUND CARE | Facility: CLINIC | Age: 88
End: 2023-05-30
Payer: MEDICARE

## 2023-05-30 VITALS
TEMPERATURE: 98.2 F | SYSTOLIC BLOOD PRESSURE: 106 MMHG | OXYGEN SATURATION: 98 % | HEART RATE: 65 BPM | RESPIRATION RATE: 16 BRPM | DIASTOLIC BLOOD PRESSURE: 73 MMHG

## 2023-05-30 PROCEDURE — 99214 OFFICE O/P EST MOD 30 MIN: CPT

## 2023-06-02 NOTE — DATA REVIEWED
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 \par On 8/10/2022 completed RT to scalp total dose of 6000 cGy. \par Excellent clinical response with restaging PET; response to therapy in the scalp lesion, with minimal residual FDG uptake. No metastases were identified. \par Patient has f/u with dermatology for debridement of scalp - to see if it is needed.\par \par Hypotension: He takes Midodrine 10 mg PO BID as needed.\par Will give 500 mg of fluids with Nivolumab.\par Patient at baseline generally stays in sitting or laying position at home.\par Family does try to keep patient engage when they're available.\par At times walk with him around the house when he is having his good days.\par Uses the at home bicycle for 10 minutes/day when feeling energetic.\par \par 03/03/2023:\par C11 Nivolumab.\par No major irAEs.\par Patient completely debridement of his scalp.\par The scalp is healing properly.\par Labs reviewed.\par Patient PSA is elevated. He will f/u with his urologist on 06/2023.\par Will perform signatera elliot. \par \par \par \par Interval History: Surgical Oncology: Gonzalo Stevens\par PCP/Cardiology: Justin Joy\par Pulmonary: Alonso Turner\par Renal: Justin Pryor\par GI: Steve Fan\par Dermatology: Durga Tobias\par [facility] [place] [facility] [place] - follows pancreatic nodule\par

## 2023-06-02 NOTE — PLAN
[FreeTextEntry1] : 5/30/23\par Plan - c/w wash wound soap and water/hydrogel/adaptic/aquacel/4x4/tape\par before dressing  soak with vashe\par follow up 1 month

## 2023-06-02 NOTE — PHYSICAL EXAM
[Ankle Swelling Bilaterally] : bilaterally  [Skin Ulcer] : ulcer [Oriented to Person] : oriented to person [Oriented to Place] : oriented to place [Oriented to Time] : oriented to time [Calm] : calm [Please See PDF for Tissue Analytics] : Please See PDF for [employer]. [Ankle Swelling (On Exam)] : not present [de-identified] : well groomed, NAD [de-identified] : weak arms and legs

## 2023-06-02 NOTE — ASSESSMENT
[FreeTextEntry1] : Mr. BEATRIS SHEA   presents to the office with a wound for over 1 year duration.  The wound is located on  the scalp.  The patient has complaints of pain when being cleaned   The patient has been dressing the wound with wound cleanser and telfa. The patient denies fevers or chills.  The patient has localized pain to the wound upon dressing changes.  The patient has no other complaints or associated symptoms.  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  with room lights dimmed  The fluorescence imaging device was placed 8-12 cm from the patient and the clinical darkness required for imaging was obtained  \par The [employer] i:X fluorescence imaging device was used to report presence and location  cyan and whit  fluorescence, indicative of bacteria at loads greater than 104 CFU/g in real-time. Images reported to have  fluorescence. The wound was -mechanically cleansed with Dakins 0.125%/Iodine/0.9% saline) and/or underwent excisional debridement.  Fluorescence images acquired after cleansing demonstrated reduction in bacteria.\par  \par 5/2/23\par Patient here for follow up of mohs surgery of the scalp. \par Wound bed crusted with slough,  exudate is milky and purulent, no odor\par wound culture\par Mechanical debridement\par Washed with Vashe\par HydrogelAdaptic/Aquacel/gauze/tape\par supplies ordered\par 5/30/23\par s/p mohs surgery\par scalp wound improved, removed some crustings to periwound, mechanical debridement\par \par \par \par

## 2023-06-14 ENCOUNTER — RX RENEWAL (OUTPATIENT)
Age: 88
End: 2023-06-14

## 2023-06-15 ENCOUNTER — OUTPATIENT (OUTPATIENT)
Dept: OUTPATIENT SERVICES | Facility: HOSPITAL | Age: 88
LOS: 1 days | End: 2023-06-15
Payer: MEDICARE

## 2023-06-15 ENCOUNTER — APPOINTMENT (OUTPATIENT)
Dept: CT IMAGING | Facility: CLINIC | Age: 88
End: 2023-06-15
Payer: MEDICARE

## 2023-06-15 DIAGNOSIS — Z98.890 OTHER SPECIFIED POSTPROCEDURAL STATES: Chronic | ICD-10-CM

## 2023-06-15 DIAGNOSIS — C44.90 UNSPECIFIED MALIGNANT NEOPLASM OF SKIN, UNSPECIFIED: Chronic | ICD-10-CM

## 2023-06-15 DIAGNOSIS — Z87.09 PERSONAL HISTORY OF OTHER DISEASES OF THE RESPIRATORY SYSTEM: Chronic | ICD-10-CM

## 2023-06-15 DIAGNOSIS — Z90.49 ACQUIRED ABSENCE OF OTHER SPECIFIED PARTS OF DIGESTIVE TRACT: Chronic | ICD-10-CM

## 2023-06-15 DIAGNOSIS — R91.8 OTHER NONSPECIFIC ABNORMAL FINDING OF LUNG FIELD: ICD-10-CM

## 2023-06-15 DIAGNOSIS — Z95.818 PRESENCE OF OTHER CARDIAC IMPLANTS AND GRAFTS: Chronic | ICD-10-CM

## 2023-06-15 DIAGNOSIS — Z98.61 CORONARY ANGIOPLASTY STATUS: Chronic | ICD-10-CM

## 2023-06-15 DIAGNOSIS — Z98.41 CATARACT EXTRACTION STATUS, RIGHT EYE: Chronic | ICD-10-CM

## 2023-06-15 PROCEDURE — 71250 CT THORAX DX C-: CPT

## 2023-06-15 PROCEDURE — 71250 CT THORAX DX C-: CPT | Mod: 26,MH

## 2023-06-18 ENCOUNTER — RX RENEWAL (OUTPATIENT)
Age: 88
End: 2023-06-18

## 2023-06-27 ENCOUNTER — OUTPATIENT (OUTPATIENT)
Dept: OUTPATIENT SERVICES | Facility: HOSPITAL | Age: 88
LOS: 1 days | End: 2023-06-27

## 2023-06-27 ENCOUNTER — APPOINTMENT (OUTPATIENT)
Dept: WOUND CARE | Facility: CLINIC | Age: 88
End: 2023-06-27
Payer: MEDICARE

## 2023-06-27 VITALS — HEART RATE: 72 BPM | OXYGEN SATURATION: 97 % | DIASTOLIC BLOOD PRESSURE: 68 MMHG | SYSTOLIC BLOOD PRESSURE: 102 MMHG

## 2023-06-27 DIAGNOSIS — Z98.890 OTHER SPECIFIED POSTPROCEDURAL STATES: Chronic | ICD-10-CM

## 2023-06-27 DIAGNOSIS — Z87.09 PERSONAL HISTORY OF OTHER DISEASES OF THE RESPIRATORY SYSTEM: Chronic | ICD-10-CM

## 2023-06-27 DIAGNOSIS — C44.90 UNSPECIFIED MALIGNANT NEOPLASM OF SKIN, UNSPECIFIED: Chronic | ICD-10-CM

## 2023-06-27 DIAGNOSIS — Z90.49 ACQUIRED ABSENCE OF OTHER SPECIFIED PARTS OF DIGESTIVE TRACT: Chronic | ICD-10-CM

## 2023-06-27 DIAGNOSIS — Z98.61 CORONARY ANGIOPLASTY STATUS: Chronic | ICD-10-CM

## 2023-06-27 DIAGNOSIS — Z98.41 CATARACT EXTRACTION STATUS, RIGHT EYE: Chronic | ICD-10-CM

## 2023-06-27 DIAGNOSIS — Z95.818 PRESENCE OF OTHER CARDIAC IMPLANTS AND GRAFTS: Chronic | ICD-10-CM

## 2023-06-27 PROCEDURE — 11042 DBRDMT SUBQ TIS 1ST 20SQCM/<: CPT

## 2023-06-28 NOTE — PLAN
[FreeTextEntry1] : 6/27/23\par Plan - supplies ordered\par wash with soap and water, stop adaptic and hydrogel, clean with vashe, aquacel/4x4/secure with tape\par follow up 1 month

## 2023-06-28 NOTE — ASSESSMENT
[FreeTextEntry1] : Mr. BEATRIS SHEA   presents to the office with a wound for over 1 year duration.  The wound is located on  the scalp.  The patient has complaints of pain when being cleaned   The patient has been dressing the wound with wound cleanser and telfa. The patient denies fevers or chills.  The patient has localized pain to the wound upon dressing changes.  The patient has no other complaints or associated symptoms.  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  with room lights dimmed  The fluorescence imaging device was placed 8-12 cm from the patient and the clinical darkness required for imaging was obtained  \par The [employer] i:X fluorescence imaging device was used to report presence and location  cyan and whit  fluorescence, indicative of bacteria at loads greater than 104 CFU/g in real-time. Images reported to have  fluorescence. The wound was -mechanically cleansed with Dakins 0.125%/Iodine/0.9% saline) and/or underwent excisional debridement.  Fluorescence images acquired after cleansing demonstrated reduction in bacteria.\par  \par 5/2/23\par Patient here for follow up of mohs surgery of the scalp. \par Wound bed crusted with slough,  exudate is milky and purulent, no odor\par wound culture\par Mechanical debridement\par Washed with Vashe\par HydrogelAdaptic/Aquacel/gauze/tape\par supplies ordered\par 5/30/23\par s/p mohs surgery\par scalp wound improved, removed some crustings to periwound, mechanical debridement\par 6/27/23\par scalp continues to improve, remaining skin 15% with yellow slough, periwound crusting, s/p excisional debridement\par remaining is bone 85%\par \par \par \par

## 2023-06-28 NOTE — PHYSICAL EXAM
[Ankle Swelling Bilaterally] : bilaterally  [Skin Ulcer] : ulcer [Oriented to Person] : oriented to person [Oriented to Place] : oriented to place [Oriented to Time] : oriented to time [Calm] : calm [Please See PDF for Tissue Analytics] : Please See PDF for [employer]. [Ankle Swelling (On Exam)] : not present [de-identified] : well groomed, NAD [de-identified] : weak arms and legs

## 2023-06-28 NOTE — DATA REVIEWED
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 \par On 8/10/2022 completed RT to scalp total dose of 6000 cGy. \par Excellent clinical response with restaging PET; response to therapy in the scalp lesion, with minimal residual FDG uptake. No metastases were identified. \par Patient has f/u with dermatology for debridement of scalp - to see if it is needed.\par \par Hypotension: He takes Midodrine 10 mg PO BID as needed.\par Will give 500 mg of fluids with Nivolumab.\par Patient at baseline generally stays in sitting or laying position at home.\par Family does try to keep patient engage when they're available.\par At times walk with him around the house when he is having his good days.\par Uses the at home bicycle for 10 minutes/day when feeling energetic.\par \par 03/03/2023:\par C11 Nivolumab.\par No major irAEs.\par Patient completely debridement of his scalp.\par The scalp is healing properly.\par Labs reviewed.\par Patient PSA is elevated. He will f/u with his urologist on 06/2023.\par Will perform signatera elliot. \par \par \par \par Interval History: Surgical Oncology: Gonzalo Stevens\par PCP/Cardiology: Justin Joy\par Pulmonary: Alonso Turner\par Renal: Justin Pryor\par GI: Steve Fan\par Dermatology: Durga Tobias\par [facility] Denis - follows pancreatic nodule\par

## 2023-07-04 ENCOUNTER — NON-APPOINTMENT (OUTPATIENT)
Age: 88
End: 2023-07-04

## 2023-07-10 ENCOUNTER — APPOINTMENT (OUTPATIENT)
Dept: PULMONOLOGY | Facility: CLINIC | Age: 88
End: 2023-07-10
Payer: MEDICARE

## 2023-07-10 ENCOUNTER — EMERGENCY (EMERGENCY)
Facility: HOSPITAL | Age: 88
LOS: 1 days | Discharge: HOME | End: 2023-07-10
Attending: EMERGENCY MEDICINE | Admitting: EMERGENCY MEDICINE
Payer: MEDICARE

## 2023-07-10 VITALS
RESPIRATION RATE: 18 BRPM | SYSTOLIC BLOOD PRESSURE: 89 MMHG | DIASTOLIC BLOOD PRESSURE: 60 MMHG | TEMPERATURE: 98 F | HEART RATE: 67 BPM

## 2023-07-10 VITALS
HEART RATE: 68 BPM | RESPIRATION RATE: 16 BRPM | SYSTOLIC BLOOD PRESSURE: 120 MMHG | OXYGEN SATURATION: 100 % | TEMPERATURE: 98 F | DIASTOLIC BLOOD PRESSURE: 61 MMHG

## 2023-07-10 VITALS
DIASTOLIC BLOOD PRESSURE: 41 MMHG | RESPIRATION RATE: 16 BRPM | OXYGEN SATURATION: 95 % | SYSTOLIC BLOOD PRESSURE: 61 MMHG | HEART RATE: 74 BPM

## 2023-07-10 VITALS — SYSTOLIC BLOOD PRESSURE: 78 MMHG | DIASTOLIC BLOOD PRESSURE: 48 MMHG

## 2023-07-10 DIAGNOSIS — Z90.49 ACQUIRED ABSENCE OF OTHER SPECIFIED PARTS OF DIGESTIVE TRACT: Chronic | ICD-10-CM

## 2023-07-10 DIAGNOSIS — Z98.890 OTHER SPECIFIED POSTPROCEDURAL STATES: Chronic | ICD-10-CM

## 2023-07-10 DIAGNOSIS — C44.90 UNSPECIFIED MALIGNANT NEOPLASM OF SKIN, UNSPECIFIED: Chronic | ICD-10-CM

## 2023-07-10 DIAGNOSIS — Z87.09 PERSONAL HISTORY OF OTHER DISEASES OF THE RESPIRATORY SYSTEM: Chronic | ICD-10-CM

## 2023-07-10 DIAGNOSIS — Z98.61 CORONARY ANGIOPLASTY STATUS: Chronic | ICD-10-CM

## 2023-07-10 DIAGNOSIS — Z98.41 CATARACT EXTRACTION STATUS, RIGHT EYE: Chronic | ICD-10-CM

## 2023-07-10 DIAGNOSIS — R91.8 OTHER NONSPECIFIC ABNORMAL FINDING OF LUNG FIELD: ICD-10-CM

## 2023-07-10 DIAGNOSIS — Z95.818 PRESENCE OF OTHER CARDIAC IMPLANTS AND GRAFTS: Chronic | ICD-10-CM

## 2023-07-10 LAB
ALBUMIN SERPL ELPH-MCNC: 3.8 G/DL — SIGNIFICANT CHANGE UP (ref 3.3–5)
ALP SERPL-CCNC: 100 U/L — SIGNIFICANT CHANGE UP (ref 40–120)
ALT FLD-CCNC: 16 U/L — SIGNIFICANT CHANGE UP (ref 4–41)
ANION GAP SERPL CALC-SCNC: 13 MMOL/L — SIGNIFICANT CHANGE UP (ref 7–14)
APPEARANCE UR: CLEAR — SIGNIFICANT CHANGE UP
AST SERPL-CCNC: 13 U/L — SIGNIFICANT CHANGE UP (ref 4–40)
BACTERIA # UR AUTO: NEGATIVE /HPF — SIGNIFICANT CHANGE UP
BASOPHILS # BLD AUTO: 0.03 K/UL — SIGNIFICANT CHANGE UP (ref 0–0.2)
BASOPHILS NFR BLD AUTO: 0.4 % — SIGNIFICANT CHANGE UP (ref 0–2)
BILIRUB SERPL-MCNC: 0.2 MG/DL — SIGNIFICANT CHANGE UP (ref 0.2–1.2)
BILIRUB UR-MCNC: NEGATIVE — SIGNIFICANT CHANGE UP
BUN SERPL-MCNC: 29 MG/DL — HIGH (ref 7–23)
CALCIUM SERPL-MCNC: 9.5 MG/DL — SIGNIFICANT CHANGE UP (ref 8.4–10.5)
CAST: 4 /LPF — SIGNIFICANT CHANGE UP (ref 0–4)
CHLORIDE SERPL-SCNC: 105 MMOL/L — SIGNIFICANT CHANGE UP (ref 98–107)
CO2 SERPL-SCNC: 22 MMOL/L — SIGNIFICANT CHANGE UP (ref 22–31)
COLOR SPEC: YELLOW — SIGNIFICANT CHANGE UP
CREAT SERPL-MCNC: 1.44 MG/DL — HIGH (ref 0.5–1.3)
DIFF PNL FLD: NEGATIVE — SIGNIFICANT CHANGE UP
EGFR: 47 ML/MIN/1.73M2 — LOW
EOSINOPHIL # BLD AUTO: 0.04 K/UL — SIGNIFICANT CHANGE UP (ref 0–0.5)
EOSINOPHIL NFR BLD AUTO: 0.6 % — SIGNIFICANT CHANGE UP (ref 0–6)
GLUCOSE SERPL-MCNC: 137 MG/DL — HIGH (ref 70–99)
GLUCOSE UR QL: NEGATIVE MG/DL — SIGNIFICANT CHANGE UP
HCT VFR BLD CALC: 30.3 % — LOW (ref 39–50)
HGB BLD-MCNC: 9.4 G/DL — LOW (ref 13–17)
IANC: 5.71 K/UL — SIGNIFICANT CHANGE UP (ref 1.8–7.4)
IMM GRANULOCYTES NFR BLD AUTO: 0.4 % — SIGNIFICANT CHANGE UP (ref 0–0.9)
KETONES UR-MCNC: NEGATIVE MG/DL — SIGNIFICANT CHANGE UP
LEUKOCYTE ESTERASE UR-ACNC: NEGATIVE — SIGNIFICANT CHANGE UP
LYMPHOCYTES # BLD AUTO: 0.8 K/UL — LOW (ref 1–3.3)
LYMPHOCYTES # BLD AUTO: 11.3 % — LOW (ref 13–44)
MAGNESIUM SERPL-MCNC: 1.9 MG/DL — SIGNIFICANT CHANGE UP (ref 1.6–2.6)
MCHC RBC-ENTMCNC: 29.8 PG — SIGNIFICANT CHANGE UP (ref 27–34)
MCHC RBC-ENTMCNC: 31 GM/DL — LOW (ref 32–36)
MCV RBC AUTO: 96.2 FL — SIGNIFICANT CHANGE UP (ref 80–100)
MONOCYTES # BLD AUTO: 0.47 K/UL — SIGNIFICANT CHANGE UP (ref 0–0.9)
MONOCYTES NFR BLD AUTO: 6.6 % — SIGNIFICANT CHANGE UP (ref 2–14)
NEUTROPHILS # BLD AUTO: 5.71 K/UL — SIGNIFICANT CHANGE UP (ref 1.8–7.4)
NEUTROPHILS NFR BLD AUTO: 80.7 % — HIGH (ref 43–77)
NITRITE UR-MCNC: NEGATIVE — SIGNIFICANT CHANGE UP
NRBC # BLD: 0 /100 WBCS — SIGNIFICANT CHANGE UP (ref 0–0)
NRBC # FLD: 0 K/UL — SIGNIFICANT CHANGE UP (ref 0–0)
PH UR: 6 — SIGNIFICANT CHANGE UP (ref 5–8)
PHOSPHATE SERPL-MCNC: 2.4 MG/DL — LOW (ref 2.5–4.5)
PLATELET # BLD AUTO: 337 K/UL — SIGNIFICANT CHANGE UP (ref 150–400)
POTASSIUM SERPL-MCNC: 3.9 MMOL/L — SIGNIFICANT CHANGE UP (ref 3.5–5.3)
POTASSIUM SERPL-SCNC: 3.9 MMOL/L — SIGNIFICANT CHANGE UP (ref 3.5–5.3)
PROT SERPL-MCNC: 7 G/DL — SIGNIFICANT CHANGE UP (ref 6–8.3)
PROT UR-MCNC: SIGNIFICANT CHANGE UP MG/DL
RBC # BLD: 3.15 M/UL — LOW (ref 4.2–5.8)
RBC # FLD: 12.8 % — SIGNIFICANT CHANGE UP (ref 10.3–14.5)
RBC CASTS # UR COMP ASSIST: 1 /HPF — SIGNIFICANT CHANGE UP (ref 0–4)
SODIUM SERPL-SCNC: 140 MMOL/L — SIGNIFICANT CHANGE UP (ref 135–145)
SP GR SPEC: 1.02 — SIGNIFICANT CHANGE UP (ref 1–1.03)
SQUAMOUS # UR AUTO: 1 /HPF — SIGNIFICANT CHANGE UP (ref 0–5)
UROBILINOGEN FLD QL: 1 MG/DL — SIGNIFICANT CHANGE UP (ref 0.2–1)
WBC # BLD: 7.08 K/UL — SIGNIFICANT CHANGE UP (ref 3.8–10.5)
WBC # FLD AUTO: 7.08 K/UL — SIGNIFICANT CHANGE UP (ref 3.8–10.5)
WBC UR QL: 1 /HPF — SIGNIFICANT CHANGE UP (ref 0–5)

## 2023-07-10 PROCEDURE — 93010 ELECTROCARDIOGRAM REPORT: CPT

## 2023-07-10 PROCEDURE — 99214 OFFICE O/P EST MOD 30 MIN: CPT

## 2023-07-10 PROCEDURE — 99284 EMERGENCY DEPT VISIT MOD MDM: CPT | Mod: GC

## 2023-07-10 NOTE — ED PROVIDER NOTE - ATTENDING CONTRIBUTION TO CARE
GEN - NAD; well appearing; A+O x3   EYES- PERRL, EOMI  ENT: Airway patent, mmm, Oral cavity and pharynx normal. No inflammation, swelling, exudate, or lesions.  NECK: Neck supple  PULMONARY - CTA b/l, symmetric breath sounds.   CARDIAC -s1s2, RRR, no M,G,R  ABDOMEN - +BS, ND, NT, soft, no guarding, no rebound, no masses   BACK - no CVA tenderness, Normal  spine   EXTREMITIES - FROM, symmetric pulses, capillary refill < 2 seconds, no edema   SKIN - no rash or bruising, healing wound to scalp without any erythema/warmth/swelling/induration  NEUROLOGIC - alert, speech clear, no focal deficits  PSYCH -nl mood/affect, nl insight.  a/p-agree with above hpi, on my evaluation-patient reporting he feels "great", has felt at his baseline today and in recent few weeks, has no concerns or complaints. DId not feel lightheaded or dizzy when his bp was low earlier today and states he routinely has low bps as per his chronic hypotension condition for which he utilizes midodrine 10mg as needed if his pressures drop. Reports he only came because his cardiologist told his pulmonologist to send him when they found the systolic to be low. Reports he does not and has not had any recent fevers, chills, cough, cp, sob, abd pain, vomiting, diarrhea, dysuria. Has well healing wounds to his scalp which he has had no recent issues with. His bp corrected prior to ed eval. Plan for labs, ua/cx to screen for anemia, elec dist, organ dysfunction, infection, suspect likely result of his chronic known condition given he was asymptomatic throughout and has no residual concerns or complaints. patient and friend at bedside informed and agreeable to plan.

## 2023-07-10 NOTE — ED PROVIDER NOTE - NSFOLLOWUPINSTRUCTIONS_ED_ALL_ED_FT
You were seen in the emergency department. Your results are attached.     Please follow up with your primary care doctor. Continue all medications as prescribed to you.     Please return to the emergency department with any new or worsening symptoms, including but not limited to:  -Dizziness  -Severe headache  -You are confused or faint  -New swelling in the legs  -Chest pain  -Shortness of breath  -High fevers    Hypotension  As the heart beats, it forces blood through the body. Hypotension, commonly called low blood pressure, is when the force of blood pumping through the arteries is too weak. Arteries are blood vessels that carry blood from the heart throughout the body. Depending on the cause and severity, hypotension may be harmless (benign) or may cause serious problems (be critical).    When your blood pressure is too low, you may not get enough blood to your brain or to the rest of your organs. This can cause weakness, light-headedness, a rapid heartbeat, and fainting.    What are the causes?  This condition may be caused by:  Blood loss.  Loss of body fluids (dehydration).  Heart problems.  Hormone (endocrine) problems.  Pregnancy.  Severe infection.  Lack of certain nutrients.  Severe allergic reactions (anaphylaxis).  Certain medicines, such as blood pressure medicine or medicines that make the body lose excess fluids (diuretics). Sometimes, hypotension may be caused by not taking medicine as directed, such as taking too much of a certain medicine.  What increases the risk?  The following factors may make you more likely to develop this condition:  Age. Risk increases as you get older.  Having a condition that affects the heart or the central nervous system.  What are the signs or symptoms?  Common symptoms of this condition include:  Weakness.  Light-headedness.  Dizziness.  Blurred vision.  Tiredness (fatigue).  Rapid heartbeat.  Fainting, in severe cases.  How is this diagnosed?  This condition is diagnosed based on:  Your medical history.  Your symptoms.  Your blood pressure measurement. Your health care provider will check your blood pressure when you are:  Lying down.  Sitting.  Standing.  A blood pressure reading is recorded as two numbers, such as "120 over 80" (or 120/80). The first ("top") number is called the systolic pressure. It is a measure of the pressure in your arteries as your heart beats. The second ("bottom") number is called the diastolic pressure. It is a measure of the pressure in your arteries when your heart relaxes between beats. Blood pressure is measured in a unit called mm Hg. Healthy blood pressure for most adults is 120/80. If your blood pressure is below 90/60, you may be diagnosed with hypotension.    Other information or tests that may be used to diagnose hypotension include:  Your other vital signs, such as your heart rate and temperature.  Blood tests.  Tilt table test. For this test, you will be safely secured to a table that moves you from a lying position to an upright position. Your heart rhythm and blood pressure will be monitored during the test.  How is this treated?  Treatment for this condition may include:  Changing your diet. This may involve drinking more water or increasing your salt (sodium) intake with high-sodium foods.  Taking medicines to raise your blood pressure.  Changing the dosage of certain medicines you are taking that might be lowering your blood pressure.  Wearing compression stockings. These stockings help to prevent blood clots and reduce swelling in your legs.  In some cases, you may need to go to the hospital for:  Fluid replacement. This means you will receive fluids through an IV.  Blood replacement. This means you will receive donated blood through an IV (transfusion).  Treating an infection or heart problems, if this applies.  Monitoring. You may need to be monitored while medicines that you are taking wear off.  Follow these instructions at home:  Eating and drinking    A plate with examples of foods in a healthy diet.  Drink enough fluid to keep your urine pale yellow.  Eat a healthy diet, and follow instructions from your health care provider about eating or drinking restrictions. A healthy diet includes:  Fresh fruits and vegetables.  Whole grains.  Lean meats.  Low-fat dairy products.  Increase your salt intake if told to do so. Do not add extra salt to your diet unless your health care provider tells you to do that.  Eat frequent, small meals.  Avoid standing up suddenly after eating.  Medicines    Take over-the-counter and prescription medicines only as told by your health care provider.  Follow instructions from your health care provider about changing the dosage of your current medicines, if this applies.  Do not stop or adjust any of your medicines on your own.  General instructions    Compression stockings on a person's lower legs.  Wear compression stockings as told by your health care provider.  Get up slowly from lying down or sitting positions. This gives your blood pressure a chance to adjust.  Avoid hot showers and excessive heat as directed by your health care provider.  Return to your normal activities as told by your health care provider. Ask your health care provider what activities are safe for you.  Do not use any products that contain nicotine or tobacco. These products include cigarettes, chewing tobacco, and vaping devices, such as e-cigarettes. If you need help quitting, ask your health care provider.  Keep all follow-up visits. This is important.  Contact a health care provider if:  You vomit.  You have diarrhea.  You have a fever for more than 2–3 days.  You feel more thirsty than usual.  You feel weak and tired.  Get help right away if:  You have chest pain.  You have a fast or irregular heartbeat.  You develop numbness in any part of your body.  You cannot move your arms or your legs.  You have trouble speaking.  You become sweaty or feel light-headed.  You faint.  You feel short of breath.  You have trouble staying awake.  You feel confused.  These symptoms may be an emergency. Get help right away. Call 911.  Do not wait to see if the symptoms will go away.  Do not drive yourself to the hospital.  Summary  Hypotension is when the force of blood pumping through the arteries is too weak.  Hypotension may be harmless (benign) or may cause serious problems (be critical).  Treatment for this condition may include changing your diet, changing your medicines, and wearing compression stockings.  In some cases, you may need to go to the hospital for fluid or blood replacement.  This information is not intended to replace advice given to you by your health care provider. Make sure you discuss any questions you have with your health care provider.

## 2023-07-10 NOTE — ED PROVIDER NOTE - NS ED ROS FT
GENERAL: + hypotension at the outside office, no fever  EYES: no eye pain  HEENT: no neck pain  CARDIAC: no chest pain  PULMONARY: no SOB  GI: no abdominal pain  : no dysuria  SKIN: no rashes  NEURO: no headache  MSK: no new joint pain

## 2023-07-10 NOTE — ED PROVIDER NOTE - PHYSICAL EXAMINATION
Gen: NAD, non-toxic appearing  Head: normal appearing  HEENT: normal conjunctiva  Lung: no respiratory distress, ctab      CV: regular rate and rhythm, no murmurs  Abd: soft, non distended, non tender   MSK: no visible deformities,  No calf tenderness, no evidence of interstitial edema.  Neuro: alert and grossly oriented, no gross motor deficits  Skin: No ayse rashes,  the skin over the scalp is status post debridement with a significant amount of granulation tissue, no cardinal signs of inflammation suggest infection.

## 2023-07-10 NOTE — ED PROVIDER NOTE - NSICDXPASTSURGICALHX_GEN_ALL_CORE_FT
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

## 2023-07-10 NOTE — ED ADULT NURSE NOTE - OBJECTIVE STATEMENT
Pt received in 4, sent from his pulmonologist' s office for hypotension. Pt has history of baseline hypotension, on Midodrine. Pt states he often has swings in blood pressure. Denies any symptoms. Normotensive on arrival.

## 2023-07-10 NOTE — ED PROVIDER NOTE - PATIENT PORTAL LINK FT
You can access the FollowMyHealth Patient Portal offered by [facility] by registering at the following website: [email or web address]. By joining [email or web address] FollowMyHealth portal, you will also be able to view your health information using other applications (apps) compatible with our system.

## 2023-07-10 NOTE — ED PROVIDER NOTE - OBJECTIVE STATEMENT
87-year-old male, history of melanoma status post chemo and radiation therapy, no known metastatic disease, intermittent hypotension on home midodrine and salt tabs taken as needed, presenting with a chief complaint of hypotension as documented at outpatient pulmonology clinic.  Patient has lung nodules visualized on prior imaging, referred to pulmonology clinic for follow-up of this.  Was found to be hypotensive at the office.  transferred by EMS here.  Currently without any active symptomology.  Review of systems was negative.  He has intermittent hypotensive episodes to 80s over 40s.  He takes midodrine and salt tabs at home for this.  History provided primarily by partner, who accompanies him here.  He is constantly monitor at home either with a either by partner.  No allergies to medications.  No

## 2023-07-10 NOTE — ED PROVIDER NOTE - CLINICAL SUMMARY MEDICAL DECISION MAKING FREE TEXT BOX
87-year-old male with transient episode of hypotension.  As documented at pulmonology clinic.  Currently normotensive and without clinical signs of shock state.  Given history of hypertension, with home midodrine and salt tabs, suspect manifestation of chronic hypotension.  However will screen with basic labs as well as urinalysis and urine culture and blood cultures.  If this looks unremarkable, will discharge with strict ED return precautions.  He is constantly monitored at home, either by aide or partner.  Will reassess following lab work.

## 2023-07-10 NOTE — ED PROVIDER NOTE - PROGRESS NOTE DETAILS
Emmy Lang PGY-3: Patient signed out to me.  Referred from pulmonologist for hypotension.  Patient takes midodrine as needed for systolic blood pressure less than 100.  Did not take any today.  Patient has been normotensive here.  Labs requiring no intervention.  UA negative.  Plan for discharge home.

## 2023-07-10 NOTE — ED ADULT TRIAGE NOTE - CHIEF COMPLAINT QUOTE
patient sent in by MD for low systolic of 60's, when ems arrived systolic in 90's. Pt c/o weakness. Pt dneies chest pain sob n/v. PHX htn, COPD, HLD.

## 2023-07-10 NOTE — ED ADULT NURSE NOTE - NSFALLHARMRISKINTERV_ED_ALL_ED

## 2023-07-11 NOTE — PHYSICAL EXAM
[No Acute Distress] : no acute distress [Supple] : supple [No JVD] : no jvd [Normal S1, S2] : normal s1, s2 [Clear to Auscultation Bilaterally] : clear to auscultation bilaterally [Normal to Percussion] : normal to percussion [No Abnormalities] : no abnormalities [Benign] : benign [Not Tender] : not tender [No HSM] : no hsm [No Clubbing] : no clubbing [No Cyanosis] : no cyanosis [1+ Pitting] : 1+ pitting [TextBox_2] : Feels weak.

## 2023-07-11 NOTE — DISCUSSION/SUMMARY
[FreeTextEntry1] : Hypotension of unclear etiology.  Appears weak.  Discussed with cardiology and recommended hospitalization.\par Cough persistent.  Did begin prior to therapy.  Most likely airways in origin.  Has improved.\par Mild COPD\par Stable pulmonary nodules.\par New groundglass opacities improved.  From prior CT.\par Melanoma on treatment.\par

## 2023-07-11 NOTE — ASSESSMENT
[FreeTextEntry1] : Due to weakness and hypotension patient sent to hospital.\par Discussed with cardiology.\par Will be reassessed posthospitalization for further pulmonary intervention. \par Likely no indication for follow-up CT unless change in status.\par \par \par 35 minutes spent in evaluation management and review of studies.\par

## 2023-07-11 NOTE — HISTORY OF PRESENT ILLNESS
[TextBox_4] : Feels resp. status OK but breathing shallow. \par Some chronic cough\par Took Midodrine last night and salt pill today\par Feeling weak.\par Denies recent significant illness.\par Status post CT of the chest.\par \par \par \par \par \par

## 2023-07-12 LAB
-  AMIKACIN: SIGNIFICANT CHANGE UP
-  AMOXICILLIN/CLAVULANIC ACID: SIGNIFICANT CHANGE UP
-  AMPICILLIN/SULBACTAM: SIGNIFICANT CHANGE UP
-  AMPICILLIN: SIGNIFICANT CHANGE UP
-  AZTREONAM: SIGNIFICANT CHANGE UP
-  CEFAZOLIN: SIGNIFICANT CHANGE UP
-  CEFEPIME: SIGNIFICANT CHANGE UP
-  CEFOXITIN: SIGNIFICANT CHANGE UP
-  CEFTRIAXONE: SIGNIFICANT CHANGE UP
-  CEFUROXIME: SIGNIFICANT CHANGE UP
-  CIPROFLOXACIN: SIGNIFICANT CHANGE UP
-  ERTAPENEM: SIGNIFICANT CHANGE UP
-  GENTAMICIN: SIGNIFICANT CHANGE UP
-  LEVOFLOXACIN: SIGNIFICANT CHANGE UP
-  MEROPENEM: SIGNIFICANT CHANGE UP
-  NITROFURANTOIN: SIGNIFICANT CHANGE UP
-  PIPERACILLIN/TAZOBACTAM: SIGNIFICANT CHANGE UP
-  TOBRAMYCIN: SIGNIFICANT CHANGE UP
-  TRIMETHOPRIM/SULFAMETHOXAZOLE: SIGNIFICANT CHANGE UP
CULTURE RESULTS: SIGNIFICANT CHANGE UP
METHOD TYPE: SIGNIFICANT CHANGE UP
ORGANISM # SPEC MICROSCOPIC CNT: SIGNIFICANT CHANGE UP
ORGANISM # SPEC MICROSCOPIC CNT: SIGNIFICANT CHANGE UP
SPECIMEN SOURCE: SIGNIFICANT CHANGE UP

## 2023-07-14 ENCOUNTER — NON-APPOINTMENT (OUTPATIENT)
Age: 88
End: 2023-07-14

## 2023-07-17 ENCOUNTER — NON-APPOINTMENT (OUTPATIENT)
Age: 88
End: 2023-07-17

## 2023-07-25 ENCOUNTER — APPOINTMENT (OUTPATIENT)
Dept: WOUND CARE | Facility: CLINIC | Age: 88
End: 2023-07-25
Payer: MEDICARE

## 2023-07-25 ENCOUNTER — APPOINTMENT (OUTPATIENT)
Dept: PULMONOLOGY | Facility: CLINIC | Age: 88
End: 2023-07-25

## 2023-07-25 VITALS
HEIGHT: 62 IN | DIASTOLIC BLOOD PRESSURE: 61 MMHG | HEART RATE: 66 BPM | OXYGEN SATURATION: 97 % | TEMPERATURE: 98 F | BODY MASS INDEX: 27.05 KG/M2 | SYSTOLIC BLOOD PRESSURE: 94 MMHG | WEIGHT: 147 LBS

## 2023-07-25 PROCEDURE — 11042 DBRDMT SUBQ TIS 1ST 20SQCM/<: CPT

## 2023-07-27 NOTE — REASON FOR VISIT
[Family Member] : family member [Follow-Up: _____] : a [unfilled] follow-up visit [FreeTextEntry1] : head wound

## 2023-07-27 NOTE — PLAN
[FreeTextEntry1] : 7/25/23\par Plan - supplies ordered\par c/w washing soap and water, clean with vashe/aquacel/4x4/tape, change every other day\par follow up 1 month

## 2023-07-27 NOTE — PHYSICAL EXAM
[Ankle Swelling Bilaterally] : bilaterally  [Skin Ulcer] : ulcer [Oriented to Person] : oriented to person [Oriented to Place] : oriented to place [Oriented to Time] : oriented to time [Calm] : calm [Please See PDF for Tissue Analytics] : Please See PDF for [employer]. [Ankle Swelling (On Exam)] : not present [de-identified] : well groomed, NAD [de-identified] : weak arms and legs

## 2023-07-27 NOTE — DATA REVIEWED
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 \par On 8/10/2022 completed RT to scalp total dose of 6000 cGy. \par Excellent clinical response with restaging PET; response to therapy in the scalp lesion, with minimal residual FDG uptake. No metastases were identified. \par Patient has f/u with dermatology for debridement of scalp - to see if it is needed.\par \par Hypotension: He takes Midodrine 10 mg PO BID as needed.\par Will give 500 mg of fluids with Nivolumab.\par Patient at baseline generally stays in sitting or laying position at home.\par Family does try to keep patient engage when they're available.\par At times walk with him around the house when he is having his good days.\par Uses the at home bicycle for 10 minutes/day when feeling energetic.\par \par 03/03/2023:\par C11 Nivolumab.\par No major irAEs.\par Patient completely debridement of his scalp.\par The scalp is healing properly.\par Labs reviewed.\par Patient PSA is elevated. He will f/u with his urologist on 06/2023.\par Will perform signatera elliot. \par \par \par \par Interval History: Surgical Oncology: Gonzalo Stevens\par PCP/Cardiology: Justin Joy\par Pulmonary: Alonso Turner\par Renal: Justin Pryor\par GI: Steve Fan\par Dermatology: Durga Tobias\par [facility] [facility] - follows pancreatic nodule\par

## 2023-07-27 NOTE — ASSESSMENT
[FreeTextEntry1] : Mr. BEATRIS SHEA   presents to the office with a wound for over 1 year duration.  The wound is located on  the scalp.  The patient has complaints of pain when being cleaned   The patient has been dressing the wound with wound cleanser and telfa. The patient denies fevers or chills.  The patient has localized pain to the wound upon dressing changes.  The patient has no other complaints or associated symptoms.  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  with room lights dimmed  The fluorescence imaging device was placed 8-12 cm from the patient and the clinical darkness required for imaging was obtained  \par The [email or web address] i:X fluorescence imaging device was used to report presence and location  cyan and whit  fluorescence, indicative of bacteria at loads greater than 104 CFU/g in real-time. Images reported to have  fluorescence. The wound was -mechanically cleansed with Dakins 0.125%/Iodine/0.9% saline) and/or underwent excisional debridement.  Fluorescence images acquired after cleansing demonstrated reduction in bacteria.\par  \par 5/2/23\par Patient here for follow up of mohs surgery of the scalp. \par Wound bed crusted with slough,  exudate is milky and purulent, no odor\par wound culture\par Mechanical debridement\par Washed with Vashe\par HydrogelAdaptic/Aquacel/gauze/tape\par supplies ordered\par 5/30/23\par s/p mohs surgery\par scalp wound improved, removed some crustings to periwound, mechanical debridement\par 6/27/23\par scalp continues to improve, remaining skin 15% with yellow slough, periwound crusting, s/p excisional debridement\par remaining is bone 85%\par 7/25/23\par Scalp wound width has pulled in, s/p excisional debridement of yellow slough, most liikely 1-2 more visits to clean up remaining open areas\par \par \par \par

## 2023-08-08 ENCOUNTER — APPOINTMENT (OUTPATIENT)
Dept: CARDIOLOGY | Facility: CLINIC | Age: 88
End: 2023-08-08
Payer: MEDICARE

## 2023-08-08 VITALS
BODY MASS INDEX: 24.16 KG/M2 | WEIGHT: 145 LBS | SYSTOLIC BLOOD PRESSURE: 90 MMHG | DIASTOLIC BLOOD PRESSURE: 60 MMHG | HEART RATE: 70 BPM | HEIGHT: 65 IN | OXYGEN SATURATION: 99 % | TEMPERATURE: 98.4 F

## 2023-08-08 PROCEDURE — 99214 OFFICE O/P EST MOD 30 MIN: CPT

## 2023-08-08 PROCEDURE — 93306 TTE W/DOPPLER COMPLETE: CPT

## 2023-08-08 PROCEDURE — 93000 ELECTROCARDIOGRAM COMPLETE: CPT

## 2023-08-08 NOTE — HISTORY OF PRESENT ILLNESS
[FreeTextEntry1] : This is a 87 year male with a Pmhx of anemia hx of malignant scalp tumor, CKD CAD HLD who presents to the office for routine follow up. pt here with daughter. to have EDG soon to see Dr. Marti end of the month to assess. pt on salt tabs and midodrine PRN for BPs.  continues to follow with wound care for scalp wound once a month.  Pt denies any CP, SOB, heart palpitations, dizziness, abdominal pain N/V/D fever or chills

## 2023-08-14 RX ORDER — IRON POLYSACCHARIDE COMPLEX 150 MG
150 CAPSULE ORAL DAILY
Qty: 90 | Refills: 0 | Status: ACTIVE | COMMUNITY
Start: 2023-08-14 | End: 1900-01-01

## 2023-08-22 ENCOUNTER — OUTPATIENT (OUTPATIENT)
Dept: OUTPATIENT SERVICES | Facility: HOSPITAL | Age: 88
LOS: 1 days | End: 2023-08-22
Payer: MEDICARE

## 2023-08-22 ENCOUNTER — APPOINTMENT (OUTPATIENT)
Dept: WOUND CARE | Facility: CLINIC | Age: 88
End: 2023-08-22
Payer: MEDICARE

## 2023-08-22 VITALS
SYSTOLIC BLOOD PRESSURE: 104 MMHG | HEART RATE: 72 BPM | WEIGHT: 145 LBS | HEIGHT: 65 IN | RESPIRATION RATE: 17 BRPM | DIASTOLIC BLOOD PRESSURE: 69 MMHG | TEMPERATURE: 97.7 F | BODY MASS INDEX: 24.16 KG/M2 | OXYGEN SATURATION: 98 %

## 2023-08-22 DIAGNOSIS — Z98.890 OTHER SPECIFIED POSTPROCEDURAL STATES: Chronic | ICD-10-CM

## 2023-08-22 DIAGNOSIS — Z87.09 PERSONAL HISTORY OF OTHER DISEASES OF THE RESPIRATORY SYSTEM: Chronic | ICD-10-CM

## 2023-08-22 DIAGNOSIS — C44.90 UNSPECIFIED MALIGNANT NEOPLASM OF SKIN, UNSPECIFIED: Chronic | ICD-10-CM

## 2023-08-22 DIAGNOSIS — Z98.61 CORONARY ANGIOPLASTY STATUS: Chronic | ICD-10-CM

## 2023-08-22 DIAGNOSIS — Z95.818 PRESENCE OF OTHER CARDIAC IMPLANTS AND GRAFTS: Chronic | ICD-10-CM

## 2023-08-22 DIAGNOSIS — Z90.49 ACQUIRED ABSENCE OF OTHER SPECIFIED PARTS OF DIGESTIVE TRACT: Chronic | ICD-10-CM

## 2023-08-22 DIAGNOSIS — Z98.41 CATARACT EXTRACTION STATUS, RIGHT EYE: Chronic | ICD-10-CM

## 2023-08-22 PROCEDURE — 11042 DBRDMT SUBQ TIS 1ST 20SQCM/<: CPT

## 2023-08-24 NOTE — DATA REVIEWED
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 \par  On 8/10/2022 completed RT to scalp total dose of 6000 cGy. \par  Excellent clinical response with restaging PET; response to therapy in the scalp lesion, with minimal residual FDG uptake. No metastases were identified. \par  Patient has f/u with dermatology for debridement of scalp - to see if it is needed.\par  \par  Hypotension: He takes Midodrine 10 mg PO BID as needed.\par  Will give 500 mg of fluids with Nivolumab.\par  Patient at baseline generally stays in sitting or laying position at home.\par  Family does try to keep patient engage when they're available.\par  At times walk with him around the house when he is having his good days.\par  Uses the at home bicycle for 10 minutes/day when feeling energetic.\par  \par  03/03/2023:\par  C11 Nivolumab.\par  No major irAEs.\par  Patient completely debridement of his scalp.\par  The scalp is healing properly.\par  Labs reviewed.\par  Patient PSA is elevated. He will f/u with his urologist on 06/2023.\par  Will perform signatera elliot. \par  \par  \par  \par  Interval History: Surgical Oncology: Gonzalo Stevens\par  PCP/Cardiology: Justin Joy\par  Pulmonary: Alonso Turner\par  Renal: Justin Pryor\par  GI: Steve Fan\par  Dermatology: Durga Tobias\par  [facility] Surgeon Denis - follows pancreatic nodule\par

## 2023-08-24 NOTE — ASSESSMENT
[FreeTextEntry1] :  85 year male with PMH CAD s/p stent, CKD, melanoma, basal cell CA, DJD, spinal stenosis compression fractures of L1, L3 and L5, and osteopenia presents today w/ c/o low back pain likely secondary to lumbar spinal stenosis and lumbar radiculopathy\par \par Several treatment options discussed including lumbar ERWIN.  I recommend lumbar ERWIN given severity of Mr. Pacheco's pain and refractoriness to available treatments.  Risks and benefits of lumbar ERWIN reviewed with patient.  However, Mr. Pacheco is reluctant to pursue spinal interventions at this time.\par \par - Orthopedic and Cardiology notes reviewed\par - MR L Spine w/o contrast reviewed\par - Lumbar paraspinal trigger point injections performed on this visit with significant improvement in his pain.  Ice and tylenol prn pain.\par - Cont. gabapentin 200 mg PO qHS.  Risks and benefits of this medication discussed. Creatinine clearance calculated on this visit as 28.  Will uptitrate as tolerated.\par - Patient with a hx of CKD, will avoid PO and inj NSAIDs\par - RTC 4-6 weeks, if pain persists or worsen despite compliance with above, then will consider scheduling lumbar ERWIN if patient is amenable.\par \par Justin Jaime MD\par Spine and Sports Medicine\par \par [employer]\par At [facility]\par \par 
Detail Level: Zone
Hide Additional Notes?: No

## 2023-08-24 NOTE — ASSESSMENT
[FreeTextEntry1] : Mr. BEATRIS SHEA   presents to the office with a wound for over 1 year duration.  The wound is located on  the scalp.  The patient has complaints of pain when being cleaned   The patient has been dressing the wound with wound cleanser and telfa. The patient denies fevers or chills.  The patient has localized pain to the wound upon dressing changes.  The patient has no other complaints or associated symptoms.   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  with room lights dimmed  The fluorescence imaging device was placed 8-12 cm from the patient and the clinical darkness required for imaging was obtained   The [employer] i:X fluorescence imaging device was used to report presence and location  cyan and whit  fluorescence, indicative of bacteria at loads greater than 104 CFU/g in real-time. Images reported to have  fluorescence. The wound was -mechanically cleansed with Dakins 0.125%/Iodine/0.9% saline) and/or underwent excisional debridement.  Fluorescence images acquired after cleansing demonstrated reduction in bacteria.   5/2/23 Patient here for follow up of mohs surgery of the scalp.  Wound bed crusted with slough,  exudate is milky and purulent, no odor wound culture Mechanical debridement Washed with Vashe HydrogelAdaptic/Aquacel/gauze/tape supplies ordered 5/30/23 s/p mohs surgery scalp wound improved, removed some crustings to periwound, mechanical debridement 6/27/23 scalp continues to improve, remaining skin 15% with yellow slough, periwound crusting, s/p excisional debridement remaining is bone 85% 7/25/23 Scalp wound width has pulled in, s/p excisional debridement of yellow slough, most liikely 1-2 more visits to clean up remaining open areas 8/22/23 scalp wound with scattered areas slough, s/p excisional debridement remaining area is bone

## 2023-08-24 NOTE — PHYSICAL EXAM
[Ankle Swelling Bilaterally] : bilaterally  [Skin Ulcer] : ulcer [Oriented to Person] : oriented to person [Oriented to Place] : oriented to place [Oriented to Time] : oriented to time [Calm] : calm [Please See PDF for Tissue Analytics] : Please See PDF for [employer]. [Ankle Swelling (On Exam)] : not present [de-identified] : well groomed, NAD [de-identified] : weak arms and legs

## 2023-08-24 NOTE — PLAN
[FreeTextEntry1] : 8/22/23 Plan - supplies ordered wash soap and water , wipe down vashe, AquaCell on open areas /adhesive foam, change every other day follow up 4 weeks

## 2023-08-24 NOTE — REASON FOR VISIT
[Follow-Up: _____] : a [unfilled] follow-up visit [Family Member] : family member [FreeTextEntry1] : head wound

## 2023-09-14 DIAGNOSIS — S01.00XA UNSPECIFIED OPEN WOUND OF SCALP, INITIAL ENCOUNTER: ICD-10-CM

## 2023-09-22 NOTE — DISCHARGE NOTE PROVIDER - CARE PROVIDER_API CALL
Medication:   lisinopril (ZESTRIL) 5 MG tablet 90 tablet 3 9/28/2022     Sig - Route: Take 1 tablet by mouth daily        Last office visit date: 6/28/23    Medication Refill Protocol Failed.  Failed criteria: see below. Sent to clinician to review.      ACE Inhibitor Refill Protocol - 12 Month Protocol Failed 09/21/2023 06:28 PM   Protocol Details  Last BP was under 140/90 or if patient has diabetes, CAD, or PVD, BP under 130/80 in past year           Orders pended, and routed to provider's nurse pool for review and or approval.   Please route any notes back to your nursing pool.       Thank you, Refill Center Staff   Justin Joy)  Cardiology; Internal Medicine  [address], [address]  [place] [address]  Phone: [phone]  Fax: [phone]  Established Patient  Follow Up Time: 1 week

## 2023-10-05 ENCOUNTER — RX RENEWAL (OUTPATIENT)
Age: 88
End: 2023-10-05

## 2023-10-11 ENCOUNTER — APPOINTMENT (OUTPATIENT)
Dept: WOUND CARE | Facility: CLINIC | Age: 88
End: 2023-10-11
Payer: MEDICARE

## 2023-10-11 VITALS
BODY MASS INDEX: 24.16 KG/M2 | HEART RATE: 70 BPM | DIASTOLIC BLOOD PRESSURE: 82 MMHG | OXYGEN SATURATION: 98 % | HEIGHT: 65 IN | TEMPERATURE: 98.2 F | WEIGHT: 145 LBS | SYSTOLIC BLOOD PRESSURE: 128 MMHG

## 2023-10-11 PROCEDURE — 99214 OFFICE O/P EST MOD 30 MIN: CPT

## 2023-10-23 NOTE — ASU PATIENT PROFILE, ADULT - ACCEPTABLE
Continue Regimen: Triamcinolone prn flares, hydrocortisone bid x 2 weeks prn flares
Detail Level: Simple
Render In Strict Bullet Format?: No
5

## 2023-11-03 DIAGNOSIS — S01.00XA UNSPECIFIED OPEN WOUND OF SCALP, INITIAL ENCOUNTER: ICD-10-CM

## 2023-11-08 ENCOUNTER — APPOINTMENT (OUTPATIENT)
Dept: DERMATOLOGY | Facility: CLINIC | Age: 88
End: 2023-11-08
Payer: MEDICARE

## 2023-11-08 DIAGNOSIS — L57.0 ACTINIC KERATOSIS: ICD-10-CM

## 2023-11-08 DIAGNOSIS — L81.4 OTHER MELANIN HYPERPIGMENTATION: ICD-10-CM

## 2023-11-08 DIAGNOSIS — D22.9 MELANOCYTIC NEVI, UNSPECIFIED: ICD-10-CM

## 2023-11-08 DIAGNOSIS — L82.0 INFLAMED SEBORRHEIC KERATOSIS: ICD-10-CM

## 2023-11-08 PROCEDURE — 99213 OFFICE O/P EST LOW 20 MIN: CPT | Mod: 25

## 2023-11-08 PROCEDURE — 17004 DESTROY PREMAL LESIONS 15/>: CPT | Mod: 59

## 2023-11-08 PROCEDURE — 17000 DESTRUCT PREMALG LESION: CPT | Mod: 59

## 2023-11-08 PROCEDURE — 17003 DESTRUCT PREMALG LES 2-14: CPT | Mod: 59

## 2023-11-08 PROCEDURE — 17110 DESTRUCTION B9 LES UP TO 14: CPT | Mod: 59

## 2023-11-14 ENCOUNTER — OUTPATIENT (OUTPATIENT)
Dept: OUTPATIENT SERVICES | Facility: HOSPITAL | Age: 88
LOS: 1 days | End: 2023-11-14
Payer: MEDICARE

## 2023-11-14 ENCOUNTER — APPOINTMENT (OUTPATIENT)
Dept: WOUND CARE | Facility: HOSPITAL | Age: 88
End: 2023-11-14
Payer: MEDICARE

## 2023-11-14 VITALS — TEMPERATURE: 98.1 F

## 2023-11-14 DIAGNOSIS — Z98.890 OTHER SPECIFIED POSTPROCEDURAL STATES: Chronic | ICD-10-CM

## 2023-11-14 DIAGNOSIS — Z90.49 ACQUIRED ABSENCE OF OTHER SPECIFIED PARTS OF DIGESTIVE TRACT: Chronic | ICD-10-CM

## 2023-11-14 DIAGNOSIS — Z98.41 CATARACT EXTRACTION STATUS, RIGHT EYE: Chronic | ICD-10-CM

## 2023-11-14 DIAGNOSIS — C44.90 UNSPECIFIED MALIGNANT NEOPLASM OF SKIN, UNSPECIFIED: Chronic | ICD-10-CM

## 2023-11-14 DIAGNOSIS — Z95.818 PRESENCE OF OTHER CARDIAC IMPLANTS AND GRAFTS: Chronic | ICD-10-CM

## 2023-11-14 DIAGNOSIS — Z98.61 CORONARY ANGIOPLASTY STATUS: Chronic | ICD-10-CM

## 2023-11-14 DIAGNOSIS — Z87.09 PERSONAL HISTORY OF OTHER DISEASES OF THE RESPIRATORY SYSTEM: Chronic | ICD-10-CM

## 2023-11-14 PROCEDURE — 11042 DBRDMT SUBQ TIS 1ST 20SQCM/<: CPT

## 2023-11-17 DIAGNOSIS — C43.4 MALIGNANT MELANOMA OF SCALP AND NECK: ICD-10-CM

## 2023-11-17 DIAGNOSIS — S01.00XA UNSPECIFIED OPEN WOUND OF SCALP, INITIAL ENCOUNTER: ICD-10-CM

## 2023-12-11 ENCOUNTER — APPOINTMENT (OUTPATIENT)
Dept: CARDIOLOGY | Facility: CLINIC | Age: 88
End: 2023-12-11
Payer: MEDICARE

## 2023-12-11 ENCOUNTER — RX RENEWAL (OUTPATIENT)
Age: 88
End: 2023-12-11

## 2023-12-11 VITALS
TEMPERATURE: 98 F | DIASTOLIC BLOOD PRESSURE: 69 MMHG | SYSTOLIC BLOOD PRESSURE: 110 MMHG | BODY MASS INDEX: 24.16 KG/M2 | HEART RATE: 78 BPM | HEIGHT: 65 IN | RESPIRATION RATE: 17 BRPM | OXYGEN SATURATION: 99 % | WEIGHT: 145 LBS

## 2023-12-11 DIAGNOSIS — S01.00XA UNSPECIFIED OPEN WOUND OF SCALP, INITIAL ENCOUNTER: ICD-10-CM

## 2023-12-11 DIAGNOSIS — R51.9 HEADACHE, UNSPECIFIED: ICD-10-CM

## 2023-12-11 DIAGNOSIS — I95.9 HYPOTENSION, UNSPECIFIED: ICD-10-CM

## 2023-12-11 DIAGNOSIS — D64.9 ANEMIA, UNSPECIFIED: ICD-10-CM

## 2023-12-11 DIAGNOSIS — E78.5 HYPERLIPIDEMIA, UNSPECIFIED: ICD-10-CM

## 2023-12-11 DIAGNOSIS — Z13.228 ENCOUNTER FOR SCREENING FOR OTHER METABOLIC DISORDERS: ICD-10-CM

## 2023-12-11 DIAGNOSIS — L57.0 ACTINIC KERATOSIS: ICD-10-CM

## 2023-12-11 PROCEDURE — 93000 ELECTROCARDIOGRAM COMPLETE: CPT

## 2023-12-11 PROCEDURE — 99214 OFFICE O/P EST MOD 30 MIN: CPT

## 2023-12-11 NOTE — H&P ADULT - NSHPPHYSICALEXAM_GEN_ALL_CORE
PHYSICAL EXAM:      Constitutional: NAD, well-groomed, mildly cachectic   HEENT:  EOMI, Normal Hearing, multiple scalp lacs  Neck: No LAD, No JVD  Respiratory: CTAB  Cardiovascular: S1 and S2, RRR, no M/G/R  Gastrointestinal: BS+, soft, NT/ND  Extremities: No peripheral edema  Vascular: 2+ peripheral pulses  Neurological: A/O x 3  Psychiatric: Normal mood, normal affect  Musculoskeletal: 4/5 strength b/l upper and lower extremities save for lower left leg 2/2 pelvic pain  Skin: No rashes Yes

## 2023-12-13 ENCOUNTER — NON-APPOINTMENT (OUTPATIENT)
Age: 88
End: 2023-12-13

## 2023-12-13 LAB
25(OH)D3 SERPL-MCNC: 53.5 NG/ML
ALBUMIN SERPL ELPH-MCNC: 3.4 G/DL
ALP BLD-CCNC: 75 U/L
ALT SERPL-CCNC: 12 U/L
ANION GAP SERPL CALC-SCNC: 19 MMOL/L
AST SERPL-CCNC: 14 U/L
BASOPHILS # BLD AUTO: 0.02 K/UL
BASOPHILS NFR BLD AUTO: 0.3 %
BILIRUB DIRECT SERPL-MCNC: 0.2 MG/DL
BILIRUB INDIRECT SERPL-MCNC: 0.2 MG/DL
BILIRUB SERPL-MCNC: 0.4 MG/DL
BUN SERPL-MCNC: 34 MG/DL
CALCIUM SERPL-MCNC: 10 MG/DL
CHLORIDE SERPL-SCNC: 101 MMOL/L
CHOLEST SERPL-MCNC: 109 MG/DL
CK SERPL-CCNC: 23 U/L
CO2 SERPL-SCNC: 19 MMOL/L
CREAT SERPL-MCNC: 1.6 MG/DL
EGFR: 41 ML/MIN/1.73M2
EOSINOPHIL # BLD AUTO: 0.05 K/UL
EOSINOPHIL NFR BLD AUTO: 0.6 %
ESTIMATED AVERAGE GLUCOSE: 120 MG/DL
FERRITIN SERPL-MCNC: 377 NG/ML
FOLATE SERPL-MCNC: >20 NG/ML
GLUCOSE SERPL-MCNC: 115 MG/DL
HAPTOGLOB SERPL-MCNC: 483 MG/DL
HBA1C MFR BLD HPLC: 5.8 %
HCT VFR BLD CALC: 32 %
HDLC SERPL-MCNC: 35 MG/DL
HGB BLD-MCNC: 9.5 G/DL
IMM GRANULOCYTES NFR BLD AUTO: 0.3 %
IRON SATN MFR SERPL: 18 %
IRON SERPL-MCNC: 33 UG/DL
LDLC SERPL CALC-MCNC: 61 MG/DL
LDLC SERPL DIRECT ASSAY-MCNC: 60 MG/DL
LYMPHOCYTES # BLD AUTO: 0.93 K/UL
LYMPHOCYTES NFR BLD AUTO: 11.7 %
MAN DIFF?: NORMAL
MCHC RBC-ENTMCNC: 28.2 PG
MCHC RBC-ENTMCNC: 29.7 GM/DL
MCV RBC AUTO: 95 FL
MONOCYTES # BLD AUTO: 0.55 K/UL
MONOCYTES NFR BLD AUTO: 6.9 %
NEUTROPHILS # BLD AUTO: 6.38 K/UL
NEUTROPHILS NFR BLD AUTO: 80.2 %
NONHDLC SERPL-MCNC: 75 MG/DL
PLATELET # BLD AUTO: 392 K/UL
POTASSIUM SERPL-SCNC: 4.1 MMOL/L
PROT SERPL-MCNC: 7.4 G/DL
RBC # BLD: 3.37 M/UL
RBC # FLD: 15 %
SODIUM SERPL-SCNC: 139 MMOL/L
T4 FREE SERPL-MCNC: 1.1 NG/DL
TIBC SERPL-MCNC: 185 UG/DL
TRANSFERRIN SERPL-MCNC: 147 MG/DL
TRIGL SERPL-MCNC: 61 MG/DL
TSH SERPL-ACNC: 2.97 UIU/ML
UIBC SERPL-MCNC: 152 UG/DL
VIT B12 SERPL-MCNC: 698 PG/ML
WBC # FLD AUTO: 7.95 K/UL

## 2023-12-22 ENCOUNTER — OUTPATIENT (OUTPATIENT)
Dept: OUTPATIENT SERVICES | Facility: HOSPITAL | Age: 88
LOS: 1 days | End: 2023-12-22
Payer: MEDICARE

## 2023-12-22 ENCOUNTER — RESULT REVIEW (OUTPATIENT)
Age: 88
End: 2023-12-22

## 2023-12-22 ENCOUNTER — APPOINTMENT (OUTPATIENT)
Dept: CT IMAGING | Facility: IMAGING CENTER | Age: 88
End: 2023-12-22
Payer: MEDICARE

## 2023-12-22 DIAGNOSIS — Z90.49 ACQUIRED ABSENCE OF OTHER SPECIFIED PARTS OF DIGESTIVE TRACT: Chronic | ICD-10-CM

## 2023-12-22 DIAGNOSIS — Z98.41 CATARACT EXTRACTION STATUS, RIGHT EYE: Chronic | ICD-10-CM

## 2023-12-22 DIAGNOSIS — Z98.890 OTHER SPECIFIED POSTPROCEDURAL STATES: Chronic | ICD-10-CM

## 2023-12-22 DIAGNOSIS — Z98.61 CORONARY ANGIOPLASTY STATUS: Chronic | ICD-10-CM

## 2023-12-22 DIAGNOSIS — Z87.09 PERSONAL HISTORY OF OTHER DISEASES OF THE RESPIRATORY SYSTEM: Chronic | ICD-10-CM

## 2023-12-22 DIAGNOSIS — Z00.8 ENCOUNTER FOR OTHER GENERAL EXAMINATION: ICD-10-CM

## 2023-12-22 DIAGNOSIS — Z95.818 PRESENCE OF OTHER CARDIAC IMPLANTS AND GRAFTS: Chronic | ICD-10-CM

## 2023-12-22 DIAGNOSIS — C44.90 UNSPECIFIED MALIGNANT NEOPLASM OF SKIN, UNSPECIFIED: Chronic | ICD-10-CM

## 2023-12-22 PROCEDURE — 70450 CT HEAD/BRAIN W/O DYE: CPT | Mod: 26,MH

## 2023-12-22 PROCEDURE — 70450 CT HEAD/BRAIN W/O DYE: CPT | Mod: MH

## 2023-12-28 ENCOUNTER — RX RENEWAL (OUTPATIENT)
Age: 88
End: 2023-12-28

## 2024-02-06 ENCOUNTER — APPOINTMENT (OUTPATIENT)
Dept: MRI IMAGING | Facility: CLINIC | Age: 89
End: 2024-02-06

## 2024-02-14 ENCOUNTER — APPOINTMENT (OUTPATIENT)
Dept: INTERNAL MEDICINE | Facility: CLINIC | Age: 89
End: 2024-02-14

## 2024-02-19 NOTE — HEALTH RISK ASSESSMENT
[0] : 2) Feeling down, depressed, or hopeless: Not at all (0) [PHQ-2 Negative - No further assessment needed] : PHQ-2 Negative - No further assessment needed [Former] : Former [IFF6Aprix] : 0

## 2024-02-21 ENCOUNTER — APPOINTMENT (OUTPATIENT)
Dept: INTERNAL MEDICINE | Facility: CLINIC | Age: 89
End: 2024-02-21
Payer: MEDICARE

## 2024-02-21 VITALS
HEART RATE: 72 BPM | OXYGEN SATURATION: 99 % | DIASTOLIC BLOOD PRESSURE: 60 MMHG | TEMPERATURE: 97.4 F | HEIGHT: 65 IN | SYSTOLIC BLOOD PRESSURE: 90 MMHG

## 2024-02-21 VITALS — SYSTOLIC BLOOD PRESSURE: 110 MMHG | DIASTOLIC BLOOD PRESSURE: 70 MMHG

## 2024-02-21 DIAGNOSIS — J44.9 CHRONIC OBSTRUCTIVE PULMONARY DISEASE, UNSPECIFIED: ICD-10-CM

## 2024-02-21 DIAGNOSIS — K22.70 BARRETT'S ESOPHAGUS W/OUT DYSPLASIA: ICD-10-CM

## 2024-02-21 DIAGNOSIS — R49.0 DYSPHONIA: ICD-10-CM

## 2024-02-21 DIAGNOSIS — N18.9 CHRONIC KIDNEY DISEASE, UNSPECIFIED: ICD-10-CM

## 2024-02-21 DIAGNOSIS — S72.002A FRACTURE OF UNSPECIFIED PART OF NECK OF LEFT FEMUR, INITIAL ENCOUNTER FOR CLOSED FRACTURE: ICD-10-CM

## 2024-02-21 DIAGNOSIS — R53.1 WEAKNESS: ICD-10-CM

## 2024-02-21 DIAGNOSIS — R93.0 ABNORMAL FINDINGS ON DIAGNOSTIC IMAGING OF SKULL AND HEAD, NOT ELSEWHERE CLASSIFIED: ICD-10-CM

## 2024-02-21 DIAGNOSIS — I25.10 ATHEROSCLEROTIC HEART DISEASE OF NATIVE CORONARY ARTERY W/OUT ANGINA PECTORIS: ICD-10-CM

## 2024-02-21 DIAGNOSIS — D03.4 MELANOMA IN SITU OF SCALP AND NECK: ICD-10-CM

## 2024-02-21 DIAGNOSIS — C43.4 MALIGNANT MELANOMA OF SCALP AND NECK: ICD-10-CM

## 2024-02-21 PROCEDURE — G2211 COMPLEX E/M VISIT ADD ON: CPT

## 2024-02-21 PROCEDURE — 99214 OFFICE O/P EST MOD 30 MIN: CPT

## 2024-02-21 RX ORDER — TRAMADOL HYDROCHLORIDE 50 MG/1
50 TABLET, COATED ORAL
Qty: 15 | Refills: 0 | Status: ACTIVE | COMMUNITY
Start: 2024-02-21 | End: 1900-01-01

## 2024-02-21 RX ORDER — MIDODRINE HYDROCHLORIDE 10 MG/1
10 TABLET ORAL
Qty: 270 | Refills: 1 | Status: ACTIVE | COMMUNITY
Start: 2021-09-06 | End: 1900-01-01

## 2024-02-21 RX ORDER — NORMAL SALT TABLETS 1 G/G
1 TABLET ORAL
Qty: 270 | Refills: 1 | Status: ACTIVE | COMMUNITY
Start: 2021-12-02 | End: 1900-01-01

## 2024-02-21 NOTE — REVIEW OF SYSTEMS
Requested Prescriptions     Pending Prescriptions Disp Refills    oxyCODONE-acetaminophen (PERCOCET) 5-325 MG per tablet [Pharmacy Med Name: OXYCODONE-ACETAMINOPHEN 5-325] 30 tablet      Sig: take 1 tablet by mouth once daily if needed for pain for up to 30 DAYS (DECREASING TRAMADOL TO TWICE A DAY AND GIVING ONE PERCOCET TO USE AT NIGHT)       Patient last seen on:  11/24/21  Date of last surgery:  n/a  Date of last refill:  11/28/21  Pain level:  n/a  Patient complaining of:  Pt requesting refill  Future appts: none [Negative] : Heme/Lymph

## 2024-02-21 NOTE — HEALTH RISK ASSESSMENT
[0] : 2) Feeling down, depressed, or hopeless: Not at all (0) [PHQ-2 Negative - No further assessment needed] : PHQ-2 Negative - No further assessment needed [Former] : Former

## 2024-02-22 ENCOUNTER — RX RENEWAL (OUTPATIENT)
Age: 89
End: 2024-02-22

## 2024-02-22 LAB
ALBUMIN SERPL ELPH-MCNC: 3.5 G/DL
ALP BLD-CCNC: 83 U/L
ALT SERPL-CCNC: 36 U/L
ANION GAP SERPL CALC-SCNC: 17 MMOL/L
APPEARANCE: CLEAR
AST SERPL-CCNC: 39 U/L
BACTERIA: ABNORMAL /HPF
BASOPHILS # BLD AUTO: 0.05 K/UL
BASOPHILS NFR BLD AUTO: 0.6 %
BILIRUB SERPL-MCNC: 0.2 MG/DL
BILIRUBIN URINE: ABNORMAL
BLOOD URINE: NEGATIVE
BUN SERPL-MCNC: 30 MG/DL
CALCIUM SERPL-MCNC: 10.3 MG/DL
CAST: 1 /LPF
CHLORIDE SERPL-SCNC: 102 MMOL/L
CO2 SERPL-SCNC: 22 MMOL/L
COLOR: NORMAL
CREAT SERPL-MCNC: 1.03 MG/DL
CREAT SPEC-SCNC: 194 MG/DL
EGFR: 70 ML/MIN/1.73M2
EOSINOPHIL # BLD AUTO: 0.08 K/UL
EOSINOPHIL NFR BLD AUTO: 1 %
EPITHELIAL CELLS: 2 /HPF
GLUCOSE QUALITATIVE U: NEGATIVE MG/DL
GLUCOSE SERPL-MCNC: 108 MG/DL
HCT VFR BLD CALC: 33.1 %
HGB BLD-MCNC: 10 G/DL
IMM GRANULOCYTES NFR BLD AUTO: 0.4 %
KETONES URINE: ABNORMAL MG/DL
LEUKOCYTE ESTERASE URINE: NEGATIVE
LYMPHOCYTES # BLD AUTO: 1.17 K/UL
LYMPHOCYTES NFR BLD AUTO: 14.8 %
MAN DIFF?: NORMAL
MCHC RBC-ENTMCNC: 28.2 PG
MCHC RBC-ENTMCNC: 30.2 GM/DL
MCV RBC AUTO: 93.5 FL
MICROALBUMIN 24H UR DL<=1MG/L-MCNC: <1.2 MG/DL
MICROALBUMIN/CREAT 24H UR-RTO: NORMAL MG/G
MICROSCOPIC-UA: NORMAL
MONOCYTES # BLD AUTO: 0.58 K/UL
MONOCYTES NFR BLD AUTO: 7.3 %
NEUTROPHILS # BLD AUTO: 6.01 K/UL
NEUTROPHILS NFR BLD AUTO: 75.9 %
NITRITE URINE: NEGATIVE
PH URINE: 6
PLATELET # BLD AUTO: 382 K/UL
POTASSIUM SERPL-SCNC: 4.1 MMOL/L
PROT SERPL-MCNC: 6.9 G/DL
PROTEIN URINE: NORMAL MG/DL
RBC # BLD: 3.54 M/UL
RBC # FLD: 15.4 %
RED BLOOD CELLS URINE: 2 /HPF
SODIUM SERPL-SCNC: 140 MMOL/L
SPECIFIC GRAVITY URINE: 1.03
T4 FREE SERPL-MCNC: 1.2 NG/DL
TSH SERPL-ACNC: 2.43 UIU/ML
UROBILINOGEN URINE: 1 MG/DL
WBC # FLD AUTO: 7.92 K/UL
WHITE BLOOD CELLS URINE: 0 /HPF

## 2024-02-24 ENCOUNTER — LABORATORY RESULT (OUTPATIENT)
Age: 89
End: 2024-02-24

## 2024-02-26 ENCOUNTER — LABORATORY RESULT (OUTPATIENT)
Age: 89
End: 2024-02-26

## 2024-02-26 DIAGNOSIS — Z00.00 ENCOUNTER FOR GENERAL ADULT MEDICAL EXAMINATION W/OUT ABNORMAL FINDINGS: ICD-10-CM

## 2024-02-27 ENCOUNTER — APPOINTMENT (OUTPATIENT)
Dept: INTERNAL MEDICINE | Facility: CLINIC | Age: 89
End: 2024-02-27

## 2024-02-27 NOTE — HEALTH RISK ASSESSMENT
[0] : 2) Feeling down, depressed, or hopeless: Not at all (0) [PHQ-2 Negative - No further assessment needed] : PHQ-2 Negative - No further assessment needed [[id number]] : 0 [Former] : Former

## 2024-02-27 NOTE — PHYSICAL EXAM
[No Acute Distress] : no acute distress [Well Nourished] : well nourished [Well Developed] : well developed [Well-Appearing] : well-appearing [Normal Sclera/Conjunctiva] : normal sclera/conjunctiva [Normal Outer Ear/Nose] : the outer ears and nose were normal in appearance [Normal Oropharynx] : the oropharynx was normal [No Lymphadenopathy] : no lymphadenopathy [No JVD] : no jugular venous distention [Supple] : supple [No Respiratory Distress] : no respiratory distress  [No Accessory Muscle Use] : no accessory muscle use [Clear to Auscultation] : lungs were clear to auscultation bilaterally [Normal Rate] : normal rate  [Regular Rhythm] : with a regular rhythm [No Murmur] : no murmur heard [Normal S1, S2] : normal S1 and S2 [No Carotid Bruits] : no carotid bruits [No Varicosities] : no varicosities [No Edema] : there was no peripheral edema [Pedal Pulses Present] : the pedal pulses are present [No Extremity Clubbing/Cyanosis] : no extremity clubbing/cyanosis [Non Tender] : non-tender [Soft] : abdomen soft [Non-distended] : non-distended [Normal Bowel Sounds] : normal bowel sounds [Normal Anterior Cervical Nodes] : no anterior cervical lymphadenopathy [No CVA Tenderness] : no CVA  tenderness [No Joint Swelling] : no joint swelling [No Spinal Tenderness] : no spinal tenderness [Grossly Normal Strength/Tone] : grossly normal strength/tone [No Rash] : no rash [Coordination Grossly Intact] : coordination grossly intact [No Focal Deficits] : no focal deficits [Normal Gait] : normal gait [Alert and Oriented x3] : oriented to person, place, and time

## 2024-03-12 ENCOUNTER — EMERGENCY (EMERGENCY)
Facility: HOSPITAL | Age: 89
LOS: 1 days | Discharge: HOME | End: 2024-03-12
Attending: EMERGENCY MEDICINE
Payer: MEDICARE

## 2024-03-12 VITALS
OXYGEN SATURATION: 98 % | DIASTOLIC BLOOD PRESSURE: 68 MMHG | TEMPERATURE: 97 F | RESPIRATION RATE: 18 BRPM | SYSTOLIC BLOOD PRESSURE: 101 MMHG | HEART RATE: 66 BPM

## 2024-03-12 VITALS
HEART RATE: 60 BPM | HEIGHT: 64 IN | RESPIRATION RATE: 18 BRPM | TEMPERATURE: 97 F | OXYGEN SATURATION: 96 % | WEIGHT: 130.07 LBS | DIASTOLIC BLOOD PRESSURE: 47 MMHG | SYSTOLIC BLOOD PRESSURE: 104 MMHG

## 2024-03-12 DIAGNOSIS — Z98.890 OTHER SPECIFIED POSTPROCEDURAL STATES: Chronic | ICD-10-CM

## 2024-03-12 DIAGNOSIS — Z98.41 CATARACT EXTRACTION STATUS, RIGHT EYE: Chronic | ICD-10-CM

## 2024-03-12 DIAGNOSIS — Z95.818 PRESENCE OF OTHER CARDIAC IMPLANTS AND GRAFTS: Chronic | ICD-10-CM

## 2024-03-12 DIAGNOSIS — C44.90 UNSPECIFIED MALIGNANT NEOPLASM OF SKIN, UNSPECIFIED: Chronic | ICD-10-CM

## 2024-03-12 DIAGNOSIS — Z90.49 ACQUIRED ABSENCE OF OTHER SPECIFIED PARTS OF DIGESTIVE TRACT: Chronic | ICD-10-CM

## 2024-03-12 DIAGNOSIS — Z98.61 CORONARY ANGIOPLASTY STATUS: Chronic | ICD-10-CM

## 2024-03-12 DIAGNOSIS — Z87.09 PERSONAL HISTORY OF OTHER DISEASES OF THE RESPIRATORY SYSTEM: Chronic | ICD-10-CM

## 2024-03-12 LAB
ALBUMIN SERPL ELPH-MCNC: 3.5 G/DL — SIGNIFICANT CHANGE UP (ref 3.3–5)
ALP SERPL-CCNC: 85 U/L — SIGNIFICANT CHANGE UP (ref 40–120)
ALT FLD-CCNC: 20 U/L — SIGNIFICANT CHANGE UP (ref 10–45)
ANION GAP SERPL CALC-SCNC: 13 MMOL/L — SIGNIFICANT CHANGE UP (ref 5–17)
APPEARANCE UR: CLEAR — SIGNIFICANT CHANGE UP
APTT BLD: 32.5 SEC — SIGNIFICANT CHANGE UP (ref 24.5–35.6)
AST SERPL-CCNC: 20 U/L — SIGNIFICANT CHANGE UP (ref 10–40)
BASE EXCESS BLDV CALC-SCNC: -7 MMOL/L — LOW (ref -2–3)
BASOPHILS # BLD AUTO: 0.03 K/UL — SIGNIFICANT CHANGE UP (ref 0–0.2)
BASOPHILS NFR BLD AUTO: 0.3 % — SIGNIFICANT CHANGE UP (ref 0–2)
BILIRUB SERPL-MCNC: 0.3 MG/DL — SIGNIFICANT CHANGE UP (ref 0.2–1.2)
BILIRUB UR-MCNC: NEGATIVE — SIGNIFICANT CHANGE UP
BUN SERPL-MCNC: 26 MG/DL — HIGH (ref 7–23)
CA-I SERPL-SCNC: 1 MMOL/L — LOW (ref 1.15–1.33)
CALCIUM SERPL-MCNC: 10.1 MG/DL — SIGNIFICANT CHANGE UP (ref 8.4–10.5)
CHLORIDE BLDV-SCNC: 108 MMOL/L — SIGNIFICANT CHANGE UP (ref 96–108)
CHLORIDE SERPL-SCNC: 102 MMOL/L — SIGNIFICANT CHANGE UP (ref 96–108)
CO2 BLDV-SCNC: 20 MMOL/L — LOW (ref 22–26)
CO2 SERPL-SCNC: 25 MMOL/L — SIGNIFICANT CHANGE UP (ref 22–31)
COLOR SPEC: SIGNIFICANT CHANGE UP
CREAT SERPL-MCNC: 0.94 MG/DL — SIGNIFICANT CHANGE UP (ref 0.5–1.3)
DIFF PNL FLD: NEGATIVE — SIGNIFICANT CHANGE UP
EGFR: 78 ML/MIN/1.73M2 — SIGNIFICANT CHANGE UP
EOSINOPHIL # BLD AUTO: 0.04 K/UL — SIGNIFICANT CHANGE UP (ref 0–0.5)
EOSINOPHIL NFR BLD AUTO: 0.4 % — SIGNIFICANT CHANGE UP (ref 0–6)
FLUAV AG NPH QL: SIGNIFICANT CHANGE UP
FLUBV AG NPH QL: SIGNIFICANT CHANGE UP
GAS PNL BLDV: 134 MMOL/L — LOW (ref 136–145)
GAS PNL BLDV: SIGNIFICANT CHANGE UP
GLUCOSE BLDV-MCNC: 76 MG/DL — SIGNIFICANT CHANGE UP (ref 70–99)
GLUCOSE SERPL-MCNC: 138 MG/DL — HIGH (ref 70–99)
GLUCOSE UR QL: NEGATIVE MG/DL — SIGNIFICANT CHANGE UP
HCO3 BLDV-SCNC: 19 MMOL/L — LOW (ref 22–29)
HCT VFR BLD CALC: 31.3 % — LOW (ref 39–50)
HCT VFR BLDA CALC: 22 % — LOW (ref 39–51)
HGB BLD CALC-MCNC: 7.4 G/DL — LOW (ref 12.6–17.4)
HGB BLD-MCNC: 9.8 G/DL — LOW (ref 13–17)
IMM GRANULOCYTES NFR BLD AUTO: 0.6 % — SIGNIFICANT CHANGE UP (ref 0–0.9)
INR BLD: 1.28 RATIO — HIGH (ref 0.85–1.18)
KETONES UR-MCNC: ABNORMAL MG/DL
LACTATE BLDV-MCNC: 0.9 MMOL/L — SIGNIFICANT CHANGE UP (ref 0.5–2)
LEUKOCYTE ESTERASE UR-ACNC: NEGATIVE — SIGNIFICANT CHANGE UP
LIDOCAIN IGE QN: 12 U/L — SIGNIFICANT CHANGE UP (ref 7–60)
LYMPHOCYTES # BLD AUTO: 0.9 K/UL — LOW (ref 1–3.3)
LYMPHOCYTES # BLD AUTO: 9.7 % — LOW (ref 13–44)
MAGNESIUM SERPL-MCNC: 1.9 MG/DL — SIGNIFICANT CHANGE UP (ref 1.6–2.6)
MCHC RBC-ENTMCNC: 28.1 PG — SIGNIFICANT CHANGE UP (ref 27–34)
MCHC RBC-ENTMCNC: 31.3 GM/DL — LOW (ref 32–36)
MCV RBC AUTO: 89.7 FL — SIGNIFICANT CHANGE UP (ref 80–100)
MONOCYTES # BLD AUTO: 0.5 K/UL — SIGNIFICANT CHANGE UP (ref 0–0.9)
MONOCYTES NFR BLD AUTO: 5.4 % — SIGNIFICANT CHANGE UP (ref 2–14)
NEUTROPHILS # BLD AUTO: 7.77 K/UL — HIGH (ref 1.8–7.4)
NEUTROPHILS NFR BLD AUTO: 83.6 % — HIGH (ref 43–77)
NITRITE UR-MCNC: NEGATIVE — SIGNIFICANT CHANGE UP
NRBC # BLD: 0 /100 WBCS — SIGNIFICANT CHANGE UP (ref 0–0)
PCO2 BLDV: 37 MMHG — LOW (ref 42–55)
PH BLDV: 7.31 — LOW (ref 7.32–7.43)
PH UR: 6.5 — SIGNIFICANT CHANGE UP (ref 5–8)
PLATELET # BLD AUTO: 424 K/UL — HIGH (ref 150–400)
PO2 BLDV: 26 MMHG — SIGNIFICANT CHANGE UP (ref 25–45)
POTASSIUM BLDV-SCNC: 5.4 MMOL/L — HIGH (ref 3.5–5.1)
POTASSIUM SERPL-MCNC: 3.7 MMOL/L — SIGNIFICANT CHANGE UP (ref 3.5–5.3)
POTASSIUM SERPL-SCNC: 3.7 MMOL/L — SIGNIFICANT CHANGE UP (ref 3.5–5.3)
PROT SERPL-MCNC: 6.8 G/DL — SIGNIFICANT CHANGE UP (ref 6–8.3)
PROT UR-MCNC: NEGATIVE MG/DL — SIGNIFICANT CHANGE UP
PROTHROM AB SERPL-ACNC: 13.3 SEC — HIGH (ref 9.5–13)
RBC # BLD: 3.49 M/UL — LOW (ref 4.2–5.8)
RBC # FLD: 14.8 % — HIGH (ref 10.3–14.5)
RSV RNA NPH QL NAA+NON-PROBE: SIGNIFICANT CHANGE UP
SAO2 % BLDV: 40.5 % — LOW (ref 67–88)
SARS-COV-2 RNA SPEC QL NAA+PROBE: SIGNIFICANT CHANGE UP
SODIUM SERPL-SCNC: 140 MMOL/L — SIGNIFICANT CHANGE UP (ref 135–145)
SP GR SPEC: >1.03 — HIGH (ref 1–1.03)
UROBILINOGEN FLD QL: 1 MG/DL — SIGNIFICANT CHANGE UP (ref 0.2–1)
WBC # BLD: 9.3 K/UL — SIGNIFICANT CHANGE UP (ref 3.8–10.5)
WBC # FLD AUTO: 9.3 K/UL — SIGNIFICANT CHANGE UP (ref 3.8–10.5)

## 2024-03-12 PROCEDURE — 84132 ASSAY OF SERUM POTASSIUM: CPT

## 2024-03-12 PROCEDURE — 71045 X-RAY EXAM CHEST 1 VIEW: CPT

## 2024-03-12 PROCEDURE — 99284 EMERGENCY DEPT VISIT MOD MDM: CPT | Mod: 25

## 2024-03-12 PROCEDURE — 99285 EMERGENCY DEPT VISIT HI MDM: CPT

## 2024-03-12 PROCEDURE — 82435 ASSAY OF BLOOD CHLORIDE: CPT

## 2024-03-12 PROCEDURE — 96374 THER/PROPH/DIAG INJ IV PUSH: CPT | Mod: XU

## 2024-03-12 PROCEDURE — 87086 URINE CULTURE/COLONY COUNT: CPT

## 2024-03-12 PROCEDURE — 82803 BLOOD GASES ANY COMBINATION: CPT

## 2024-03-12 PROCEDURE — 74177 CT ABD & PELVIS W/CONTRAST: CPT | Mod: MC

## 2024-03-12 PROCEDURE — 83690 ASSAY OF LIPASE: CPT

## 2024-03-12 PROCEDURE — 82330 ASSAY OF CALCIUM: CPT

## 2024-03-12 PROCEDURE — 85730 THROMBOPLASTIN TIME PARTIAL: CPT

## 2024-03-12 PROCEDURE — 83605 ASSAY OF LACTIC ACID: CPT

## 2024-03-12 PROCEDURE — 81003 URINALYSIS AUTO W/O SCOPE: CPT

## 2024-03-12 PROCEDURE — 85610 PROTHROMBIN TIME: CPT

## 2024-03-12 PROCEDURE — 80053 COMPREHEN METABOLIC PANEL: CPT

## 2024-03-12 PROCEDURE — 87637 SARSCOV2&INF A&B&RSV AMP PRB: CPT

## 2024-03-12 PROCEDURE — 74177 CT ABD & PELVIS W/CONTRAST: CPT | Mod: 26,MC

## 2024-03-12 PROCEDURE — 82947 ASSAY GLUCOSE BLOOD QUANT: CPT

## 2024-03-12 PROCEDURE — 85025 COMPLETE CBC W/AUTO DIFF WBC: CPT

## 2024-03-12 PROCEDURE — 85018 HEMOGLOBIN: CPT

## 2024-03-12 PROCEDURE — 85014 HEMATOCRIT: CPT

## 2024-03-12 PROCEDURE — 76705 ECHO EXAM OF ABDOMEN: CPT | Mod: 26

## 2024-03-12 PROCEDURE — 71045 X-RAY EXAM CHEST 1 VIEW: CPT | Mod: 26

## 2024-03-12 PROCEDURE — 83735 ASSAY OF MAGNESIUM: CPT

## 2024-03-12 PROCEDURE — 76705 ECHO EXAM OF ABDOMEN: CPT

## 2024-03-12 PROCEDURE — 84295 ASSAY OF SERUM SODIUM: CPT

## 2024-03-12 RX ORDER — ACETAMINOPHEN 500 MG
1000 TABLET ORAL ONCE
Refills: 0 | Status: COMPLETED | OUTPATIENT
Start: 2024-03-12 | End: 2024-03-12

## 2024-03-12 RX ORDER — SODIUM CHLORIDE 9 MG/ML
500 INJECTION INTRAMUSCULAR; INTRAVENOUS; SUBCUTANEOUS ONCE
Refills: 0 | Status: COMPLETED | OUTPATIENT
Start: 2024-03-12 | End: 2024-03-12

## 2024-03-12 RX ADMIN — Medication 400 MILLIGRAM(S): at 19:11

## 2024-03-12 RX ADMIN — SODIUM CHLORIDE 500 MILLILITER(S): 9 INJECTION INTRAMUSCULAR; INTRAVENOUS; SUBCUTANEOUS at 19:11

## 2024-03-12 NOTE — ED ADULT NURSE NOTE - MODE OF DISCHARGE
Left message for patient to return call.  Spencer Lees [facility]  July 8, 2020       
Left message on answering machine to call back.  Called pt to advise EGD/Colonoscopy procedure scheduled on 7/21/2020 with EAG has been BUMP apology to be given to pt for the inconvenience.  Appt cancelled in Epic.  Referral cancelled.    Rossy Muse, CARMEN July 6, 2020       
Patient returning call. Attempted to transfer call to GI scheduling. Patient is requesting a return call. Please advise.  
Patient returning call. Call transferred to GI scheduling.   
Spoke to pt   Patient has been reschedule 10/08  paperwork edit and sent to ROMEO hewitt   instructions modify to reflect change  instructions have been  Mail and mychart   Electronically Signed by:    KIMBERLY Dudley , July 9, 2020         
Stretcher

## 2024-03-12 NOTE — ED ADULT TRIAGE NOTE - PAIN RATING/NUMBER SCALE (0-10): ACTIVITY
Problem: Falls - Risk of  Goal: *Absence of Falls  Description: Document Inder Jaime Fall Risk and appropriate interventions in the flowsheet.   Outcome: Progressing Towards Goal  Note: Fall Risk Interventions:  Mobility Interventions: Bed/chair exit alarm              Elimination Interventions: Call light in reach    History of Falls Interventions: Bed/chair exit alarm         Problem: Patient Education: Go to Patient Education Activity  Goal: Patient/Family Education  Outcome: Progressing Towards Goal     Problem: Patient Education: Go to Patient Education Activity  Goal: Patient/Family Education  Outcome: Progressing Towards Goal     Problem: TIA/CVA Stroke: 0-24 hours  Goal: Off Pathway (Use only if patient is Off Pathway)  Outcome: Progressing Towards Goal  Goal: Activity/Safety  Outcome: Progressing Towards Goal  Goal: Consults, if ordered  Outcome: Progressing Towards Goal  Goal: Diagnostic Test/Procedures  Outcome: Progressing Towards Goal  Goal: Nutrition/Diet  Outcome: Progressing Towards Goal  Goal: Discharge Planning  Outcome: Progressing Towards Goal  Goal: Medications  Outcome: Progressing Towards Goal  Goal: Respiratory  Outcome: Progressing Towards Goal  Goal: Treatments/Interventions/Procedures  Outcome: Progressing Towards Goal  Goal: Minimize risk of bleeding post-thrombolytic infusion  Outcome: Progressing Towards Goal  Goal: Monitor for complications post-thrombolytic infusion  Outcome: Progressing Towards Goal  Goal: Psychosocial  Outcome: Progressing Towards Goal  Goal: *Hemodynamically stable  Outcome: Progressing Towards Goal  Goal: *Neurologically stable  Description: Absence of additional neurological deficits    Outcome: Progressing Towards Goal  Goal: *Verbalizes anxiety and depression are reduced or absent  Outcome: Progressing Towards Goal  Goal: *Absence of Signs of Aspiration on Current Diet  Outcome: Progressing Towards Goal  Goal: *Absence of deep venous thrombosis signs and symptoms(Stroke Metric)  Outcome: Progressing Towards Goal  Goal: *Ability to perform ADLs and demonstrates progressive mobility and function  Outcome: Progressing Towards Goal  Goal: *Stroke education started(Stroke Metric)  Outcome: Progressing Towards Goal  Goal: *Dysphagia screen performed(Stroke Metric)  Outcome: Progressing Towards Goal  Goal: *Rehab consulted(Stroke Metric)  Outcome: Progressing Towards Goal     Problem: TIA/CVA Stroke: Day 2 Until Discharge  Goal: Off Pathway (Use only if patient is Off Pathway)  Outcome: Progressing Towards Goal  Goal: Activity/Safety  Outcome: Progressing Towards Goal  Goal: Diagnostic Test/Procedures  Outcome: Progressing Towards Goal  Goal: Nutrition/Diet  Outcome: Progressing Towards Goal  Goal: Discharge Planning  Outcome: Progressing Towards Goal  Goal: Medications  Outcome: Progressing Towards Goal  Goal: Respiratory  Outcome: Progressing Towards Goal  Goal: Treatments/Interventions/Procedures  Outcome: Progressing Towards Goal  Goal: Psychosocial  Outcome: Progressing Towards Goal  Goal: *Verbalizes anxiety and depression are reduced or absent  Outcome: Progressing Towards Goal  Goal: *Absence of aspiration  Outcome: Progressing Towards Goal  Goal: *Absence of deep venous thrombosis signs and symptoms(Stroke Metric)  Outcome: Progressing Towards Goal  Goal: *Optimal pain control at patient's stated goal  Outcome: Progressing Towards Goal  Goal: *Tolerating diet  Outcome: Progressing Towards Goal  Goal: *Ability to perform ADLs and demonstrates progressive mobility and function  Outcome: Progressing Towards Goal  Goal: *Stroke education continued(Stroke Metric)  Outcome: Progressing Towards Goal     Problem: Ischemic Stroke: Discharge Outcomes  Goal: *Verbalizes anxiety and depression are reduced or absent  Outcome: Progressing Towards Goal  Goal: *Verbalize understanding of risk factor modification(Stroke Metric)  Outcome: Progressing Towards Goal  Goal: *Hemodynamically stable  Outcome: Progressing Towards Goal  Goal: *Absence of aspiration pneumonia  Outcome: Progressing Towards Goal  Goal: *Aware of needed dietary changes  Outcome: Progressing Towards Goal  Goal: *Verbalize understanding of prescribed medications including anti-coagulants, anti-lipid, and/or anti-platelets(Stroke Metric)  Outcome: Progressing Towards Goal  Goal: *Tolerating diet  Outcome: Progressing Towards Goal  Goal: *Aware of follow-up diagnostics related to anticoagulants  Outcome: Progressing Towards Goal  Goal: *Ability to perform ADLs and demonstrates progressive mobility and function  Outcome: Progressing Towards Goal  Goal: *Absence of DVT(Stroke Metric)  Outcome: Progressing Towards Goal  Goal: *Absence of aspiration  Outcome: Progressing Towards Goal  Goal: *Optimal pain control at patient's stated goal  Outcome: Progressing Towards Goal  Goal: *Home safety concerns addressed  Outcome: Progressing Towards Goal  Goal: *Describes available resources and support systems  Outcome: Progressing Towards Goal  Goal: *Verbalizes understanding of activation of EMS(911) for stroke symptoms(Stroke Metric)  Outcome: Progressing Towards Goal  Goal: *Understands and describes signs and symptoms to report to providers(Stroke Metric)  Outcome: Progressing Towards Goal  Goal: *Neurolgocially stable (absence of additional neurological deficits)  Outcome: Progressing Towards Goal  Goal: *Verbalizes importance of follow-up with primary care physician(Stroke Metric)  Outcome: Progressing Towards Goal  Goal: *Smoking cessation discussed,if applicable(Stroke Metric)  Outcome: Progressing Towards Goal  Goal: *Depression screening completed(Stroke Metric)  Outcome: Progressing Towards Goal     Problem: Pressure Injury - Risk of  Goal: *Prevention of pressure injury  Description: Document Kol[place] Scale and appropriate interventions in the flowsheet.   Outcome: Progressing Towards Goal     Problem: Patient Education: Go to Patient Education Activity  Goal: Patient/Family Education  Outcome: Progressing Towards Goal 0 (no pain/absence of nonverbal indicators of pain)

## 2024-03-12 NOTE — ED ADULT TRIAGE NOTE - AS PAIN REST
Pneumonia of left lower lobe due to infectious organism Pneumonia of left lower lobe due to infectious organism 0 (no pain/absence of nonverbal indicators of pain)

## 2024-03-12 NOTE — ED PROVIDER NOTE - RAPID ASSESSMENT
88-year-old male with past medical history of anemia, CAD status post stents, CKD, hypotension, COPD, hyperlipidemia, melanoma not currently on chemo presents to the emergency department for evaluation of 1 week of lower abdominal pain associated with diarrhea.  Per patient and family patient has had intermittent crampy lower abdominal pain associated with loose stool for the last week which is improving.  Denies fever, nausea, vomiting, recent antibiotic use, recent hospitalizations    Rapid assessment by Cha Medina PA-C full eval to be performed in ED

## 2024-03-12 NOTE — ED PROVIDER NOTE - CLINICAL SUMMARY MEDICAL DECISION MAKING FREE TEXT BOX
Attending Anette Farfan: 87 yo male h/o kidney disease, melanoma, hyperlipidemia presenting with abdominal pain. pain located in lower abdomen and associated with darrhea. no blood in the stool. no testicular pain. upon arrival pt hemodynamicaly stable. on exam pt awake and alert follwing comnands. pt with ttp llq and surapubic area, passing gas making obstruction less likely. no pulsatile mass on exam and ext wwp. concern for possible colitis vs diverticulitis. less likley SBO. pocus performed showing no evidence  or large amount of free fluid and no evidence of dilated loops of bowel. no evidence of aaa on pocus. will obtain labs, ct abd/pelv and re-eval. no back pain and ext wwp.

## 2024-03-12 NOTE — ED PROVIDER NOTE - ATTENDING CONTRIBUTION TO CARE
Attending MD Aentte Farfan:  I personally have seen and examined this patient.  Resident note reviewed and agree on plan of care and except where noted.  See HPI, PE, and MDM for details.

## 2024-03-12 NOTE — ED ADULT NURSE REASSESSMENT NOTE - NS ED NURSE REASSESS COMMENT FT1
pt was straight cathed with order from provider. two rn at bedside using sterile technique 100ml of yellow urine

## 2024-03-12 NOTE — ED PROVIDER NOTE - PROGRESS NOTE DETAILS
Awa Loaiza MD (PGY-2 EM): received sign out on patient- discussed ct results, ua negative. patient tolerated PO. disucssed return precuations w/ son. discussed need for outpatient fu. no diarrhaea since arrival to ed.

## 2024-03-12 NOTE — ED PROVIDER NOTE - NSFOLLOWUPINSTRUCTIONS_ED_ALL_ED_FT
Abdominal Pain    Many things can cause abdominal pain. Many times, abdominal pain is not caused by a disease and will improve without treatment. Your health care provider will do a physical exam to determine if there is a dangerous cause of your pain; blood tests and imaging may help determine the cause of your pain. However, in many cases, no cause may be found and you may need further testing as an outpatient. Monitor your abdominal pain for any changes.     SEEK IMMEDIATE MEDICAL CARE IF YOU HAVE ANY OF THE FOLLOWING SYMPTOMS: worsening abdominal pain, uncontrollable vomiting, profuse diarrhea, inability to have bowel movements or pass gas, black or bloody stools, fever accompanying chest pain or back pain, or fainting. These symptoms may represent a serious problem that is an emergency. Do not wait to see if the symptoms will go away. Get medical help right away. Call 911 and do not drive yourself to the hospital.    Someone from the ED should contact you to help schedule your specialty appointment. if someone does not contact you please use a number provided below to help schedule your appointment.    You are to follow-up in the next 1-2 weeks with [facility] Division of Gastroenterology at [facility]. Please call [phone] for an appointment. Abdominal Pain    Many things can cause abdominal pain. Many times, abdominal pain is not caused by a disease and will improve without treatment. Your health care provider will do a physical exam to determine if there is a dangerous cause of your pain; blood tests and imaging may help determine the cause of your pain. However, in many cases, no cause may be found and you may need further testing as an outpatient. Monitor your abdominal pain for any changes.     SEEK IMMEDIATE MEDICAL CARE IF YOU HAVE ANY OF THE FOLLOWING SYMPTOMS: worsening abdominal pain, uncontrollable vomiting, profuse diarrhea, inability to have bowel movements or pass gas, black or bloody stools, fever accompanying chest pain or back pain, or fainting. These symptoms may represent a serious problem that is an emergency. Do not wait to see if the symptoms will go away. Get medical help right away. Call 911 and do not drive yourself to the hospital.    Seen in the emergency department for constipation for 6 days.  We got a CAT scan which showed that you do have constipation without any obstruction in your bowels.    You were given an enema in the emergency room.  We recommend that you start a bowel regimen and continue to use enemas at home.    You can take the following for constipation:  Miralax: 17 grams twice a day, and can increase to three times a day if no relief   Senna: Two tabs 1-2 times per day  Docusate (Colace): 100mg 1-2 times per day   Bisacodyl (Dulcolax): 10mg suppository up to three times a day  Magnesium citrate: 100-240 mL QD-BID  You may also use an enema but no more than 2 doses in 24 hours.    Someone from the ED should contact you to help schedule your specialty appointment. if someone does not contact you please use a number provided below to help schedule your appointment.    You are to follow-up in the next 1-2 weeks with [facility] Division of Gastroenterology at [facility]. Please call [phone] for an appointment.

## 2024-03-12 NOTE — ED PROVIDER NOTE - OBJECTIVE STATEMENT
Attending Anette Farfan: 89 yo male h/o prior cholecystectomy , cad, hyperlipidemia, melanoma with open wound ot the top of the head presentin giwth abdominal pain with associated diarrhea. pain located in lower abdomen and asociated with loose stools. no blood in the stool. no fevers or hcills. no recent abx use. passing gas.no fevers. does struggle with constipation.

## 2024-03-12 NOTE — ED PROVIDER NOTE - PATIENT PORTAL LINK FT
You can access the FollowMyHealth Patient Portal offered by [facility] by registering at the following website: [email or web address]. By joining [employer]’s FollowMyHealth portal, you will also be able to view your health information using other applications (apps) compatible with our system.

## 2024-03-12 NOTE — ED PROVIDER NOTE - PHYSICAL EXAMINATION
Attending Anette Farfan: Gen: NAD, heent: lesion to the top of the head eomi, perrla, mmm, op pink, uvula midline, neck; nttp,  chest: nttp, no crepitus, cv: rrr,  lungs: ctab, abd: soft,ttp llq and suprpubic area, nondistended, no peritoneal signs, no guarding, ext: wwp, neg homans,neuro: awake and alert, following commands, speech clear, sensation and strength intact, no focal deficits

## 2024-03-12 NOTE — ASU PATIENT PROFILE, ADULT - AS SC BRADEN MOBILITY
-- DO NOT REPLY / DO NOT REPLY ALL --  -- Message is from Engagement Center Operations (ECO) --    ONLY TO BE USED WITHIN A REFILL MEDICATION ENCOUNTER    Med Refill  Is the patient currently having any symptoms?: No/Non-Emergent symptoms    Name of medication requested: See pended med Patient is requesting to send the 10 mg; 60 quantity capsule to her pharmacy    Is this the first request for the medication in the last 48 hours?: Yes    Patient is requesting a medication refill - medication is on active medication list    Patient is currently OUT of the requested medication - sent as HIGH priority    Full name of the provider who ordered the medication: Crow Hurley DO     Clinic site name / Account # for provider: [facility] [address]     [facility]: Pharmacy  [id number] [place] [address] [facility]    Patient confirmed the above pharmacy as correct?  Yes    Caller Information         Type Contact Phone/Fax    03/12/2024 09:51 AM CDT Phone (Incoming) [address] (Self) [phone] (M)            Alternative phone number: No    Can a detailed message be left?: Yes         (4) no limitation

## 2024-03-13 LAB
CULTURE RESULTS: SIGNIFICANT CHANGE UP
SPECIMEN SOURCE: SIGNIFICANT CHANGE UP

## 2024-03-26 ENCOUNTER — RX RENEWAL (OUTPATIENT)
Age: 89
End: 2024-03-26

## 2024-04-08 ENCOUNTER — APPOINTMENT (OUTPATIENT)
Dept: CARDIOLOGY | Facility: CLINIC | Age: 89
End: 2024-04-08

## 2024-04-08 ENCOUNTER — EMERGENCY (EMERGENCY)
Facility: HOSPITAL | Age: 89
LOS: 1 days | Discharge: HOME | End: 2024-04-08
Attending: STUDENT IN AN ORGANIZED HEALTH CARE EDUCATION/TRAINING PROGRAM
Payer: MEDICARE

## 2024-04-08 VITALS
TEMPERATURE: 98 F | OXYGEN SATURATION: 100 % | RESPIRATION RATE: 18 BRPM | DIASTOLIC BLOOD PRESSURE: 59 MMHG | HEART RATE: 70 BPM | SYSTOLIC BLOOD PRESSURE: 107 MMHG

## 2024-04-08 VITALS
WEIGHT: 130.07 LBS | HEART RATE: 88 BPM | RESPIRATION RATE: 16 BRPM | TEMPERATURE: 98 F | SYSTOLIC BLOOD PRESSURE: 93 MMHG | OXYGEN SATURATION: 99 % | HEIGHT: 64 IN | DIASTOLIC BLOOD PRESSURE: 61 MMHG

## 2024-04-08 DIAGNOSIS — Z90.49 ACQUIRED ABSENCE OF OTHER SPECIFIED PARTS OF DIGESTIVE TRACT: Chronic | ICD-10-CM

## 2024-04-08 DIAGNOSIS — Z95.818 PRESENCE OF OTHER CARDIAC IMPLANTS AND GRAFTS: Chronic | ICD-10-CM

## 2024-04-08 DIAGNOSIS — Z98.890 OTHER SPECIFIED POSTPROCEDURAL STATES: Chronic | ICD-10-CM

## 2024-04-08 DIAGNOSIS — Z98.61 CORONARY ANGIOPLASTY STATUS: Chronic | ICD-10-CM

## 2024-04-08 DIAGNOSIS — Z98.41 CATARACT EXTRACTION STATUS, RIGHT EYE: Chronic | ICD-10-CM

## 2024-04-08 DIAGNOSIS — Z87.09 PERSONAL HISTORY OF OTHER DISEASES OF THE RESPIRATORY SYSTEM: Chronic | ICD-10-CM

## 2024-04-08 DIAGNOSIS — C44.90 UNSPECIFIED MALIGNANT NEOPLASM OF SKIN, UNSPECIFIED: Chronic | ICD-10-CM

## 2024-04-08 LAB
ALBUMIN SERPL ELPH-MCNC: 3.3 G/DL — SIGNIFICANT CHANGE UP (ref 3.3–5)
ALP SERPL-CCNC: 75 U/L — SIGNIFICANT CHANGE UP (ref 40–120)
ALT FLD-CCNC: 15 U/L — SIGNIFICANT CHANGE UP (ref 10–45)
ANION GAP SERPL CALC-SCNC: 12 MMOL/L — SIGNIFICANT CHANGE UP (ref 5–17)
AST SERPL-CCNC: 24 U/L — SIGNIFICANT CHANGE UP (ref 10–40)
BASOPHILS # BLD AUTO: 0.03 K/UL — SIGNIFICANT CHANGE UP (ref 0–0.2)
BASOPHILS NFR BLD AUTO: 0.4 % — SIGNIFICANT CHANGE UP (ref 0–2)
BILIRUB SERPL-MCNC: 0.3 MG/DL — SIGNIFICANT CHANGE UP (ref 0.2–1.2)
BLD GP AB SCN SERPL QL: NEGATIVE — SIGNIFICANT CHANGE UP
BUN SERPL-MCNC: 24 MG/DL — HIGH (ref 7–23)
CALCIUM SERPL-MCNC: 9.7 MG/DL — SIGNIFICANT CHANGE UP (ref 8.4–10.5)
CHLORIDE SERPL-SCNC: 104 MMOL/L — SIGNIFICANT CHANGE UP (ref 96–108)
CO2 SERPL-SCNC: 23 MMOL/L — SIGNIFICANT CHANGE UP (ref 22–31)
CREAT SERPL-MCNC: 1.16 MG/DL — SIGNIFICANT CHANGE UP (ref 0.5–1.3)
EGFR: 61 ML/MIN/1.73M2 — SIGNIFICANT CHANGE UP
EOSINOPHIL # BLD AUTO: 0.07 K/UL — SIGNIFICANT CHANGE UP (ref 0–0.5)
EOSINOPHIL NFR BLD AUTO: 1 % — SIGNIFICANT CHANGE UP (ref 0–6)
GLUCOSE SERPL-MCNC: 123 MG/DL — HIGH (ref 70–99)
HCT VFR BLD CALC: 34.6 % — LOW (ref 39–50)
HGB BLD-MCNC: 10.4 G/DL — LOW (ref 13–17)
IMM GRANULOCYTES NFR BLD AUTO: 0.4 % — SIGNIFICANT CHANGE UP (ref 0–0.9)
LYMPHOCYTES # BLD AUTO: 0.98 K/UL — LOW (ref 1–3.3)
LYMPHOCYTES # BLD AUTO: 14.2 % — SIGNIFICANT CHANGE UP (ref 13–44)
MCHC RBC-ENTMCNC: 27.6 PG — SIGNIFICANT CHANGE UP (ref 27–34)
MCHC RBC-ENTMCNC: 30.1 GM/DL — LOW (ref 32–36)
MCV RBC AUTO: 91.8 FL — SIGNIFICANT CHANGE UP (ref 80–100)
MONOCYTES # BLD AUTO: 0.44 K/UL — SIGNIFICANT CHANGE UP (ref 0–0.9)
MONOCYTES NFR BLD AUTO: 6.4 % — SIGNIFICANT CHANGE UP (ref 2–14)
NEUTROPHILS # BLD AUTO: 5.36 K/UL — SIGNIFICANT CHANGE UP (ref 1.8–7.4)
NEUTROPHILS NFR BLD AUTO: 77.6 % — HIGH (ref 43–77)
NRBC # BLD: 0 /100 WBCS — SIGNIFICANT CHANGE UP (ref 0–0)
OB PNL STL: NEGATIVE — SIGNIFICANT CHANGE UP
PLATELET # BLD AUTO: 343 K/UL — SIGNIFICANT CHANGE UP (ref 150–400)
POTASSIUM SERPL-MCNC: 4.6 MMOL/L — SIGNIFICANT CHANGE UP (ref 3.5–5.3)
POTASSIUM SERPL-SCNC: 4.6 MMOL/L — SIGNIFICANT CHANGE UP (ref 3.5–5.3)
PROT SERPL-MCNC: 7 G/DL — SIGNIFICANT CHANGE UP (ref 6–8.3)
RBC # BLD: 3.77 M/UL — LOW (ref 4.2–5.8)
RBC # FLD: 15.9 % — HIGH (ref 10.3–14.5)
RH IG SCN BLD-IMP: POSITIVE — SIGNIFICANT CHANGE UP
SODIUM SERPL-SCNC: 139 MMOL/L — SIGNIFICANT CHANGE UP (ref 135–145)
WBC # BLD: 6.91 K/UL — SIGNIFICANT CHANGE UP (ref 3.8–10.5)
WBC # FLD AUTO: 6.91 K/UL — SIGNIFICANT CHANGE UP (ref 3.8–10.5)

## 2024-04-08 PROCEDURE — 86901 BLOOD TYPING SEROLOGIC RH(D): CPT

## 2024-04-08 PROCEDURE — 99284 EMERGENCY DEPT VISIT MOD MDM: CPT

## 2024-04-08 PROCEDURE — 86850 RBC ANTIBODY SCREEN: CPT

## 2024-04-08 PROCEDURE — 85025 COMPLETE CBC W/AUTO DIFF WBC: CPT

## 2024-04-08 PROCEDURE — 80053 COMPREHEN METABOLIC PANEL: CPT

## 2024-04-08 PROCEDURE — 86900 BLOOD TYPING SEROLOGIC ABO: CPT

## 2024-04-08 PROCEDURE — 36415 COLL VENOUS BLD VENIPUNCTURE: CPT

## 2024-04-08 PROCEDURE — 82272 OCCULT BLD FECES 1-3 TESTS: CPT

## 2024-04-08 NOTE — ED ADULT NURSE NOTE - SUICIDE SCREENING QUESTION 3
Received a PA request  for Testosterone Cypionate 200MG/ML IM SOLN. testosterone cypionate (DEPOTESTOTERONE CYPIONATE) 200 MG/ML injection [[id number]]  ENDED. Please advise. Thank you. Patient unable to complete

## 2024-04-08 NOTE — ED PROVIDER NOTE - PROGRESS NOTE DETAILS
MD Manjarrez: Labs unactionable. FOBT negative. Likely symptoms secondary to Kaopectate side effects. Pt was re-evaluated at bedside, VSS, feeling better overall. Results were discussed with patient as well as return precautions and follow up plan with PCP and/or GI. Time was taken to answer any questions that the patient had before providing them with discharge paperwork.

## 2024-04-08 NOTE — ED PROVIDER NOTE - PATIENT PORTAL LINK FT
You can access the FollowMyHealth Patient Portal offered by [facility] by registering at the following website: [email or web address]. By joining [employer]’s FollowMyHealth portal, you will also be able to view your health information using other applications (apps) compatible with our system.

## 2024-04-08 NOTE — ED PROVIDER NOTE - CLINICAL SUMMARY MEDICAL DECISION MAKING FREE TEXT BOX
88-year-old male, past medical history of anemia, CAD status post stents, CKD, hypertension, COPD, hyperlipidemia, and melanoma, presents to ED complaining of dark stools x 1 to 2 weeks.  Patient was sent to ED by PCP for evaluation.  Per son, patient has been dealing with diarrhea, so son's been giving patient Kaopectate x 1 week.  Patient now feeling constipated.  (Kaopectate known side effects include constipation and dark-colored stool).  Patient denies any fever/chills, chest pain/shortness of breath, abdominal pain, urinary symptoms, lightheadedness/dizziness, weakness/numbness, rashes or any other symptoms at this time.    Vital signs stable.  Physical exam significant for elderly appearing male, in no acute respiratory distress.  Head is normocephalic is atraumatic.  Extraocular was intact.  Pupils equal round reactive to light.  No conjunctival pallor.  Oropharynx is clear.  Neck supple.  Heart is regular rate and rhythm without murmur.  Lungs clear to auscultation bilaterally.  Abdomen is soft and nontender/nondistended.  No lower extremity edema.  Rectal exam (chaperoned by ORTIZ Peterson): Dark stool noted on BHARAT, no bright red blood, no hemorrhoids palpated, large soft stool ball palpated in rectal vault.  Pulses are 2+ throughout.  Skin is warm and well-perfused without rashes.  Neuroexam is nonfocal.    Differential diagnosis includes but not limited to upper GI bleed versus medication side effect. Given benign abdominal exam, no need for CT abdomen pelvis today.  Patient is otherwise stable. Manual stool disimpaction performed at bedside, with some relief to patient.  Will check basic labs/electrolytes and fecal occult blood test.  If workup is unremarkable, likely symptoms secondary to Kaopectate.  Will recommend patient DC medication and possibly start stool regimen.  Will recommend follow-up with patient's outpatient GI.

## 2024-04-08 NOTE — ED PROVIDER NOTE - NSFOLLOWUPINSTRUCTIONS_ED_ALL_ED_FT
You were seen in the Emergency Department for dark-colored stool.    Your blood work and fecal-occult blood test were unremarkable. Likely your symptoms were due to your Kaopectate medication.    Please discontinue taking Kaopectate. You may use senna or colace outpatient as needed to help constipation. A high-fiber diet should also be started.    Please follow up with your outpatient Gastroenterologist at your earliest convenience for continued care.    Return to ED immediately if develop worsening abdominal pain, bright red blood from rectum, uncontrollable vomiting (of blood), passing out, or any other worsening symptoms.

## 2024-04-08 NOTE — ED ADULT NURSE NOTE - NSFALLASSESSNEED_ED_ALL_ED
Lisdexamfetamine Dimesylate  40MG / Capsule  Rx# [id number] Stimulant Qty: 30  Days: 30  Refills: 0 Prescribed: 2018  Dispensed: 2018 CHOLO MAN  [address] [facility], [address] [facility].  [address] [facility], [address] [address]  : 1965 [id number] [facility], [address]  Pay Type: Private Pay     yes

## 2024-04-08 NOTE — ED ADULT TRIAGE NOTE - CHIEF COMPLAINT QUOTE
dc on 3/12 for abdominal pain, 2-3 weeks of "dark stools" sent here from PCP office  Denies chest pain, Dizziness, SOB

## 2024-04-08 NOTE — ED ADULT NURSE REASSESSMENT NOTE - NS ED NURSE REASSESS COMMENT FT1
Received patient from ORTIZ Peterson, patient at baseline mental status, able to make needs known, pending non-emergent transportation, comfort and safety provided.

## 2024-04-08 NOTE — ED ADULT NURSE NOTE - OBJECTIVE STATEMENT
PT is an 88 year old A&OX2 male with PMH of Caballero's esophagus, melanoma with radiation and immunotherapy 2 years ago, CKD, and hypotension on Midodrine who presents to the ED from home with c/o dark stools. PT's son at the bedside, serves as caretaker and historian, states PT has been having diarrhea for 2-3 weeks since last being seen in this ED on 3/12. Son states he has been giving PT Kaopectate for the diarrhea. PT's son states PT is incontinent in a diaper and bedbound at baseline. PT has an aide 4 days per week for 8 hours per day. PT states he feels weak. PT denies chest pain, SOB, N/V, dizziness, and abdominal pain. PT is resting comfortably in bed, breathing unlabored on room air, and speaking in complete sentences. Abdomen is soft, non-tender, and non-distended. Skin is warm and dry, no diaphoresis noted. No edema noted to B/L extremities. PT able to move all extremities spontaneously, sensation intact. IV access established 20G in right AC. PT placed in hospital gown. Safety and comfort maintained. Son at the bedside.

## 2024-05-01 ENCOUNTER — APPOINTMENT (OUTPATIENT)
Dept: DERMATOLOGY | Facility: CLINIC | Age: 89
End: 2024-05-01

## 2024-05-07 LAB
ALBUMIN SERPL ELPH-MCNC: 4 G/DL
ALP BLD-CCNC: 91 U/L
ALT SERPL-CCNC: 11 U/L
ANION GAP SERPL CALC-SCNC: 14 MMOL/L
AST SERPL-CCNC: 17 U/L
BILIRUB DIRECT SERPL-MCNC: 0.1 MG/DL
BILIRUB INDIRECT SERPL-MCNC: 0.2 MG/DL
BILIRUB SERPL-MCNC: 0.3 MG/DL
BUN SERPL-MCNC: 25 MG/DL
CALCIUM SERPL-MCNC: 10 MG/DL
CHLORIDE SERPL-SCNC: 102 MMOL/L
CHOLEST SERPL-MCNC: 113 MG/DL
CK SERPL-CCNC: 31 U/L
CO2 SERPL-SCNC: 22 MMOL/L
CREAT SERPL-MCNC: 1.45 MG/DL
EGFR: 47 ML/MIN/1.73M2
ESTIMATED AVERAGE GLUCOSE: 120 MG/DL
FERRITIN SERPL-MCNC: 316 NG/ML
FOLATE SERPL-MCNC: >20 NG/ML
GLUCOSE SERPL-MCNC: 90 MG/DL
HBA1C MFR BLD HPLC: 5.8 %
HDLC SERPL-MCNC: 33 MG/DL
IRON SATN MFR SERPL: 17 %
IRON SERPL-MCNC: 40 UG/DL
LDLC SERPL CALC-MCNC: 66 MG/DL
NONHDLC SERPL-MCNC: 80 MG/DL
POTASSIUM SERPL-SCNC: 4.2 MMOL/L
PROT SERPL-MCNC: 7.1 G/DL
SODIUM SERPL-SCNC: 139 MMOL/L
T4 FREE SERPL-MCNC: 1.2 NG/DL
TIBC SERPL-MCNC: 233 UG/DL
TRANSFERRIN SERPL-MCNC: 156 MG/DL
TRIGL SERPL-MCNC: 65 MG/DL
TSH SERPL-ACNC: 3.17 UIU/ML
UIBC SERPL-MCNC: 192 UG/DL
VIT B12 SERPL-MCNC: 870 PG/ML

## 2024-05-14 ENCOUNTER — NON-APPOINTMENT (OUTPATIENT)
Age: 89
End: 2024-05-14

## 2024-05-22 NOTE — ED PROVIDER NOTE - PMH
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  
done

## 2024-08-13 NOTE — ASU PATIENT PROFILE, ADULT - BRADEN SCORE (IF 18 OR LESS ACTIVATE SKIN INJURY RISK INCREASED GUIDELINE), MLM
Detail Level: Detailed Quality 226: Preventive Care And Screening: Tobacco Use: Screening And Cessation Intervention: Patient screened for tobacco use and is an ex/non-smoker Quality 431: Preventive Care And Screening: Unhealthy Alcohol Use - Screening: Patient not identified as an unhealthy alcohol user when screened for unhealthy alcohol use using a systematic screening method Quality 130: Documentation Of Current Medications In The Medical Record: Current Medications Documented 22

## 2024-10-29 NOTE — H&P PST ADULT - TOBACCO USE
over the medial joint line. No lateral joint line or patellar tendon tenderness.      Instability Tests: Anterior drawer test negative. Posterior drawer test negative. Anterior Lachman test negative. Medial Tami test negative and lateral Tami test negative.      Comments: Anterior patella tenderness   Skin:     General: Skin is warm and dry.   Neurological:      Mental Status: He is alert and oriented to person, place, and time.         I have reviewed and interpreted all of the currently available lab results from this visit (ifapplicable):  No results found for this visit on 10/29/24.   Radiographs (if obtained):  [] The following radiograph wasinterpreted by myself in the absence of a radiologist:   [] Radiologist's Report Reviewed:  XR KNEE LEFT (3 VIEWS)   Final Result      There is probably a small left knee joint effusion present.   Mild degenerative changes of the left knee are most apparent at the   patellofemoral and medial compartments. No definite joint space narrowing is   seen.      No acute fracture or dislocation is evident.   If clinical concern persists, short-term follow-up imaging may be performed to   rule out a currently occult fracture.      Electronically signed by Matthew Carnes MD            EKG (if obtained): (All EKG's are interpreted by myself in the absence of a cardiologist)    Chart review shows recent radiographs:  XR CHEST PORTABLE    Result Date: 10/9/2024  Chest X-ray HISTORY: Chest pain, cough, SOB COMPARISON: 12/24/2018 TECHNIQUE: AP/PA view of the chest was obtained. FINDINGS: The cardiac silhouette is enlarged with mild pulmonary edema. No consolidation, pleural effusion, or pneumothorax is seen. The bony structures are intact.     Mild pulmonary edema. Electronically signed by Miguel Vasquez      MDM:      Patient presents to the ED with knee pain.  X-ray reveals questionable joint effusion.  He is able to ambulate has been limping.  He does not require crutches short course  Former smoker

## 2025-07-16 NOTE — H&P PST ADULT - RS GEN PE MLT RESP DETAILS PC
Instructions: This plan will send the code FBSE to the PM system.  DO NOT or CHANGE the price.
Detail Level: Simple
Body Of Note (Please Add Your Own Text Here): recommend skin cancer screening q 6 months
Price (Do Not Change): 0.00
clear to auscultation bilaterally

## (undated) DEVICE — BASIN SET DOUBLE

## (undated) DEVICE — ELCTR BOVIE TIP BLADE INSULATED 2.75" EDGE

## (undated) DEVICE — DRAPE TOWEL BLUE 17" X 24"

## (undated) DEVICE — WARMING BLANKET FULL ADULT

## (undated) DEVICE — POSITIONER STRAP ARMBOARD VELCRO TS-30

## (undated) DEVICE — ELCTR GROUNDING PAD ADULT COVIDIEN

## (undated) DEVICE — SUT SILK 2-0 30" SH

## (undated) DEVICE — GLV 8 PROTEXIS (WHITE)

## (undated) DEVICE — SUT PROLENE 4-0 36" SH

## (undated) DEVICE — MIDAS REX LEGEND BALL FLUTED LG BORE 5.0MM X 14CM

## (undated) DEVICE — DRAPE 3/4 SHEET 52X76"

## (undated) DEVICE — DRAPE SPLIT SHEET 77" X 120"

## (undated) DEVICE — SOL IRR POUR NS 0.9% 500ML

## (undated) DEVICE — PREP BETADINE SPONGE STICKS

## (undated) DEVICE — VENODYNE/SCD SLEEVE CALF MEDIUM

## (undated) DEVICE — PACK MINOR WITH LAP

## (undated) DEVICE — LABELS BLANK W PEN

## (undated) DEVICE — DRAPE INSTRUMENT POUCH 6.75" X 11"